# Patient Record
Sex: FEMALE | Race: BLACK OR AFRICAN AMERICAN | Employment: OTHER
[De-identification: names, ages, dates, MRNs, and addresses within clinical notes are randomized per-mention and may not be internally consistent; named-entity substitution may affect disease eponyms.]

---

## 2017-01-12 ENCOUNTER — OFFICE VISIT (OUTPATIENT)
Facility: CLINIC | Age: 67
End: 2017-01-12

## 2017-01-12 VITALS
WEIGHT: 232 LBS | BODY MASS INDEX: 38.65 KG/M2 | TEMPERATURE: 98.4 F | DIASTOLIC BLOOD PRESSURE: 80 MMHG | OXYGEN SATURATION: 98 % | SYSTOLIC BLOOD PRESSURE: 128 MMHG | HEART RATE: 68 BPM | HEIGHT: 65 IN

## 2017-01-12 DIAGNOSIS — M25.511 CHRONIC RIGHT SHOULDER PAIN: ICD-10-CM

## 2017-01-12 DIAGNOSIS — M15.9 PRIMARY OSTEOARTHRITIS INVOLVING MULTIPLE JOINTS: ICD-10-CM

## 2017-01-12 DIAGNOSIS — M79.7 FIBROMYALGIA: ICD-10-CM

## 2017-01-12 DIAGNOSIS — E66.9 OBESITY (BMI 30-39.9): ICD-10-CM

## 2017-01-12 DIAGNOSIS — G47.30 SLEEP APNEA, UNSPECIFIED TYPE: ICD-10-CM

## 2017-01-12 DIAGNOSIS — E78.5 HYPERLIPIDEMIA, UNSPECIFIED HYPERLIPIDEMIA TYPE: ICD-10-CM

## 2017-01-12 DIAGNOSIS — I10 ESSENTIAL HYPERTENSION: Primary | ICD-10-CM

## 2017-01-12 DIAGNOSIS — E03.8 OTHER SPECIFIED HYPOTHYROIDISM: ICD-10-CM

## 2017-01-12 DIAGNOSIS — G89.29 CHRONIC RIGHT SHOULDER PAIN: ICD-10-CM

## 2017-01-12 PROCEDURE — 99214 OFFICE O/P EST MOD 30 MIN: CPT | Performed by: FAMILY MEDICINE

## 2017-01-12 PROCEDURE — 20610 DRAIN/INJ JOINT/BURSA W/O US: CPT | Performed by: FAMILY MEDICINE

## 2017-01-12 RX ORDER — LIDOCAINE HYDROCHLORIDE 20 MG/ML
1 INJECTION, SOLUTION INFILTRATION; PERINEURAL ONCE
Status: COMPLETED | OUTPATIENT
Start: 2017-01-12 | End: 2017-01-12

## 2017-01-12 RX ORDER — TRIAMCINOLONE ACETONIDE 40 MG/ML
40 INJECTION, SUSPENSION INTRA-ARTICULAR; INTRAMUSCULAR ONCE
Status: COMPLETED | OUTPATIENT
Start: 2017-01-12 | End: 2017-01-12

## 2017-01-12 RX ADMIN — LIDOCAINE HYDROCHLORIDE 1 ML: 20 INJECTION, SOLUTION INFILTRATION; PERINEURAL at 15:41

## 2017-01-12 RX ADMIN — TRIAMCINOLONE ACETONIDE 40 MG: 40 INJECTION, SUSPENSION INTRA-ARTICULAR; INTRAMUSCULAR at 15:42

## 2017-01-16 DIAGNOSIS — I10 ESSENTIAL HYPERTENSION: ICD-10-CM

## 2017-01-16 RX ORDER — BUPROPION HYDROCHLORIDE 150 MG/1
150 TABLET ORAL EVERY MORNING
Qty: 90 TABLET | Refills: 1 | Status: SHIPPED | OUTPATIENT
Start: 2017-01-16 | End: 2017-05-24 | Stop reason: DRUGHIGH

## 2017-01-16 RX ORDER — LEVOTHYROXINE SODIUM 0.12 MG/1
TABLET ORAL
Qty: 30 TABLET | Refills: 5 | Status: SHIPPED | OUTPATIENT
Start: 2017-01-16 | End: 2017-08-29 | Stop reason: SDUPTHER

## 2017-01-16 RX ORDER — LIDOCAINE 50 MG/G
2 PATCH TOPICAL DAILY PRN
Qty: 30 PATCH | Refills: 5 | Status: SHIPPED | OUTPATIENT
Start: 2017-01-16 | End: 2017-01-30 | Stop reason: SDUPTHER

## 2017-01-16 RX ORDER — ERGOCALCIFEROL (VITAMIN D2) 1250 MCG
50000 CAPSULE ORAL
Qty: 12 CAPSULE | Refills: 1 | Status: SHIPPED | OUTPATIENT
Start: 2017-01-16 | End: 2017-08-29 | Stop reason: SDUPTHER

## 2017-01-24 DIAGNOSIS — L85.3 DRY SKIN: ICD-10-CM

## 2017-01-25 RX ORDER — AMMONIUM LACTATE 12 G/100G
LOTION TOPICAL
Qty: 57 G | Refills: 2 | Status: SHIPPED | OUTPATIENT
Start: 2017-01-25 | End: 2017-09-03 | Stop reason: SDUPTHER

## 2017-01-30 RX ORDER — LIDOCAINE 50 MG/G
2 PATCH TOPICAL DAILY PRN
Qty: 30 PATCH | Refills: 5 | Status: SHIPPED | OUTPATIENT
Start: 2017-01-30 | End: 2017-02-16 | Stop reason: SDUPTHER

## 2017-02-09 ENCOUNTER — HOSPITAL ENCOUNTER (OUTPATIENT)
Dept: PHARMACY | Age: 67
Setting detail: THERAPIES SERIES
Discharge: HOME | End: 2017-02-09
Payer: COMMERCIAL

## 2017-02-09 LAB
INR BLD: 2.2
PROTIME: 26.6 SECONDS

## 2017-02-09 PROCEDURE — 85610 PROTHROMBIN TIME: CPT

## 2017-02-09 PROCEDURE — G0463 HOSPITAL OUTPT CLINIC VISIT: HCPCS

## 2017-02-16 ENCOUNTER — OFFICE VISIT (OUTPATIENT)
Facility: CLINIC | Age: 67
End: 2017-02-16

## 2017-02-16 VITALS
WEIGHT: 235.4 LBS | DIASTOLIC BLOOD PRESSURE: 76 MMHG | OXYGEN SATURATION: 98 % | BODY MASS INDEX: 39.22 KG/M2 | TEMPERATURE: 98 F | SYSTOLIC BLOOD PRESSURE: 128 MMHG | HEART RATE: 80 BPM

## 2017-02-16 DIAGNOSIS — E66.9 OBESITY (BMI 30-39.9): ICD-10-CM

## 2017-02-16 DIAGNOSIS — M25.511 CHRONIC PAIN OF BOTH SHOULDERS: ICD-10-CM

## 2017-02-16 DIAGNOSIS — M15.9 PRIMARY OSTEOARTHRITIS INVOLVING MULTIPLE JOINTS: ICD-10-CM

## 2017-02-16 DIAGNOSIS — F32.A DEPRESSION, UNSPECIFIED DEPRESSION TYPE: ICD-10-CM

## 2017-02-16 DIAGNOSIS — E03.8 OTHER SPECIFIED HYPOTHYROIDISM: ICD-10-CM

## 2017-02-16 DIAGNOSIS — N30.00 ACUTE CYSTITIS WITHOUT HEMATURIA: ICD-10-CM

## 2017-02-16 DIAGNOSIS — E78.5 HYPERLIPIDEMIA, UNSPECIFIED HYPERLIPIDEMIA TYPE: ICD-10-CM

## 2017-02-16 DIAGNOSIS — M25.512 CHRONIC PAIN OF BOTH SHOULDERS: ICD-10-CM

## 2017-02-16 DIAGNOSIS — I10 ESSENTIAL HYPERTENSION: ICD-10-CM

## 2017-02-16 DIAGNOSIS — R30.0 DYSURIA: ICD-10-CM

## 2017-02-16 DIAGNOSIS — M79.7 FIBROMYALGIA: Primary | ICD-10-CM

## 2017-02-16 DIAGNOSIS — G89.29 CHRONIC PAIN OF BOTH SHOULDERS: ICD-10-CM

## 2017-02-16 DIAGNOSIS — Z12.39 BREAST CANCER SCREENING: ICD-10-CM

## 2017-02-16 DIAGNOSIS — F41.9 ANXIETY: ICD-10-CM

## 2017-02-16 DIAGNOSIS — G47.30 SLEEP APNEA, UNSPECIFIED TYPE: ICD-10-CM

## 2017-02-16 LAB
BILIRUBIN, POC: ABNORMAL
BLOOD URINE, POC: ABNORMAL
CLARITY, POC: ABNORMAL
COLOR, POC: YELLOW
GLUCOSE URINE, POC: ABNORMAL
KETONES, POC: ABNORMAL
LEUKOCYTE EST, POC: ABNORMAL
NITRITE, POC: ABNORMAL
PH, POC: 7.5
PROTEIN, POC: ABNORMAL
SPECIFIC GRAVITY, POC: 1.02
UROBILINOGEN, POC: ABNORMAL

## 2017-02-16 PROCEDURE — 99215 OFFICE O/P EST HI 40 MIN: CPT | Performed by: FAMILY MEDICINE

## 2017-02-16 PROCEDURE — 81002 URINALYSIS NONAUTO W/O SCOPE: CPT | Performed by: FAMILY MEDICINE

## 2017-02-16 RX ORDER — LIDOCAINE 50 MG/G
2 PATCH TOPICAL DAILY PRN
Qty: 30 PATCH | Refills: 1 | Status: SHIPPED | OUTPATIENT
Start: 2017-02-16 | End: 2017-04-13 | Stop reason: SDUPTHER

## 2017-02-16 RX ORDER — NITROFURANTOIN 25; 75 MG/1; MG/1
100 CAPSULE ORAL 2 TIMES DAILY
Qty: 20 CAPSULE | Refills: 0 | Status: SHIPPED | OUTPATIENT
Start: 2017-02-16 | End: 2017-02-26

## 2017-02-21 ENCOUNTER — TELEPHONE (OUTPATIENT)
Facility: CLINIC | Age: 67
End: 2017-02-21

## 2017-02-27 DIAGNOSIS — M79.7 FIBROMYALGIA: ICD-10-CM

## 2017-03-01 ENCOUNTER — OFFICE VISIT (OUTPATIENT)
Facility: CLINIC | Age: 67
End: 2017-03-01

## 2017-03-01 VITALS
SYSTOLIC BLOOD PRESSURE: 152 MMHG | WEIGHT: 236.6 LBS | DIASTOLIC BLOOD PRESSURE: 90 MMHG | BODY MASS INDEX: 39.42 KG/M2 | OXYGEN SATURATION: 97 % | TEMPERATURE: 98.6 F | HEART RATE: 69 BPM

## 2017-03-01 DIAGNOSIS — F32.A DEPRESSION, UNSPECIFIED DEPRESSION TYPE: ICD-10-CM

## 2017-03-01 DIAGNOSIS — F41.9 ANXIETY: ICD-10-CM

## 2017-03-01 DIAGNOSIS — E03.8 OTHER SPECIFIED HYPOTHYROIDISM: ICD-10-CM

## 2017-03-01 DIAGNOSIS — G47.33 OBSTRUCTIVE SLEEP APNEA SYNDROME: ICD-10-CM

## 2017-03-01 DIAGNOSIS — R42 VERTIGO: Primary | ICD-10-CM

## 2017-03-01 DIAGNOSIS — E66.9 OBESITY (BMI 30-39.9): ICD-10-CM

## 2017-03-01 DIAGNOSIS — M79.7 FIBROMYALGIA: ICD-10-CM

## 2017-03-01 DIAGNOSIS — I10 ESSENTIAL HYPERTENSION: ICD-10-CM

## 2017-03-01 DIAGNOSIS — E78.5 HYPERLIPIDEMIA, UNSPECIFIED HYPERLIPIDEMIA TYPE: ICD-10-CM

## 2017-03-01 PROCEDURE — 99214 OFFICE O/P EST MOD 30 MIN: CPT | Performed by: FAMILY MEDICINE

## 2017-03-01 RX ORDER — VALSARTAN 160 MG/1
160 TABLET ORAL DAILY
Qty: 30 TABLET | Refills: 3 | Status: SHIPPED | OUTPATIENT
Start: 2017-03-01 | End: 2017-07-26 | Stop reason: SDUPTHER

## 2017-03-01 RX ORDER — ACETAMINOPHEN AND CODEINE PHOSPHATE 300; 30 MG/1; MG/1
TABLET ORAL
Qty: 120 TABLET | Refills: 0 | Status: SHIPPED | OUTPATIENT
Start: 2017-03-01 | End: 2017-03-31 | Stop reason: SDUPTHER

## 2017-03-01 RX ORDER — TRAMADOL HYDROCHLORIDE 50 MG/1
TABLET ORAL
Qty: 180 TABLET | Refills: 0 | Status: SHIPPED | OUTPATIENT
Start: 2017-03-01 | End: 2017-03-31 | Stop reason: SDUPTHER

## 2017-03-02 ENCOUNTER — TELEPHONE (OUTPATIENT)
Facility: CLINIC | Age: 67
End: 2017-03-02

## 2017-03-02 RX ORDER — ACETAMINOPHEN AND CODEINE PHOSPHATE 300; 30 MG/1; MG/1
TABLET ORAL
Qty: 120 TABLET | Refills: 0 | OUTPATIENT
Start: 2017-03-02

## 2017-03-02 RX ORDER — TRAMADOL HYDROCHLORIDE 50 MG/1
TABLET ORAL
Qty: 180 TABLET | Refills: 0 | OUTPATIENT
Start: 2017-03-02

## 2017-03-09 ENCOUNTER — APPOINTMENT (OUTPATIENT)
Dept: PHARMACY | Age: 67
End: 2017-03-09
Payer: COMMERCIAL

## 2017-03-10 ENCOUNTER — HOSPITAL ENCOUNTER (OUTPATIENT)
Dept: PHARMACY | Age: 67
Setting detail: THERAPIES SERIES
Discharge: HOME | End: 2017-03-10
Payer: COMMERCIAL

## 2017-03-10 LAB
INR BLD: 2.3
PROTIME: 28 SECONDS

## 2017-03-10 PROCEDURE — 85610 PROTHROMBIN TIME: CPT

## 2017-03-10 PROCEDURE — G0463 HOSPITAL OUTPT CLINIC VISIT: HCPCS

## 2017-04-07 ENCOUNTER — HOSPITAL ENCOUNTER (OUTPATIENT)
Dept: PHARMACY | Age: 67
Setting detail: THERAPIES SERIES
End: 2017-04-07
Payer: COMMERCIAL

## 2017-04-12 ENCOUNTER — HOSPITAL ENCOUNTER (OUTPATIENT)
Dept: PHARMACY | Age: 67
Setting detail: THERAPIES SERIES
Discharge: HOME | End: 2017-04-12
Payer: COMMERCIAL

## 2017-04-12 ENCOUNTER — APPOINTMENT (OUTPATIENT)
Dept: PHARMACY | Age: 67
End: 2017-04-12
Payer: COMMERCIAL

## 2017-04-12 LAB
INR BLD: 2
PROTIME: 24.1 SECONDS

## 2017-04-12 PROCEDURE — G0463 HOSPITAL OUTPT CLINIC VISIT: HCPCS

## 2017-04-12 PROCEDURE — 85610 PROTHROMBIN TIME: CPT

## 2017-04-13 DIAGNOSIS — M25.512 CHRONIC PAIN OF BOTH SHOULDERS: ICD-10-CM

## 2017-04-13 DIAGNOSIS — M79.7 FIBROMYALGIA: ICD-10-CM

## 2017-04-13 DIAGNOSIS — M15.9 PRIMARY OSTEOARTHRITIS INVOLVING MULTIPLE JOINTS: ICD-10-CM

## 2017-04-13 DIAGNOSIS — M25.511 CHRONIC PAIN OF BOTH SHOULDERS: ICD-10-CM

## 2017-04-13 DIAGNOSIS — G89.29 CHRONIC PAIN OF BOTH SHOULDERS: ICD-10-CM

## 2017-04-13 RX ORDER — LIDOCAINE 50 MG/G
PATCH TOPICAL
Qty: 30 PATCH | Refills: 1 | Status: SHIPPED | OUTPATIENT
Start: 2017-04-13 | End: 2017-07-26 | Stop reason: SDUPTHER

## 2017-05-02 ENCOUNTER — HOSPITAL ENCOUNTER (OUTPATIENT)
Dept: PHARMACY | Age: 67
Setting detail: THERAPIES SERIES
Discharge: HOME | End: 2017-05-02
Payer: COMMERCIAL

## 2017-05-02 DIAGNOSIS — I82.4Y9 DEEP VEIN THROMBOSIS (DVT) OF PROXIMAL LOWER EXTREMITY, UNSPECIFIED CHRONICITY, UNSPECIFIED LATERALITY (HCC): ICD-10-CM

## 2017-05-02 LAB
INR BLD: 3.1
PROTIME: 37.1 SECONDS

## 2017-05-02 PROCEDURE — 99211 OFF/OP EST MAY X REQ PHY/QHP: CPT

## 2017-05-02 PROCEDURE — 85610 PROTHROMBIN TIME: CPT

## 2017-05-16 ENCOUNTER — HOSPITAL ENCOUNTER (OUTPATIENT)
Dept: PHARMACY | Age: 67
Setting detail: THERAPIES SERIES
End: 2017-05-16
Payer: COMMERCIAL

## 2017-05-19 ENCOUNTER — HOSPITAL ENCOUNTER (OUTPATIENT)
Dept: PHARMACY | Age: 67
Setting detail: THERAPIES SERIES
Discharge: HOME | End: 2017-05-19
Payer: COMMERCIAL

## 2017-05-19 LAB
INR BLD: 2.6
PROTIME: 30.9 SECONDS

## 2017-05-19 PROCEDURE — 85610 PROTHROMBIN TIME: CPT

## 2017-05-19 PROCEDURE — 99211 OFF/OP EST MAY X REQ PHY/QHP: CPT

## 2017-06-13 RX ORDER — SIMVASTATIN 40 MG
TABLET ORAL
Qty: 90 TABLET | Refills: 10 | Status: SHIPPED | OUTPATIENT
Start: 2017-06-13 | End: 2018-07-02 | Stop reason: SDUPTHER

## 2017-06-13 RX ORDER — WARFARIN SODIUM 5 MG/1
TABLET ORAL
Qty: 90 TABLET | Refills: 10 | Status: SHIPPED | OUTPATIENT
Start: 2017-06-13 | End: 2017-06-16 | Stop reason: ALTCHOICE

## 2017-06-16 ENCOUNTER — HOSPITAL ENCOUNTER (OUTPATIENT)
Dept: PHARMACY | Age: 67
Setting detail: THERAPIES SERIES
Discharge: HOME | End: 2017-06-16
Payer: COMMERCIAL

## 2017-06-16 LAB
INR BLD: 1.9
PROTIME: 22.8 SECONDS

## 2017-06-16 PROCEDURE — 85610 PROTHROMBIN TIME: CPT

## 2017-06-16 PROCEDURE — 99211 OFF/OP EST MAY X REQ PHY/QHP: CPT

## 2017-06-30 ENCOUNTER — HOSPITAL ENCOUNTER (OUTPATIENT)
Dept: PHARMACY | Age: 67
Setting detail: THERAPIES SERIES
Discharge: HOME | End: 2017-06-30
Payer: COMMERCIAL

## 2017-06-30 LAB
INR BLD: 4.3
PROTIME: 51.2 SECONDS

## 2017-06-30 PROCEDURE — 85610 PROTHROMBIN TIME: CPT

## 2017-06-30 PROCEDURE — 99211 OFF/OP EST MAY X REQ PHY/QHP: CPT

## 2017-07-05 ENCOUNTER — HOSPITAL ENCOUNTER (OUTPATIENT)
Dept: PHARMACY | Age: 67
Setting detail: THERAPIES SERIES
Discharge: HOME | End: 2017-07-05
Payer: COMMERCIAL

## 2017-07-05 LAB
INR BLD: 1.2
PROTIME: 14 SECONDS

## 2017-07-05 PROCEDURE — 85610 PROTHROMBIN TIME: CPT

## 2017-07-05 PROCEDURE — 99211 OFF/OP EST MAY X REQ PHY/QHP: CPT

## 2017-07-10 ENCOUNTER — HOSPITAL ENCOUNTER (OUTPATIENT)
Dept: PHARMACY | Age: 67
Setting detail: THERAPIES SERIES
Discharge: HOME | End: 2017-07-10
Payer: COMMERCIAL

## 2017-07-10 LAB
INR BLD: 2.4
PROTIME: 29.3 SECONDS

## 2017-07-10 PROCEDURE — 99211 OFF/OP EST MAY X REQ PHY/QHP: CPT

## 2017-07-10 PROCEDURE — 85610 PROTHROMBIN TIME: CPT

## 2017-07-17 ENCOUNTER — APPOINTMENT (OUTPATIENT)
Dept: PHARMACY | Age: 67
End: 2017-07-17
Payer: COMMERCIAL

## 2017-07-19 ENCOUNTER — HOSPITAL ENCOUNTER (OUTPATIENT)
Dept: PHARMACY | Age: 67
Setting detail: THERAPIES SERIES
Discharge: HOME | End: 2017-07-19
Payer: COMMERCIAL

## 2017-07-19 DIAGNOSIS — I82.403 ACUTE DEEP VEIN THROMBOSIS (DVT) OF BOTH LOWER EXTREMITIES, UNSPECIFIED VEIN (HCC): ICD-10-CM

## 2017-07-19 LAB
INR BLD: 3.7
PROTIME: 44.4 SECONDS

## 2017-07-19 PROCEDURE — 99211 OFF/OP EST MAY X REQ PHY/QHP: CPT

## 2017-07-19 PROCEDURE — 85610 PROTHROMBIN TIME: CPT

## 2017-07-28 ENCOUNTER — HOSPITAL ENCOUNTER (OUTPATIENT)
Dept: PHARMACY | Age: 67
Setting detail: THERAPIES SERIES
Discharge: HOME | End: 2017-07-28
Payer: COMMERCIAL

## 2017-07-28 LAB
INR BLD: 2.5
PROTIME: 30 SECONDS

## 2017-07-28 PROCEDURE — 85610 PROTHROMBIN TIME: CPT

## 2017-07-28 PROCEDURE — 99211 OFF/OP EST MAY X REQ PHY/QHP: CPT

## 2017-08-11 ENCOUNTER — HOSPITAL ENCOUNTER (OUTPATIENT)
Dept: PHARMACY | Age: 67
Setting detail: THERAPIES SERIES
Discharge: HOME | End: 2017-08-11
Payer: COMMERCIAL

## 2017-08-11 DIAGNOSIS — I82.90 DEEP VEIN THROMBOSIS (DVT) OF NON-EXTREMITY VEIN, UNSPECIFIED CHRONICITY: ICD-10-CM

## 2017-08-11 LAB
INR BLD: 2.3
PROTIME: 27.3 SECONDS

## 2017-08-11 PROCEDURE — 99211 OFF/OP EST MAY X REQ PHY/QHP: CPT

## 2017-08-11 PROCEDURE — 85610 PROTHROMBIN TIME: CPT

## 2017-08-25 ENCOUNTER — HOSPITAL ENCOUNTER (OUTPATIENT)
Dept: PHARMACY | Age: 67
Setting detail: THERAPIES SERIES
Discharge: HOME | End: 2017-08-25
Payer: COMMERCIAL

## 2017-08-25 LAB
INR BLD: 2.9
PROTIME: 35 SECONDS

## 2017-08-25 PROCEDURE — 99211 OFF/OP EST MAY X REQ PHY/QHP: CPT

## 2017-08-25 PROCEDURE — 85610 PROTHROMBIN TIME: CPT

## 2017-08-29 RX ORDER — BUPROPION HYDROCHLORIDE 150 MG/1
TABLET ORAL
Qty: 90 TABLET | Refills: 1 | Status: SHIPPED | OUTPATIENT
Start: 2017-08-29 | End: 2017-11-16

## 2017-08-29 RX ORDER — ERGOCALCIFEROL 1.25 MG/1
CAPSULE ORAL
Qty: 12 CAPSULE | Refills: 1 | Status: SHIPPED | OUTPATIENT
Start: 2017-08-29 | End: 2018-03-13 | Stop reason: SDUPTHER

## 2017-08-29 RX ORDER — LEVOTHYROXINE SODIUM 0.12 MG/1
TABLET ORAL
Qty: 30 TABLET | Refills: 5 | Status: SHIPPED | OUTPATIENT
Start: 2017-08-29 | End: 2018-04-13 | Stop reason: SDUPTHER

## 2017-08-29 RX ORDER — FLUTICASONE PROPIONATE 50 MCG
SPRAY, SUSPENSION (ML) NASAL
Qty: 16 G | Refills: 11 | Status: SHIPPED | OUTPATIENT
Start: 2017-08-29 | End: 2019-08-02 | Stop reason: DRUGHIGH

## 2017-09-21 ENCOUNTER — HOSPITAL ENCOUNTER (OUTPATIENT)
Dept: PHARMACY | Age: 67
Setting detail: THERAPIES SERIES
Discharge: HOME | End: 2017-09-21
Payer: COMMERCIAL

## 2017-09-21 DIAGNOSIS — I82.403 DEEP VEIN THROMBOSIS (DVT) OF BOTH LOWER EXTREMITIES, UNSPECIFIED CHRONICITY, UNSPECIFIED VEIN (HCC): ICD-10-CM

## 2017-09-21 LAB
INR BLD: 1.2
PROTIME: 14.5 SECONDS

## 2017-09-21 PROCEDURE — 85610 PROTHROMBIN TIME: CPT

## 2017-09-21 PROCEDURE — 99211 OFF/OP EST MAY X REQ PHY/QHP: CPT

## 2017-09-28 ENCOUNTER — OFFICE VISIT (OUTPATIENT)
Dept: PSYCHOLOGY | Age: 67
End: 2017-09-28
Payer: COMMERCIAL

## 2017-09-28 DIAGNOSIS — F33.1 DEPRESSION, MAJOR, RECURRENT, MODERATE (HCC): Primary | ICD-10-CM

## 2017-09-28 PROCEDURE — 90791 PSYCH DIAGNOSTIC EVALUATION: CPT | Performed by: SOCIAL WORKER

## 2017-09-28 ASSESSMENT — PATIENT HEALTH QUESTIONNAIRE - PHQ9
SUM OF ALL RESPONSES TO PHQ QUESTIONS 1-9: 19
3. TROUBLE FALLING OR STAYING ASLEEP: 3
6. FEELING BAD ABOUT YOURSELF - OR THAT YOU ARE A FAILURE OR HAVE LET YOURSELF OR YOUR FAMILY DOWN: 2
SUM OF ALL RESPONSES TO PHQ9 QUESTIONS 1 & 2: 6
10. IF YOU CHECKED OFF ANY PROBLEMS, HOW DIFFICULT HAVE THESE PROBLEMS MADE IT FOR YOU TO DO YOUR WORK, TAKE CARE OF THINGS AT HOME, OR GET ALONG WITH OTHER PEOPLE: 2
7. TROUBLE CONCENTRATING ON THINGS, SUCH AS READING THE NEWSPAPER OR WATCHING TELEVISION: 2
5. POOR APPETITE OR OVEREATING: 2
2. FEELING DOWN, DEPRESSED OR HOPELESS: 3
1. LITTLE INTEREST OR PLEASURE IN DOING THINGS: 3
4. FEELING TIRED OR HAVING LITTLE ENERGY: 3
9. THOUGHTS THAT YOU WOULD BE BETTER OFF DEAD, OR OF HURTING YOURSELF: 1
8. MOVING OR SPEAKING SO SLOWLY THAT OTHER PEOPLE COULD HAVE NOTICED. OR THE OPPOSITE, BEING SO FIGETY OR RESTLESS THAT YOU HAVE BEEN MOVING AROUND A LOT MORE THAN USUAL: 0

## 2017-10-05 ENCOUNTER — HOSPITAL ENCOUNTER (OUTPATIENT)
Dept: PHARMACY | Age: 67
Setting detail: THERAPIES SERIES
Discharge: HOME | End: 2017-10-05
Payer: COMMERCIAL

## 2017-10-05 DIAGNOSIS — I82.403 DEEP VEIN THROMBOSIS (DVT) OF BOTH LOWER EXTREMITIES, UNSPECIFIED CHRONICITY, UNSPECIFIED VEIN (HCC): ICD-10-CM

## 2017-10-05 LAB
INR BLD: 4.8
PROTIME: 58 SECONDS

## 2017-10-05 PROCEDURE — 99211 OFF/OP EST MAY X REQ PHY/QHP: CPT

## 2017-10-05 PROCEDURE — 85610 PROTHROMBIN TIME: CPT

## 2017-10-05 NOTE — PROGRESS NOTES
Patient states compliant all of the time with regimen. No bleeding or thromboembolic side effects noted. No significant med or dietary changes. No significant recent illness or disease state changes. PT/INR done in office per protocol. INR was supratherapeutic. INR = 4.8, goal range 2-3    Warfarin regimen will be held for one day and decreased to 2.5 mg daily. Will retest in 1 week. Patient understands dosing directions and information discussed. Dosing schedule and follow up appointment given to patient. Progress note routed to referring physicians office.

## 2017-10-09 ENCOUNTER — HOSPITAL ENCOUNTER (OUTPATIENT)
Dept: PAIN MANAGEMENT | Age: 67
Discharge: HOME | End: 2017-10-09
Payer: COMMERCIAL

## 2017-10-12 ENCOUNTER — OFFICE VISIT (OUTPATIENT)
Dept: PSYCHOLOGY | Age: 67
End: 2017-10-12
Payer: COMMERCIAL

## 2017-10-12 ENCOUNTER — HOSPITAL ENCOUNTER (OUTPATIENT)
Dept: PHARMACY | Age: 67
Setting detail: THERAPIES SERIES
Discharge: HOME | End: 2017-10-12
Payer: COMMERCIAL

## 2017-10-12 DIAGNOSIS — F33.1 DEPRESSION, MAJOR, RECURRENT, MODERATE (HCC): Primary | ICD-10-CM

## 2017-10-12 DIAGNOSIS — I82.403 DEEP VEIN THROMBOSIS (DVT) OF BOTH LOWER EXTREMITIES, UNSPECIFIED CHRONICITY, UNSPECIFIED VEIN (HCC): ICD-10-CM

## 2017-10-12 LAB
INR BLD: 1.5
PROTIME: 17.4 SECONDS

## 2017-10-12 PROCEDURE — 90832 PSYTX W PT 30 MINUTES: CPT | Performed by: SOCIAL WORKER

## 2017-10-12 PROCEDURE — 85610 PROTHROMBIN TIME: CPT

## 2017-10-12 PROCEDURE — 99211 OFF/OP EST MAY X REQ PHY/QHP: CPT

## 2017-10-12 NOTE — PROGRESS NOTES
Medication Sig Dispense Refill    lidocaine (LIDODERM) 5 % APPLY 2 PATCHES TO THE SKIN DAILY AS NEEDED FOR PAIN 30 patch 1    traMADol (ULTRAM) 50 MG tablet TAKE TWO TABLETS BY MOUTH EVERY 8 HOURS AS NEEDED. Do not fill until 9/30/17. 180 tablet 0    acetaminophen-codeine (TYLENOL #3) 300-30 MG per tablet TAKE ONE TABLET BY MOUTH EVERY 6 HOURS AS NEEDED. Do not fill until 9/30/17. (Patient taking differently: 1 tablet every 8 hours as needed TAKE ONE TABLET BY MOUTH EVERY 6 HOURS AS NEEDED. Do not fill until 9/30/17.) 120 tablet 0    hydrocortisone 1 % cream APPLY TWO TIMES A DAY 28.35 g 1    ammonium lactate (LAC-HYDRIN) 12 % lotion APPLY TO AFFECTED AREA(S) DAILY 225 mL 1    vitamin D (ERGOCALCIFEROL) 68140 units CAPS capsule TAKE 1 CAPSULE BY MOUTH ONCE WEEKLY ON SATURDAYS 12 capsule 1    levothyroxine (SYNTHROID) 125 MCG tablet TAKE 1 TABLET BY MOUTH DAILY. 30 tablet 5    buPROPion (WELLBUTRIN XL) 150 MG extended release tablet TAKE 1 TABLET BY MOUTH EVERY MORNING 90 tablet 1    fluticasone (FLONASE) 50 MCG/ACT nasal spray INSTILL 2 SPRAYS IN EACH NOSTRIL ONCE DAILY 16 g 11    gabapentin (NEURONTIN) 600 MG tablet TAKE ONE (1) TABLET BY MOUTH 4 TIMES DAILY 120 tablet 3    valsartan (DIOVAN) 160 MG tablet Take 1 tablet by mouth daily 90 tablet 1    metoprolol tartrate (LOPRESSOR) 25 MG tablet Take 1 tablet by mouth 2 times daily 60 tablet 5    Gabapentin POWD Formula 8E: diclo3%+gaba6%+DMSO5%+prilo2%+baclofen2%+lido2%. Apply 1-2 gm topically to affected area TID- g 5    simvastatin (ZOCOR) 40 MG tablet TAKE 1 TABLET BY MOUTH EVERY NIGHT 90 tablet 10    buPROPion (WELLBUTRIN XL) 300 MG extended release tablet Take 1 tablet by mouth every morning 90 tablet 1    loratadine (CLARITIN) 10 MG tablet TAKE 1 TABLET BY MOUTH DAILY. 90 tablet 1    warfarin (COUMADIN) 5 MG tablet Take 2.5 mg by mouth daily Dosed by [facility]: 2.5 mg daily and 5 mg Thursdays.   1    acyclovir (ZOVIRAX) 200 MG capsule TAKE ONE (1) CAPSULE BY MOUTH TWICE DAILY 60 capsule 11    albuterol sulfate HFA (PROVENTIL HFA) 108 (90 BASE) MCG/ACT inhaler Inhale 1-2 puffs into the lungs every 4 hours as needed for Wheezing 1 Inhaler 0    Lancets MISC Test daily and as needed 50 each 3    Glucose Blood (BLOOD GLUCOSE TEST STRIPS) STRP Test blood sugar once daily. 50 strip 1     No current facility-administered medications for this visit. Social History:   Social History     Social History    Marital status: Single     Spouse name: N/A    Number of children: 2    Years of education: 13     Occupational History    disabled       SSI      Social History Main Topics    Smoking status: Never Smoker    Smokeless tobacco: Never Used    Alcohol use No      Comment: never used     Drug use: No    Sexual activity: No     Other Topics Concern    Not on file     Social History Narrative    No narrative on file       TOBACCO:   reports that she has never smoked. She has never used smokeless tobacco.  ETOH:   reports that she does not drink alcohol. Family History:   Family History   Problem Relation Age of Onset    Cervical Cancer Mother     Heart Disease Mother     Heart Disease Father          Assessment:     The patient returns for her follow up visit related to depression. She reports trying to complete her homework but sates,\" I got angry because I started to see how my thinking was causing me to feel certain things and I got overwhelmed and just stopped trying to do the homework. Reviewed cognitive distortions and discussed a positive visualization strategy to help the patient begin to identify and release her feelings in a healthy manner. She continues to report experiencing depression, difficulty falling and staying asleep, isolation and decreased motivation to participate in social activities with friends and family. The patient denies SI/HI.  She agreed to return in 2

## 2017-10-12 NOTE — PATIENT INSTRUCTIONS
1. Identify the cognitive distortions you use and write down an example of when you use the ones you selected. 2. Use positive visualization to identify, feel and release emotions in a healthy manner. ie Name feeling, visualize color and shape and ask to leave, then imagine the feeling leaving in a creative way . 3. Return in 2 weeks.

## 2017-10-26 ENCOUNTER — HOSPITAL ENCOUNTER (OUTPATIENT)
Dept: PHARMACY | Age: 67
Setting detail: THERAPIES SERIES
Discharge: HOME | End: 2017-10-26
Payer: COMMERCIAL

## 2017-10-26 LAB
INR BLD: 1.8
PROTIME: 21.8 SECONDS

## 2017-10-26 PROCEDURE — 99211 OFF/OP EST MAY X REQ PHY/QHP: CPT

## 2017-10-26 PROCEDURE — 85610 PROTHROMBIN TIME: CPT

## 2017-10-26 NOTE — PROGRESS NOTES
Patient states compliant all of the time with regimen. No bleeding or thromboembolic side effects noted. No significant med or dietary changes. No significant recent illness or disease state changes. PT/INR done in office per protocol. INR was subtherapeutic. INR = 1.8, goal range 2-3    Warfarin regimen will be continued at current dose 5 mg Thursday and 2.5 mg all other days. Will retest in 2 weeks. Patient understands dosing directions and information discussed. Dosing schedule and follow up appointment given to patient. Progress note routed to referring physicians office.

## 2017-11-09 ENCOUNTER — HOSPITAL ENCOUNTER (OUTPATIENT)
Dept: PHARMACY | Age: 67
Setting detail: THERAPIES SERIES
Discharge: HOME | End: 2017-11-09
Payer: COMMERCIAL

## 2017-11-09 DIAGNOSIS — I82.403 DEEP VEIN THROMBOSIS (DVT) OF BOTH LOWER EXTREMITIES, UNSPECIFIED CHRONICITY, UNSPECIFIED VEIN (HCC): ICD-10-CM

## 2017-11-09 LAB
INR BLD: 1.2
PROTIME: 13.8 SECONDS

## 2017-11-09 PROCEDURE — 85610 PROTHROMBIN TIME: CPT

## 2017-11-09 PROCEDURE — 99211 OFF/OP EST MAY X REQ PHY/QHP: CPT

## 2017-11-16 ENCOUNTER — HOSPITAL ENCOUNTER (OUTPATIENT)
Dept: PHARMACY | Age: 67
Setting detail: THERAPIES SERIES
Discharge: HOME | End: 2017-11-16
Payer: COMMERCIAL

## 2017-11-16 ENCOUNTER — HOSPITAL ENCOUNTER (OUTPATIENT)
Age: 67
Setting detail: SPECIMEN
Discharge: HOME | End: 2017-11-16
Payer: COMMERCIAL

## 2017-11-16 DIAGNOSIS — I82.403 DEEP VEIN THROMBOSIS (DVT) OF BOTH LOWER EXTREMITIES, UNSPECIFIED CHRONICITY, UNSPECIFIED VEIN (HCC): ICD-10-CM

## 2017-11-16 LAB
AMPHETAMINE SCREEN URINE: NEGATIVE
BARBITURATE SCREEN URINE: NEGATIVE
BENZODIAZEPINE SCREEN, URINE: NEGATIVE
BUPRENORPHINE URINE: ABNORMAL
CANNABINOID SCREEN URINE: NEGATIVE
COCAINE METABOLITE, URINE: NEGATIVE
INR BLD: 2.6
MDMA URINE: ABNORMAL
METHADONE SCREEN, URINE: POSITIVE
METHAMPHETAMINE, URINE: ABNORMAL
OPIATES, URINE: POSITIVE
OXYCODONE SCREEN URINE: NEGATIVE
PHENCYCLIDINE, URINE: NEGATIVE
PROPOXYPHENE, URINE: ABNORMAL
PROTIME: 31 SECONDS
TEST INFORMATION: ABNORMAL
TRICYCLIC ANTIDEPRESSANTS, UR: ABNORMAL

## 2017-11-16 PROCEDURE — 85610 PROTHROMBIN TIME: CPT

## 2017-11-16 PROCEDURE — 99211 OFF/OP EST MAY X REQ PHY/QHP: CPT

## 2017-11-30 ENCOUNTER — HOSPITAL ENCOUNTER (OUTPATIENT)
Dept: PHARMACY | Age: 67
Setting detail: THERAPIES SERIES
Discharge: HOME | End: 2017-11-30
Payer: COMMERCIAL

## 2017-11-30 DIAGNOSIS — I82.403 DEEP VEIN THROMBOSIS (DVT) OF BOTH LOWER EXTREMITIES, UNSPECIFIED CHRONICITY, UNSPECIFIED VEIN (HCC): ICD-10-CM

## 2017-11-30 LAB
INR BLD: 2.2
PROTIME: 26.2 SECONDS

## 2017-11-30 PROCEDURE — 99211 OFF/OP EST MAY X REQ PHY/QHP: CPT

## 2017-11-30 PROCEDURE — 85610 PROTHROMBIN TIME: CPT

## 2017-11-30 NOTE — PROGRESS NOTES
Patient states compliant all of the time with regimen. No bleeding or thromboembolic side effects noted. No significant med or dietary changes. Still on tramadol. Unsure when she will change to [facility]. No significant recent illness or disease state changes. PT/INR done in office per protocol. INR was therapeutic t 2.2 (goal 2-3). Warfarin regimen will be continued at current dose 5mg Mon/Wed and 2.5mg 5x weekly. Will retest in 4 weeks. Patient understands dosing directions and information discussed. Dosing schedule and follow up appointment given to patient. Progress note routed to referring physicians office.

## 2017-12-28 ENCOUNTER — HOSPITAL ENCOUNTER (OUTPATIENT)
Dept: PHARMACY | Age: 67
Setting detail: THERAPIES SERIES
Discharge: HOME | End: 2017-12-28
Payer: COMMERCIAL

## 2017-12-28 LAB
INR BLD: 2.6
PROTIME: 30.6 SECONDS

## 2017-12-28 PROCEDURE — 99211 OFF/OP EST MAY X REQ PHY/QHP: CPT

## 2017-12-28 PROCEDURE — 85610 PROTHROMBIN TIME: CPT

## 2018-01-09 ENCOUNTER — TELEPHONE (OUTPATIENT)
Dept: PHARMACY | Age: 68
End: 2018-01-09

## 2018-01-11 ENCOUNTER — HOSPITAL ENCOUNTER (OUTPATIENT)
Dept: PHARMACY | Age: 68
Setting detail: THERAPIES SERIES
Discharge: HOME | End: 2018-01-11
Payer: COMMERCIAL

## 2018-01-11 DIAGNOSIS — I82.403 DEEP VEIN THROMBOSIS (DVT) OF BOTH LOWER EXTREMITIES, UNSPECIFIED CHRONICITY, UNSPECIFIED VEIN (HCC): ICD-10-CM

## 2018-01-11 LAB
INR BLD: 2
PROTIME: 23.4 SECONDS

## 2018-01-11 PROCEDURE — 85610 PROTHROMBIN TIME: CPT

## 2018-01-11 PROCEDURE — 99211 OFF/OP EST MAY X REQ PHY/QHP: CPT

## 2018-01-11 NOTE — PROGRESS NOTES
Patient states compliant all of the time with regimen. No bleeding or thromboembolic side effects noted but does have more bruising on right arm and leg that are resolving but she does not know where they came from. No significant med or dietary changes. No significant recent illness or disease state changes. PT/INR done in office per protocol. INR was therapeutic at  2 (goal 2-3). Warfarin regimen will be continued at current dose of 5mg Mon/Wed and 2.5mg 5x weekly. Will retest in 4 weeks. Patient understands dosing directions and information discussed. Dosing schedule and follow up appointment given to patient. Progress note routed to referring physicians office.

## 2018-01-25 ENCOUNTER — HOSPITAL ENCOUNTER (OUTPATIENT)
Dept: PHARMACY | Age: 68
Setting detail: THERAPIES SERIES
Discharge: HOME | End: 2018-01-25
Payer: COMMERCIAL

## 2018-01-25 DIAGNOSIS — I82.403 DEEP VEIN THROMBOSIS (DVT) OF BOTH LOWER EXTREMITIES, UNSPECIFIED CHRONICITY, UNSPECIFIED VEIN (HCC): ICD-10-CM

## 2018-01-25 LAB
INR BLD: 1.4
PROTIME: 16.5 SECONDS

## 2018-01-25 PROCEDURE — 85610 PROTHROMBIN TIME: CPT

## 2018-01-25 PROCEDURE — 99211 OFF/OP EST MAY X REQ PHY/QHP: CPT

## 2018-02-02 ENCOUNTER — HOSPITAL ENCOUNTER (OUTPATIENT)
Dept: PHARMACY | Age: 68
Setting detail: THERAPIES SERIES
Discharge: HOME | End: 2018-02-02
Payer: COMMERCIAL

## 2018-02-02 LAB
INR BLD: 2.3
PROTIME: 27.2 SECONDS

## 2018-02-02 PROCEDURE — 99211 OFF/OP EST MAY X REQ PHY/QHP: CPT

## 2018-02-02 PROCEDURE — 85610 PROTHROMBIN TIME: CPT

## 2018-02-13 ENCOUNTER — HOSPITAL ENCOUNTER (EMERGENCY)
Age: 68
Discharge: HOME | End: 2018-02-13
Attending: EMERGENCY MEDICINE
Payer: COMMERCIAL

## 2018-02-13 ENCOUNTER — APPOINTMENT (OUTPATIENT)
Dept: GENERAL RADIOLOGY | Age: 68
End: 2018-02-13
Payer: COMMERCIAL

## 2018-02-13 VITALS
WEIGHT: 225 LBS | HEIGHT: 66 IN | HEART RATE: 83 BPM | SYSTOLIC BLOOD PRESSURE: 159 MMHG | OXYGEN SATURATION: 99 % | RESPIRATION RATE: 16 BRPM | DIASTOLIC BLOOD PRESSURE: 65 MMHG | BODY MASS INDEX: 36.16 KG/M2 | TEMPERATURE: 97.7 F

## 2018-02-13 DIAGNOSIS — G89.29 CHRONIC LEFT SHOULDER PAIN: Primary | ICD-10-CM

## 2018-02-13 DIAGNOSIS — M25.512 CHRONIC LEFT SHOULDER PAIN: Primary | ICD-10-CM

## 2018-02-13 PROCEDURE — 99283 EMERGENCY DEPT VISIT LOW MDM: CPT

## 2018-02-13 PROCEDURE — 73030 X-RAY EXAM OF SHOULDER: CPT

## 2018-02-13 RX ORDER — CYCLOBENZAPRINE HCL 10 MG
10 TABLET ORAL 3 TIMES DAILY PRN
Qty: 15 TABLET | Refills: 0 | Status: SHIPPED | OUTPATIENT
Start: 2018-02-13 | End: 2018-08-03 | Stop reason: ALTCHOICE

## 2018-02-13 RX ORDER — METHYLPREDNISOLONE 4 MG/1
TABLET ORAL
Qty: 1 KIT | Refills: 0 | Status: SHIPPED | OUTPATIENT
Start: 2018-02-13 | End: 2018-02-19

## 2018-02-13 ASSESSMENT — ENCOUNTER SYMPTOMS
SHORTNESS OF BREATH: 0
COUGH: 0
TROUBLE SWALLOWING: 0
VOMITING: 0
NAUSEA: 0

## 2018-02-13 ASSESSMENT — PAIN DESCRIPTION - DESCRIPTORS: DESCRIPTORS: ACHING

## 2018-02-13 ASSESSMENT — PAIN DESCRIPTION - LOCATION: LOCATION: SHOULDER

## 2018-02-13 ASSESSMENT — PAIN DESCRIPTION - ORIENTATION: ORIENTATION: LEFT

## 2018-02-13 ASSESSMENT — PAIN DESCRIPTION - PAIN TYPE: TYPE: ACUTE PAIN

## 2018-02-13 ASSESSMENT — PAIN SCALES - GENERAL: PAINLEVEL_OUTOF10: 9

## 2018-02-13 NOTE — ED PROVIDER NOTES
[address]  eMERGENCY dEPARTMENT eNCOUnter      Pt Name: Nima Lopez  MRN: [id number]  [place] 1950  Date of evaluation: 2/13/2018  Provider: Vanessa Dawkins [id number]       Chief Complaint   Patient presents with    Shoulder Pain     left          HISTORY OF PRESENT ILLNESS  (Location/Symptom, Timing/Onset, Context/Setting, Quality, Duration, Modifying Factors, Severity.)   Nima Lopez is a 74 y.o. female who presents to the emergency department for evaluation of  orthopedic pain:    Location/Symptom:  Left shoulder pain  Timing/Onset:  chronic  Context/Setting:  Patient presents to ED with complaints of left shoulder pain. States that pain is chronic but has gotten worse over the past few days. States she had injury in December and she felt some \"crunching noise\" in her left anterior shoulder. States she might have some arthritis in it as well. States that pain is constant, aching, worsened by movement and activity. Pain is worse and especially by activities overhead. She has been taking Tylenol 3 and applying Lidoderm patches with no improvement of her symptoms. No new injury. No numbness, tingling or weakness. No chest pain, shortness of breath, abdominal pain, nausea, vomiting, fever or chills. Quality:   Achy, sharp  Duration:   constant  Modifying Factors: Worse with movement, better with rest  Severity:   Mild-moderate      Nursing Notes were reviewed and I agree. REVIEW OF SYSTEMS    (2-9 systems for level 4, 10 or more for level 5)     Review of Systems   Constitutional: Negative for chills and fever. HENT: Negative for trouble swallowing. Respiratory: Negative for cough and shortness of breath. Cardiovascular: Negative for chest pain and palpitations. Gastrointestinal: Negative for nausea and vomiting. Musculoskeletal:        Left shoulder pain     Except as noted above the remainder of the review of systems was reviewed and negative. PAST MEDICAL HISTORY         Diagnosis Date    Affective disorder ([facility] [address]     seasonal    DVT (deep venous thrombosis) (HCC)     has history in R leg    Fibromyalgia     Hypertension     Pneumonia      Reviewed. SURGICAL HISTORY           Procedure Laterality Date    COLONOSCOPY  8/31/2011    internal hemorrhoides    DILATION AND CURETTAGE OF UTERUS      ECTOPIC PREGNANCY SURGERY Left     HYSTERECTOMY       Reviewed. CURRENT MEDICATIONS       Previous Medications    ACETAMINOPHEN-CODEINE (TYLENOL #3) 300-30 MG PER TABLET    TAKE ONE TABLET BY MOUTH EVERY 6 HOURS AS NEEDED FOR PAIN. ACYCLOVIR (ZOVIRAX) 200 MG CAPSULE    TAKE ONE (1) CAPSULE BY MOUTH TWICE DAILY    ALBUTEROL SULFATE HFA (PROVENTIL HFA) 108 (90 BASE) MCG/ACT INHALER    Inhale 1-2 puffs into the lungs every 4 hours as needed for Wheezing    AMMONIUM LACTATE (LAC-HYDRIN) 12 % LOTION    APPLY TO AFFECTED AREA(S) DAILY    BUPROPION (WELLBUTRIN XL) 300 MG EXTENDED RELEASE TABLET    Take 1 tablet by mouth every morning    FLUTICASONE (FLONASE) 50 MCG/ACT NASAL SPRAY    INSTILL 2 SPRAYS IN EACH NOSTRIL ONCE DAILY    GABAPENTIN (NEURONTIN) 800 MG TABLET    TAKE ONE (1) TABLET BY MOUTH 4 TIMES DAILY    GABAPENTIN POWD    Formula 8E: diclo3%+gaba6%+DMSO5%+prilo2%+baclofen2%+lido2%. Apply 1-2 gm topically to affected area TID-QID    GLUCOSE BLOOD (BLOOD GLUCOSE TEST STRIPS) STRP    Test blood sugar once daily. HYDROCORTISONE 1 % CREAM    APPLY TWO TIMES A DAY    LANCETS MISC    Test daily and as needed    LEVOTHYROXINE (SYNTHROID) 125 MCG TABLET    TAKE 1 TABLET BY MOUTH DAILY. LIDOCAINE (LIDODERM) 5 %    APPLY 2 PATCHES TO THE SKIN DAILY AS NEEDED FOR PAIN    LIDOCAINE (LIDODERM) 5 %    APPLY 2 PATCHES TO THE SKIN DAILY AS NEEDED FOR PAIN    LORATADINE (CLARITIN) 10 MG TABLET    TAKE 1 TABLET BY MOUTH DAILY.     METOPROLOL TARTRATE (LOPRESSOR) 25 MG TABLET    Take 1 tablet by mouth 2 times daily    SIMVASTATIN (ZOCOR) 40 MG TABLET    TAKE

## 2018-02-16 ENCOUNTER — HOSPITAL ENCOUNTER (OUTPATIENT)
Dept: PHARMACY | Age: 68
Setting detail: THERAPIES SERIES
Discharge: HOME | End: 2018-02-16
Payer: COMMERCIAL

## 2018-02-16 LAB
INR BLD: 1.9
PROTIME: 23.3 SECONDS

## 2018-02-16 PROCEDURE — 85610 PROTHROMBIN TIME: CPT

## 2018-02-16 PROCEDURE — 99211 OFF/OP EST MAY X REQ PHY/QHP: CPT

## 2018-03-09 ENCOUNTER — HOSPITAL ENCOUNTER (OUTPATIENT)
Dept: PHARMACY | Age: 68
Setting detail: THERAPIES SERIES
Discharge: HOME | End: 2018-03-09
Payer: COMMERCIAL

## 2018-03-09 DIAGNOSIS — I82.403 ACUTE DEEP VEIN THROMBOSIS (DVT) OF BOTH LOWER EXTREMITIES, UNSPECIFIED VEIN (HCC): ICD-10-CM

## 2018-03-09 LAB
INR BLD: 1.4
PROTIME: 17.1 SECONDS

## 2018-03-09 PROCEDURE — 99211 OFF/OP EST MAY X REQ PHY/QHP: CPT

## 2018-03-09 PROCEDURE — 85610 PROTHROMBIN TIME: CPT

## 2018-03-09 NOTE — PROGRESS NOTES
Patient states compliant all of the time with regimen. No bleeding or thromboembolic side effects noted. No significant dietary changes. No significant recent illness or disease state changes. Patient started on cephalexin yesterday for UTI. PT/INR done in office per protocol. INR was subtherapeutic. INR = 1.4, goal range 2-3    Warfarin regimen will be increased to 2.5 mg MWF and 5 mg all other days. WIll take 5 mg today  Will retest in 2 weeks. Patient understands dosing directions and information discussed. Dosing schedule and follow up appointment given to patient. Progress note routed to referring physicians office.

## 2018-03-23 ENCOUNTER — HOSPITAL ENCOUNTER (OUTPATIENT)
Dept: PHARMACY | Age: 68
Discharge: HOME | End: 2018-03-23

## 2018-03-29 ENCOUNTER — HOSPITAL ENCOUNTER (OUTPATIENT)
Dept: PHARMACY | Age: 68
Setting detail: THERAPIES SERIES
Discharge: HOME | End: 2018-03-29
Payer: COMMERCIAL

## 2018-03-29 DIAGNOSIS — I82.401 DEEP VEIN THROMBOSIS (DVT) OF RIGHT LOWER EXTREMITY, UNSPECIFIED CHRONICITY, UNSPECIFIED VEIN (HCC): ICD-10-CM

## 2018-03-29 LAB
INR BLD: 5.6
PROTIME: 67.7 SECONDS

## 2018-03-29 PROCEDURE — 85610 PROTHROMBIN TIME: CPT

## 2018-03-29 PROCEDURE — 99211 OFF/OP EST MAY X REQ PHY/QHP: CPT

## 2018-04-05 ENCOUNTER — HOSPITAL ENCOUNTER (OUTPATIENT)
Dept: PHARMACY | Age: 68
Setting detail: THERAPIES SERIES
Discharge: HOME | End: 2018-04-05
Payer: COMMERCIAL

## 2018-04-05 LAB
INR BLD: 1.1
PROTIME: 13.6 SECONDS

## 2018-04-05 PROCEDURE — 85610 PROTHROMBIN TIME: CPT

## 2018-04-05 PROCEDURE — 99211 OFF/OP EST MAY X REQ PHY/QHP: CPT

## 2018-04-12 ENCOUNTER — HOSPITAL ENCOUNTER (OUTPATIENT)
Dept: PHARMACY | Age: 68
Setting detail: THERAPIES SERIES
Discharge: HOME | End: 2018-04-12
Payer: COMMERCIAL

## 2018-04-12 DIAGNOSIS — I82.409 DEEP VEIN THROMBOSIS (DVT) OF LOWER EXTREMITY, UNSPECIFIED CHRONICITY, UNSPECIFIED LATERALITY, UNSPECIFIED VEIN (HCC): ICD-10-CM

## 2018-04-12 LAB
INR BLD: 2.4
PROTIME: 28.7 SECONDS

## 2018-04-12 PROCEDURE — 99211 OFF/OP EST MAY X REQ PHY/QHP: CPT

## 2018-04-12 PROCEDURE — 85610 PROTHROMBIN TIME: CPT

## 2018-04-13 RX ORDER — LEVOTHYROXINE SODIUM 0.12 MG/1
TABLET ORAL
Qty: 30 TABLET | Refills: 5 | Status: SHIPPED | OUTPATIENT
Start: 2018-04-13 | End: 2018-10-09 | Stop reason: SDUPTHER

## 2018-04-26 ENCOUNTER — HOSPITAL ENCOUNTER (OUTPATIENT)
Dept: PHARMACY | Age: 68
Setting detail: THERAPIES SERIES
Discharge: HOME | End: 2018-04-26
Payer: COMMERCIAL

## 2018-04-26 DIAGNOSIS — I82.409 DEEP VEIN THROMBOSIS (DVT) OF LOWER EXTREMITY, UNSPECIFIED CHRONICITY, UNSPECIFIED LATERALITY, UNSPECIFIED VEIN (HCC): ICD-10-CM

## 2018-04-26 LAB
INR BLD: 2.9
PROTIME: 34.7 SECONDS

## 2018-04-26 PROCEDURE — 85610 PROTHROMBIN TIME: CPT

## 2018-04-26 PROCEDURE — 99211 OFF/OP EST MAY X REQ PHY/QHP: CPT

## 2018-05-24 ENCOUNTER — HOSPITAL ENCOUNTER (OUTPATIENT)
Dept: PHARMACY | Age: 68
Setting detail: THERAPIES SERIES
Discharge: HOME | End: 2018-05-24
Payer: COMMERCIAL

## 2018-05-24 DIAGNOSIS — I82.409 DEEP VEIN THROMBOSIS (DVT) OF LOWER EXTREMITY, UNSPECIFIED CHRONICITY, UNSPECIFIED LATERALITY, UNSPECIFIED VEIN (HCC): ICD-10-CM

## 2018-05-24 LAB
INR BLD: 2.3
PROTIME: 27.8 SECONDS

## 2018-05-24 PROCEDURE — 99211 OFF/OP EST MAY X REQ PHY/QHP: CPT

## 2018-05-24 PROCEDURE — 85610 PROTHROMBIN TIME: CPT

## 2018-05-24 RX ORDER — CELECOXIB 200 MG/1
200 CAPSULE ORAL 3 TIMES DAILY PRN
COMMUNITY
End: 2018-08-03 | Stop reason: ALTCHOICE

## 2018-06-21 ENCOUNTER — HOSPITAL ENCOUNTER (OUTPATIENT)
Dept: PHARMACY | Age: 68
Setting detail: THERAPIES SERIES
Discharge: HOME | End: 2018-06-21
Payer: COMMERCIAL

## 2018-06-21 DIAGNOSIS — I82.409 DEEP VEIN THROMBOSIS (DVT) OF LOWER EXTREMITY, UNSPECIFIED CHRONICITY, UNSPECIFIED LATERALITY, UNSPECIFIED VEIN (HCC): ICD-10-CM

## 2018-06-21 LAB
INR BLD: 5.1
PROTIME: 61.6 SECONDS

## 2018-06-21 PROCEDURE — 85610 PROTHROMBIN TIME: CPT

## 2018-06-21 PROCEDURE — 99211 OFF/OP EST MAY X REQ PHY/QHP: CPT

## 2018-06-26 ENCOUNTER — HOSPITAL ENCOUNTER (OUTPATIENT)
Dept: PHARMACY | Age: 68
Setting detail: THERAPIES SERIES
Discharge: HOME | End: 2018-06-26
Payer: COMMERCIAL

## 2018-06-26 DIAGNOSIS — I82.409 DEEP VEIN THROMBOSIS (DVT) OF LOWER EXTREMITY, UNSPECIFIED CHRONICITY, UNSPECIFIED LATERALITY, UNSPECIFIED VEIN (HCC): ICD-10-CM

## 2018-06-26 LAB
INR BLD: 1.4
PROTIME: 16.6 SECONDS

## 2018-06-26 PROCEDURE — 85610 PROTHROMBIN TIME: CPT

## 2018-06-26 PROCEDURE — 99211 OFF/OP EST MAY X REQ PHY/QHP: CPT

## 2018-07-02 RX ORDER — SIMVASTATIN 40 MG
TABLET ORAL
Qty: 90 TABLET | Refills: 10 | Status: SHIPPED | OUTPATIENT
Start: 2018-07-02 | End: 2019-08-19 | Stop reason: SDUPTHER

## 2018-07-05 ENCOUNTER — APPOINTMENT (OUTPATIENT)
Dept: PHARMACY | Age: 68
End: 2018-07-05
Payer: COMMERCIAL

## 2018-07-09 ENCOUNTER — HOSPITAL ENCOUNTER (OUTPATIENT)
Dept: PHARMACY | Age: 68
Setting detail: THERAPIES SERIES
Discharge: HOME | End: 2018-07-09
Payer: COMMERCIAL

## 2018-07-09 LAB
INR BLD: 4.6
PROTIME: 55.5 SECONDS

## 2018-07-09 PROCEDURE — 99211 OFF/OP EST MAY X REQ PHY/QHP: CPT

## 2018-07-09 PROCEDURE — 85610 PROTHROMBIN TIME: CPT

## 2018-07-09 NOTE — PROGRESS NOTES
Patient states compliant all of the time with regimen. No bleeding or thromboembolic side effects noted. No significant dietary changes. No significant disease state changes. Patient states she did have a little funny feeling in chest on Saturday. Took 1 Tylenol and felt better. Has not been feeling well for the past 4 days. Had diarrhea on Thurs and Friday of last week. Not eating much in general.     PT/INR done in office per protocol. INR was supratherapeutic at 4.6 (goal 2-3). Warfarin regimen will be held for 2 days and then 5 mg MWF and 2.5 mg all other days  Will retest in 1 week. Patient counseled on signs/symptoms of bleeding and when to seek medical attention. Patient instructed to use extra precautions with elevated INR. Patient understands dosing directions and information discussed. Dosing schedule and follow up appointment given to patient. Progress note routed to referring physicians office.

## 2018-07-16 ENCOUNTER — TELEPHONE (OUTPATIENT)
Dept: PHARMACY | Age: 68
End: 2018-07-16

## 2018-07-16 ENCOUNTER — APPOINTMENT (OUTPATIENT)
Dept: PHARMACY | Age: 68
End: 2018-07-16
Payer: COMMERCIAL

## 2018-07-16 NOTE — TELEPHONE ENCOUNTER
Patient was due to come in for INR check today but the caravan that was supposed to  patient stated they did not have an appt for her. Patient will get ride to clinic sometime tomorrow 7/17 and we will set up rides for her for next appt.

## 2018-07-17 ENCOUNTER — HOSPITAL ENCOUNTER (OUTPATIENT)
Dept: PHARMACY | Age: 68
Setting detail: THERAPIES SERIES
Discharge: HOME | End: 2018-07-17
Payer: COMMERCIAL

## 2018-07-17 LAB
INR BLD: 2.4
PROTIME: 29 SECONDS

## 2018-07-17 PROCEDURE — 99211 OFF/OP EST MAY X REQ PHY/QHP: CPT

## 2018-07-17 PROCEDURE — 85610 PROTHROMBIN TIME: CPT

## 2018-07-31 ENCOUNTER — APPOINTMENT (OUTPATIENT)
Dept: PHARMACY | Age: 68
End: 2018-07-31
Payer: COMMERCIAL

## 2018-08-03 ENCOUNTER — HOSPITAL ENCOUNTER (OUTPATIENT)
Dept: PHARMACY | Age: 68
Setting detail: THERAPIES SERIES
Discharge: HOME | End: 2018-08-03
Payer: COMMERCIAL

## 2018-08-03 DIAGNOSIS — I82.403 DEEP VEIN THROMBOSIS (DVT) OF BOTH LOWER EXTREMITIES, UNSPECIFIED CHRONICITY, UNSPECIFIED VEIN (HCC): ICD-10-CM

## 2018-08-03 LAB
INR BLD: 3
PROTIME: 36.5 SECONDS

## 2018-08-03 PROCEDURE — 99212 OFFICE O/P EST SF 10 MIN: CPT

## 2018-08-03 PROCEDURE — 85610 PROTHROMBIN TIME: CPT

## 2018-08-03 RX ORDER — UBIDECARENONE 75 MG
50 CAPSULE ORAL DAILY
COMMUNITY
End: 2018-10-06

## 2018-08-08 ENCOUNTER — HOSPITAL ENCOUNTER (OUTPATIENT)
Age: 68
Setting detail: SPECIMEN
Discharge: HOME | End: 2018-08-08
Payer: COMMERCIAL

## 2018-08-08 DIAGNOSIS — E03.9 ACQUIRED HYPOTHYROIDISM: ICD-10-CM

## 2018-08-08 DIAGNOSIS — I10 ESSENTIAL HYPERTENSION: ICD-10-CM

## 2018-08-08 LAB
ALBUMIN SERPL-MCNC: 3.9 G/DL (ref 3.5–5.2)
ALBUMIN/GLOBULIN RATIO: 1.1 (ref 1–2.5)
ALP BLD-CCNC: 58 U/L (ref 35–104)
ALT SERPL-CCNC: 14 U/L (ref 5–33)
ANION GAP SERPL CALCULATED.3IONS-SCNC: 10 MMOL/L (ref 9–17)
AST SERPL-CCNC: 15 U/L
BILIRUB SERPL-MCNC: <0.1 MG/DL (ref 0.3–1.2)
BUN BLDV-MCNC: 12 MG/DL (ref 8–23)
BUN/CREAT BLD: ABNORMAL (ref 9–20)
CALCIUM SERPL-MCNC: 9.2 MG/DL (ref 8.6–10.4)
CHLORIDE BLD-SCNC: 97 MMOL/L (ref 98–107)
CO2: 30 MMOL/L (ref 20–31)
CREAT SERPL-MCNC: 0.7 MG/DL (ref 0.5–0.9)
GFR AFRICAN AMERICAN: >60 ML/MIN
GFR NON-AFRICAN AMERICAN: >60 ML/MIN
GFR SERPL CREATININE-BSD FRML MDRD: ABNORMAL ML/MIN/{1.73_M2}
GFR SERPL CREATININE-BSD FRML MDRD: ABNORMAL ML/MIN/{1.73_M2}
GLUCOSE BLD-MCNC: 97 MG/DL (ref 70–99)
HCT VFR BLD CALC: 45 % (ref 36.3–47.1)
HEMOGLOBIN: 14 G/DL (ref 11.9–15.1)
MCH RBC QN AUTO: 28.8 PG (ref 25.2–33.5)
MCHC RBC AUTO-ENTMCNC: 31.1 G/DL (ref 28.4–34.8)
MCV RBC AUTO: 92.6 FL (ref 82.6–102.9)
NRBC AUTOMATED: 0 PER 100 WBC
PDW BLD-RTO: 13.6 % (ref 11.8–14.4)
PLATELET # BLD: 221 K/UL (ref 138–453)
PMV BLD AUTO: 10.1 FL (ref 8.1–13.5)
POTASSIUM SERPL-SCNC: 4.2 MMOL/L (ref 3.7–5.3)
RBC # BLD: 4.86 M/UL (ref 3.95–5.11)
SODIUM BLD-SCNC: 137 MMOL/L (ref 135–144)
THYROXINE, FREE: 0.77 NG/DL (ref 0.93–1.7)
TOTAL PROTEIN: 7.3 G/DL (ref 6.4–8.3)
TSH SERPL DL<=0.05 MIU/L-ACNC: 7.37 MIU/L (ref 0.3–5)
WBC # BLD: 5.8 K/UL (ref 3.5–11.3)

## 2018-08-20 ENCOUNTER — HOSPITAL ENCOUNTER (OUTPATIENT)
Dept: WOMENS IMAGING | Age: 68
Discharge: HOME | End: 2018-08-22
Payer: COMMERCIAL

## 2018-08-20 DIAGNOSIS — Z12.39 BREAST CANCER SCREENING: ICD-10-CM

## 2018-08-20 PROCEDURE — 77063 BREAST TOMOSYNTHESIS BI: CPT

## 2018-08-24 ENCOUNTER — HOSPITAL ENCOUNTER (OUTPATIENT)
Dept: PHARMACY | Age: 68
Setting detail: THERAPIES SERIES
Discharge: HOME | End: 2018-08-24
Payer: COMMERCIAL

## 2018-08-24 LAB
INR BLD: 3.1
PROTIME: 37.7 SECONDS

## 2018-08-24 PROCEDURE — 85610 PROTHROMBIN TIME: CPT

## 2018-08-24 PROCEDURE — 99211 OFF/OP EST MAY X REQ PHY/QHP: CPT

## 2018-09-07 ENCOUNTER — HOSPITAL ENCOUNTER (OUTPATIENT)
Dept: PHARMACY | Age: 68
Setting detail: THERAPIES SERIES
Discharge: HOME | End: 2018-09-07
Payer: COMMERCIAL

## 2018-09-07 LAB
INR BLD: 2.4
PROTIME: 29.2 SECONDS

## 2018-09-07 PROCEDURE — 99211 OFF/OP EST MAY X REQ PHY/QHP: CPT

## 2018-09-07 PROCEDURE — 85610 PROTHROMBIN TIME: CPT

## 2018-10-05 ENCOUNTER — TELEPHONE (OUTPATIENT)
Dept: PHARMACY | Age: 68
End: 2018-10-05

## 2018-10-05 ENCOUNTER — HOSPITAL ENCOUNTER (OUTPATIENT)
Dept: PHARMACY | Age: 68
Setting detail: THERAPIES SERIES
Discharge: HOME | End: 2018-10-05
Payer: COMMERCIAL

## 2018-10-05 DIAGNOSIS — I82.403 DEEP VEIN THROMBOSIS (DVT) OF BOTH LOWER EXTREMITIES, UNSPECIFIED CHRONICITY, UNSPECIFIED VEIN (HCC): ICD-10-CM

## 2018-10-05 LAB
INR BLD: 2
PROTIME: 23.9 SECONDS

## 2018-10-05 PROCEDURE — 93005 ELECTROCARDIOGRAM TRACING: CPT

## 2018-10-05 PROCEDURE — 99212 OFFICE O/P EST SF 10 MIN: CPT

## 2018-10-05 PROCEDURE — 85610 PROTHROMBIN TIME: CPT

## 2018-10-05 RX ORDER — WARFARIN SODIUM 2.5 MG/1
TABLET ORAL
Qty: 120 TABLET | Refills: 3 | Status: ON HOLD | OUTPATIENT
Start: 2018-10-05 | End: 2018-10-06

## 2018-10-06 ENCOUNTER — HOSPITAL ENCOUNTER (OUTPATIENT)
Age: 68
Setting detail: OBSERVATION
Discharge: HOME | End: 2018-10-08
Attending: EMERGENCY MEDICINE | Admitting: FAMILY MEDICINE
Payer: COMMERCIAL

## 2018-10-06 ENCOUNTER — APPOINTMENT (OUTPATIENT)
Dept: GENERAL RADIOLOGY | Age: 68
End: 2018-10-06
Payer: COMMERCIAL

## 2018-10-06 DIAGNOSIS — R07.9 CHEST PAIN, UNSPECIFIED TYPE: Primary | ICD-10-CM

## 2018-10-06 LAB
ABSOLUTE EOS #: 0.1 K/UL (ref 0–0.4)
ABSOLUTE IMMATURE GRANULOCYTE: ABNORMAL K/UL (ref 0–0.3)
ABSOLUTE LYMPH #: 1.6 K/UL (ref 1–4.8)
ABSOLUTE MONO #: 0.4 K/UL (ref 0.1–1.3)
ALBUMIN SERPL-MCNC: 3.9 G/DL (ref 3.5–5.2)
ALBUMIN/GLOBULIN RATIO: ABNORMAL (ref 1–2.5)
ALP BLD-CCNC: 56 U/L (ref 35–104)
ALT SERPL-CCNC: 12 U/L (ref 5–33)
ANION GAP SERPL CALCULATED.3IONS-SCNC: 11 MMOL/L (ref 9–17)
AST SERPL-CCNC: 13 U/L
BASOPHILS # BLD: 0 % (ref 0–2)
BASOPHILS ABSOLUTE: 0 K/UL (ref 0–0.2)
BILIRUB SERPL-MCNC: 0.23 MG/DL (ref 0.3–1.2)
BUN BLDV-MCNC: 13 MG/DL (ref 8–23)
BUN/CREAT BLD: ABNORMAL (ref 9–20)
CALCIUM SERPL-MCNC: 9.4 MG/DL (ref 8.6–10.4)
CHLORIDE BLD-SCNC: 100 MMOL/L (ref 98–107)
CO2: 28 MMOL/L (ref 20–31)
CREAT SERPL-MCNC: 0.69 MG/DL (ref 0.5–0.9)
DIFFERENTIAL TYPE: ABNORMAL
EKG ATRIAL RATE: 76 BPM
EKG P AXIS: 44 DEGREES
EKG P-R INTERVAL: 158 MS
EKG Q-T INTERVAL: 386 MS
EKG QRS DURATION: 98 MS
EKG QTC CALCULATION (BAZETT): 434 MS
EKG R AXIS: -2 DEGREES
EKG T AXIS: 31 DEGREES
EKG VENTRICULAR RATE: 76 BPM
EOSINOPHILS RELATIVE PERCENT: 3 % (ref 0–4)
GFR AFRICAN AMERICAN: >60 ML/MIN
GFR NON-AFRICAN AMERICAN: >60 ML/MIN
GFR SERPL CREATININE-BSD FRML MDRD: ABNORMAL ML/MIN/{1.73_M2}
GFR SERPL CREATININE-BSD FRML MDRD: ABNORMAL ML/MIN/{1.73_M2}
GLUCOSE BLD-MCNC: 99 MG/DL (ref 70–99)
HCT VFR BLD CALC: 41.1 % (ref 36–46)
HEMOGLOBIN: 13.3 G/DL (ref 12–16)
IMMATURE GRANULOCYTES: ABNORMAL %
INR BLD: 1.9
LYMPHOCYTES # BLD: 30 % (ref 24–44)
MCH RBC QN AUTO: 29 PG (ref 26–34)
MCHC RBC AUTO-ENTMCNC: 32.5 G/DL (ref 31–37)
MCV RBC AUTO: 89.4 FL (ref 80–100)
MONOCYTES # BLD: 8 % (ref 1–7)
NRBC AUTOMATED: ABNORMAL PER 100 WBC
PDW BLD-RTO: 13.8 % (ref 11.5–14.9)
PLATELET # BLD: 201 K/UL (ref 150–450)
PLATELET ESTIMATE: ABNORMAL
PMV BLD AUTO: 7.6 FL (ref 6–12)
POTASSIUM SERPL-SCNC: 4.2 MMOL/L (ref 3.7–5.3)
PROTHROMBIN TIME: 19.4 SEC (ref 9.7–12)
RBC # BLD: 4.59 M/UL (ref 4–5.2)
RBC # BLD: ABNORMAL 10*6/UL
SEG NEUTROPHILS: 59 % (ref 36–66)
SEGMENTED NEUTROPHILS ABSOLUTE COUNT: 3.2 K/UL (ref 1.3–9.1)
SODIUM BLD-SCNC: 139 MMOL/L (ref 135–144)
TOTAL PROTEIN: 7.2 G/DL (ref 6.4–8.3)
TROPONIN INTERP: NORMAL
TROPONIN T: <0.03 NG/ML
WBC # BLD: 5.4 K/UL (ref 3.5–11)
WBC # BLD: ABNORMAL 10*3/UL

## 2018-10-06 PROCEDURE — 71046 X-RAY EXAM CHEST 2 VIEWS: CPT

## 2018-10-06 PROCEDURE — 99285 EMERGENCY DEPT VISIT HI MDM: CPT

## 2018-10-06 PROCEDURE — 6370000000 HC RX 637 (ALT 250 FOR IP): Performed by: EMERGENCY MEDICINE

## 2018-10-06 PROCEDURE — G0378 HOSPITAL OBSERVATION PER HR: HCPCS

## 2018-10-06 PROCEDURE — 36415 COLL VENOUS BLD VENIPUNCTURE: CPT

## 2018-10-06 PROCEDURE — 85610 PROTHROMBIN TIME: CPT

## 2018-10-06 PROCEDURE — 96374 THER/PROPH/DIAG INJ IV PUSH: CPT

## 2018-10-06 PROCEDURE — 84484 ASSAY OF TROPONIN QUANT: CPT

## 2018-10-06 PROCEDURE — 6370000000 HC RX 637 (ALT 250 FOR IP): Performed by: FAMILY MEDICINE

## 2018-10-06 PROCEDURE — 2580000003 HC RX 258: Performed by: FAMILY MEDICINE

## 2018-10-06 PROCEDURE — 93005 ELECTROCARDIOGRAM TRACING: CPT

## 2018-10-06 PROCEDURE — 80053 COMPREHEN METABOLIC PANEL: CPT

## 2018-10-06 PROCEDURE — 85025 COMPLETE CBC W/AUTO DIFF WBC: CPT

## 2018-10-06 PROCEDURE — 6360000002 HC RX W HCPCS: Performed by: EMERGENCY MEDICINE

## 2018-10-06 RX ORDER — CETIRIZINE HYDROCHLORIDE 10 MG/1
10 TABLET ORAL DAILY
Status: DISCONTINUED | OUTPATIENT
Start: 2018-10-06 | End: 2018-10-08 | Stop reason: HOSPADM

## 2018-10-06 RX ORDER — LEVOTHYROXINE SODIUM 0.12 MG/1
125 TABLET ORAL DAILY
Status: DISCONTINUED | OUTPATIENT
Start: 2018-10-06 | End: 2018-10-08 | Stop reason: HOSPADM

## 2018-10-06 RX ORDER — LIDOCAINE 50 MG/G
1 PATCH TOPICAL DAILY PRN
Status: DISCONTINUED | OUTPATIENT
Start: 2018-10-06 | End: 2018-10-08 | Stop reason: HOSPADM

## 2018-10-06 RX ORDER — FLUTICASONE PROPIONATE 50 MCG
2 SPRAY, SUSPENSION (ML) NASAL DAILY
Status: DISCONTINUED | OUTPATIENT
Start: 2018-10-07 | End: 2018-10-08 | Stop reason: HOSPADM

## 2018-10-06 RX ORDER — MORPHINE SULFATE 4 MG/ML
4 INJECTION, SOLUTION INTRAMUSCULAR; INTRAVENOUS ONCE
Status: COMPLETED | OUTPATIENT
Start: 2018-10-06 | End: 2018-10-06

## 2018-10-06 RX ORDER — SODIUM CHLORIDE 0.9 % (FLUSH) 0.9 %
10 SYRINGE (ML) INJECTION EVERY 12 HOURS SCHEDULED
Status: DISCONTINUED | OUTPATIENT
Start: 2018-10-06 | End: 2018-10-08 | Stop reason: HOSPADM

## 2018-10-06 RX ORDER — GABAPENTIN 400 MG/1
800 CAPSULE ORAL 4 TIMES DAILY
Status: DISCONTINUED | OUTPATIENT
Start: 2018-10-06 | End: 2018-10-08 | Stop reason: HOSPADM

## 2018-10-06 RX ORDER — WARFARIN SODIUM 5 MG/1
5 TABLET ORAL SEE ADMIN INSTRUCTIONS
COMMUNITY
End: 2020-09-10

## 2018-10-06 RX ORDER — AMMONIUM LACTATE 12 G/100G
LOTION TOPICAL PRN
Status: DISCONTINUED | OUTPATIENT
Start: 2018-10-06 | End: 2018-10-08 | Stop reason: HOSPADM

## 2018-10-06 RX ORDER — SIMVASTATIN 40 MG
40 TABLET ORAL NIGHTLY
Status: DISCONTINUED | OUTPATIENT
Start: 2018-10-06 | End: 2018-10-08 | Stop reason: HOSPADM

## 2018-10-06 RX ORDER — LOSARTAN POTASSIUM 100 MG/1
100 TABLET ORAL DAILY
Status: DISCONTINUED | OUTPATIENT
Start: 2018-10-07 | End: 2018-10-08 | Stop reason: HOSPADM

## 2018-10-06 RX ORDER — NICOTINE POLACRILEX 4 MG
15 LOZENGE BUCCAL PRN
Status: DISCONTINUED | OUTPATIENT
Start: 2018-10-06 | End: 2018-10-08 | Stop reason: HOSPADM

## 2018-10-06 RX ORDER — WARFARIN SODIUM 2.5 MG/1
2.5 TABLET ORAL SEE ADMIN INSTRUCTIONS
COMMUNITY
End: 2021-01-07

## 2018-10-06 RX ORDER — TRAMADOL HYDROCHLORIDE 50 MG/1
50 TABLET ORAL EVERY 8 HOURS PRN
Status: DISCONTINUED | OUTPATIENT
Start: 2018-10-06 | End: 2018-10-08 | Stop reason: HOSPADM

## 2018-10-06 RX ORDER — WARFARIN SODIUM 5 MG/1
5 TABLET ORAL
Status: DISCONTINUED | OUTPATIENT
Start: 2018-10-08 | End: 2018-10-08 | Stop reason: HOSPADM

## 2018-10-06 RX ORDER — ACETAMINOPHEN 325 MG/1
650 TABLET ORAL ONCE
Status: COMPLETED | OUTPATIENT
Start: 2018-10-06 | End: 2018-10-06

## 2018-10-06 RX ORDER — SODIUM CHLORIDE 0.9 % (FLUSH) 0.9 %
10 SYRINGE (ML) INJECTION PRN
Status: DISCONTINUED | OUTPATIENT
Start: 2018-10-06 | End: 2018-10-08 | Stop reason: HOSPADM

## 2018-10-06 RX ORDER — ACETAMINOPHEN AND CODEINE PHOSPHATE 300; 30 MG/1; MG/1
1 TABLET ORAL EVERY 6 HOURS PRN
Status: DISCONTINUED | OUTPATIENT
Start: 2018-10-06 | End: 2018-10-06 | Stop reason: CLARIF

## 2018-10-06 RX ORDER — VALSARTAN 320 MG/1
1 TABLET ORAL DAILY
Status: DISCONTINUED | OUTPATIENT
Start: 2018-10-06 | End: 2018-10-06

## 2018-10-06 RX ORDER — WARFARIN SODIUM 2.5 MG/1
2.5 TABLET ORAL
Status: DISCONTINUED | OUTPATIENT
Start: 2018-10-06 | End: 2018-10-08 | Stop reason: HOSPADM

## 2018-10-06 RX ORDER — ACETAMINOPHEN 325 MG/1
650 TABLET ORAL EVERY 4 HOURS PRN
Status: DISCONTINUED | OUTPATIENT
Start: 2018-10-06 | End: 2018-10-08 | Stop reason: HOSPADM

## 2018-10-06 RX ORDER — DEXTROSE MONOHYDRATE 50 MG/ML
100 INJECTION, SOLUTION INTRAVENOUS PRN
Status: DISCONTINUED | OUTPATIENT
Start: 2018-10-06 | End: 2018-10-08 | Stop reason: HOSPADM

## 2018-10-06 RX ORDER — ASPIRIN 81 MG/1
324 TABLET, CHEWABLE ORAL ONCE
Status: COMPLETED | OUTPATIENT
Start: 2018-10-06 | End: 2018-10-06

## 2018-10-06 RX ORDER — DEXTROSE MONOHYDRATE 25 G/50ML
12.5 INJECTION, SOLUTION INTRAVENOUS PRN
Status: DISCONTINUED | OUTPATIENT
Start: 2018-10-06 | End: 2018-10-08 | Stop reason: HOSPADM

## 2018-10-06 RX ORDER — ACYCLOVIR 200 MG/1
200 CAPSULE ORAL 2 TIMES DAILY
Status: DISCONTINUED | OUTPATIENT
Start: 2018-10-06 | End: 2018-10-08 | Stop reason: HOSPADM

## 2018-10-06 RX ORDER — DIAPER,BRIEF,INFANT-TODD,DISP
EACH MISCELLANEOUS 2 TIMES DAILY
Status: DISCONTINUED | OUTPATIENT
Start: 2018-10-06 | End: 2018-10-08 | Stop reason: HOSPADM

## 2018-10-06 RX ORDER — ERGOCALCIFEROL 1.25 MG/1
50000 CAPSULE ORAL WEEKLY
Status: DISCONTINUED | OUTPATIENT
Start: 2018-10-06 | End: 2018-10-08 | Stop reason: HOSPADM

## 2018-10-06 RX ORDER — BUPROPION HYDROCHLORIDE 300 MG/1
300 TABLET ORAL EVERY MORNING
Status: DISCONTINUED | OUTPATIENT
Start: 2018-10-07 | End: 2018-10-08 | Stop reason: HOSPADM

## 2018-10-06 RX ORDER — HYDROCODONE BITARTRATE AND ACETAMINOPHEN 5; 325 MG/1; MG/1
1 TABLET ORAL EVERY 6 HOURS PRN
Status: DISCONTINUED | OUTPATIENT
Start: 2018-10-06 | End: 2018-10-08 | Stop reason: HOSPADM

## 2018-10-06 RX ORDER — NITROGLYCERIN 0.4 MG/1
0.4 TABLET SUBLINGUAL ONCE
Status: COMPLETED | OUTPATIENT
Start: 2018-10-06 | End: 2018-10-06

## 2018-10-06 RX ADMIN — ASPIRIN 81 MG 324 MG: 81 TABLET ORAL at 12:54

## 2018-10-06 RX ADMIN — ERGOCALCIFEROL 50000 UNITS: 1.25 CAPSULE ORAL at 20:34

## 2018-10-06 RX ADMIN — NITROGLYCERIN 0.4 MG: 0.4 TABLET SUBLINGUAL at 12:32

## 2018-10-06 RX ADMIN — SIMVASTATIN 40 MG: 40 TABLET, FILM COATED ORAL at 20:33

## 2018-10-06 RX ADMIN — CETIRIZINE HYDROCHLORIDE 10 MG: 10 TABLET, FILM COATED ORAL at 20:33

## 2018-10-06 RX ADMIN — ACYCLOVIR 200 MG: 200 CAPSULE ORAL at 20:34

## 2018-10-06 RX ADMIN — Medication 10 ML: at 20:40

## 2018-10-06 RX ADMIN — MORPHINE SULFATE 4 MG: 4 INJECTION INTRAVENOUS at 14:05

## 2018-10-06 RX ADMIN — ACETAMINOPHEN 650 MG: 325 TABLET, FILM COATED ORAL at 12:21

## 2018-10-06 RX ADMIN — WARFARIN SODIUM 2.5 MG: 2.5 TABLET ORAL at 20:34

## 2018-10-06 RX ADMIN — LEVOTHYROXINE SODIUM 125 MCG: 125 TABLET ORAL at 20:33

## 2018-10-06 RX ADMIN — HYDROCODONE BITARTRATE AND ACETAMINOPHEN 1 TABLET: 5; 325 TABLET ORAL at 23:55

## 2018-10-06 RX ADMIN — METOPROLOL TARTRATE 25 MG: 25 TABLET ORAL at 20:33

## 2018-10-06 RX ADMIN — GABAPENTIN 800 MG: 400 CAPSULE ORAL at 20:33

## 2018-10-06 ASSESSMENT — PAIN SCALES - GENERAL
PAINLEVEL_OUTOF10: 8
PAINLEVEL_OUTOF10: 4
PAINLEVEL_OUTOF10: 4
PAINLEVEL_OUTOF10: 9
PAINLEVEL_OUTOF10: 7
PAINLEVEL_OUTOF10: 9

## 2018-10-06 ASSESSMENT — PAIN DESCRIPTION - PAIN TYPE
TYPE: ACUTE PAIN

## 2018-10-06 ASSESSMENT — PAIN DESCRIPTION - DESCRIPTORS
DESCRIPTORS: ACHING

## 2018-10-06 ASSESSMENT — PAIN DESCRIPTION - LOCATION
LOCATION: CHEST

## 2018-10-06 ASSESSMENT — ENCOUNTER SYMPTOMS
DIARRHEA: 0
EYE PAIN: 0
SORE THROAT: 0
CONSTIPATION: 0
BACK PAIN: 0
NAUSEA: 0
VOMITING: 0
EYE REDNESS: 0
CHEST TIGHTNESS: 0
SHORTNESS OF BREATH: 0
COUGH: 0
ABDOMINAL PAIN: 0

## 2018-10-06 ASSESSMENT — PAIN DESCRIPTION - FREQUENCY
FREQUENCY: CONTINUOUS

## 2018-10-06 ASSESSMENT — PAIN DESCRIPTION - DIRECTION: RADIATING_TOWARDS: MID BACK

## 2018-10-06 ASSESSMENT — HEART SCORE: ECG: 0

## 2018-10-06 NOTE — ED PROVIDER NOTES
10/06/18 1142 10/06/18 1231 10/06/18 1247   BP: (!) 122/104 (!) 148/66 122/64   Pulse: 80 77 77   Resp: 16 18    Temp: 98.8 °F (37.1 °C)     TempSrc: Oral     SpO2: 97% 96% 95%   Weight: 227 lb (103 kg)     Height: 5' 5\" (1.651 m)         The patient was given the following medications while in the emergency department:  Orders Placed This Encounter   Medications    nitroGLYCERIN (NITROSTAT) SL tablet 0.4 mg    aspirin chewable tablet 324 mg    acetaminophen (TYLENOL) tablet 650 mg     CONSULTS:  IP CONSULT TO FAMILY MEDICINE  IP CONSULT TO CARDIOLOGY    FINAL IMPRESSION      1. Chest pain, unspecified type          DISPOSITION/PLAN   DISPOSITION Decision To Admit 10/06/2018 01:28:03 PM      PATIENT REFERRED TO:  No follow-up provider specified.   DISCHARGE MEDICATIONS:  New Prescriptions    No medications on file     Andrea Astudillo MD  Attending Emergency Physician                        Kerwin Merida MD  10/06/18 999

## 2018-10-07 LAB
ABSOLUTE EOS #: 0.2 K/UL (ref 0–0.4)
ABSOLUTE IMMATURE GRANULOCYTE: NORMAL K/UL (ref 0–0.3)
ABSOLUTE LYMPH #: 2.6 K/UL (ref 1–4.8)
ABSOLUTE MONO #: 0.5 K/UL (ref 0.1–1.3)
ANION GAP SERPL CALCULATED.3IONS-SCNC: 11 MMOL/L (ref 9–17)
BASOPHILS # BLD: 1 % (ref 0–2)
BASOPHILS ABSOLUTE: 0 K/UL (ref 0–0.2)
BUN BLDV-MCNC: 11 MG/DL (ref 8–23)
BUN/CREAT BLD: ABNORMAL (ref 9–20)
CALCIUM SERPL-MCNC: 9.1 MG/DL (ref 8.6–10.4)
CHLORIDE BLD-SCNC: 100 MMOL/L (ref 98–107)
CO2: 28 MMOL/L (ref 20–31)
CREAT SERPL-MCNC: 0.68 MG/DL (ref 0.5–0.9)
DIFFERENTIAL TYPE: NORMAL
EOSINOPHILS RELATIVE PERCENT: 3 % (ref 0–4)
GFR AFRICAN AMERICAN: >60 ML/MIN
GFR NON-AFRICAN AMERICAN: >60 ML/MIN
GFR SERPL CREATININE-BSD FRML MDRD: ABNORMAL ML/MIN/{1.73_M2}
GFR SERPL CREATININE-BSD FRML MDRD: ABNORMAL ML/MIN/{1.73_M2}
GLUCOSE BLD-MCNC: 115 MG/DL (ref 70–99)
HCT VFR BLD CALC: 40 % (ref 36–46)
HEMOGLOBIN: 13 G/DL (ref 12–16)
IMMATURE GRANULOCYTES: NORMAL %
LYMPHOCYTES # BLD: 41 % (ref 24–44)
MCH RBC QN AUTO: 29.1 PG (ref 26–34)
MCHC RBC AUTO-ENTMCNC: 32.6 G/DL (ref 31–37)
MCV RBC AUTO: 89.2 FL (ref 80–100)
MONOCYTES # BLD: 7 % (ref 1–7)
NRBC AUTOMATED: NORMAL PER 100 WBC
PDW BLD-RTO: 13.8 % (ref 11.5–14.9)
PLATELET # BLD: 195 K/UL (ref 150–450)
PLATELET ESTIMATE: NORMAL
PMV BLD AUTO: 7 FL (ref 6–12)
POTASSIUM SERPL-SCNC: 3.8 MMOL/L (ref 3.7–5.3)
RBC # BLD: 4.48 M/UL (ref 4–5.2)
RBC # BLD: NORMAL 10*6/UL
SEG NEUTROPHILS: 48 % (ref 36–66)
SEGMENTED NEUTROPHILS ABSOLUTE COUNT: 3 K/UL (ref 1.3–9.1)
SODIUM BLD-SCNC: 139 MMOL/L (ref 135–144)
TROPONIN INTERP: NORMAL
TROPONIN INTERP: NORMAL
TROPONIN T: <0.03 NG/ML
TROPONIN T: <0.03 NG/ML
WBC # BLD: 6.2 K/UL (ref 3.5–11)
WBC # BLD: NORMAL 10*3/UL

## 2018-10-07 PROCEDURE — G0378 HOSPITAL OBSERVATION PER HR: HCPCS

## 2018-10-07 PROCEDURE — 6370000000 HC RX 637 (ALT 250 FOR IP): Performed by: FAMILY MEDICINE

## 2018-10-07 PROCEDURE — 36415 COLL VENOUS BLD VENIPUNCTURE: CPT

## 2018-10-07 PROCEDURE — 85025 COMPLETE CBC W/AUTO DIFF WBC: CPT

## 2018-10-07 PROCEDURE — 2580000003 HC RX 258: Performed by: FAMILY MEDICINE

## 2018-10-07 PROCEDURE — 80048 BASIC METABOLIC PNL TOTAL CA: CPT

## 2018-10-07 PROCEDURE — 99220 PR INITIAL OBSERVATION CARE/DAY 70 MINUTES: CPT | Performed by: FAMILY MEDICINE

## 2018-10-07 PROCEDURE — 84484 ASSAY OF TROPONIN QUANT: CPT

## 2018-10-07 RX ADMIN — HYDROCODONE BITARTRATE AND ACETAMINOPHEN 1 TABLET: 5; 325 TABLET ORAL at 20:04

## 2018-10-07 RX ADMIN — LEVOTHYROXINE SODIUM 125 MCG: 125 TABLET ORAL at 06:51

## 2018-10-07 RX ADMIN — CETIRIZINE HYDROCHLORIDE 10 MG: 10 TABLET, FILM COATED ORAL at 08:59

## 2018-10-07 RX ADMIN — HYDROCODONE BITARTRATE AND ACETAMINOPHEN 1 TABLET: 5; 325 TABLET ORAL at 09:00

## 2018-10-07 RX ADMIN — METOPROLOL TARTRATE 25 MG: 25 TABLET ORAL at 22:01

## 2018-10-07 RX ADMIN — HYDROCORTISONE: 1 CREAM TOPICAL at 22:01

## 2018-10-07 RX ADMIN — LOSARTAN POTASSIUM 100 MG: 100 TABLET ORAL at 08:59

## 2018-10-07 RX ADMIN — SIMVASTATIN 40 MG: 40 TABLET, FILM COATED ORAL at 22:01

## 2018-10-07 RX ADMIN — GABAPENTIN 800 MG: 400 CAPSULE ORAL at 17:33

## 2018-10-07 RX ADMIN — BUPROPION HYDROCHLORIDE 300 MG: 300 TABLET, FILM COATED, EXTENDED RELEASE ORAL at 08:59

## 2018-10-07 RX ADMIN — ACYCLOVIR 200 MG: 200 CAPSULE ORAL at 22:00

## 2018-10-07 RX ADMIN — GABAPENTIN 800 MG: 400 CAPSULE ORAL at 08:59

## 2018-10-07 RX ADMIN — Medication 10 ML: at 09:02

## 2018-10-07 RX ADMIN — FLUTICASONE PROPIONATE 2 SPRAY: 50 SPRAY, METERED NASAL at 09:01

## 2018-10-07 RX ADMIN — ACYCLOVIR 200 MG: 200 CAPSULE ORAL at 09:02

## 2018-10-07 RX ADMIN — GABAPENTIN 800 MG: 400 CAPSULE ORAL at 22:01

## 2018-10-07 RX ADMIN — GABAPENTIN 800 MG: 400 CAPSULE ORAL at 13:04

## 2018-10-07 RX ADMIN — Medication 10 ML: at 22:01

## 2018-10-07 RX ADMIN — WARFARIN SODIUM 2.5 MG: 2.5 TABLET ORAL at 17:33

## 2018-10-07 ASSESSMENT — PAIN SCALES - GENERAL
PAINLEVEL_OUTOF10: 8
PAINLEVEL_OUTOF10: 0
PAINLEVEL_OUTOF10: 5
PAINLEVEL_OUTOF10: 0
PAINLEVEL_OUTOF10: 7

## 2018-10-07 ASSESSMENT — PAIN DESCRIPTION - FREQUENCY
FREQUENCY: CONTINUOUS
FREQUENCY: CONTINUOUS

## 2018-10-07 ASSESSMENT — PAIN DESCRIPTION - ONSET: ONSET: ON-GOING

## 2018-10-07 ASSESSMENT — PAIN DESCRIPTION - LOCATION
LOCATION: ABDOMEN
LOCATION: CHEST

## 2018-10-07 ASSESSMENT — PAIN DESCRIPTION - DESCRIPTORS
DESCRIPTORS: ACHING
DESCRIPTORS: ACHING

## 2018-10-07 ASSESSMENT — PAIN DESCRIPTION - DIRECTION: RADIATING_TOWARDS: BACK

## 2018-10-07 ASSESSMENT — PAIN DESCRIPTION - PAIN TYPE
TYPE: ACUTE PAIN
TYPE: ACUTE PAIN

## 2018-10-07 ASSESSMENT — PAIN DESCRIPTION - ORIENTATION: ORIENTATION: MID

## 2018-10-07 NOTE — H&P
Family Medicine Admit Note    PCP: Booker Garcia MD    Date of Admission: 10/6/2018    Date of Service: Pt seen/examined on 10/7/18 and Placed in Observation. Chief Complaint:  Chest pain      History Of Present Illness: The patient is a 79 y.o. female who presents to [address] [place] [address] with complaints of intermittent chest pain for the past 6 weeks. She reports that the pain has been persistent for the past few days and radiates straight thru to her back. She describes the pain as pressure mid-sternal. The pain is worse with exertion and when laying flat. She reports that the pain improves with Nitro but she ran out of them. She is scheduled for an outpt stress test later this month and has never had one. She denies any N/V, cough, SOB, fever or chills. She is on Coumadin for hx of DVT. She denies any leg pain or swelling. Past Medical History:        Diagnosis Date    Affective disorder ([address]     seasonal    DVT (deep venous thrombosis) (HCC)     has history in R leg    Fibromyalgia     Hypertension     Pneumonia        Past Surgical History:        Procedure Laterality Date    COLONOSCOPY  8/31/2011    internal hemorrhoides    DILATION AND CURETTAGE OF UTERUS      ECTOPIC PREGNANCY SURGERY Left     HYSTERECTOMY         Medications Prior to Admission:    Prior to Admission medications    Medication Sig Start Date End Date Taking? Authorizing Provider   warfarin (COUMADIN) 2.5 MG tablet Take 2.5 mg by mouth See Admin Instructions   Yes Historical Provider, MD   warfarin (COUMADIN) 5 MG tablet Take 5 mg by mouth See Admin Instructions   Yes Historical Provider, MD   acetaminophen-codeine (TYLENOL #3) 300-30 MG per tablet Take 1 tablet by mouth every 6 hours as needed for Pain for up to 30 days.  TAKE ONE TABLET BY MOUTH EVERY 6 HOURS AS NEEDED FOR PAIN. 10/2/18 11/1/18 Yes VIKA Pederson - CNP   traMADol (ULTRAM) 50 MG tablet Take 1 tablet by mouth every 8 hours as needed for Pain for up to 28 days. TAKE TWO TABLETS BY MOUTH EVERY 8 HOURS AS NEEDED. 10/2/18 10/30/18 Yes VIKA Stevens CNP   vitamin D (ERGOCALCIFEROL) 26000 units CAPS capsule TAKE 1 CAPSULE BY MOUTH EVERY WEEK ON SATURDAYS 9/13/18  Yes VIKA Stevens CNP   metoprolol tartrate (LOPRESSOR) 25 MG tablet TAKE ONE (1) TABLET BY MOUTH TWICE DAILY 9/13/18  Yes VIKA Stevens CNP   loratadine (CLARITIN) 10 MG tablet TAKE 1 TABLET BY MOUTH ONCE DAILY  Patient taking differently: 1 tab daily prn 9/13/18  Yes VIKA Stevens CNP   lidocaine (LIDODERM) 5 % APPLY 2 PATCHES TO THE SKIN ONCE DAILY AS NEEDED FOR PAIN 9/13/18  Yes VIKA Stevens CNP   Gabapentin POWD Formula 8E: diclo3%+gaba6%+DMSO5%+prilo2%+baclofen2%+lido2%. Apply 1-2 gm topically to affected area TID-QID 7/24/18  Yes Phillip Girard MD   valsartan (DIOVAN) 320 MG tablet TAKE 1 TABLET BY MOUTH ONCE DAILY 7/11/18  Yes Phillip Girard MD   buPROPion (WELLBUTRIN XL) 300 MG extended release tablet Take 1 tablet by mouth every morning 7/3/18  Yes Phillip Girard MD   simvastatin (ZOCOR) 40 MG tablet TAKE ONE (1) TABLET BY MOUTH EVERY NIGHT 7/2/18  Yes Phillip Girard MD   gabapentin (NEURONTIN) 800 MG tablet TAKE ONE (1) TABLET BY MOUTH 4 TIMES DAILY. 4/16/18 10/6/18 Yes Phillip Girard MD   hydrocortisone 1 % cream APPLY TO AFFECTED AREA TWICE DAILY 4/13/18  Yes Phillip Girard MD   levothyroxine (SYNTHROID) 125 MCG tablet TAKE 1 TABLET BY MOUTH DAILY.  4/13/18  Yes VIKA Wells CNP   ammonium lactate (LAC-HYDRIN) 12 % lotion APPLY TO AFFECTED AREA(S) DAILY 4/4/18  Yes Phillip Girard MD   acyclovir (ZOVIRAX) 200 MG capsule TAKE ONE (1) CAPSULE BY MOUTH TWICE DAILY 12/14/17  Yes VIKA Wells CNP   fluticasone [facility]) 50 MCG/ACT nasal spray INSTILL 2 SPRAYS IN EACH NOSTRIL ONCE DAILY 8/29/17  Yes Jimbo Angela PA-C   Lancets MISC Test daily and as needed 7/15/15   VIKA Wells CNP   Glucose Blood (BLOOD GLUCOSE

## 2018-10-07 NOTE — PLAN OF CARE
Problem: Pain:  Goal: Pain level will decrease  Pain level will decrease   Outcome: Ongoing  Patient denies chest pain this shift, chronic pain continues. Medication PRN. Problem: Falls - Risk of:  Goal: Will remain free from falls  Will remain free from falls   Outcome: Ongoing  No injury this shift.

## 2018-10-07 NOTE — H&P
HISTORY and [address]         NAME:  Mariola Hancock  MRN: [id number]   YOB: 1950   Date: 10/7/2018   Age: 79 y.o. Gender: female       COMPLAINT AND PRESENT HISTORY:    Rafael Schumacher, a magan 79 yr old female was admitted from ED yesterday after going there due to chest pain, She say she had had chest pressure specifically in substernal area for 2 full days, She has had some twinges off and on for 6 weeks. She saw Dr Varney Lombard mid Sept for this pain. That was her first appointment. Pain mid sternal area radiates to the back. She denies diaphoresis or nausea but she has noted some very slight dyspnea on exertion. No leg swelling,     She has HTN, She takes her meds, She is on Coumadin for a DVT and doing well,     In ED the EKG was done, Triponin was less than 0.03.  Lytes and CBC were normal, She last had an ECHO July 2016    DIAGNOSTIC RESULTS   Radiology:     EKG:   Labs:  CBC:   Recent Labs      10/06/18   1155  10/07/18   0300   WBC  5.4  6.2   HGB  13.3  13.0   PLT  201  195     BMP:    Recent Labs      10/06/18   1155  10/07/18   0300   NA  139  139   K  4.2  3.8   CL  100  100   CO2  28  28   BUN  13  11   CREATININE  0.69  0.68   GLUCOSE  99  115*     Hepatic:   Recent Labs      10/06/18   1155   AST  13   ALT  12   BILITOT  0.23*   ALKPHOS  56     CARDIAC ENZY:   Recent Labs      10/06/18   2039  10/07/18   0300  10/07/18   0928   TROPONINT  <0.03  <0.03  <0.03     INR:   Recent Labs      10/05/18   1335  10/06/18   1155   INR  2  1.9     U/A:  Lab Results   Component Value Date    NITRITE neg 03/07/2018    COLORU yellow 03/07/2018    COLORU YELLOW 07/19/2016    WBCUA 10 TO 20 07/19/2016    RBCUA 2 TO 5 07/19/2016    MUCUS NOT REPORTED 07/19/2016    BACTERIA FEW 07/19/2016    CLARITYU cloudy 03/07/2018    SPECGRAV 1.020 03/07/2018    SPECGRAV 1.036 07/19/2016    LEUKOCYTESUR trace 03/07/2018    LEUKOCYTESUR MOD 07/19/2016    BLOODU neg 03/07/2018    GLUCOSEU

## 2018-10-08 ENCOUNTER — APPOINTMENT (OUTPATIENT)
Dept: NUCLEAR MEDICINE | Age: 68
End: 2018-10-08
Payer: COMMERCIAL

## 2018-10-08 VITALS
DIASTOLIC BLOOD PRESSURE: 58 MMHG | BODY MASS INDEX: 38.53 KG/M2 | RESPIRATION RATE: 16 BRPM | HEART RATE: 72 BPM | WEIGHT: 231.26 LBS | SYSTOLIC BLOOD PRESSURE: 142 MMHG | TEMPERATURE: 97.7 F | HEIGHT: 65 IN | OXYGEN SATURATION: 98 %

## 2018-10-08 LAB
INR BLD: 1.7
LV EF: 57 %
LVEF MODALITY: NORMAL
PROTHROMBIN TIME: 17.6 SEC (ref 9.7–12)
TROPONIN INTERP: NORMAL
TROPONIN T: <0.03 NG/ML

## 2018-10-08 PROCEDURE — 6370000000 HC RX 637 (ALT 250 FOR IP): Performed by: FAMILY MEDICINE

## 2018-10-08 PROCEDURE — 93017 CV STRESS TEST TRACING ONLY: CPT

## 2018-10-08 PROCEDURE — 3430000000 HC RX DIAGNOSTIC RADIOPHARMACEUTICAL: Performed by: INTERNAL MEDICINE

## 2018-10-08 PROCEDURE — 6360000002 HC RX W HCPCS: Performed by: FAMILY MEDICINE

## 2018-10-08 PROCEDURE — 85610 PROTHROMBIN TIME: CPT

## 2018-10-08 PROCEDURE — G0378 HOSPITAL OBSERVATION PER HR: HCPCS

## 2018-10-08 PROCEDURE — 2580000003 HC RX 258: Performed by: FAMILY MEDICINE

## 2018-10-08 PROCEDURE — 99225 PR SBSQ OBSERVATION CARE/DAY 25 MINUTES: CPT | Performed by: FAMILY MEDICINE

## 2018-10-08 PROCEDURE — A9500 TC99M SESTAMIBI: HCPCS | Performed by: INTERNAL MEDICINE

## 2018-10-08 PROCEDURE — 36415 COLL VENOUS BLD VENIPUNCTURE: CPT

## 2018-10-08 PROCEDURE — 2580000003 HC RX 258: Performed by: INTERNAL MEDICINE

## 2018-10-08 PROCEDURE — 78452 HT MUSCLE IMAGE SPECT MULT: CPT

## 2018-10-08 PROCEDURE — 84484 ASSAY OF TROPONIN QUANT: CPT

## 2018-10-08 RX ORDER — METOPROLOL TARTRATE 5 MG/5ML
2.5 INJECTION INTRAVENOUS PRN
Status: DISCONTINUED | OUTPATIENT
Start: 2018-10-08 | End: 2018-10-08 | Stop reason: HOSPADM

## 2018-10-08 RX ORDER — NITROGLYCERIN 0.4 MG/1
TABLET SUBLINGUAL
Qty: 25 TABLET | Refills: 3 | Status: SHIPPED | OUTPATIENT
Start: 2018-10-08 | End: 2020-10-26 | Stop reason: SDUPTHER

## 2018-10-08 RX ORDER — SODIUM CHLORIDE 0.9 % (FLUSH) 0.9 %
10 SYRINGE (ML) INJECTION PRN
Status: DISCONTINUED | OUTPATIENT
Start: 2018-10-08 | End: 2018-10-08 | Stop reason: HOSPADM

## 2018-10-08 RX ORDER — 0.9 % SODIUM CHLORIDE 0.9 %
250 INTRAVENOUS SOLUTION INTRAVENOUS ONCE
Status: DISCONTINUED | OUTPATIENT
Start: 2018-10-08 | End: 2018-10-08 | Stop reason: HOSPADM

## 2018-10-08 RX ORDER — AMINOPHYLLINE DIHYDRATE 25 MG/ML
100 INJECTION, SOLUTION INTRAVENOUS
Status: DISCONTINUED | OUTPATIENT
Start: 2018-10-08 | End: 2018-10-08 | Stop reason: HOSPADM

## 2018-10-08 RX ORDER — NITROGLYCERIN 0.4 MG/1
0.4 TABLET SUBLINGUAL EVERY 5 MIN PRN
Status: DISCONTINUED | OUTPATIENT
Start: 2018-10-08 | End: 2018-10-08 | Stop reason: HOSPADM

## 2018-10-08 RX ADMIN — ACYCLOVIR 200 MG: 200 CAPSULE ORAL at 11:22

## 2018-10-08 RX ADMIN — CETIRIZINE HYDROCHLORIDE 10 MG: 10 TABLET, FILM COATED ORAL at 11:22

## 2018-10-08 RX ADMIN — TETRAKIS(2-METHOXYISOBUTYLISOCYANIDE)COPPER(I) TETRAFLUOROBORATE 35.4 MILLICURIE: 1 INJECTION, POWDER, LYOPHILIZED, FOR SOLUTION INTRAVENOUS at 09:20

## 2018-10-08 RX ADMIN — BUPROPION HYDROCHLORIDE 300 MG: 300 TABLET, FILM COATED, EXTENDED RELEASE ORAL at 11:22

## 2018-10-08 RX ADMIN — GABAPENTIN 800 MG: 400 CAPSULE ORAL at 11:23

## 2018-10-08 RX ADMIN — Medication 10 ML: at 07:51

## 2018-10-08 RX ADMIN — HYDROCODONE BITARTRATE AND ACETAMINOPHEN 1 TABLET: 5; 325 TABLET ORAL at 05:30

## 2018-10-08 RX ADMIN — Medication 10 ML: at 09:18

## 2018-10-08 RX ADMIN — HYDROCODONE BITARTRATE AND ACETAMINOPHEN 1 TABLET: 5; 325 TABLET ORAL at 13:02

## 2018-10-08 RX ADMIN — METOPROLOL TARTRATE 25 MG: 25 TABLET ORAL at 11:22

## 2018-10-08 RX ADMIN — REGADENOSON 0.4 MG: 0.08 INJECTION, SOLUTION INTRAVENOUS at 09:18

## 2018-10-08 RX ADMIN — TETRAKIS(2-METHOXYISOBUTYLISOCYANIDE)COPPER(I) TETRAFLUOROBORATE 11.3 MILLICURIE: 1 INJECTION, POWDER, LYOPHILIZED, FOR SOLUTION INTRAVENOUS at 07:50

## 2018-10-08 RX ADMIN — LEVOTHYROXINE SODIUM 125 MCG: 125 TABLET ORAL at 05:31

## 2018-10-08 RX ADMIN — LOSARTAN POTASSIUM 100 MG: 100 TABLET ORAL at 11:22

## 2018-10-08 ASSESSMENT — PAIN DESCRIPTION - LOCATION: LOCATION: CHEST

## 2018-10-08 ASSESSMENT — PAIN DESCRIPTION - ORIENTATION: ORIENTATION: MID

## 2018-10-08 ASSESSMENT — PAIN SCALES - GENERAL
PAINLEVEL_OUTOF10: 3
PAINLEVEL_OUTOF10: 0
PAINLEVEL_OUTOF10: 7
PAINLEVEL_OUTOF10: 8

## 2018-10-08 ASSESSMENT — PAIN DESCRIPTION - DIRECTION: RADIATING_TOWARDS: BACK

## 2018-10-08 ASSESSMENT — PAIN DESCRIPTION - DESCRIPTORS: DESCRIPTORS: ACHING

## 2018-10-08 ASSESSMENT — PAIN DESCRIPTION - PAIN TYPE: TYPE: ACUTE PAIN

## 2018-10-08 ASSESSMENT — PAIN DESCRIPTION - FREQUENCY: FREQUENCY: CONTINUOUS

## 2018-10-08 NOTE — PROGRESS NOTES
infection without hematuria           Plan:  Patient admitted with chest pain r/o ACS - stress test today. If stress is negative, she may be D/C'd home. Continue current treatments. Follow up with Cardiology as directed. Follow up in office next week.     See orders   Disposition:    Electronically signed by Mouna Terry MD on 10/8/2018 at 5:47 AM

## 2018-10-08 NOTE — FLOWSHEET NOTE
10/08/18 1440   Encounter Summary   Services provided to: Patient   Referral/Consult From: Rounding   Complexity of Encounter Low   Length of Encounter 15 minutes   Spiritual/Hinduism   Type Spiritual support   Assessment Sleeping   Intervention Prayer   Outcome Did not respond

## 2018-10-09 ENCOUNTER — TELEPHONE (OUTPATIENT)
Dept: PHARMACY | Age: 68
End: 2018-10-09

## 2018-10-10 RX ORDER — LEVOTHYROXINE SODIUM 0.12 MG/1
TABLET ORAL
Qty: 30 TABLET | Refills: 5 | Status: SHIPPED | OUTPATIENT
Start: 2018-10-10 | End: 2018-10-15 | Stop reason: DRUGHIGH

## 2018-10-10 NOTE — TELEPHONE ENCOUNTER
(post-traumatic stress disorder)     DVT (deep venous thrombosis) (HCC)     Chronic pain     IGT (impaired glucose tolerance)     Fatigue     Chronic bilateral low back pain without sciatica     Chest pain     Obesity (BMI 30-39. 9)     Acute cystitis without hematuria     Urinary tract infection without hematuria

## 2018-10-11 NOTE — DISCHARGE SUMMARY
200 MG capsule  TAKE ONE (1) CAPSULE BY MOUTH TWICE DAILY             ammonium lactate (LAC-HYDRIN) 12 % lotion  APPLY TO AFFECTED AREA(S) DAILY             buPROPion (WELLBUTRIN XL) 300 MG extended release tablet  Take 1 tablet by mouth every morning             fluticasone (FLONASE) 50 MCG/ACT nasal spray  INSTILL 2 SPRAYS IN EACH NOSTRIL ONCE DAILY             gabapentin (NEURONTIN) 800 MG tablet  TAKE ONE (1) TABLET BY MOUTH 4 TIMES DAILY. Gabapentin POWD  Formula 8E: diclo3%+gaba6%+DMSO5%+prilo2%+baclofen2%+lido2%. Apply 1-2 gm topically to affected area TID-QID             Glucose Blood (BLOOD GLUCOSE TEST STRIPS) STRP  Test blood sugar once daily. hydrocortisone 1 % cream  APPLY TO AFFECTED AREA TWICE DAILY             Lancets MISC  Test daily and as needed             lidocaine (LIDODERM) 5 %  APPLY 2 PATCHES TO THE SKIN ONCE DAILY AS NEEDED FOR PAIN             loratadine (CLARITIN) 10 MG tablet  TAKE 1 TABLET BY MOUTH ONCE DAILY             metoprolol tartrate (LOPRESSOR) 25 MG tablet  TAKE ONE (1) TABLET BY MOUTH TWICE DAILY             nitroGLYCERIN (NITROSTAT) 0.4 MG SL tablet  up to max of 3 total doses. If no relief after 1 dose, call 911. simvastatin (ZOCOR) 40 MG tablet  TAKE ONE (1) TABLET BY MOUTH EVERY NIGHT             traMADol (ULTRAM) 50 MG tablet  Take 1 tablet by mouth every 8 hours as needed for Pain for up to 28 days.  TAKE TWO TABLETS BY MOUTH EVERY 8 HOURS AS NEEDED.             valsartan (DIOVAN) 320 MG tablet  TAKE 1 TABLET BY MOUTH ONCE DAILY             vitamin D (ERGOCALCIFEROL) 65859 units CAPS capsule  TAKE 1 CAPSULE BY MOUTH EVERY WEEK ON SATURDAYS             warfarin (COUMADIN) 2.5 MG tablet  Take 2.5 mg by mouth See Admin Instructions             warfarin (COUMADIN) 5 MG tablet  Take 5 mg by mouth See Admin Instructions                  Activity: activity as tolerated    Diet: cardiac diet    Time Spent on discharge is more than 30 minutes in

## 2018-10-15 ENCOUNTER — HOSPITAL ENCOUNTER (OUTPATIENT)
Dept: PHARMACY | Age: 68
Setting detail: THERAPIES SERIES
Discharge: HOME | End: 2018-10-15
Payer: COMMERCIAL

## 2018-10-15 LAB
INR BLD: 2.1
PROTIME: 25.7 SECONDS

## 2018-10-15 PROCEDURE — 99212 OFFICE O/P EST SF 10 MIN: CPT

## 2018-10-15 PROCEDURE — 85610 PROTHROMBIN TIME: CPT

## 2018-10-15 NOTE — PROGRESS NOTES
Patient states compliant all of the time with regimen. No bleeding or thromboembolic side effects noted. No significant dietary changes. Patient saw Dr. Joann Archer and levothyroxine was increased to 137 mcg, valsartan changed to losartan, and will be starting tizanidine. No significant recent illness or disease state changes. PT/INR done in office per protocol. INR was therapeutic at 2.1 (goal 2-3). Warfarin regimen will be continued at current dose 5 mg MWF and 2.5 mg all other days. Will retest in 2 weeks with change in levothyroxine dose. Patient understands dosing directions and information discussed. Dosing schedule and follow up appointment given to patient. Progress note routed to referring physicians office.

## 2018-10-29 ENCOUNTER — HOSPITAL ENCOUNTER (OUTPATIENT)
Dept: PHARMACY | Age: 68
Setting detail: THERAPIES SERIES
Discharge: HOME | End: 2018-10-29
Payer: COMMERCIAL

## 2018-10-29 DIAGNOSIS — I82.403 DEEP VEIN THROMBOSIS (DVT) OF BOTH LOWER EXTREMITIES, UNSPECIFIED CHRONICITY, UNSPECIFIED VEIN (HCC): ICD-10-CM

## 2018-10-29 LAB
INR BLD: 1.7
PROTIME: 19.9 SECONDS

## 2018-10-29 PROCEDURE — 99212 OFFICE O/P EST SF 10 MIN: CPT

## 2018-10-29 PROCEDURE — 85610 PROTHROMBIN TIME: CPT

## 2018-11-10 ENCOUNTER — HOSPITAL ENCOUNTER (EMERGENCY)
Age: 68
Discharge: HOME | End: 2018-11-11
Attending: EMERGENCY MEDICINE
Payer: COMMERCIAL

## 2018-11-10 DIAGNOSIS — R07.9 CHEST PAIN, UNSPECIFIED TYPE: Primary | ICD-10-CM

## 2018-11-10 LAB
ABSOLUTE EOS #: 0.2 K/UL (ref 0–0.4)
ABSOLUTE IMMATURE GRANULOCYTE: ABNORMAL K/UL (ref 0–0.3)
ABSOLUTE LYMPH #: 1.9 K/UL (ref 1–4.8)
ABSOLUTE MONO #: 0.5 K/UL (ref 0.1–1.3)
BASOPHILS # BLD: 1 % (ref 0–2)
BASOPHILS ABSOLUTE: 0 K/UL (ref 0–0.2)
DIFFERENTIAL TYPE: ABNORMAL
EKG ATRIAL RATE: 68 BPM
EKG P AXIS: 34 DEGREES
EKG P-R INTERVAL: 150 MS
EKG Q-T INTERVAL: 400 MS
EKG QRS DURATION: 92 MS
EKG QTC CALCULATION (BAZETT): 425 MS
EKG R AXIS: -6 DEGREES
EKG T AXIS: 23 DEGREES
EKG VENTRICULAR RATE: 68 BPM
EOSINOPHILS RELATIVE PERCENT: 3 % (ref 0–4)
HCT VFR BLD CALC: 40.8 % (ref 36–46)
HEMOGLOBIN: 13.3 G/DL (ref 12–16)
IMMATURE GRANULOCYTES: ABNORMAL %
LYMPHOCYTES # BLD: 32 % (ref 24–44)
MCH RBC QN AUTO: 29 PG (ref 26–34)
MCHC RBC AUTO-ENTMCNC: 32.7 G/DL (ref 31–37)
MCV RBC AUTO: 88.7 FL (ref 80–100)
MONOCYTES # BLD: 9 % (ref 1–7)
NRBC AUTOMATED: ABNORMAL PER 100 WBC
PDW BLD-RTO: 13.6 % (ref 11.5–14.9)
PLATELET # BLD: 193 K/UL (ref 150–450)
PLATELET ESTIMATE: ABNORMAL
PMV BLD AUTO: 7.4 FL (ref 6–12)
RBC # BLD: 4.6 M/UL (ref 4–5.2)
RBC # BLD: ABNORMAL 10*6/UL
SEG NEUTROPHILS: 55 % (ref 36–66)
SEGMENTED NEUTROPHILS ABSOLUTE COUNT: 3.3 K/UL (ref 1.3–9.1)
WBC # BLD: 5.9 K/UL (ref 3.5–11)
WBC # BLD: ABNORMAL 10*3/UL

## 2018-11-10 PROCEDURE — 36415 COLL VENOUS BLD VENIPUNCTURE: CPT

## 2018-11-10 PROCEDURE — 85379 FIBRIN DEGRADATION QUANT: CPT

## 2018-11-10 PROCEDURE — 99285 EMERGENCY DEPT VISIT HI MDM: CPT

## 2018-11-10 PROCEDURE — 93005 ELECTROCARDIOGRAM TRACING: CPT

## 2018-11-10 PROCEDURE — 83690 ASSAY OF LIPASE: CPT

## 2018-11-10 PROCEDURE — 84484 ASSAY OF TROPONIN QUANT: CPT

## 2018-11-10 PROCEDURE — 85730 THROMBOPLASTIN TIME PARTIAL: CPT

## 2018-11-10 PROCEDURE — 80053 COMPREHEN METABOLIC PANEL: CPT

## 2018-11-10 PROCEDURE — 83880 ASSAY OF NATRIURETIC PEPTIDE: CPT

## 2018-11-10 PROCEDURE — 85610 PROTHROMBIN TIME: CPT

## 2018-11-10 PROCEDURE — 85025 COMPLETE CBC W/AUTO DIFF WBC: CPT

## 2018-11-10 RX ORDER — ASPIRIN 81 MG/1
324 TABLET, CHEWABLE ORAL ONCE
Status: DISCONTINUED | OUTPATIENT
Start: 2018-11-10 | End: 2018-11-11 | Stop reason: HOSPADM

## 2018-11-10 RX ORDER — NITROGLYCERIN 0.4 MG/1
0.4 TABLET SUBLINGUAL EVERY 5 MIN PRN
Status: DISCONTINUED | OUTPATIENT
Start: 2018-11-10 | End: 2018-11-11 | Stop reason: HOSPADM

## 2018-11-10 ASSESSMENT — PAIN SCALES - GENERAL: PAINLEVEL_OUTOF10: 7

## 2018-11-11 ENCOUNTER — APPOINTMENT (OUTPATIENT)
Dept: GENERAL RADIOLOGY | Age: 68
End: 2018-11-11
Payer: COMMERCIAL

## 2018-11-11 VITALS
OXYGEN SATURATION: 94 % | BODY MASS INDEX: 36.48 KG/M2 | SYSTOLIC BLOOD PRESSURE: 110 MMHG | RESPIRATION RATE: 14 BRPM | HEIGHT: 66 IN | WEIGHT: 227 LBS | DIASTOLIC BLOOD PRESSURE: 47 MMHG | TEMPERATURE: 98.5 F | HEART RATE: 68 BPM

## 2018-11-11 LAB
ALBUMIN SERPL-MCNC: 3.8 G/DL (ref 3.5–5.2)
ALBUMIN/GLOBULIN RATIO: ABNORMAL (ref 1–2.5)
ALP BLD-CCNC: 55 U/L (ref 35–104)
ALT SERPL-CCNC: 14 U/L (ref 5–33)
ANION GAP SERPL CALCULATED.3IONS-SCNC: 9 MMOL/L (ref 9–17)
AST SERPL-CCNC: 14 U/L
BILIRUB SERPL-MCNC: 0.25 MG/DL (ref 0.3–1.2)
BNP INTERPRETATION: NORMAL
BUN BLDV-MCNC: 13 MG/DL (ref 8–23)
BUN/CREAT BLD: ABNORMAL (ref 9–20)
CALCIUM SERPL-MCNC: 9.5 MG/DL (ref 8.6–10.4)
CHLORIDE BLD-SCNC: 100 MMOL/L (ref 98–107)
CO2: 29 MMOL/L (ref 20–31)
CREAT SERPL-MCNC: 0.9 MG/DL (ref 0.5–0.9)
D-DIMER QUANTITATIVE: <0.19 MG/L FEU
GFR AFRICAN AMERICAN: >60 ML/MIN
GFR NON-AFRICAN AMERICAN: >60 ML/MIN
GFR SERPL CREATININE-BSD FRML MDRD: ABNORMAL ML/MIN/{1.73_M2}
GFR SERPL CREATININE-BSD FRML MDRD: ABNORMAL ML/MIN/{1.73_M2}
GLUCOSE BLD-MCNC: 107 MG/DL (ref 70–99)
INR BLD: 2.6
LIPASE: 24 U/L (ref 13–60)
PARTIAL THROMBOPLASTIN TIME: 30.8 SEC (ref 23–31)
POTASSIUM SERPL-SCNC: 4.1 MMOL/L (ref 3.7–5.3)
PRO-BNP: 22 PG/ML
PROTHROMBIN TIME: 26.1 SEC (ref 9.7–12)
SODIUM BLD-SCNC: 138 MMOL/L (ref 135–144)
TOTAL PROTEIN: 7 G/DL (ref 6.4–8.3)
TROPONIN INTERP: NORMAL
TROPONIN INTERP: NORMAL
TROPONIN T: <0.03 NG/ML
TROPONIN T: <0.03 NG/ML

## 2018-11-11 PROCEDURE — 2580000003 HC RX 258: Performed by: EMERGENCY MEDICINE

## 2018-11-11 PROCEDURE — 71046 X-RAY EXAM CHEST 2 VIEWS: CPT

## 2018-11-11 PROCEDURE — 84484 ASSAY OF TROPONIN QUANT: CPT

## 2018-11-11 PROCEDURE — 36415 COLL VENOUS BLD VENIPUNCTURE: CPT

## 2018-11-11 RX ORDER — 0.9 % SODIUM CHLORIDE 0.9 %
500 INTRAVENOUS SOLUTION INTRAVENOUS ONCE
Status: COMPLETED | OUTPATIENT
Start: 2018-11-11 | End: 2018-11-11

## 2018-11-11 RX ADMIN — SODIUM CHLORIDE 500 ML: 9 INJECTION, SOLUTION INTRAVENOUS at 01:25

## 2018-11-11 ASSESSMENT — HEART SCORE: ECG: 1

## 2018-11-11 ASSESSMENT — PAIN SCALES - GENERAL: PAINLEVEL_OUTOF10: 2

## 2018-11-11 NOTE — ED NOTES
Pt up to the bathroom per self. Pt states chest pain is better. No complaints at this time.      Malou Anderson, RN  11/11/18 0662

## 2018-11-12 ASSESSMENT — ENCOUNTER SYMPTOMS
ABDOMINAL PAIN: 0
DIARRHEA: 0
SHORTNESS OF BREATH: 1
EYE REDNESS: 0
RHINORRHEA: 0
NAUSEA: 0
EYE PAIN: 0
VOMITING: 0
COUGH: 0
BACK PAIN: 0
SORE THROAT: 0

## 2018-12-06 ENCOUNTER — HOSPITAL ENCOUNTER (OUTPATIENT)
Dept: PHARMACY | Age: 68
Setting detail: THERAPIES SERIES
Discharge: HOME | End: 2018-12-06
Payer: COMMERCIAL

## 2018-12-06 ENCOUNTER — HOSPITAL ENCOUNTER (OUTPATIENT)
Dept: PHARMACY | Age: 68
Setting detail: THERAPIES SERIES
End: 2018-12-06
Payer: COMMERCIAL

## 2018-12-06 LAB
INR BLD: 2.5
PROTIME: 29.4 SECONDS

## 2018-12-06 PROCEDURE — 99211 OFF/OP EST MAY X REQ PHY/QHP: CPT

## 2018-12-06 PROCEDURE — 85610 PROTHROMBIN TIME: CPT

## 2019-01-03 ENCOUNTER — HOSPITAL ENCOUNTER (OUTPATIENT)
Dept: PHARMACY | Age: 69
Setting detail: THERAPIES SERIES
Discharge: HOME | End: 2019-01-03
Payer: COMMERCIAL

## 2019-01-03 DIAGNOSIS — I82.403 DEEP VEIN THROMBOSIS (DVT) OF BOTH LOWER EXTREMITIES, UNSPECIFIED CHRONICITY, UNSPECIFIED VEIN (HCC): ICD-10-CM

## 2019-01-03 LAB
INR BLD: 2.8
PROTIME: 33.7 SECONDS

## 2019-01-03 PROCEDURE — 85610 PROTHROMBIN TIME: CPT

## 2019-01-03 PROCEDURE — 99211 OFF/OP EST MAY X REQ PHY/QHP: CPT

## 2019-01-27 ENCOUNTER — APPOINTMENT (OUTPATIENT)
Dept: GENERAL RADIOLOGY | Age: 69
End: 2019-01-27
Payer: COMMERCIAL

## 2019-01-27 ENCOUNTER — APPOINTMENT (OUTPATIENT)
Dept: CT IMAGING | Age: 69
End: 2019-01-27
Payer: COMMERCIAL

## 2019-01-27 ENCOUNTER — HOSPITAL ENCOUNTER (OUTPATIENT)
Age: 69
Setting detail: OBSERVATION
Discharge: HOME HEALTH | End: 2019-01-29
Attending: EMERGENCY MEDICINE | Admitting: FAMILY MEDICINE
Payer: COMMERCIAL

## 2019-01-27 DIAGNOSIS — R79.1 SUPRATHERAPEUTIC INR: ICD-10-CM

## 2019-01-27 DIAGNOSIS — N39.0 URINARY TRACT INFECTION WITHOUT HEMATURIA, SITE UNSPECIFIED: ICD-10-CM
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
## 2019-01-27 DIAGNOSIS — M54.50 CHRONIC BILATERAL LOW BACK PAIN WITHOUT SCIATICA: ICD-10-CM

## 2019-01-27 DIAGNOSIS — M79.7 FIBROMYALGIA: ICD-10-CM

## 2019-01-27 DIAGNOSIS — N30.00 ACUTE CYSTITIS WITHOUT HEMATURIA: ICD-10-CM

## 2019-01-27 DIAGNOSIS — G89.29 CHRONIC BILATERAL LOW BACK PAIN WITHOUT SCIATICA: ICD-10-CM
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
## 2019-01-27 PROBLEM — S00.83XA FACIAL HEMATOMA: Status: ACTIVE | Noted: 2019-01-27

## 2019-01-27 LAB
-: ABNORMAL
ABSOLUTE EOS #: 0.2 K/UL (ref 0–0.4)
ABSOLUTE IMMATURE GRANULOCYTE: ABNORMAL K/UL (ref 0–0.3)
ABSOLUTE LYMPH #: 1.9 K/UL (ref 1–4.8)
ABSOLUTE MONO #: 0.6 K/UL (ref 0.1–1.3)
AMORPHOUS: ABNORMAL
ANION GAP SERPL CALCULATED.3IONS-SCNC: 9 MMOL/L (ref 9–17)
BACTERIA: ABNORMAL
BASOPHILS # BLD: 1 % (ref 0–2)
BASOPHILS ABSOLUTE: 0 K/UL (ref 0–0.2)
BILIRUBIN URINE: NEGATIVE
BNP INTERPRETATION: NORMAL
BUN BLDV-MCNC: 15 MG/DL (ref 8–23)
BUN/CREAT BLD: ABNORMAL (ref 9–20)
CALCIUM SERPL-MCNC: 9.8 MG/DL (ref 8.6–10.4)
CASTS UA: ABNORMAL /LPF
CHLORIDE BLD-SCNC: 100 MMOL/L (ref 98–107)
CO2: 31 MMOL/L (ref 20–31)
COLOR: YELLOW
COMMENT UA: ABNORMAL
CREAT SERPL-MCNC: 0.75 MG/DL (ref 0.5–0.9)
CRYSTALS, UA: ABNORMAL /HPF
DIFFERENTIAL TYPE: ABNORMAL
EKG ATRIAL RATE: 69 BPM
EKG P AXIS: 58 DEGREES
EKG P-R INTERVAL: 150 MS
EKG Q-T INTERVAL: 428 MS
EKG QRS DURATION: 96 MS
EKG QTC CALCULATION (BAZETT): 458 MS
EKG R AXIS: 10 DEGREES
EKG T AXIS: 38 DEGREES
EKG VENTRICULAR RATE: 69 BPM
EOSINOPHILS RELATIVE PERCENT: 4 % (ref 0–4)
EPITHELIAL CELLS UA: ABNORMAL /HPF
GFR AFRICAN AMERICAN: >60 ML/MIN
GFR NON-AFRICAN AMERICAN: >60 ML/MIN
GFR SERPL CREATININE-BSD FRML MDRD: ABNORMAL ML/MIN/{1.73_M2}
GFR SERPL CREATININE-BSD FRML MDRD: ABNORMAL ML/MIN/{1.73_M2}
GLUCOSE BLD-MCNC: 102 MG/DL (ref 70–99)
GLUCOSE URINE: NEGATIVE
HCT VFR BLD CALC: 40.3 % (ref 36–46)
HEMOGLOBIN: 13.1 G/DL (ref 12–16)
IMMATURE GRANULOCYTES: ABNORMAL %
INR BLD: 4
KETONES, URINE: NEGATIVE
LEUKOCYTE ESTERASE, URINE: ABNORMAL
LYMPHOCYTES # BLD: 33 % (ref 24–44)
MCH RBC QN AUTO: 28.8 PG (ref 26–34)
MCHC RBC AUTO-ENTMCNC: 32.4 G/DL (ref 31–37)
MCV RBC AUTO: 89 FL (ref 80–100)
MONOCYTES # BLD: 11 % (ref 1–7)
MUCUS: ABNORMAL
NITRITE, URINE: NEGATIVE
NRBC AUTOMATED: ABNORMAL PER 100 WBC
OTHER OBSERVATIONS UA: ABNORMAL
PDW BLD-RTO: 13.7 % (ref 11.5–14.9)
PH UA: 5.5 (ref 5–8)
PLATELET # BLD: 204 K/UL (ref 150–450)
PLATELET ESTIMATE: ABNORMAL
PMV BLD AUTO: 7.1 FL (ref 6–12)
POTASSIUM SERPL-SCNC: 4.2 MMOL/L (ref 3.7–5.3)
PRO-BNP: <20 PG/ML
PROTEIN UA: NEGATIVE
PROTHROMBIN TIME: 39.2 SEC (ref 11.8–14.6)
RBC # BLD: 4.53 M/UL (ref 4–5.2)
RBC # BLD: ABNORMAL 10*6/UL
RBC UA: ABNORMAL /HPF
RENAL EPITHELIAL, UA: ABNORMAL /HPF
SEG NEUTROPHILS: 51 % (ref 36–66)
SEGMENTED NEUTROPHILS ABSOLUTE COUNT: 3 K/UL (ref 1.3–9.1)
SODIUM BLD-SCNC: 140 MMOL/L (ref 135–144)
SPECIFIC GRAVITY UA: 1.02 (ref 1–1.03)
TRICHOMONAS: ABNORMAL
TROPONIN INTERP: NORMAL
TROPONIN T: NORMAL NG/ML
TROPONIN, HIGH SENSITIVITY: <6 NG/L (ref 0–14)
TURBIDITY: ABNORMAL
URINE HGB: NEGATIVE
UROBILINOGEN, URINE: NORMAL
WBC # BLD: 5.8 K/UL (ref 3.5–11)
WBC # BLD: ABNORMAL 10*3/UL
WBC UA: ABNORMAL /HPF
YEAST: ABNORMAL

## 2019-01-27 PROCEDURE — 70486 CT MAXILLOFACIAL W/O DYE: CPT

## 2019-01-27 PROCEDURE — G0378 HOSPITAL OBSERVATION PER HR: HCPCS

## 2019-01-27 PROCEDURE — 71046 X-RAY EXAM CHEST 2 VIEWS: CPT

## 2019-01-27 PROCEDURE — 72125 CT NECK SPINE W/O DYE: CPT

## 2019-01-27 PROCEDURE — 6370000000 HC RX 637 (ALT 250 FOR IP): Performed by: FAMILY MEDICINE

## 2019-01-27 PROCEDURE — 73564 X-RAY EXAM KNEE 4 OR MORE: CPT

## 2019-01-27 PROCEDURE — 85610 PROTHROMBIN TIME: CPT

## 2019-01-27 PROCEDURE — 70450 CT HEAD/BRAIN W/O DYE: CPT

## 2019-01-27 PROCEDURE — 81001 URINALYSIS AUTO W/SCOPE: CPT

## 2019-01-27 PROCEDURE — 36415 COLL VENOUS BLD VENIPUNCTURE: CPT

## 2019-01-27 PROCEDURE — 6370000000 HC RX 637 (ALT 250 FOR IP): Performed by: EMERGENCY MEDICINE

## 2019-01-27 PROCEDURE — 84484 ASSAY OF TROPONIN QUANT: CPT

## 2019-01-27 PROCEDURE — 83880 ASSAY OF NATRIURETIC PEPTIDE: CPT

## 2019-01-27 PROCEDURE — 80048 BASIC METABOLIC PNL TOTAL CA: CPT

## 2019-01-27 PROCEDURE — 2580000003 HC RX 258: Performed by: FAMILY MEDICINE

## 2019-01-27 PROCEDURE — 85025 COMPLETE CBC W/AUTO DIFF WBC: CPT

## 2019-01-27 PROCEDURE — 93005 ELECTROCARDIOGRAM TRACING: CPT

## 2019-01-27 PROCEDURE — 99285 EMERGENCY DEPT VISIT HI MDM: CPT

## 2019-01-27 RX ORDER — OXYCODONE HYDROCHLORIDE 5 MG/1
5 TABLET ORAL ONCE
Status: COMPLETED | OUTPATIENT
Start: 2019-01-27 | End: 2019-01-27

## 2019-01-27 RX ORDER — HYDROCODONE BITARTRATE AND ACETAMINOPHEN 5; 325 MG/1; MG/1
1 TABLET ORAL EVERY 6 HOURS PRN
Status: DISCONTINUED | OUTPATIENT
Start: 2019-01-27 | End: 2019-01-29 | Stop reason: HOSPADM

## 2019-01-27 RX ORDER — ACETAMINOPHEN 325 MG/1
650 TABLET ORAL EVERY 4 HOURS PRN
Status: DISCONTINUED | OUTPATIENT
Start: 2019-01-27 | End: 2019-01-29 | Stop reason: HOSPADM

## 2019-01-27 RX ORDER — LEVOTHYROXINE SODIUM 137 UG/1
137 TABLET ORAL DAILY
Status: DISCONTINUED | OUTPATIENT
Start: 2019-01-27 | End: 2019-01-29 | Stop reason: HOSPADM

## 2019-01-27 RX ORDER — SODIUM CHLORIDE 0.9 % (FLUSH) 0.9 %
10 SYRINGE (ML) INJECTION PRN
Status: DISCONTINUED | OUTPATIENT
Start: 2019-01-27 | End: 2019-01-29 | Stop reason: HOSPADM

## 2019-01-27 RX ORDER — TIZANIDINE 2 MG/1
2 TABLET ORAL EVERY 8 HOURS PRN
Status: DISCONTINUED | OUTPATIENT
Start: 2019-01-27 | End: 2019-01-29 | Stop reason: HOSPADM

## 2019-01-27 RX ORDER — DIAPER,BRIEF,INFANT-TODD,DISP
EACH MISCELLANEOUS 2 TIMES DAILY
Status: DISCONTINUED | OUTPATIENT
Start: 2019-01-27 | End: 2019-01-29 | Stop reason: HOSPADM

## 2019-01-27 RX ORDER — OXYCODONE HYDROCHLORIDE AND ACETAMINOPHEN 5; 325 MG/1; MG/1
2 TABLET ORAL EVERY 4 HOURS PRN
Status: DISCONTINUED | OUTPATIENT
Start: 2019-01-27 | End: 2019-01-29 | Stop reason: HOSPADM

## 2019-01-27 RX ORDER — SIMVASTATIN 40 MG
40 TABLET ORAL NIGHTLY
Status: DISCONTINUED | OUTPATIENT
Start: 2019-01-27 | End: 2019-01-29 | Stop reason: HOSPADM

## 2019-01-27 RX ORDER — OXYCODONE HYDROCHLORIDE AND ACETAMINOPHEN 5; 325 MG/1; MG/1
1 TABLET ORAL ONCE
Status: COMPLETED | OUTPATIENT
Start: 2019-01-27 | End: 2019-01-27

## 2019-01-27 RX ORDER — FLUTICASONE PROPIONATE 50 MCG
2 SPRAY, SUSPENSION (ML) NASAL DAILY
Status: DISCONTINUED | OUTPATIENT
Start: 2019-01-27 | End: 2019-01-29 | Stop reason: HOSPADM

## 2019-01-27 RX ORDER — OXYCODONE HYDROCHLORIDE AND ACETAMINOPHEN 5; 325 MG/1; MG/1
1 TABLET ORAL EVERY 4 HOURS PRN
Status: DISCONTINUED | OUTPATIENT
Start: 2019-01-27 | End: 2019-01-29 | Stop reason: HOSPADM

## 2019-01-27 RX ORDER — AMLODIPINE BESYLATE 10 MG/1
10 TABLET ORAL DAILY
Status: DISCONTINUED | OUTPATIENT
Start: 2019-01-27 | End: 2019-01-29 | Stop reason: HOSPADM

## 2019-01-27 RX ORDER — AMMONIUM LACTATE 12 G/100G
LOTION TOPICAL PRN
Status: DISCONTINUED | OUTPATIENT
Start: 2019-01-27 | End: 2019-01-29 | Stop reason: HOSPADM

## 2019-01-27 RX ORDER — ACYCLOVIR 200 MG/1
200 CAPSULE ORAL 2 TIMES DAILY
Status: DISCONTINUED | OUTPATIENT
Start: 2019-01-27 | End: 2019-01-29 | Stop reason: HOSPADM

## 2019-01-27 RX ORDER — GABAPENTIN 400 MG/1
800 CAPSULE ORAL 3 TIMES DAILY
Status: DISCONTINUED | OUTPATIENT
Start: 2019-01-27 | End: 2019-01-29 | Stop reason: HOSPADM

## 2019-01-27 RX ORDER — SODIUM CHLORIDE 0.9 % (FLUSH) 0.9 %
10 SYRINGE (ML) INJECTION EVERY 12 HOURS SCHEDULED
Status: DISCONTINUED | OUTPATIENT
Start: 2019-01-27 | End: 2019-01-29 | Stop reason: HOSPADM

## 2019-01-27 RX ORDER — NITROGLYCERIN 0.4 MG/1
0.4 TABLET SUBLINGUAL EVERY 5 MIN PRN
Status: DISCONTINUED | OUTPATIENT
Start: 2019-01-27 | End: 2019-01-29 | Stop reason: HOSPADM

## 2019-01-27 RX ORDER — BUPROPION HYDROCHLORIDE 300 MG/1
300 TABLET ORAL EVERY MORNING
Status: DISCONTINUED | OUTPATIENT
Start: 2019-01-28 | End: 2019-01-29 | Stop reason: HOSPADM

## 2019-01-27 RX ADMIN — AMLODIPINE BESYLATE 10 MG: 10 TABLET ORAL at 16:11

## 2019-01-27 RX ADMIN — ACYCLOVIR 200 MG: 200 CAPSULE ORAL at 21:36

## 2019-01-27 RX ADMIN — METOPROLOL TARTRATE 25 MG: 25 TABLET ORAL at 21:33

## 2019-01-27 RX ADMIN — OXYCODONE HYDROCHLORIDE AND ACETAMINOPHEN 1 TABLET: 5; 325 TABLET ORAL at 09:28

## 2019-01-27 RX ADMIN — HYDROCORTISONE: 1 CREAM TOPICAL at 21:39

## 2019-01-27 RX ADMIN — OXYCODONE HYDROCHLORIDE 5 MG: 5 TABLET ORAL at 12:22

## 2019-01-27 RX ADMIN — GABAPENTIN 800 MG: 400 CAPSULE ORAL at 21:33

## 2019-01-27 RX ADMIN — GABAPENTIN 800 MG: 400 CAPSULE ORAL at 16:11

## 2019-01-27 RX ADMIN — SIMVASTATIN 40 MG: 40 TABLET, FILM COATED ORAL at 21:33

## 2019-01-27 RX ADMIN — OXYCODONE AND ACETAMINOPHEN 2 TABLET: 5; 325 TABLET ORAL at 21:33

## 2019-01-27 RX ADMIN — Medication 10 ML: at 21:42

## 2019-01-27 RX ADMIN — OXYCODONE AND ACETAMINOPHEN 2 TABLET: 5; 325 TABLET ORAL at 16:03

## 2019-01-27 ASSESSMENT — ENCOUNTER SYMPTOMS
DIARRHEA: 0
TROUBLE SWALLOWING: 0
FACIAL SWELLING: 1
CHEST TIGHTNESS: 0
EYE REDNESS: 0
VOMITING: 0
SORE THROAT: 0
COUGH: 0
NAUSEA: 0
ABDOMINAL PAIN: 0
CONSTIPATION: 0
SHORTNESS OF BREATH: 0
BACK PAIN: 0
EYE PAIN: 0

## 2019-01-27 ASSESSMENT — PAIN SCALES - GENERAL
PAINLEVEL_OUTOF10: 5
PAINLEVEL_OUTOF10: 10
PAINLEVEL_OUTOF10: 8
PAINLEVEL_OUTOF10: 2

## 2019-01-27 ASSESSMENT — PAIN DESCRIPTION - ORIENTATION
ORIENTATION: LEFT

## 2019-01-27 ASSESSMENT — PAIN DESCRIPTION - ONSET
ONSET: ON-GOING
ONSET: ON-GOING

## 2019-01-27 ASSESSMENT — PAIN DESCRIPTION - DESCRIPTORS
DESCRIPTORS: SHARP
DESCRIPTORS: SHARP
DESCRIPTORS: SHARP;CONSTANT

## 2019-01-27 ASSESSMENT — PAIN DESCRIPTION - LOCATION
LOCATION: LEG;FACE
LOCATION: LEG

## 2019-01-27 ASSESSMENT — PAIN DESCRIPTION - FREQUENCY
FREQUENCY: CONTINUOUS
FREQUENCY: CONTINUOUS

## 2019-01-27 ASSESSMENT — PAIN DESCRIPTION - PAIN TYPE
TYPE: ACUTE PAIN

## 2019-01-28 LAB
ALBUMIN SERPL-MCNC: 3.3 G/DL (ref 3.5–5.2)
ALBUMIN/GLOBULIN RATIO: ABNORMAL (ref 1–2.5)
ALP BLD-CCNC: 53 U/L (ref 35–104)
ALT SERPL-CCNC: 13 U/L (ref 5–33)
ANION GAP SERPL CALCULATED.3IONS-SCNC: 7 MMOL/L (ref 9–17)
AST SERPL-CCNC: 14 U/L
BILIRUB SERPL-MCNC: 0.18 MG/DL (ref 0.3–1.2)
BUN BLDV-MCNC: 15 MG/DL (ref 8–23)
BUN/CREAT BLD: ABNORMAL (ref 9–20)
CALCIUM SERPL-MCNC: 9.7 MG/DL (ref 8.6–10.4)
CHLORIDE BLD-SCNC: 102 MMOL/L (ref 98–107)
CO2: 31 MMOL/L (ref 20–31)
CREAT SERPL-MCNC: 0.64 MG/DL (ref 0.5–0.9)
GFR AFRICAN AMERICAN: >60 ML/MIN
GFR NON-AFRICAN AMERICAN: >60 ML/MIN
GFR SERPL CREATININE-BSD FRML MDRD: ABNORMAL ML/MIN/{1.73_M2}
GFR SERPL CREATININE-BSD FRML MDRD: ABNORMAL ML/MIN/{1.73_M2}
GLUCOSE BLD-MCNC: 108 MG/DL (ref 70–99)
HCT VFR BLD CALC: 38.9 % (ref 36–46)
HEMOGLOBIN: 12.5 G/DL (ref 12–16)
MCH RBC QN AUTO: 28.9 PG (ref 26–34)
MCHC RBC AUTO-ENTMCNC: 32.1 G/DL (ref 31–37)
MCV RBC AUTO: 89.9 FL (ref 80–100)
NRBC AUTOMATED: NORMAL PER 100 WBC
PDW BLD-RTO: 13.8 % (ref 11.5–14.9)
PLATELET # BLD: 193 K/UL (ref 150–450)
PMV BLD AUTO: 7.4 FL (ref 6–12)
POTASSIUM SERPL-SCNC: 4.3 MMOL/L (ref 3.7–5.3)
RBC # BLD: 4.33 M/UL (ref 4–5.2)
SODIUM BLD-SCNC: 140 MMOL/L (ref 135–144)
TOTAL PROTEIN: 6.7 G/DL (ref 6.4–8.3)
WBC # BLD: 5.4 K/UL (ref 3.5–11)

## 2019-01-28 PROCEDURE — G0378 HOSPITAL OBSERVATION PER HR: HCPCS

## 2019-01-28 PROCEDURE — 6360000002 HC RX W HCPCS: Performed by: FAMILY MEDICINE

## 2019-01-28 PROCEDURE — 36415 COLL VENOUS BLD VENIPUNCTURE: CPT

## 2019-01-28 PROCEDURE — 96376 TX/PRO/DX INJ SAME DRUG ADON: CPT

## 2019-01-28 PROCEDURE — 6370000000 HC RX 637 (ALT 250 FOR IP): Performed by: FAMILY MEDICINE

## 2019-01-28 PROCEDURE — 2580000003 HC RX 258: Performed by: FAMILY MEDICINE

## 2019-01-28 PROCEDURE — 97162 PT EVAL MOD COMPLEX 30 MIN: CPT

## 2019-01-28 PROCEDURE — 97165 OT EVAL LOW COMPLEX 30 MIN: CPT

## 2019-01-28 PROCEDURE — 80053 COMPREHEN METABOLIC PANEL: CPT

## 2019-01-28 PROCEDURE — 85027 COMPLETE CBC AUTOMATED: CPT

## 2019-01-28 PROCEDURE — 96374 THER/PROPH/DIAG INJ IV PUSH: CPT

## 2019-01-28 PROCEDURE — 99220 PR INITIAL OBSERVATION CARE/DAY 70 MINUTES: CPT | Performed by: FAMILY MEDICINE

## 2019-01-28 RX ORDER — NITROFURANTOIN 25; 75 MG/1; MG/1
100 CAPSULE ORAL EVERY 12 HOURS SCHEDULED
Status: DISCONTINUED | OUTPATIENT
Start: 2019-01-28 | End: 2019-01-29 | Stop reason: HOSPADM

## 2019-01-28 RX ORDER — ONDANSETRON 2 MG/ML
4 INJECTION INTRAMUSCULAR; INTRAVENOUS EVERY 6 HOURS PRN
Status: DISCONTINUED | OUTPATIENT
Start: 2019-01-28 | End: 2019-01-29 | Stop reason: HOSPADM

## 2019-01-28 RX ADMIN — ONDANSETRON 4 MG: 2 INJECTION INTRAMUSCULAR; INTRAVENOUS at 23:38

## 2019-01-28 RX ADMIN — METOPROLOL TARTRATE 25 MG: 25 TABLET ORAL at 10:06

## 2019-01-28 RX ADMIN — HYDROCORTISONE: 1 CREAM TOPICAL at 10:06

## 2019-01-28 RX ADMIN — Medication 10 ML: at 10:12

## 2019-01-28 RX ADMIN — SIMVASTATIN 40 MG: 40 TABLET, FILM COATED ORAL at 20:48

## 2019-01-28 RX ADMIN — METOPROLOL TARTRATE 25 MG: 25 TABLET ORAL at 21:02

## 2019-01-28 RX ADMIN — OXYCODONE AND ACETAMINOPHEN 2 TABLET: 5; 325 TABLET ORAL at 10:09

## 2019-01-28 RX ADMIN — HYDROCORTISONE: 1 CREAM TOPICAL at 20:48

## 2019-01-28 RX ADMIN — ACYCLOVIR 200 MG: 200 CAPSULE ORAL at 10:05

## 2019-01-28 RX ADMIN — NITROFURANTOIN (MONOHYDRATE/MACROCRYSTALS) 100 MG: 75; 25 CAPSULE ORAL at 20:48

## 2019-01-28 RX ADMIN — ONDANSETRON 4 MG: 2 INJECTION INTRAMUSCULAR; INTRAVENOUS at 15:41

## 2019-01-28 RX ADMIN — LEVOTHYROXINE SODIUM 137 MCG: 137 TABLET ORAL at 05:48

## 2019-01-28 RX ADMIN — Medication 10 ML: at 23:39

## 2019-01-28 RX ADMIN — NITROFURANTOIN (MONOHYDRATE/MACROCRYSTALS) 100 MG: 75; 25 CAPSULE ORAL at 10:06

## 2019-01-28 RX ADMIN — OXYCODONE AND ACETAMINOPHEN 2 TABLET: 5; 325 TABLET ORAL at 05:48

## 2019-01-28 RX ADMIN — ACYCLOVIR 200 MG: 200 CAPSULE ORAL at 20:47

## 2019-01-28 RX ADMIN — BUPROPION HYDROCHLORIDE 300 MG: 300 TABLET, FILM COATED, EXTENDED RELEASE ORAL at 10:05

## 2019-01-28 RX ADMIN — GABAPENTIN 800 MG: 400 CAPSULE ORAL at 20:47

## 2019-01-28 RX ADMIN — Medication 10 ML: at 20:50

## 2019-01-28 RX ADMIN — OXYCODONE AND ACETAMINOPHEN 2 TABLET: 5; 325 TABLET ORAL at 20:46

## 2019-01-28 RX ADMIN — AMLODIPINE BESYLATE 10 MG: 10 TABLET ORAL at 10:05

## 2019-01-28 RX ADMIN — GABAPENTIN 800 MG: 400 CAPSULE ORAL at 15:46

## 2019-01-28 RX ADMIN — GABAPENTIN 800 MG: 400 CAPSULE ORAL at 10:06

## 2019-01-28 RX ADMIN — OXYCODONE AND ACETAMINOPHEN 2 TABLET: 5; 325 TABLET ORAL at 15:41

## 2019-01-28 ASSESSMENT — PAIN DESCRIPTION - FREQUENCY
FREQUENCY: CONTINUOUS

## 2019-01-28 ASSESSMENT — PAIN DESCRIPTION - ONSET
ONSET: ON-GOING
ONSET: ON-GOING

## 2019-01-28 ASSESSMENT — PAIN SCALES - GENERAL
PAINLEVEL_OUTOF10: 7
PAINLEVEL_OUTOF10: 0
PAINLEVEL_OUTOF10: 0
PAINLEVEL_OUTOF10: 10
PAINLEVEL_OUTOF10: 7
PAINLEVEL_OUTOF10: 0
PAINLEVEL_OUTOF10: 8
PAINLEVEL_OUTOF10: 0
PAINLEVEL_OUTOF10: 7
PAINLEVEL_OUTOF10: 0
PAINLEVEL_OUTOF10: 10

## 2019-01-28 ASSESSMENT — PAIN DESCRIPTION - LOCATION
LOCATION: LEG

## 2019-01-28 ASSESSMENT — PAIN DESCRIPTION - PAIN TYPE
TYPE: ACUTE PAIN

## 2019-01-28 ASSESSMENT — PAIN DESCRIPTION - ORIENTATION
ORIENTATION: LEFT

## 2019-01-28 ASSESSMENT — PAIN DESCRIPTION - DESCRIPTORS
DESCRIPTORS: THROBBING
DESCRIPTORS: THROBBING
DESCRIPTORS: SHARP

## 2019-01-28 ASSESSMENT — PAIN - FUNCTIONAL ASSESSMENT: PAIN_FUNCTIONAL_ASSESSMENT: ACTIVITIES ARE NOT PREVENTED

## 2019-01-28 ASSESSMENT — PAIN DESCRIPTION - PROGRESSION
CLINICAL_PROGRESSION: GRADUALLY WORSENING
CLINICAL_PROGRESSION: GRADUALLY WORSENING

## 2019-01-29 ENCOUNTER — HOSPITAL ENCOUNTER (OUTPATIENT)
Dept: PHARMACY | Age: 69
Setting detail: THERAPIES SERIES
Discharge: HOME | End: 2019-01-29
Payer: COMMERCIAL

## 2019-01-29 VITALS
HEART RATE: 64 BPM | RESPIRATION RATE: 16 BRPM | BODY MASS INDEX: 38.97 KG/M2 | TEMPERATURE: 97.5 F | OXYGEN SATURATION: 97 % | HEIGHT: 65 IN | WEIGHT: 233.91 LBS | DIASTOLIC BLOOD PRESSURE: 50 MMHG | SYSTOLIC BLOOD PRESSURE: 129 MMHG

## 2019-01-29 LAB
ALBUMIN SERPL-MCNC: 3.5 G/DL (ref 3.5–5.2)
ALBUMIN/GLOBULIN RATIO: ABNORMAL (ref 1–2.5)
ALP BLD-CCNC: 51 U/L (ref 35–104)
ALT SERPL-CCNC: 13 U/L (ref 5–33)
ANION GAP SERPL CALCULATED.3IONS-SCNC: 8 MMOL/L (ref 9–17)
AST SERPL-CCNC: 12 U/L
BILIRUB SERPL-MCNC: 0.17 MG/DL (ref 0.3–1.2)
BUN BLDV-MCNC: 15 MG/DL (ref 8–23)
BUN/CREAT BLD: ABNORMAL (ref 9–20)
CALCIUM SERPL-MCNC: 9.2 MG/DL (ref 8.6–10.4)
CHLORIDE BLD-SCNC: 98 MMOL/L (ref 98–107)
CO2: 32 MMOL/L (ref 20–31)
CREAT SERPL-MCNC: 0.74 MG/DL (ref 0.5–0.9)
GFR AFRICAN AMERICAN: >60 ML/MIN
GFR NON-AFRICAN AMERICAN: >60 ML/MIN
GFR SERPL CREATININE-BSD FRML MDRD: ABNORMAL ML/MIN/{1.73_M2}
GFR SERPL CREATININE-BSD FRML MDRD: ABNORMAL ML/MIN/{1.73_M2}
GLUCOSE BLD-MCNC: 111 MG/DL (ref 70–99)
HCT VFR BLD CALC: 40.1 % (ref 36–46)
HEMOGLOBIN: 12.8 G/DL (ref 12–16)
INR BLD: 1.7
MCH RBC QN AUTO: 28.5 PG (ref 26–34)
MCHC RBC AUTO-ENTMCNC: 32 G/DL (ref 31–37)
MCV RBC AUTO: 89.2 FL (ref 80–100)
NRBC AUTOMATED: NORMAL PER 100 WBC
PDW BLD-RTO: 13.8 % (ref 11.5–14.9)
PLATELET # BLD: 187 K/UL (ref 150–450)
PMV BLD AUTO: 6.8 FL (ref 6–12)
POTASSIUM SERPL-SCNC: 4 MMOL/L (ref 3.7–5.3)
PROTIME: 20.7 SECONDS
RBC # BLD: 4.5 M/UL (ref 4–5.2)
SODIUM BLD-SCNC: 138 MMOL/L (ref 135–144)
TOTAL PROTEIN: 6.7 G/DL (ref 6.4–8.3)
WBC # BLD: 5.7 K/UL (ref 3.5–11)

## 2019-01-29 PROCEDURE — 6360000002 HC RX W HCPCS: Performed by: FAMILY MEDICINE

## 2019-01-29 PROCEDURE — 99211 OFF/OP EST MAY X REQ PHY/QHP: CPT

## 2019-01-29 PROCEDURE — 85610 PROTHROMBIN TIME: CPT

## 2019-01-29 PROCEDURE — 6370000000 HC RX 637 (ALT 250 FOR IP): Performed by: FAMILY MEDICINE

## 2019-01-29 PROCEDURE — 2580000003 HC RX 258: Performed by: FAMILY MEDICINE

## 2019-01-29 PROCEDURE — 99217 PR OBSERVATION CARE DISCHARGE MANAGEMENT: CPT | Performed by: FAMILY MEDICINE

## 2019-01-29 PROCEDURE — 80053 COMPREHEN METABOLIC PANEL: CPT

## 2019-01-29 PROCEDURE — 96376 TX/PRO/DX INJ SAME DRUG ADON: CPT

## 2019-01-29 PROCEDURE — 36415 COLL VENOUS BLD VENIPUNCTURE: CPT

## 2019-01-29 PROCEDURE — G0378 HOSPITAL OBSERVATION PER HR: HCPCS

## 2019-01-29 PROCEDURE — 85027 COMPLETE CBC AUTOMATED: CPT

## 2019-01-29 RX ORDER — NITROFURANTOIN 25; 75 MG/1; MG/1
100 CAPSULE ORAL EVERY 12 HOURS SCHEDULED
Qty: 14 CAPSULE | Refills: 0 | Status: SHIPPED | OUTPATIENT
Start: 2019-01-29 | End: 2019-02-05

## 2019-01-29 RX ADMIN — OXYCODONE AND ACETAMINOPHEN 2 TABLET: 5; 325 TABLET ORAL at 17:13

## 2019-01-29 RX ADMIN — AMLODIPINE BESYLATE 10 MG: 10 TABLET ORAL at 10:14

## 2019-01-29 RX ADMIN — GABAPENTIN 800 MG: 400 CAPSULE ORAL at 10:13

## 2019-01-29 RX ADMIN — OXYCODONE AND ACETAMINOPHEN 2 TABLET: 5; 325 TABLET ORAL at 02:25

## 2019-01-29 RX ADMIN — Medication 10 ML: at 10:16

## 2019-01-29 RX ADMIN — ACYCLOVIR 200 MG: 200 CAPSULE ORAL at 10:14

## 2019-01-29 RX ADMIN — LEVOTHYROXINE SODIUM 137 MCG: 137 TABLET ORAL at 06:44

## 2019-01-29 RX ADMIN — HYDROCORTISONE: 1 CREAM TOPICAL at 10:14

## 2019-01-29 RX ADMIN — NITROFURANTOIN (MONOHYDRATE/MACROCRYSTALS) 100 MG: 75; 25 CAPSULE ORAL at 10:13

## 2019-01-29 RX ADMIN — BUPROPION HYDROCHLORIDE 300 MG: 300 TABLET, FILM COATED, EXTENDED RELEASE ORAL at 10:13

## 2019-01-29 RX ADMIN — ONDANSETRON 4 MG: 2 INJECTION INTRAMUSCULAR; INTRAVENOUS at 10:13

## 2019-01-29 RX ADMIN — GABAPENTIN 800 MG: 400 CAPSULE ORAL at 15:05

## 2019-01-29 RX ADMIN — METOPROLOL TARTRATE 25 MG: 25 TABLET ORAL at 10:14

## 2019-01-29 RX ADMIN — OXYCODONE AND ACETAMINOPHEN 2 TABLET: 5; 325 TABLET ORAL at 06:48

## 2019-01-29 RX ADMIN — OXYCODONE AND ACETAMINOPHEN 2 TABLET: 5; 325 TABLET ORAL at 12:03

## 2019-01-29 RX ADMIN — FLUTICASONE PROPIONATE 2 SPRAY: 50 SPRAY, METERED NASAL at 10:15

## 2019-01-29 ASSESSMENT — PAIN DESCRIPTION - LOCATION
LOCATION: LEG
LOCATION: LEG

## 2019-01-29 ASSESSMENT — PAIN DESCRIPTION - PAIN TYPE
TYPE: ACUTE PAIN
TYPE: ACUTE PAIN

## 2019-01-29 ASSESSMENT — PAIN DESCRIPTION - ORIENTATION
ORIENTATION: LEFT
ORIENTATION: LEFT

## 2019-01-29 ASSESSMENT — PAIN SCALES - GENERAL
PAINLEVEL_OUTOF10: 5
PAINLEVEL_OUTOF10: 0
PAINLEVEL_OUTOF10: 8
PAINLEVEL_OUTOF10: 6
PAINLEVEL_OUTOF10: 8
PAINLEVEL_OUTOF10: 6
PAINLEVEL_OUTOF10: 8

## 2019-01-31 ENCOUNTER — APPOINTMENT (OUTPATIENT)
Dept: PHARMACY | Age: 69
End: 2019-01-31
Payer: COMMERCIAL

## 2019-02-06 ENCOUNTER — HOSPITAL ENCOUNTER (OUTPATIENT)
Age: 69
Setting detail: SPECIMEN
Discharge: HOME | End: 2019-02-06
Payer: COMMERCIAL

## 2019-02-06 ENCOUNTER — APPOINTMENT (OUTPATIENT)
Dept: PHARMACY | Age: 69
End: 2019-02-06
Payer: COMMERCIAL

## 2019-02-07 LAB
CULTURE: NORMAL
Lab: NORMAL
SPECIMEN DESCRIPTION: NORMAL
STATUS: NORMAL

## 2019-02-11 ENCOUNTER — HOSPITAL ENCOUNTER (OUTPATIENT)
Dept: PHARMACY | Age: 69
Setting detail: THERAPIES SERIES
Discharge: HOME | End: 2019-02-11
Payer: COMMERCIAL

## 2019-02-11 LAB
INR BLD: 2.1
PROTIME: 24.8 SECONDS

## 2019-02-11 PROCEDURE — 99211 OFF/OP EST MAY X REQ PHY/QHP: CPT

## 2019-02-11 PROCEDURE — 85610 PROTHROMBIN TIME: CPT

## 2019-02-26 PROBLEM — N39.0 URINARY TRACT INFECTION: Status: RESOLVED | Noted: 2019-01-27 | Resolved: 2019-02-26

## 2019-03-04 ENCOUNTER — APPOINTMENT (OUTPATIENT)
Dept: GENERAL RADIOLOGY | Age: 69
End: 2019-03-04
Payer: COMMERCIAL

## 2019-03-04 ENCOUNTER — HOSPITAL ENCOUNTER (EMERGENCY)
Age: 69
Discharge: HOME | End: 2019-03-04
Attending: EMERGENCY MEDICINE
Payer: COMMERCIAL

## 2019-03-04 ENCOUNTER — APPOINTMENT (OUTPATIENT)
Dept: CT IMAGING | Age: 69
End: 2019-03-04
Payer: COMMERCIAL

## 2019-03-04 VITALS
HEIGHT: 65 IN | TEMPERATURE: 97.8 F | OXYGEN SATURATION: 99 % | DIASTOLIC BLOOD PRESSURE: 66 MMHG | BODY MASS INDEX: 37.49 KG/M2 | WEIGHT: 225 LBS | HEART RATE: 72 BPM | SYSTOLIC BLOOD PRESSURE: 122 MMHG | RESPIRATION RATE: 20 BRPM

## 2019-03-04 DIAGNOSIS — R07.9 CHEST PAIN, UNSPECIFIED TYPE: Primary | ICD-10-CM

## 2019-03-04 LAB
ABSOLUTE EOS #: 0.2 K/UL (ref 0–0.4)
ABSOLUTE IMMATURE GRANULOCYTE: ABNORMAL K/UL (ref 0–0.3)
ABSOLUTE LYMPH #: 1.7 K/UL (ref 1–4.8)
ABSOLUTE MONO #: 0.4 K/UL (ref 0.1–1.3)
ANION GAP SERPL CALCULATED.3IONS-SCNC: 10 MMOL/L (ref 9–17)
BASOPHILS # BLD: 1 % (ref 0–2)
BASOPHILS ABSOLUTE: 0 K/UL (ref 0–0.2)
BNP INTERPRETATION: NORMAL
BUN BLDV-MCNC: 13 MG/DL (ref 8–23)
BUN/CREAT BLD: ABNORMAL (ref 9–20)
CALCIUM SERPL-MCNC: 9.4 MG/DL (ref 8.6–10.4)
CHLORIDE BLD-SCNC: 99 MMOL/L (ref 98–107)
CO2: 28 MMOL/L (ref 20–31)
CREAT SERPL-MCNC: 0.73 MG/DL (ref 0.5–0.9)
DIFFERENTIAL TYPE: ABNORMAL
EOSINOPHILS RELATIVE PERCENT: 3 % (ref 0–4)
GFR AFRICAN AMERICAN: >60 ML/MIN
GFR NON-AFRICAN AMERICAN: >60 ML/MIN
GFR SERPL CREATININE-BSD FRML MDRD: ABNORMAL ML/MIN/{1.73_M2}
GFR SERPL CREATININE-BSD FRML MDRD: ABNORMAL ML/MIN/{1.73_M2}
GLUCOSE BLD-MCNC: 117 MG/DL (ref 70–99)
HCT VFR BLD CALC: 39.2 % (ref 36–46)
HEMOGLOBIN: 12.7 G/DL (ref 12–16)
IMMATURE GRANULOCYTES: ABNORMAL %
LYMPHOCYTES # BLD: 33 % (ref 24–44)
MCH RBC QN AUTO: 28.6 PG (ref 26–34)
MCHC RBC AUTO-ENTMCNC: 32.4 G/DL (ref 31–37)
MCV RBC AUTO: 88.2 FL (ref 80–100)
MONOCYTES # BLD: 8 % (ref 1–7)
MYOGLOBIN: 44 NG/ML (ref 25–58)
MYOGLOBIN: 45 NG/ML (ref 25–58)
NRBC AUTOMATED: ABNORMAL PER 100 WBC
PDW BLD-RTO: 13.7 % (ref 11.5–14.9)
PLATELET # BLD: 208 K/UL (ref 150–450)
PLATELET ESTIMATE: ABNORMAL
PMV BLD AUTO: 7.2 FL (ref 6–12)
POTASSIUM SERPL-SCNC: 4 MMOL/L (ref 3.7–5.3)
PRO-BNP: <20 PG/ML
RBC # BLD: 4.44 M/UL (ref 4–5.2)
RBC # BLD: ABNORMAL 10*6/UL
SEG NEUTROPHILS: 55 % (ref 36–66)
SEGMENTED NEUTROPHILS ABSOLUTE COUNT: 2.8 K/UL (ref 1.3–9.1)
SODIUM BLD-SCNC: 137 MMOL/L (ref 135–144)
TROPONIN INTERP: NORMAL
TROPONIN INTERP: NORMAL
TROPONIN T: NORMAL NG/ML
TROPONIN T: NORMAL NG/ML
TROPONIN, HIGH SENSITIVITY: <6 NG/L (ref 0–14)
TROPONIN, HIGH SENSITIVITY: <6 NG/L (ref 0–14)
WBC # BLD: 5 K/UL (ref 3.5–11)
WBC # BLD: ABNORMAL 10*3/UL

## 2019-03-04 PROCEDURE — 83874 ASSAY OF MYOGLOBIN: CPT

## 2019-03-04 PROCEDURE — 99285 EMERGENCY DEPT VISIT HI MDM: CPT

## 2019-03-04 PROCEDURE — 85025 COMPLETE CBC W/AUTO DIFF WBC: CPT

## 2019-03-04 PROCEDURE — 2580000003 HC RX 258: Performed by: EMERGENCY MEDICINE

## 2019-03-04 PROCEDURE — 83880 ASSAY OF NATRIURETIC PEPTIDE: CPT

## 2019-03-04 PROCEDURE — 6360000004 HC RX CONTRAST MEDICATION: Performed by: EMERGENCY MEDICINE

## 2019-03-04 PROCEDURE — 80048 BASIC METABOLIC PNL TOTAL CA: CPT

## 2019-03-04 PROCEDURE — 71046 X-RAY EXAM CHEST 2 VIEWS: CPT

## 2019-03-04 PROCEDURE — 36415 COLL VENOUS BLD VENIPUNCTURE: CPT

## 2019-03-04 PROCEDURE — 71275 CT ANGIOGRAPHY CHEST: CPT

## 2019-03-04 PROCEDURE — 93005 ELECTROCARDIOGRAM TRACING: CPT

## 2019-03-04 PROCEDURE — 6370000000 HC RX 637 (ALT 250 FOR IP): Performed by: EMERGENCY MEDICINE

## 2019-03-04 PROCEDURE — 84484 ASSAY OF TROPONIN QUANT: CPT

## 2019-03-04 RX ORDER — NITROGLYCERIN 0.4 MG/1
0.4 TABLET SUBLINGUAL EVERY 5 MIN PRN
Status: DISCONTINUED | OUTPATIENT
Start: 2019-03-04 | End: 2019-03-04 | Stop reason: HOSPADM

## 2019-03-04 RX ORDER — 0.9 % SODIUM CHLORIDE 0.9 %
80 INTRAVENOUS SOLUTION INTRAVENOUS ONCE
Status: COMPLETED | OUTPATIENT
Start: 2019-03-04 | End: 2019-03-04

## 2019-03-04 RX ORDER — ASPIRIN 81 MG/1
324 TABLET, CHEWABLE ORAL ONCE
Status: COMPLETED | OUTPATIENT
Start: 2019-03-04 | End: 2019-03-04

## 2019-03-04 RX ORDER — SODIUM CHLORIDE 0.9 % (FLUSH) 0.9 %
10 SYRINGE (ML) INJECTION PRN
Status: DISCONTINUED | OUTPATIENT
Start: 2019-03-04 | End: 2019-03-04 | Stop reason: HOSPADM

## 2019-03-04 RX ADMIN — ASPIRIN 81 MG 324 MG: 81 TABLET ORAL at 13:41

## 2019-03-04 RX ADMIN — IOVERSOL 100 ML: 741 INJECTION INTRA-ARTERIAL; INTRAVENOUS at 13:16

## 2019-03-04 RX ADMIN — SODIUM CHLORIDE 80 ML: 9 INJECTION, SOLUTION INTRAVENOUS at 13:16

## 2019-03-04 RX ADMIN — Medication 10 ML: at 13:17

## 2019-03-04 ASSESSMENT — PAIN DESCRIPTION - PAIN TYPE: TYPE: ACUTE PAIN

## 2019-03-04 ASSESSMENT — PAIN DESCRIPTION - FREQUENCY: FREQUENCY: CONTINUOUS

## 2019-03-04 ASSESSMENT — PAIN DESCRIPTION - LOCATION: LOCATION: CHEST;BACK

## 2019-03-04 ASSESSMENT — ENCOUNTER SYMPTOMS
EYE PAIN: 0
COUGH: 0
RHINORRHEA: 0
EYE REDNESS: 0
FACIAL SWELLING: 0
SHORTNESS OF BREATH: 1
ABDOMINAL PAIN: 0
VOMITING: 0
TROUBLE SWALLOWING: 0
BACK PAIN: 0
SINUS PRESSURE: 0
BLOOD IN STOOL: 0
COLOR CHANGE: 0
DIARRHEA: 0
SORE THROAT: 0
CONSTIPATION: 0
EYE DISCHARGE: 0
NAUSEA: 0
CHEST TIGHTNESS: 0
WHEEZING: 0

## 2019-03-04 ASSESSMENT — PAIN DESCRIPTION - ORIENTATION: ORIENTATION: MID;UPPER

## 2019-03-04 ASSESSMENT — PAIN SCALES - GENERAL
PAINLEVEL_OUTOF10: 8
PAINLEVEL_OUTOF10: 5

## 2019-03-04 ASSESSMENT — PAIN DESCRIPTION - DESCRIPTORS: DESCRIPTORS: PRESSURE

## 2019-03-05 LAB
EKG ATRIAL RATE: 69 BPM
EKG ATRIAL RATE: 69 BPM
EKG P AXIS: 49 DEGREES
EKG P AXIS: 49 DEGREES
EKG P-R INTERVAL: 150 MS
EKG P-R INTERVAL: 150 MS
EKG Q-T INTERVAL: 430 MS
EKG Q-T INTERVAL: 430 MS
EKG QRS DURATION: 92 MS
EKG QRS DURATION: 92 MS
EKG QTC CALCULATION (BAZETT): 460 MS
EKG QTC CALCULATION (BAZETT): 460 MS
EKG R AXIS: 2 DEGREES
EKG R AXIS: 2 DEGREES
EKG T AXIS: 44 DEGREES
EKG T AXIS: 44 DEGREES
EKG VENTRICULAR RATE: 69 BPM
EKG VENTRICULAR RATE: 69 BPM

## 2019-03-06 ENCOUNTER — HOSPITAL ENCOUNTER (EMERGENCY)
Age: 69
Discharge: HOME | End: 2019-03-06
Attending: EMERGENCY MEDICINE
Payer: COMMERCIAL

## 2019-03-06 VITALS
DIASTOLIC BLOOD PRESSURE: 64 MMHG | HEART RATE: 98 BPM | SYSTOLIC BLOOD PRESSURE: 145 MMHG | RESPIRATION RATE: 20 BRPM | WEIGHT: 225 LBS | TEMPERATURE: 97.6 F | BODY MASS INDEX: 37.49 KG/M2 | HEIGHT: 65 IN | OXYGEN SATURATION: 99 %

## 2019-03-06 DIAGNOSIS — R11.2 NON-INTRACTABLE VOMITING WITH NAUSEA, UNSPECIFIED VOMITING TYPE: ICD-10-CM

## 2019-03-06 DIAGNOSIS — R07.9 CHEST PAIN, UNSPECIFIED TYPE: Primary | ICD-10-CM

## 2019-03-06 LAB
ABSOLUTE EOS #: 0.1 K/UL (ref 0–0.4)
ABSOLUTE IMMATURE GRANULOCYTE: NORMAL K/UL (ref 0–0.3)
ABSOLUTE LYMPH #: 1.6 K/UL (ref 1–4.8)
ABSOLUTE MONO #: 0.4 K/UL (ref 0.1–1.3)
ALBUMIN SERPL-MCNC: 3.8 G/DL (ref 3.5–5.2)
ALBUMIN/GLOBULIN RATIO: ABNORMAL (ref 1–2.5)
ALP BLD-CCNC: 59 U/L (ref 35–104)
ALT SERPL-CCNC: 13 U/L (ref 5–33)
ANION GAP SERPL CALCULATED.3IONS-SCNC: 12 MMOL/L (ref 9–17)
AST SERPL-CCNC: 18 U/L
BASOPHILS # BLD: 0 % (ref 0–2)
BASOPHILS ABSOLUTE: 0 K/UL (ref 0–0.2)
BILIRUB SERPL-MCNC: 0.45 MG/DL (ref 0.3–1.2)
BUN BLDV-MCNC: 8 MG/DL (ref 8–23)
BUN/CREAT BLD: ABNORMAL (ref 9–20)
CALCIUM SERPL-MCNC: 9.4 MG/DL (ref 8.6–10.4)
CHLORIDE BLD-SCNC: 98 MMOL/L (ref 98–107)
CO2: 26 MMOL/L (ref 20–31)
CREAT SERPL-MCNC: 0.65 MG/DL (ref 0.5–0.9)
DIFFERENTIAL TYPE: NORMAL
EOSINOPHILS RELATIVE PERCENT: 2 % (ref 0–4)
GFR AFRICAN AMERICAN: >60 ML/MIN
GFR NON-AFRICAN AMERICAN: >60 ML/MIN
GFR SERPL CREATININE-BSD FRML MDRD: ABNORMAL ML/MIN/{1.73_M2}
GFR SERPL CREATININE-BSD FRML MDRD: ABNORMAL ML/MIN/{1.73_M2}
GLUCOSE BLD-MCNC: 98 MG/DL (ref 70–99)
HCT VFR BLD CALC: 40.1 % (ref 36–46)
HEMOGLOBIN: 13.4 G/DL (ref 12–16)
IMMATURE GRANULOCYTES: NORMAL %
INR BLD: 1.8
LIPASE: 15 U/L (ref 13–60)
LYMPHOCYTES # BLD: 25 % (ref 24–44)
MCH RBC QN AUTO: 29.2 PG (ref 26–34)
MCHC RBC AUTO-ENTMCNC: 33.4 G/DL (ref 31–37)
MCV RBC AUTO: 87.4 FL (ref 80–100)
MONOCYTES # BLD: 7 % (ref 1–7)
MYOGLOBIN: 40 NG/ML (ref 25–58)
MYOGLOBIN: 43 NG/ML (ref 25–58)
NRBC AUTOMATED: NORMAL PER 100 WBC
PARTIAL THROMBOPLASTIN TIME: 31 SEC (ref 24–36)
PDW BLD-RTO: 13.8 % (ref 11.5–14.9)
PLATELET # BLD: 217 K/UL (ref 150–450)
PLATELET ESTIMATE: NORMAL
PMV BLD AUTO: 7.4 FL (ref 6–12)
POTASSIUM SERPL-SCNC: 3.5 MMOL/L (ref 3.7–5.3)
PROTHROMBIN TIME: 20.5 SEC (ref 11.8–14.6)
RBC # BLD: 4.59 M/UL (ref 4–5.2)
RBC # BLD: NORMAL 10*6/UL
SEG NEUTROPHILS: 66 % (ref 36–66)
SEGMENTED NEUTROPHILS ABSOLUTE COUNT: 4.1 K/UL (ref 1.3–9.1)
SODIUM BLD-SCNC: 136 MMOL/L (ref 135–144)
THYROXINE, FREE: 0.92 NG/DL (ref 0.93–1.7)
TOTAL PROTEIN: 7.9 G/DL (ref 6.4–8.3)
TROPONIN INTERP: NORMAL
TROPONIN INTERP: NORMAL
TROPONIN T: NORMAL NG/ML
TROPONIN T: NORMAL NG/ML
TROPONIN, HIGH SENSITIVITY: <6 NG/L (ref 0–14)
TROPONIN, HIGH SENSITIVITY: <6 NG/L (ref 0–14)
TSH SERPL DL<=0.05 MIU/L-ACNC: 13.25 MIU/L (ref 0.3–5)
WBC # BLD: 6.3 K/UL (ref 3.5–11)
WBC # BLD: NORMAL 10*3/UL

## 2019-03-06 PROCEDURE — 83690 ASSAY OF LIPASE: CPT

## 2019-03-06 PROCEDURE — 84484 ASSAY OF TROPONIN QUANT: CPT

## 2019-03-06 PROCEDURE — 84439 ASSAY OF FREE THYROXINE: CPT

## 2019-03-06 PROCEDURE — 6360000002 HC RX W HCPCS: Performed by: EMERGENCY MEDICINE

## 2019-03-06 PROCEDURE — 2580000003 HC RX 258: Performed by: EMERGENCY MEDICINE

## 2019-03-06 PROCEDURE — 99284 EMERGENCY DEPT VISIT MOD MDM: CPT

## 2019-03-06 PROCEDURE — 85730 THROMBOPLASTIN TIME PARTIAL: CPT

## 2019-03-06 PROCEDURE — 93005 ELECTROCARDIOGRAM TRACING: CPT

## 2019-03-06 PROCEDURE — 80053 COMPREHEN METABOLIC PANEL: CPT

## 2019-03-06 PROCEDURE — 84443 ASSAY THYROID STIM HORMONE: CPT

## 2019-03-06 PROCEDURE — 36415 COLL VENOUS BLD VENIPUNCTURE: CPT

## 2019-03-06 PROCEDURE — 83874 ASSAY OF MYOGLOBIN: CPT

## 2019-03-06 PROCEDURE — 96374 THER/PROPH/DIAG INJ IV PUSH: CPT

## 2019-03-06 PROCEDURE — 85610 PROTHROMBIN TIME: CPT

## 2019-03-06 PROCEDURE — 85025 COMPLETE CBC W/AUTO DIFF WBC: CPT

## 2019-03-06 RX ORDER — ONDANSETRON 4 MG/1
4 TABLET, ORALLY DISINTEGRATING ORAL EVERY 8 HOURS PRN
Qty: 10 TABLET | Refills: 0 | Status: SHIPPED | OUTPATIENT
Start: 2019-03-06 | End: 2019-08-02

## 2019-03-06 RX ORDER — 0.9 % SODIUM CHLORIDE 0.9 %
500 INTRAVENOUS SOLUTION INTRAVENOUS ONCE
Status: COMPLETED | OUTPATIENT
Start: 2019-03-06 | End: 2019-03-06

## 2019-03-06 RX ORDER — ONDANSETRON 2 MG/ML
4 INJECTION INTRAMUSCULAR; INTRAVENOUS ONCE
Status: COMPLETED | OUTPATIENT
Start: 2019-03-06 | End: 2019-03-06

## 2019-03-06 RX ADMIN — SODIUM CHLORIDE 500 ML: 9 INJECTION, SOLUTION INTRAVENOUS at 15:44

## 2019-03-06 RX ADMIN — ONDANSETRON 4 MG: 2 INJECTION INTRAMUSCULAR; INTRAVENOUS at 15:44

## 2019-03-06 ASSESSMENT — ENCOUNTER SYMPTOMS
WHEEZING: 0
CONSTIPATION: 0
CHEST TIGHTNESS: 0
EYE DISCHARGE: 0
DIARRHEA: 0
SORE THROAT: 0
FACIAL SWELLING: 0
TROUBLE SWALLOWING: 0
EYE PAIN: 0
COUGH: 1
SHORTNESS OF BREATH: 0
ABDOMINAL PAIN: 0
SINUS PRESSURE: 0
EYE REDNESS: 0
VOMITING: 1
BLOOD IN STOOL: 0
NAUSEA: 1
RHINORRHEA: 0
BACK PAIN: 0
COLOR CHANGE: 0

## 2019-03-06 ASSESSMENT — PAIN SCALES - GENERAL: PAINLEVEL_OUTOF10: 8

## 2019-03-06 ASSESSMENT — PAIN DESCRIPTION - LOCATION: LOCATION: CHEST

## 2019-03-07 ENCOUNTER — TELEPHONE (OUTPATIENT)
Dept: PHARMACY | Age: 69
End: 2019-03-07

## 2019-03-07 LAB
EKG ATRIAL RATE: 88 BPM
EKG P AXIS: 54 DEGREES
EKG P-R INTERVAL: 150 MS
EKG Q-T INTERVAL: 378 MS
EKG QRS DURATION: 92 MS
EKG QTC CALCULATION (BAZETT): 457 MS
EKG R AXIS: 1 DEGREES
EKG T AXIS: 29 DEGREES
EKG VENTRICULAR RATE: 88 BPM

## 2019-03-11 ENCOUNTER — HOSPITAL ENCOUNTER (OUTPATIENT)
Dept: PHARMACY | Age: 69
Setting detail: THERAPIES SERIES
Discharge: HOME | End: 2019-03-11
Payer: COMMERCIAL

## 2019-03-11 LAB
INR BLD: 2.2
PROTIME: 26.3 SECONDS

## 2019-03-11 PROCEDURE — 85610 PROTHROMBIN TIME: CPT

## 2019-03-11 PROCEDURE — 99211 OFF/OP EST MAY X REQ PHY/QHP: CPT

## 2019-03-25 ENCOUNTER — HOSPITAL ENCOUNTER (OUTPATIENT)
Dept: PHARMACY | Age: 69
Setting detail: THERAPIES SERIES
Discharge: HOME | End: 2019-03-25
Payer: COMMERCIAL

## 2019-03-25 LAB
INR BLD: 2.4
PROTIME: 28.7 SECONDS

## 2019-03-25 PROCEDURE — 99211 OFF/OP EST MAY X REQ PHY/QHP: CPT

## 2019-03-25 PROCEDURE — 85610 PROTHROMBIN TIME: CPT

## 2019-04-22 ENCOUNTER — HOSPITAL ENCOUNTER (OUTPATIENT)
Dept: PHARMACY | Age: 69
Setting detail: THERAPIES SERIES
Discharge: HOME | End: 2019-04-22
Payer: COMMERCIAL

## 2019-04-22 LAB
INR BLD: 4.7
PROTIME: 55.9 SECONDS

## 2019-04-22 PROCEDURE — 85610 PROTHROMBIN TIME: CPT

## 2019-04-22 PROCEDURE — 99211 OFF/OP EST MAY X REQ PHY/QHP: CPT

## 2019-04-24 ENCOUNTER — HOSPITAL ENCOUNTER (OUTPATIENT)
Dept: PHARMACY | Age: 69
Setting detail: THERAPIES SERIES
Discharge: HOME | End: 2019-04-24
Payer: COMMERCIAL

## 2019-04-24 DIAGNOSIS — I82.403 DEEP VEIN THROMBOSIS (DVT) OF BOTH LOWER EXTREMITIES, UNSPECIFIED CHRONICITY, UNSPECIFIED VEIN (HCC): ICD-10-CM

## 2019-04-24 LAB
INR BLD: 1.8
PROTIME: 21.3 SECONDS

## 2019-04-24 PROCEDURE — 99211 OFF/OP EST MAY X REQ PHY/QHP: CPT

## 2019-04-24 PROCEDURE — 85610 PROTHROMBIN TIME: CPT

## 2019-04-24 NOTE — PROGRESS NOTES
Patient states compliant all of the time with regimen. Warfarin held last two day for elevated INR  Medication list reviewed. No changes. No bleeding or thromboembolic side effects noted. Diet, use of Vitamin K reviewed. Patient does relate she had not been eating her usual vitamin K foods. No significant recent illness or disease state changes. Patient in increased pain from fibromyalgia and shoulder pain. No upcomming surgeries or procedures. New prescription for warfarin needed? No    PT/INR done in office per protocol. INR was subtherapeutic. INR = 1.8, goal range 2-3 after holding for 2 days    Warfarin regimen will be decreased to 5 mg MWF and 2.5 mg all other days. Will retest in 2 weeks. Patient understands dosing directions and information discussed. Dosing schedule and follow up appointment given to patient. Progress note routed to referring physicians office.

## 2019-04-26 PROBLEM — M25.511 CHRONIC PAIN OF BOTH SHOULDERS: Status: ACTIVE | Noted: 2019-04-26

## 2019-04-26 PROBLEM — M15.0 PRIMARY OSTEOARTHRITIS INVOLVING MULTIPLE JOINTS: Status: ACTIVE | Noted: 2019-04-26

## 2019-04-26 PROBLEM — M25.512 CHRONIC PAIN OF BOTH SHOULDERS: Status: ACTIVE | Noted: 2019-04-26

## 2019-04-26 PROBLEM — M15.9 PRIMARY OSTEOARTHRITIS INVOLVING MULTIPLE JOINTS: Status: ACTIVE | Noted: 2019-04-26

## 2019-04-26 PROBLEM — G89.29 CHRONIC PAIN OF BOTH SHOULDERS: Status: ACTIVE | Noted: 2019-04-26

## 2019-05-08 ENCOUNTER — HOSPITAL ENCOUNTER (OUTPATIENT)
Dept: PHARMACY | Age: 69
Setting detail: THERAPIES SERIES
Discharge: HOME | End: 2019-05-08
Payer: COMMERCIAL

## 2019-05-08 LAB
INR BLD: 3.5
PROTIME: 41.5 SECONDS

## 2019-05-08 PROCEDURE — 85610 PROTHROMBIN TIME: CPT

## 2019-05-08 PROCEDURE — 99212 OFFICE O/P EST SF 10 MIN: CPT

## 2019-05-08 NOTE — PROGRESS NOTES
Patient states compliant all of the time with regimen. No bleeding or thromboembolic side effects noted. No significant med or dietary changes. No significant disease state changes. Patient states with the changing weather she has been in a lot more pain. Patient has been taking 4 tablets of Tylenol with codeine. Patient has had to take a few zofran tablets due to nausea. PT/INR done in office per protocol. INR was supratherapeutic at 3.5 (goal 2-3). Warfarin regimen will be held for 1 day and then reduced to 5 mg Mon/Fri and 2.5 mg all other days. Will retest in 1 week. Patient counseled on signs/symptoms of bleeding and when to seek medical attention. Patient instructed to use extra precautions with elevated INR. Patient understands dosing directions and information discussed. Dosing schedule and follow up appointment given to patient. Progress note routed to referring physicians office. Patient acknowledges working in [address] [place] [address] with Pharmacist as referred by his/her physician.

## 2019-05-15 ENCOUNTER — HOSPITAL ENCOUNTER (OUTPATIENT)
Dept: PHARMACY | Age: 69
Setting detail: THERAPIES SERIES
Discharge: HOME | End: 2019-05-15
Payer: COMMERCIAL

## 2019-05-15 LAB
INR BLD: 2.2
PROTIME: 26.3 SECONDS

## 2019-05-15 PROCEDURE — 85610 PROTHROMBIN TIME: CPT

## 2019-05-15 PROCEDURE — 99211 OFF/OP EST MAY X REQ PHY/QHP: CPT

## 2019-05-28 ENCOUNTER — TELEPHONE (OUTPATIENT)
Dept: PHARMACY | Age: 69
End: 2019-05-28

## 2019-05-28 NOTE — TELEPHONE ENCOUNTER
Patient forget to call Moises Payan for  tomorrow.  THey are only able to pick her up at 8:30, time rescheduleds

## 2019-05-29 ENCOUNTER — HOSPITAL ENCOUNTER (OUTPATIENT)
Dept: PHARMACY | Age: 69
Setting detail: THERAPIES SERIES
Discharge: HOME | End: 2019-05-29
Payer: COMMERCIAL

## 2019-05-29 DIAGNOSIS — I82.403 DEEP VEIN THROMBOSIS (DVT) OF BOTH LOWER EXTREMITIES, UNSPECIFIED CHRONICITY, UNSPECIFIED VEIN (HCC): ICD-10-CM

## 2019-05-29 LAB
INR BLD: 2.1
PROTIME: 25.3 SECONDS

## 2019-05-29 PROCEDURE — 85610 PROTHROMBIN TIME: CPT

## 2019-05-29 PROCEDURE — 99211 OFF/OP EST MAY X REQ PHY/QHP: CPT

## 2019-05-29 NOTE — PROGRESS NOTES
Patient states compliant all of the time with regimen. No bleeding or thromboembolic side effects noted. No significant med or dietary changes. No significant recent illness or disease state changes. PT/INR done in office per protocol. INR was therapeutic at 2.1 (goal 2-3). Warfarin regimen will be continued at current dose of 5mg on MWF and 2.5mg 4x weekly. Will retest in 4 weeks. Patient understands dosing directions and information discussed. Dosing schedule and follow up appointment given to patient. Progress note routed to referring physicians office. Patient acknowledges working in [address] [place] [address] with Pharmacist as referred by his/her physician.

## 2019-06-06 ENCOUNTER — HOSPITAL ENCOUNTER (OUTPATIENT)
Dept: WOMENS IMAGING | Age: 69
Discharge: HOME | End: 2019-06-08
Payer: COMMERCIAL

## 2019-06-06 DIAGNOSIS — Z13.820 SCREENING FOR OSTEOPOROSIS: ICD-10-CM

## 2019-06-06 DIAGNOSIS — R10.13 EPIGASTRIC ABDOMINAL PAIN: ICD-10-CM

## 2019-06-06 PROCEDURE — 76705 ECHO EXAM OF ABDOMEN: CPT

## 2019-06-06 PROCEDURE — 77080 DXA BONE DENSITY AXIAL: CPT

## 2019-06-26 ENCOUNTER — HOSPITAL ENCOUNTER (OUTPATIENT)
Dept: PHARMACY | Age: 69
Setting detail: THERAPIES SERIES
Discharge: HOME | End: 2019-06-26
Payer: COMMERCIAL

## 2019-06-26 DIAGNOSIS — I82.409 DEEP VEIN THROMBOSIS (DVT) OF LOWER EXTREMITY, UNSPECIFIED CHRONICITY, UNSPECIFIED LATERALITY, UNSPECIFIED VEIN (HCC): ICD-10-CM

## 2019-06-26 LAB
INR BLD: 3.3
PROTIME: 39.8 SECONDS

## 2019-06-26 PROCEDURE — 99212 OFFICE O/P EST SF 10 MIN: CPT

## 2019-06-26 PROCEDURE — 85610 PROTHROMBIN TIME: CPT

## 2019-06-26 NOTE — PROGRESS NOTES
Patient states compliant all of the time with regimen. No bleeding or thromboembolic side effects noted. Patient reports possibly eating less vitamin K. Pain control has not been good. Patient has been having issues getting all of her pain medication due to change in insurance and opioid restrictions and has been taking some ibuprofen and naproxen - not at the same time. Patient reports taking it with food. Was using tylenol #3 and tramadol but does not have as much as she used to. PT/INR done in office per protocol. INR was supratherapeutic at 3.3 (goal 2-3) likely due to intake of NSAIDs. Warfarin regimen will be held today then reduced to 5mg on Mon/Fri and 2.5mg all other days. Will retest in 2 weeks. Patient understands dosing directions and information discussed. Dosing schedule and follow up appointment given to patient. Progress note routed to referring physicians office. Patient acknowledges working in [address] [place] [address] with Pharmacist as referred by his/her physician.

## 2019-07-08 ENCOUNTER — HOSPITAL ENCOUNTER (OUTPATIENT)
Dept: PHARMACY | Age: 69
Setting detail: THERAPIES SERIES
Discharge: HOME | End: 2019-07-08
Payer: COMMERCIAL

## 2019-07-08 DIAGNOSIS — I82.409 DEEP VEIN THROMBOSIS (DVT) OF LOWER EXTREMITY, UNSPECIFIED CHRONICITY, UNSPECIFIED LATERALITY, UNSPECIFIED VEIN (HCC): ICD-10-CM

## 2019-07-08 LAB
INR BLD: 3.3
PROTIME: 39.5 SECONDS

## 2019-07-08 PROCEDURE — 85610 PROTHROMBIN TIME: CPT

## 2019-07-08 PROCEDURE — 99212 OFFICE O/P EST SF 10 MIN: CPT

## 2019-07-24 ENCOUNTER — HOSPITAL ENCOUNTER (OUTPATIENT)
Dept: PHARMACY | Age: 69
Setting detail: THERAPIES SERIES
Discharge: HOME | End: 2019-07-24
Payer: COMMERCIAL

## 2019-07-24 DIAGNOSIS — I82.409 DEEP VEIN THROMBOSIS (DVT) OF LOWER EXTREMITY, UNSPECIFIED CHRONICITY, UNSPECIFIED LATERALITY, UNSPECIFIED VEIN (HCC): ICD-10-CM

## 2019-07-24 LAB
INR BLD: 1.7
PROTIME: 20.3 SECONDS

## 2019-07-24 PROCEDURE — 85610 PROTHROMBIN TIME: CPT

## 2019-07-24 PROCEDURE — 99212 OFFICE O/P EST SF 10 MIN: CPT

## 2019-07-28 ENCOUNTER — HOSPITAL ENCOUNTER (EMERGENCY)
Age: 69
Discharge: HOME | End: 2019-07-28
Attending: EMERGENCY MEDICINE
Payer: COMMERCIAL

## 2019-07-28 ENCOUNTER — APPOINTMENT (OUTPATIENT)
Dept: GENERAL RADIOLOGY | Age: 69
End: 2019-07-28
Payer: COMMERCIAL

## 2019-07-28 VITALS
TEMPERATURE: 98 F | RESPIRATION RATE: 13 BRPM | OXYGEN SATURATION: 96 % | WEIGHT: 212 LBS | SYSTOLIC BLOOD PRESSURE: 138 MMHG | HEIGHT: 65 IN | BODY MASS INDEX: 35.32 KG/M2 | DIASTOLIC BLOOD PRESSURE: 88 MMHG | HEART RATE: 73 BPM

## 2019-07-28 DIAGNOSIS — R07.9 CHEST PAIN, UNSPECIFIED TYPE: Primary | ICD-10-CM

## 2019-07-28 DIAGNOSIS — N30.00 ACUTE CYSTITIS WITHOUT HEMATURIA: ICD-10-CM

## 2019-07-28 LAB
-: ABNORMAL
ABSOLUTE EOS #: 0.1 K/UL (ref 0–0.4)
ABSOLUTE IMMATURE GRANULOCYTE: ABNORMAL K/UL (ref 0–0.3)
ABSOLUTE LYMPH #: 1.7 K/UL (ref 1–4.8)
ABSOLUTE MONO #: 0.6 K/UL (ref 0.1–1.3)
AMORPHOUS: ABNORMAL
ANION GAP SERPL CALCULATED.3IONS-SCNC: 11 MMOL/L (ref 9–17)
BACTERIA: ABNORMAL
BASOPHILS # BLD: 1 % (ref 0–2)
BASOPHILS ABSOLUTE: 0 K/UL (ref 0–0.2)
BILIRUBIN URINE: NEGATIVE
BUN BLDV-MCNC: 12 MG/DL (ref 8–23)
BUN/CREAT BLD: NORMAL (ref 9–20)
CALCIUM SERPL-MCNC: 9.6 MG/DL (ref 8.6–10.4)
CASTS UA: ABNORMAL /LPF
CHLORIDE BLD-SCNC: 101 MMOL/L (ref 98–107)
CO2: 26 MMOL/L (ref 20–31)
COLOR: YELLOW
COMMENT UA: ABNORMAL
CREAT SERPL-MCNC: 0.87 MG/DL (ref 0.5–0.9)
CRYSTALS, UA: ABNORMAL /HPF
D-DIMER QUANTITATIVE: 0.41 MG/L FEU (ref 0–0.59)
DIFFERENTIAL TYPE: ABNORMAL
EOSINOPHILS RELATIVE PERCENT: 2 % (ref 0–4)
EPITHELIAL CELLS UA: ABNORMAL /HPF
GFR AFRICAN AMERICAN: >60 ML/MIN
GFR NON-AFRICAN AMERICAN: >60 ML/MIN
GFR SERPL CREATININE-BSD FRML MDRD: NORMAL ML/MIN/{1.73_M2}
GFR SERPL CREATININE-BSD FRML MDRD: NORMAL ML/MIN/{1.73_M2}
GLUCOSE BLD-MCNC: 99 MG/DL (ref 70–99)
GLUCOSE URINE: NEGATIVE
HCT VFR BLD CALC: 37.9 % (ref 36–46)
HEMOGLOBIN: 12.3 G/DL (ref 12–16)
IMMATURE GRANULOCYTES: ABNORMAL %
INR BLD: 1.7
KETONES, URINE: NEGATIVE
LEUKOCYTE ESTERASE, URINE: ABNORMAL
LYMPHOCYTES # BLD: 25 % (ref 24–44)
MCH RBC QN AUTO: 28.6 PG (ref 26–34)
MCHC RBC AUTO-ENTMCNC: 32.5 G/DL (ref 31–37)
MCV RBC AUTO: 87.9 FL (ref 80–100)
MONOCYTES # BLD: 9 % (ref 1–7)
MUCUS: ABNORMAL
NITRITE, URINE: NEGATIVE
NRBC AUTOMATED: ABNORMAL PER 100 WBC
OTHER OBSERVATIONS UA: ABNORMAL
PDW BLD-RTO: 13.5 % (ref 11.5–14.9)
PH UA: 6.5 (ref 5–8)
PLATELET # BLD: 208 K/UL (ref 150–450)
PLATELET ESTIMATE: ABNORMAL
PMV BLD AUTO: 7 FL (ref 6–12)
POTASSIUM SERPL-SCNC: 3.7 MMOL/L (ref 3.7–5.3)
PROTEIN UA: NEGATIVE
PROTHROMBIN TIME: 20.1 SEC (ref 11.8–14.6)
RBC # BLD: 4.31 M/UL (ref 4–5.2)
RBC # BLD: ABNORMAL 10*6/UL
RBC UA: ABNORMAL /HPF
RENAL EPITHELIAL, UA: ABNORMAL /HPF
SEG NEUTROPHILS: 63 % (ref 36–66)
SEGMENTED NEUTROPHILS ABSOLUTE COUNT: 4.4 K/UL (ref 1.3–9.1)
SODIUM BLD-SCNC: 138 MMOL/L (ref 135–144)
SPECIFIC GRAVITY UA: 1.01 (ref 1–1.03)
TRICHOMONAS: ABNORMAL
TROPONIN INTERP: NORMAL
TROPONIN INTERP: NORMAL
TROPONIN T: NORMAL NG/ML
TROPONIN T: NORMAL NG/ML
TROPONIN, HIGH SENSITIVITY: <6 NG/L (ref 0–14)
TROPONIN, HIGH SENSITIVITY: <6 NG/L (ref 0–14)
TURBIDITY: CLEAR
URINE HGB: NEGATIVE
UROBILINOGEN, URINE: NORMAL
WBC # BLD: 7 K/UL (ref 3.5–11)
WBC # BLD: ABNORMAL 10*3/UL
WBC UA: ABNORMAL /HPF
YEAST: ABNORMAL

## 2019-07-28 PROCEDURE — 93005 ELECTROCARDIOGRAM TRACING: CPT

## 2019-07-28 PROCEDURE — 71046 X-RAY EXAM CHEST 2 VIEWS: CPT

## 2019-07-28 PROCEDURE — 85025 COMPLETE CBC W/AUTO DIFF WBC: CPT

## 2019-07-28 PROCEDURE — 85610 PROTHROMBIN TIME: CPT

## 2019-07-28 PROCEDURE — 80048 BASIC METABOLIC PNL TOTAL CA: CPT

## 2019-07-28 PROCEDURE — 6370000000 HC RX 637 (ALT 250 FOR IP): Performed by: EMERGENCY MEDICINE

## 2019-07-28 PROCEDURE — 85379 FIBRIN DEGRADATION QUANT: CPT

## 2019-07-28 PROCEDURE — 99285 EMERGENCY DEPT VISIT HI MDM: CPT

## 2019-07-28 PROCEDURE — 36415 COLL VENOUS BLD VENIPUNCTURE: CPT

## 2019-07-28 PROCEDURE — 81001 URINALYSIS AUTO W/SCOPE: CPT

## 2019-07-28 PROCEDURE — 84484 ASSAY OF TROPONIN QUANT: CPT

## 2019-07-28 PROCEDURE — 87086 URINE CULTURE/COLONY COUNT: CPT

## 2019-07-28 RX ORDER — NITROFURANTOIN 25; 75 MG/1; MG/1
100 CAPSULE ORAL 2 TIMES DAILY
Qty: 14 CAPSULE | Refills: 0 | Status: SHIPPED | OUTPATIENT
Start: 2019-07-28 | End: 2019-08-01

## 2019-07-28 RX ORDER — ASPIRIN 81 MG/1
324 TABLET, CHEWABLE ORAL ONCE
Status: COMPLETED | OUTPATIENT
Start: 2019-07-28 | End: 2019-07-28

## 2019-07-28 RX ORDER — NITROFURANTOIN 25; 75 MG/1; MG/1
100 CAPSULE ORAL ONCE
Status: COMPLETED | OUTPATIENT
Start: 2019-07-28 | End: 2019-07-28

## 2019-07-28 RX ADMIN — ASPIRIN 81 MG CHEWABLE TABLET 324 MG: 81 TABLET CHEWABLE at 16:22

## 2019-07-28 RX ADMIN — NITROFURANTOIN MONOHYDRATE/MACROCRYSTALLINE 100 MG: 25; 75 CAPSULE ORAL at 19:30

## 2019-07-28 ASSESSMENT — ENCOUNTER SYMPTOMS
SHORTNESS OF BREATH: 1
COLOR CHANGE: 0
VOMITING: 0
COUGH: 0
NAUSEA: 0
ABDOMINAL PAIN: 0
CONSTIPATION: 0
BACK PAIN: 0
TROUBLE SWALLOWING: 0
BLOOD IN STOOL: 0
SORE THROAT: 0
DIARRHEA: 0

## 2019-07-28 ASSESSMENT — PAIN SCALES - GENERAL: PAINLEVEL_OUTOF10: 7

## 2019-07-28 NOTE — ED PROVIDER NOTES
911.    ONDANSETRON (ZOFRAN ODT) 4 MG DISINTEGRATING TABLET    Take 1 tablet by mouth every 8 hours as needed for Nausea or Vomiting    SIMVASTATIN (ZOCOR) 40 MG TABLET    TAKE ONE (1) TABLET BY MOUTH EVERY NIGHT    TIZANIDINE (ZANAFLEX) 2 MG TABLET    Take 1 tablet by mouth every 8 hours as needed (muscle spasms)    TRAMADOL (ULTRAM) 50 MG TABLET    Take 1 tablet by mouth every 6 hours as needed for Pain for up to 30 days. VITAMIN D (ERGOCALCIFEROL) 19465 UNITS CAPS CAPSULE    TAKE 1 CAPSULE BY MOUTH EVERY WEEK ON SATURDAYS    WARFARIN (COUMADIN) 2.5 MG TABLET    Take 2.5 mg by mouth See Admin Instructions Dosed by [facility] Anticoagulation Service:5 mg MF and 2.5 mg 5x weekly    WARFARIN (COUMADIN) 5 MG TABLET    Take 5 mg by mouth See Admin Instructions Dosed by [facility] Anticoagulation Service: 5 mg Monday and 2.5 mg  6x weekly       ALLERGIES     is allergic to cymbalta [duloxetine hcl]; lisinopril; and robaxin [methocarbamol]. FAMILY HISTORY     She indicated that the status of her mother is unknown. She indicated that the status of her father is unknown.     family history includes Cervical Cancer in her mother; Heart Disease in her father and mother. SOCIAL HISTORY      reports that she has never smoked. She has never used smokeless tobacco. She reports that she does not drink alcohol or use drugs. PHYSICAL EXAM     INITIAL VITALS:  height is 5' 5\" (1.651 m) and weight is 212 lb (96.2 kg). Her oral temperature is 98 °F (36.7 °C). Her blood pressure is 138/88 and her pulse is 73. Her respiration is 13 and oxygen saturation is 96%. Physical Exam   Constitutional: She is oriented to person, place, and time. No distress. HENT:   Head: Normocephalic and atraumatic. Eyes: Pupils are equal, round, and reactive to light. Conjunctivae are normal.   Neck: Neck supple. Cardiovascular: Normal rate, regular rhythm, normal heart sounds and intact distal pulses.    No murmur Abnormal; Notable for the following components:    Bacteria, UA FEW (*)     All other components within normal limits   URINE CULTURE CLEAN CATCH   TROPONIN   TROPONIN   BASIC METABOLIC PANEL   D-DIMER, QUANTITATIVE         EMERGENCY DEPARTMENT COURSE:   Vitals:    Vitals:    07/28/19 1520 07/28/19 1630   BP: (!) 141/66 138/88   Pulse: 83 73   Resp: 16 13   Temp: 98 °F (36.7 °C)    TempSrc: Oral    SpO2:  96%   Weight: 212 lb (96.2 kg)    Height: 5' 5\" (1.651 m)      7:24 PM  On reexamination patient feels completely improved. She is denying any chest pain, difficulty breathing at this time. Work-up is unremarkable. She has 2- troponins less than 6. D-dimer is negative. She did have an infection in her urine with 20-50 white blood cells. She does have a history of frequent UTIs. Upon further questioning she has had some increase in frequency of urination. She is afebrile and well-appearing. I believe should be appropriate for outpatient treatment. Will start on Macrobid. She was advised to follow-up with her PCP. She states she actually has a follow-up appointment later this week. Advised to return if any symptoms worsen. Patient is agreeable to plan and will be discharged home today. CRITICALCARE:      CONSULTS:  None      PROCEDURES:      FINAL IMPRESSION      1. Chest pain, unspecified type    2.  Acute cystitis without hematuria            DISPOSITION/PLAN   DISPOSITION Decision To Discharge 07/28/2019 06:10:47 PM          PATIENT REFERRED TO:  Bernice Holley MD  [address] [facility]  [address] [facility],[address]  [address] [facility]  [phone]    Schedule an appointment as soon as possible for a visit       [facility] ED  [facility] [address] [phone]    As needed, If symptoms worsen      DISCHARGE MEDICATIONS:  New Prescriptions    NITROFURANTOIN, MACROCRYSTAL-MONOHYDRATE, (MACROBID) 100 MG CAPSULE    Take 1 capsule by mouth 2 times daily for 7 doses       (Please note that portions ofthis note were completed with a voice recognition program.  Efforts were made to edit the dictations but occasionally words are mis-transcribed.)    Mandie Hahn DO  Attending Emergency Physician          Mandie Hahn DO  07/28/19 1924

## 2019-07-28 NOTE — ED NOTES
Pt to be discharged.  Reports to Dr Shannan Quiñones that she has increased urinary frequency-CCU collected     Bi Liu RN  07/28/19 4985

## 2019-07-29 LAB
CULTURE: NORMAL
Lab: NORMAL
SPECIMEN DESCRIPTION: NORMAL

## 2019-08-02 ENCOUNTER — HOSPITAL ENCOUNTER (OUTPATIENT)
Age: 69
Setting detail: OBSERVATION
Discharge: HOME | End: 2019-08-05
Attending: EMERGENCY MEDICINE | Admitting: FAMILY MEDICINE
Payer: COMMERCIAL

## 2019-08-02 ENCOUNTER — APPOINTMENT (OUTPATIENT)
Dept: CT IMAGING | Age: 69
End: 2019-08-02
Payer: COMMERCIAL

## 2019-08-02 DIAGNOSIS — I82.403 RECURRENT ACUTE DEEP VEIN THROMBOSIS (DVT) OF BOTH LOWER EXTREMITIES (HCC): Chronic | ICD-10-CM

## 2019-08-02 DIAGNOSIS — R07.9 CHEST PAIN, UNSPECIFIED TYPE: Primary | ICD-10-CM

## 2019-08-02 LAB
-: ABNORMAL
ABSOLUTE EOS #: 0.1 K/UL (ref 0–0.4)
ABSOLUTE IMMATURE GRANULOCYTE: ABNORMAL K/UL (ref 0–0.3)
ABSOLUTE LYMPH #: 1.5 K/UL (ref 1–4.8)
ABSOLUTE MONO #: 0.5 K/UL (ref 0.1–1.3)
AMORPHOUS: ABNORMAL
ANION GAP SERPL CALCULATED.3IONS-SCNC: 11 MMOL/L (ref 9–17)
BACTERIA: ABNORMAL
BASOPHILS # BLD: 1 % (ref 0–2)
BASOPHILS ABSOLUTE: 0 K/UL (ref 0–0.2)
BILIRUBIN URINE: NEGATIVE
BNP INTERPRETATION: NORMAL
BUN BLDV-MCNC: 13 MG/DL (ref 8–23)
BUN/CREAT BLD: ABNORMAL (ref 9–20)
CALCIUM SERPL-MCNC: 8.8 MG/DL (ref 8.6–10.4)
CASTS UA: ABNORMAL /LPF
CHLORIDE BLD-SCNC: 102 MMOL/L (ref 98–107)
CO2: 26 MMOL/L (ref 20–31)
COLOR: YELLOW
COMMENT UA: ABNORMAL
CREAT SERPL-MCNC: 0.76 MG/DL (ref 0.5–0.9)
CRYSTALS, UA: ABNORMAL /HPF
DIFFERENTIAL TYPE: ABNORMAL
EOSINOPHILS RELATIVE PERCENT: 2 % (ref 0–4)
EPITHELIAL CELLS UA: ABNORMAL /HPF
GFR AFRICAN AMERICAN: >60 ML/MIN
GFR NON-AFRICAN AMERICAN: >60 ML/MIN
GFR SERPL CREATININE-BSD FRML MDRD: ABNORMAL ML/MIN/{1.73_M2}
GFR SERPL CREATININE-BSD FRML MDRD: ABNORMAL ML/MIN/{1.73_M2}
GLUCOSE BLD-MCNC: 104 MG/DL (ref 70–99)
GLUCOSE URINE: NEGATIVE
HCT VFR BLD CALC: 36.9 % (ref 36–46)
HEMOGLOBIN: 11.9 G/DL (ref 12–16)
IMMATURE GRANULOCYTES: ABNORMAL %
INR BLD: 1.9
KETONES, URINE: NEGATIVE
LEUKOCYTE ESTERASE, URINE: ABNORMAL
LYMPHOCYTES # BLD: 25 % (ref 24–44)
MCH RBC QN AUTO: 28.5 PG (ref 26–34)
MCHC RBC AUTO-ENTMCNC: 32.2 G/DL (ref 31–37)
MCV RBC AUTO: 88.4 FL (ref 80–100)
MONOCYTES # BLD: 9 % (ref 1–7)
MUCUS: ABNORMAL
NITRITE, URINE: NEGATIVE
NRBC AUTOMATED: ABNORMAL PER 100 WBC
OTHER OBSERVATIONS UA: ABNORMAL
PDW BLD-RTO: 13.9 % (ref 11.5–14.9)
PH UA: 7 (ref 5–8)
PLATELET # BLD: 209 K/UL (ref 150–450)
PLATELET ESTIMATE: ABNORMAL
PMV BLD AUTO: 6.3 FL (ref 6–12)
POTASSIUM SERPL-SCNC: 3.9 MMOL/L (ref 3.7–5.3)
PRO-BNP: 24 PG/ML
PROTEIN UA: NEGATIVE
PROTHROMBIN TIME: 21.6 SEC (ref 11.8–14.6)
RBC # BLD: 4.18 M/UL (ref 4–5.2)
RBC # BLD: ABNORMAL 10*6/UL
RBC UA: ABNORMAL /HPF
RENAL EPITHELIAL, UA: ABNORMAL /HPF
SEG NEUTROPHILS: 63 % (ref 36–66)
SEGMENTED NEUTROPHILS ABSOLUTE COUNT: 3.8 K/UL (ref 1.3–9.1)
SODIUM BLD-SCNC: 139 MMOL/L (ref 135–144)
SPECIFIC GRAVITY UA: 1.01 (ref 1–1.03)
TRICHOMONAS: ABNORMAL
TROPONIN INTERP: NORMAL
TROPONIN INTERP: NORMAL
TROPONIN T: NORMAL NG/ML
TROPONIN T: NORMAL NG/ML
TROPONIN, HIGH SENSITIVITY: <6 NG/L (ref 0–14)
TROPONIN, HIGH SENSITIVITY: <6 NG/L (ref 0–14)
TURBIDITY: CLEAR
URINE HGB: NEGATIVE
UROBILINOGEN, URINE: NORMAL
WBC # BLD: 6 K/UL (ref 3.5–11)
WBC # BLD: ABNORMAL 10*3/UL
WBC UA: ABNORMAL /HPF
YEAST: ABNORMAL

## 2019-08-02 PROCEDURE — G0378 HOSPITAL OBSERVATION PER HR: HCPCS

## 2019-08-02 PROCEDURE — 81001 URINALYSIS AUTO W/SCOPE: CPT

## 2019-08-02 PROCEDURE — 80048 BASIC METABOLIC PNL TOTAL CA: CPT

## 2019-08-02 PROCEDURE — 83880 ASSAY OF NATRIURETIC PEPTIDE: CPT

## 2019-08-02 PROCEDURE — 6360000004 HC RX CONTRAST MEDICATION: Performed by: EMERGENCY MEDICINE

## 2019-08-02 PROCEDURE — 99285 EMERGENCY DEPT VISIT HI MDM: CPT

## 2019-08-02 PROCEDURE — 85610 PROTHROMBIN TIME: CPT

## 2019-08-02 PROCEDURE — 93005 ELECTROCARDIOGRAM TRACING: CPT | Performed by: EMERGENCY MEDICINE

## 2019-08-02 PROCEDURE — 6370000000 HC RX 637 (ALT 250 FOR IP): Performed by: EMERGENCY MEDICINE

## 2019-08-02 PROCEDURE — 84484 ASSAY OF TROPONIN QUANT: CPT

## 2019-08-02 PROCEDURE — 96374 THER/PROPH/DIAG INJ IV PUSH: CPT

## 2019-08-02 PROCEDURE — 87086 URINE CULTURE/COLONY COUNT: CPT

## 2019-08-02 PROCEDURE — 6360000002 HC RX W HCPCS: Performed by: EMERGENCY MEDICINE

## 2019-08-02 PROCEDURE — 71260 CT THORAX DX C+: CPT

## 2019-08-02 PROCEDURE — 2580000003 HC RX 258: Performed by: EMERGENCY MEDICINE

## 2019-08-02 PROCEDURE — 36415 COLL VENOUS BLD VENIPUNCTURE: CPT

## 2019-08-02 PROCEDURE — 96375 TX/PRO/DX INJ NEW DRUG ADDON: CPT

## 2019-08-02 PROCEDURE — 85025 COMPLETE CBC W/AUTO DIFF WBC: CPT

## 2019-08-02 RX ORDER — SODIUM CHLORIDE 0.9 % (FLUSH) 0.9 %
10 SYRINGE (ML) INJECTION PRN
Status: DISCONTINUED | OUTPATIENT
Start: 2019-08-02 | End: 2019-08-03 | Stop reason: SDUPTHER

## 2019-08-02 RX ORDER — FLUTICASONE PROPIONATE 50 MCG
2 SPRAY, SUSPENSION (ML) NASAL DAILY PRN
COMMUNITY
End: 2019-09-26 | Stop reason: SDUPTHER

## 2019-08-02 RX ORDER — LORATADINE 10 MG/1
10 TABLET ORAL DAILY PRN
COMMUNITY
End: 2019-09-26 | Stop reason: SDUPTHER

## 2019-08-02 RX ORDER — MORPHINE SULFATE 4 MG/ML
4 INJECTION, SOLUTION INTRAMUSCULAR; INTRAVENOUS ONCE
Status: COMPLETED | OUTPATIENT
Start: 2019-08-02 | End: 2019-08-02

## 2019-08-02 RX ORDER — ASPIRIN 81 MG/1
324 TABLET, CHEWABLE ORAL ONCE
Status: COMPLETED | OUTPATIENT
Start: 2019-08-02 | End: 2019-08-02

## 2019-08-02 RX ORDER — 0.9 % SODIUM CHLORIDE 0.9 %
80 INTRAVENOUS SOLUTION INTRAVENOUS ONCE
Status: DISCONTINUED | OUTPATIENT
Start: 2019-08-02 | End: 2019-08-05 | Stop reason: HOSPADM

## 2019-08-02 RX ORDER — ONDANSETRON 2 MG/ML
4 INJECTION INTRAMUSCULAR; INTRAVENOUS ONCE
Status: COMPLETED | OUTPATIENT
Start: 2019-08-02 | End: 2019-08-02

## 2019-08-02 RX ORDER — 0.9 % SODIUM CHLORIDE 0.9 %
80 INTRAVENOUS SOLUTION INTRAVENOUS ONCE
Status: COMPLETED | OUTPATIENT
Start: 2019-08-02 | End: 2019-08-02

## 2019-08-02 RX ADMIN — IOVERSOL 75 ML: 741 INJECTION INTRA-ARTERIAL; INTRAVENOUS at 15:35

## 2019-08-02 RX ADMIN — MORPHINE SULFATE 4 MG: 4 INJECTION, SOLUTION INTRAMUSCULAR; INTRAVENOUS at 14:53

## 2019-08-02 RX ADMIN — SODIUM CHLORIDE 80 ML: 9 INJECTION, SOLUTION INTRAVENOUS at 15:35

## 2019-08-02 RX ADMIN — ONDANSETRON 4 MG: 2 INJECTION INTRAMUSCULAR; INTRAVENOUS at 14:53

## 2019-08-02 RX ADMIN — Medication 10 ML: at 15:35

## 2019-08-02 RX ADMIN — ASPIRIN 81 MG 324 MG: 81 TABLET ORAL at 13:18

## 2019-08-02 ASSESSMENT — ENCOUNTER SYMPTOMS
CONSTIPATION: 0
COUGH: 0
TROUBLE SWALLOWING: 0
DIARRHEA: 0
VOMITING: 0
NAUSEA: 0
SORE THROAT: 0
COLOR CHANGE: 0
BACK PAIN: 1
ABDOMINAL PAIN: 0
BLOOD IN STOOL: 0
SHORTNESS OF BREATH: 1

## 2019-08-02 ASSESSMENT — PAIN SCALES - GENERAL
PAINLEVEL_OUTOF10: 7
PAINLEVEL_OUTOF10: 9

## 2019-08-02 ASSESSMENT — PAIN DESCRIPTION - LOCATION: LOCATION: CHEST

## 2019-08-02 NOTE — ED PROVIDER NOTES
METOPROLOL TARTRATE (LOPRESSOR) 25 MG TABLET    TAKE 1 TABLET BY MOUTH TWICE DAILY    NITROGLYCERIN (NITROSTAT) 0.4 MG SL TABLET    up to max of 3 total doses. If no relief after 1 dose, call 911. SIMVASTATIN (ZOCOR) 40 MG TABLET    TAKE ONE (1) TABLET BY MOUTH EVERY NIGHT    TIZANIDINE (ZANAFLEX) 2 MG TABLET    Take 1 tablet by mouth every 8 hours as needed (muscle spasms)    TRAMADOL (ULTRAM) 50 MG TABLET    Take 1 tablet by mouth every 6 hours as needed for Pain for up to 30 days. VITAMIN D (ERGOCALCIFEROL) 59286 UNITS CAPS CAPSULE    TAKE 1 CAPSULE BY MOUTH EVERY WEEK ON SATURDAYS    WARFARIN (COUMADIN) 2.5 MG TABLET    Take 2.5 mg by mouth Five times weekly Indications: Sun, Tues, Wed, Thurs, Sat Dosed by [facility] Anticoagulation Service:5 mg MF and 2.5 mg 5x weekly    WARFARIN (COUMADIN) 5 MG TABLET    Take 5 mg by mouth Twice a Week Indications: Monday, Friday Dosed by [facility] Anticoagulation Service: 5 mg Monday, Friday and 2.5 mg  5x weekly       ALLERGIES     is allergic to cymbalta [duloxetine hcl]; lisinopril; and robaxin [methocarbamol]. FAMILY HISTORY     She indicated that the status of her mother is unknown. She indicated that the status of her father is unknown.     family history includes Cervical Cancer in her mother; Heart Disease in her father and mother. SOCIAL HISTORY      reports that she has never smoked. She has never used smokeless tobacco. She reports that she does not drink alcohol or use drugs. PHYSICAL EXAM     INITIAL VITALS:  height is 5' 5\" (1.651 m) and weight is 212 lb (96.2 kg). Her oral temperature is 98.2 °F (36.8 °C). Her blood pressure is 116/71 and her pulse is 58. Her respiration is 17 and oxygen saturation is 97%. Physical Exam   Constitutional: She is oriented to person, place, and time. No distress. HENT:   Head: Normocephalic and atraumatic. Eyes: Pupils are equal, round, and reactive to light.  Conjunctivae are normal. Neck: Neck supple. Cardiovascular: Normal rate, regular rhythm, normal heart sounds and intact distal pulses. No murmur heard. Pulmonary/Chest: Effort normal and breath sounds normal. No respiratory distress. Abdominal: Soft. Bowel sounds are normal. She exhibits no distension. There is no tenderness. Musculoskeletal: She exhibits no edema or tenderness. Lymphadenopathy:     She has no cervical adenopathy. Neurological: She is alert and oriented to person, place, and time. Skin: Skin is warm and dry. No rash noted. Psychiatric: Judgment normal.   Nursing note and vitals reviewed. DIFFERENTIAL DIAGNOSIS/MDM:   73-year-old female presents for second visit in the past week for chest pain shortness of breath. She is afebrile, nontoxic, normal vital signs. No acute distress. Work-up reviewed from prior visit. I actually saw the patient at that time. She had 2- troponins and a negative d-dimer. I am going to repeat cardiac work-up. I still have a low suspicion for PE however I am going to get a CT scan of her chest based on her continued symptoms and worsening symptoms. Dissection is a possibility however less are likely due to no radiation of the pain and no ripping or tearing sensation and also her overall clinical picture. With this being a repeat visit for chest pain and her symptoms worsening I anticipate admission.     DIAGNOSTIC RESULTS     EKG: All EKG's are interpreted by the Emergency Department Physician who either signs or Co-signs this chart in the absence of a cardiologist.    EKG Interpretation    Interpreted by emergency department physician at 1:20 PM.    Compared to EKG from March 2019    Rhythm: normal sinus   Rate: normal  Axis: normal  Ectopy: none  Conduction: normal  ST Segments: no acute change  T Waves: non specific changes and flattening in  multiple  Q Waves: nonspecific    EKG  Impression: non-specific EKG, no significant changes        RADIOLOGY:   I

## 2019-08-03 PROBLEM — S00.83XA FACIAL HEMATOMA: Status: RESOLVED | Noted: 2019-01-27 | Resolved: 2019-08-03

## 2019-08-03 LAB
ANION GAP SERPL CALCULATED.3IONS-SCNC: 8 MMOL/L (ref 9–17)
BUN BLDV-MCNC: 15 MG/DL (ref 8–23)
BUN/CREAT BLD: ABNORMAL (ref 9–20)
CALCIUM SERPL-MCNC: 8.7 MG/DL (ref 8.6–10.4)
CHLORIDE BLD-SCNC: 103 MMOL/L (ref 98–107)
CO2: 28 MMOL/L (ref 20–31)
CREAT SERPL-MCNC: 0.7 MG/DL (ref 0.5–0.9)
CULTURE: NORMAL
GFR AFRICAN AMERICAN: >60 ML/MIN
GFR NON-AFRICAN AMERICAN: >60 ML/MIN
GFR SERPL CREATININE-BSD FRML MDRD: ABNORMAL ML/MIN/{1.73_M2}
GFR SERPL CREATININE-BSD FRML MDRD: ABNORMAL ML/MIN/{1.73_M2}
GLUCOSE BLD-MCNC: 85 MG/DL (ref 70–99)
HCT VFR BLD CALC: 36.5 % (ref 36–46)
HEMOGLOBIN: 12 G/DL (ref 12–16)
INR BLD: 1.8
Lab: NORMAL
MCH RBC QN AUTO: 29.2 PG (ref 26–34)
MCHC RBC AUTO-ENTMCNC: 32.9 G/DL (ref 31–37)
MCV RBC AUTO: 88.7 FL (ref 80–100)
NRBC AUTOMATED: NORMAL PER 100 WBC
PDW BLD-RTO: 14.1 % (ref 11.5–14.9)
PLATELET # BLD: 202 K/UL (ref 150–450)
PMV BLD AUTO: 6.5 FL (ref 6–12)
POTASSIUM SERPL-SCNC: 4.2 MMOL/L (ref 3.7–5.3)
PROTHROMBIN TIME: 20.6 SEC (ref 11.8–14.6)
RBC # BLD: 4.12 M/UL (ref 4–5.2)
SODIUM BLD-SCNC: 139 MMOL/L (ref 135–144)
SPECIMEN DESCRIPTION: NORMAL
TROPONIN INTERP: NORMAL
TROPONIN INTERP: NORMAL
TROPONIN T: NORMAL NG/ML
TROPONIN T: NORMAL NG/ML
TROPONIN, HIGH SENSITIVITY: <6 NG/L (ref 0–14)
TROPONIN, HIGH SENSITIVITY: <6 NG/L (ref 0–14)
WBC # BLD: 6.3 K/UL (ref 3.5–11)

## 2019-08-03 PROCEDURE — 36415 COLL VENOUS BLD VENIPUNCTURE: CPT

## 2019-08-03 PROCEDURE — 85027 COMPLETE CBC AUTOMATED: CPT

## 2019-08-03 PROCEDURE — 80048 BASIC METABOLIC PNL TOTAL CA: CPT

## 2019-08-03 PROCEDURE — 6360000002 HC RX W HCPCS: Performed by: FAMILY MEDICINE

## 2019-08-03 PROCEDURE — 6370000000 HC RX 637 (ALT 250 FOR IP): Performed by: FAMILY MEDICINE

## 2019-08-03 PROCEDURE — 84484 ASSAY OF TROPONIN QUANT: CPT

## 2019-08-03 PROCEDURE — 99225 PR SBSQ OBSERVATION CARE/DAY 25 MINUTES: CPT | Performed by: FAMILY MEDICINE

## 2019-08-03 PROCEDURE — 85610 PROTHROMBIN TIME: CPT

## 2019-08-03 PROCEDURE — 2580000003 HC RX 258: Performed by: FAMILY MEDICINE

## 2019-08-03 PROCEDURE — G0378 HOSPITAL OBSERVATION PER HR: HCPCS

## 2019-08-03 RX ORDER — WARFARIN SODIUM 2.5 MG/1
2.5 TABLET ORAL
Status: DISCONTINUED | OUTPATIENT
Start: 2019-08-03 | End: 2019-08-04

## 2019-08-03 RX ORDER — ERGOCALCIFEROL 1.25 MG/1
50000 CAPSULE ORAL WEEKLY
Status: DISCONTINUED | OUTPATIENT
Start: 2019-08-03 | End: 2019-08-05 | Stop reason: HOSPADM

## 2019-08-03 RX ORDER — SODIUM CHLORIDE 0.9 % (FLUSH) 0.9 %
10 SYRINGE (ML) INJECTION PRN
Status: DISCONTINUED | OUTPATIENT
Start: 2019-08-03 | End: 2019-08-05 | Stop reason: HOSPADM

## 2019-08-03 RX ORDER — ACYCLOVIR 200 MG/1
200 CAPSULE ORAL 2 TIMES DAILY
Status: DISCONTINUED | OUTPATIENT
Start: 2019-08-03 | End: 2019-08-05 | Stop reason: HOSPADM

## 2019-08-03 RX ORDER — CETIRIZINE HYDROCHLORIDE 10 MG/1
10 TABLET ORAL DAILY
Status: DISCONTINUED | OUTPATIENT
Start: 2019-08-03 | End: 2019-08-05 | Stop reason: HOSPADM

## 2019-08-03 RX ORDER — AMLODIPINE BESYLATE 10 MG/1
10 TABLET ORAL DAILY
Status: DISCONTINUED | OUTPATIENT
Start: 2019-08-03 | End: 2019-08-05 | Stop reason: HOSPADM

## 2019-08-03 RX ORDER — FLUTICASONE PROPIONATE 50 MCG
2 SPRAY, SUSPENSION (ML) NASAL DAILY PRN
Status: DISCONTINUED | OUTPATIENT
Start: 2019-08-03 | End: 2019-08-05 | Stop reason: HOSPADM

## 2019-08-03 RX ORDER — GABAPENTIN 400 MG/1
800 CAPSULE ORAL 4 TIMES DAILY
Status: DISCONTINUED | OUTPATIENT
Start: 2019-08-03 | End: 2019-08-04 | Stop reason: SDUPTHER

## 2019-08-03 RX ORDER — HYDROCODONE BITARTRATE AND ACETAMINOPHEN 5; 325 MG/1; MG/1
1 TABLET ORAL EVERY 6 HOURS PRN
Status: DISCONTINUED | OUTPATIENT
Start: 2019-08-03 | End: 2019-08-05 | Stop reason: HOSPADM

## 2019-08-03 RX ORDER — AMMONIUM LACTATE 12 G/100G
LOTION TOPICAL PRN
Status: DISCONTINUED | OUTPATIENT
Start: 2019-08-03 | End: 2019-08-05 | Stop reason: HOSPADM

## 2019-08-03 RX ORDER — PANTOPRAZOLE SODIUM 40 MG/1
40 TABLET, DELAYED RELEASE ORAL 2 TIMES DAILY
Status: DISCONTINUED | OUTPATIENT
Start: 2019-08-03 | End: 2019-08-05 | Stop reason: HOSPADM

## 2019-08-03 RX ORDER — LEVOTHYROXINE SODIUM 137 UG/1
137 TABLET ORAL DAILY
Status: DISCONTINUED | OUTPATIENT
Start: 2019-08-03 | End: 2019-08-04

## 2019-08-03 RX ORDER — SIMVASTATIN 40 MG
40 TABLET ORAL NIGHTLY
Status: DISCONTINUED | OUTPATIENT
Start: 2019-08-03 | End: 2019-08-05 | Stop reason: HOSPADM

## 2019-08-03 RX ORDER — SODIUM CHLORIDE 0.9 % (FLUSH) 0.9 %
10 SYRINGE (ML) INJECTION EVERY 12 HOURS SCHEDULED
Status: DISCONTINUED | OUTPATIENT
Start: 2019-08-03 | End: 2019-08-05 | Stop reason: HOSPADM

## 2019-08-03 RX ORDER — WARFARIN SODIUM 5 MG/1
5 TABLET ORAL
Status: DISCONTINUED | OUTPATIENT
Start: 2019-08-05 | End: 2019-08-04

## 2019-08-03 RX ORDER — ONDANSETRON 2 MG/ML
4 INJECTION INTRAMUSCULAR; INTRAVENOUS EVERY 6 HOURS PRN
Status: DISCONTINUED | OUTPATIENT
Start: 2019-08-03 | End: 2019-08-05 | Stop reason: HOSPADM

## 2019-08-03 RX ORDER — BUPROPION HYDROCHLORIDE 300 MG/1
300 TABLET ORAL EVERY MORNING
Status: DISCONTINUED | OUTPATIENT
Start: 2019-08-03 | End: 2019-08-05 | Stop reason: HOSPADM

## 2019-08-03 RX ORDER — ASPIRIN 81 MG/1
81 TABLET, CHEWABLE ORAL DAILY
Status: DISCONTINUED | OUTPATIENT
Start: 2019-08-03 | End: 2019-08-05 | Stop reason: HOSPADM

## 2019-08-03 RX ORDER — NITROGLYCERIN 0.4 MG/1
0.4 TABLET SUBLINGUAL EVERY 5 MIN PRN
Status: DISCONTINUED | OUTPATIENT
Start: 2019-08-03 | End: 2019-08-05 | Stop reason: HOSPADM

## 2019-08-03 RX ORDER — TIZANIDINE 2 MG/1
2 TABLET ORAL EVERY 8 HOURS PRN
Status: DISCONTINUED | OUTPATIENT
Start: 2019-08-03 | End: 2019-08-05 | Stop reason: HOSPADM

## 2019-08-03 RX ADMIN — GABAPENTIN 800 MG: 400 CAPSULE ORAL at 17:22

## 2019-08-03 RX ADMIN — ONDANSETRON 4 MG: 2 INJECTION INTRAMUSCULAR; INTRAVENOUS at 11:58

## 2019-08-03 RX ADMIN — WARFARIN SODIUM 2.5 MG: 2.5 TABLET ORAL at 17:22

## 2019-08-03 RX ADMIN — CETIRIZINE HYDROCHLORIDE 10 MG: 10 TABLET, FILM COATED ORAL at 07:59

## 2019-08-03 RX ADMIN — GABAPENTIN 800 MG: 400 CAPSULE ORAL at 07:58

## 2019-08-03 RX ADMIN — BUPROPION HYDROCHLORIDE 300 MG: 300 TABLET, FILM COATED, EXTENDED RELEASE ORAL at 07:59

## 2019-08-03 RX ADMIN — GABAPENTIN 800 MG: 400 CAPSULE ORAL at 11:58

## 2019-08-03 RX ADMIN — GABAPENTIN 800 MG: 400 CAPSULE ORAL at 21:53

## 2019-08-03 RX ADMIN — SIMVASTATIN 40 MG: 40 TABLET, FILM COATED ORAL at 21:53

## 2019-08-03 RX ADMIN — LEVOTHYROXINE SODIUM 137 MCG: 137 TABLET ORAL at 07:58

## 2019-08-03 RX ADMIN — METOPROLOL TARTRATE 25 MG: 25 TABLET ORAL at 07:58

## 2019-08-03 RX ADMIN — METOPROLOL TARTRATE 25 MG: 25 TABLET ORAL at 21:53

## 2019-08-03 RX ADMIN — HYDROCODONE BITARTRATE AND ACETAMINOPHEN 1 TABLET: 5; 325 TABLET ORAL at 01:54

## 2019-08-03 RX ADMIN — ACYCLOVIR 200 MG: 200 CAPSULE ORAL at 07:59

## 2019-08-03 RX ADMIN — Medication 10 ML: at 07:59

## 2019-08-03 RX ADMIN — AMLODIPINE BESYLATE 10 MG: 10 TABLET ORAL at 07:59

## 2019-08-03 RX ADMIN — ASPIRIN 81 MG 81 MG: 81 TABLET ORAL at 07:59

## 2019-08-03 RX ADMIN — Medication 10 ML: at 21:54

## 2019-08-03 RX ADMIN — ACYCLOVIR 200 MG: 200 CAPSULE ORAL at 01:54

## 2019-08-03 RX ADMIN — HYDROCODONE BITARTRATE AND ACETAMINOPHEN 1 TABLET: 5; 325 TABLET ORAL at 08:40

## 2019-08-03 RX ADMIN — HYDROCODONE BITARTRATE AND ACETAMINOPHEN 1 TABLET: 5; 325 TABLET ORAL at 21:53

## 2019-08-03 RX ADMIN — ACYCLOVIR 200 MG: 200 CAPSULE ORAL at 21:53

## 2019-08-03 ASSESSMENT — PAIN SCALES - GENERAL
PAINLEVEL_OUTOF10: 9
PAINLEVEL_OUTOF10: 6
PAINLEVEL_OUTOF10: 8

## 2019-08-03 NOTE — H&P
Family Medicine Admit Note    PCP: Geovanna Vee MD    Date of Admission: 8/2/2019    Date of Service: Pt seen/examined on 8/3/2019 and  Placed in Observation. Chief Complaint:  Chest pain      History Of Present Illness: The patient is a 81 y.o. female who presents to [facility] with chest pain off and on for the past week. Past Medical History:        Diagnosis Date    Affective disorder ([address]     seasonal    Arthritis     shoulders and lower back    DVT (deep venous thrombosis) ([facility])     has history in R leg X 4    Fibromyalgia     Hyperlipidemia     Hypertension     Pneumonia     Sinusitis     Thyroid disease     hypothyroidsm       Past Surgical History:        Procedure Laterality Date    COLONOSCOPY  8/31/2011    internal hemorrhoides    DILATION AND CURETTAGE OF UTERUS      ECTOPIC PREGNANCY SURGERY Left     HYSTERECTOMY         Medications Prior to Admission:    Prior to Admission medications    Medication Sig Start Date End Date Taking? Authorizing Provider   fluticasone (FLONASE) 50 MCG/ACT nasal spray 2 sprays by Each Nostril route daily as needed for Allergies (seasonal)   Yes Historical Provider, MD   loratadine (CLARITIN) 10 MG tablet Take 10 mg by mouth daily as needed (seasonal allergies)   Yes Historical Provider, MD   acetaminophen-codeine (TYLENOL #3) 300-30 MG per tablet Take 1 tablet by mouth every 6 hours as needed for Pain for up to 30 days. TAKE ONE TABLET BY MOUTH EVERY 6 HOURS AS NEEDED FOR PAIN 7/29/19 8/28/19 Yes Geovanna Vee MD   traMADol (ULTRAM) 50 MG tablet Take 1 tablet by mouth every 6 hours as needed for Pain for up to 30 days.  7/29/19 8/28/19 Yes Geovanna Vee MD   levothyroxine (SYNTHROID) 137 MCG tablet Take 1 tablet by mouth Daily 7/29/19  Yes Geovanna Vee MD   metoprolol tartrate (LOPRESSOR) 25 MG tablet TAKE 1 TABLET BY MOUTH TWICE DAILY 5/24/19  Yes Anthony Simmons, APRN - CNP   buPROPion (WELLBUTRIN XL) 300 MG extended

## 2019-08-04 LAB
EKG ATRIAL RATE: 67 BPM
EKG P AXIS: 47 DEGREES
EKG P-R INTERVAL: 154 MS
EKG Q-T INTERVAL: 406 MS
EKG QRS DURATION: 94 MS
EKG QTC CALCULATION (BAZETT): 429 MS
EKG R AXIS: -2 DEGREES
EKG T AXIS: 16 DEGREES
EKG VENTRICULAR RATE: 67 BPM
INR BLD: 1.8
PROTHROMBIN TIME: 21.3 SEC (ref 11.8–14.6)
THYROXINE, FREE: 0.91 NG/DL (ref 0.93–1.7)
TSH SERPL DL<=0.05 MIU/L-ACNC: 5.98 MIU/L (ref 0.3–5)

## 2019-08-04 PROCEDURE — 36415 COLL VENOUS BLD VENIPUNCTURE: CPT

## 2019-08-04 PROCEDURE — 93010 ELECTROCARDIOGRAM REPORT: CPT | Performed by: INTERNAL MEDICINE

## 2019-08-04 PROCEDURE — 84439 ASSAY OF FREE THYROXINE: CPT

## 2019-08-04 PROCEDURE — G0378 HOSPITAL OBSERVATION PER HR: HCPCS

## 2019-08-04 PROCEDURE — 6360000002 HC RX W HCPCS: Performed by: INTERNAL MEDICINE

## 2019-08-04 PROCEDURE — 2580000003 HC RX 258: Performed by: FAMILY MEDICINE

## 2019-08-04 PROCEDURE — 6370000000 HC RX 637 (ALT 250 FOR IP): Performed by: FAMILY MEDICINE

## 2019-08-04 PROCEDURE — 6370000000 HC RX 637 (ALT 250 FOR IP): Performed by: INTERNAL MEDICINE

## 2019-08-04 PROCEDURE — 85610 PROTHROMBIN TIME: CPT

## 2019-08-04 PROCEDURE — 99232 SBSQ HOSP IP/OBS MODERATE 35: CPT | Performed by: FAMILY MEDICINE

## 2019-08-04 PROCEDURE — 84443 ASSAY THYROID STIM HORMONE: CPT

## 2019-08-04 RX ORDER — LEVOTHYROXINE SODIUM 0.07 MG/1
150 TABLET ORAL DAILY
Status: DISCONTINUED | OUTPATIENT
Start: 2019-08-05 | End: 2019-08-05 | Stop reason: HOSPADM

## 2019-08-04 RX ORDER — WARFARIN SODIUM 5 MG/1
5 TABLET ORAL
Status: COMPLETED | OUTPATIENT
Start: 2019-08-04 | End: 2019-08-04

## 2019-08-04 RX ORDER — GABAPENTIN 800 MG/1
800 TABLET ORAL 4 TIMES DAILY
Status: DISCONTINUED | OUTPATIENT
Start: 2019-08-04 | End: 2019-08-05 | Stop reason: HOSPADM

## 2019-08-04 RX ORDER — PANTOPRAZOLE SODIUM 40 MG/1
40 TABLET, DELAYED RELEASE ORAL 2 TIMES DAILY
Qty: 30 TABLET | Refills: 3 | Status: SHIPPED | OUTPATIENT
Start: 2019-08-04 | End: 2019-09-04 | Stop reason: SDUPTHER

## 2019-08-04 RX ORDER — ASPIRIN 81 MG/1
81 TABLET, CHEWABLE ORAL DAILY
Qty: 30 TABLET | Refills: 3 | Status: SHIPPED | OUTPATIENT
Start: 2019-08-05 | End: 2020-03-02

## 2019-08-04 RX ADMIN — GABAPENTIN 800 MG: 400 CAPSULE ORAL at 13:08

## 2019-08-04 RX ADMIN — HYDROCODONE BITARTRATE AND ACETAMINOPHEN 1 TABLET: 5; 325 TABLET ORAL at 14:16

## 2019-08-04 RX ADMIN — METOPROLOL TARTRATE 25 MG: 25 TABLET ORAL at 21:09

## 2019-08-04 RX ADMIN — ENOXAPARIN SODIUM 100 MG: 100 INJECTION SUBCUTANEOUS at 21:12

## 2019-08-04 RX ADMIN — BUPROPION HYDROCHLORIDE 300 MG: 300 TABLET, FILM COATED, EXTENDED RELEASE ORAL at 08:11

## 2019-08-04 RX ADMIN — GABAPENTIN 800 MG: 800 TABLET ORAL at 21:47

## 2019-08-04 RX ADMIN — HYDROCODONE BITARTRATE AND ACETAMINOPHEN 1 TABLET: 5; 325 TABLET ORAL at 21:56

## 2019-08-04 RX ADMIN — CETIRIZINE HYDROCHLORIDE 10 MG: 10 TABLET, FILM COATED ORAL at 07:38

## 2019-08-04 RX ADMIN — HYDROCODONE BITARTRATE AND ACETAMINOPHEN 1 TABLET: 5; 325 TABLET ORAL at 08:11

## 2019-08-04 RX ADMIN — ACYCLOVIR 200 MG: 200 CAPSULE ORAL at 07:39

## 2019-08-04 RX ADMIN — Medication 10 ML: at 07:39

## 2019-08-04 RX ADMIN — LEVOTHYROXINE SODIUM 137 MCG: 137 TABLET ORAL at 05:51

## 2019-08-04 RX ADMIN — ASPIRIN 81 MG 81 MG: 81 TABLET ORAL at 07:38

## 2019-08-04 RX ADMIN — Medication 10 ML: at 21:09

## 2019-08-04 RX ADMIN — PANTOPRAZOLE SODIUM 40 MG: 40 TABLET, DELAYED RELEASE ORAL at 21:12

## 2019-08-04 RX ADMIN — ACYCLOVIR 200 MG: 200 CAPSULE ORAL at 21:09

## 2019-08-04 RX ADMIN — METOPROLOL TARTRATE 25 MG: 25 TABLET ORAL at 07:37

## 2019-08-04 RX ADMIN — GABAPENTIN 800 MG: 400 CAPSULE ORAL at 17:23

## 2019-08-04 RX ADMIN — PANTOPRAZOLE SODIUM 40 MG: 40 TABLET, DELAYED RELEASE ORAL at 07:37

## 2019-08-04 RX ADMIN — AMLODIPINE BESYLATE 10 MG: 10 TABLET ORAL at 07:37

## 2019-08-04 RX ADMIN — WARFARIN SODIUM 5 MG: 2.5 TABLET ORAL at 17:24

## 2019-08-04 RX ADMIN — SIMVASTATIN 40 MG: 40 TABLET, FILM COATED ORAL at 21:09

## 2019-08-04 RX ADMIN — GABAPENTIN 800 MG: 400 CAPSULE ORAL at 07:37

## 2019-08-04 ASSESSMENT — PAIN SCALES - GENERAL
PAINLEVEL_OUTOF10: 10
PAINLEVEL_OUTOF10: 8
PAINLEVEL_OUTOF10: 9

## 2019-08-04 NOTE — PROGRESS NOTES
[facility] Bumps is a 81 y.o. female patient.     Current Facility-Administered Medications   Medication Dose Route Frequency Provider Last Rate Last Dose    [START ON 8/5/2019] levothyroxine (SYNTHROID) tablet 150 mcg  150 mcg Oral Daily Jacquie Gomez MD        HYDROcodone-acetaminophen [facility]) 5-325 MG per tablet 1 tablet  1 tablet Oral Q6H PRN Jacquie Gomez MD   1 tablet at 08/03/19 2153    acyclovir (ZOVIRAX) capsule 200 mg  200 mg Oral BID Jacquie Gomez MD   200 mg at 08/04/19 0739    amLODIPine (NORVASC) tablet 10 mg  10 mg Oral Daily Jacquie Gomez MD   10 mg at 08/04/19 0737    ammonium lactate (LAC-HYDRIN) 12 % lotion   Topical PRN Jacquie Gomez MD        buPROPion [facility] XL) extended release tablet 300 mg  300 mg Oral QAM Jacquie Gomez MD   300 mg at 08/03/19 0759    fluticasone (FLONASE) 50 MCG/ACT nasal spray 2 spray  2 spray Each Nostril Daily PRN Jacquie Gomez MD        gabapentin (NEURONTIN) capsule 800 mg  800 mg Oral 4x Daily Jacquie Gomez MD   800 mg at 08/04/19 0737    cetirizine (ZYRTEC) tablet 10 mg  10 mg Oral Daily Jacquie Gomez MD   10 mg at 08/04/19 6615    metoprolol tartrate (LOPRESSOR) tablet 25 mg  25 mg Oral BID Jacquie Gomez MD   25 mg at 08/04/19 0737    nitroGLYCERIN (NITROSTAT) SL tablet 0.4 mg  0.4 mg Sublingual Q5 Min PRN Jacquie Gomez MD        simvastatin (ZOCOR) tablet 40 mg  40 mg Oral Nightly Jacquie Gomez MD   40 mg at 08/03/19 2153    tiZANidine (ZANAFLEX) tablet 2 mg  2 mg Oral Q8H PRN Jacquie Gomez MD        vitamin D (ERGOCALCIFEROL) capsule 50,000 Units  50,000 Units Oral Weekly Jacquie Gomez MD        warfarin (COUMADIN) tablet 2.5 mg  2.5 mg Oral Once per day on Sun Tue Wed Thu Sat Jacquie Gomez MD   2.5 mg at 08/03/19 1722    [START ON 8/5/2019] warfarin (COUMADIN) tablet 5 mg  5 mg Oral Once per day on Mon Fri MD Nathalie Downs sodium chloride flush 0.9 % injection 10 mL  10 mL Intravenous 2 times per day Edel Cleary MD   10 mL at 08/04/19 0739    sodium chloride flush 0.9 % injection 10 mL  10 mL Intravenous PRN Edel Cleary MD        magnesium hydroxide (MILK OF MAGNESIA) 400 MG/5ML suspension 30 mL  30 mL Oral Daily PRN Edel Cleary MD        ondansetron [facility]) injection 4 mg  4 mg Intravenous Q6H PRN Edel Cleary MD   4 mg at 08/03/19 1158    aspirin chewable tablet 81 mg  81 mg Oral Daily dEel Cleary MD   81 mg at 08/04/19 8800    warfarin (COUMADIN) daily dosing (placeholder)   Other RX Placeholder Edel Cleary MD        pantoprazole (PROTONIX) tablet 40 mg  40 mg Oral BID Luis Alfredo Richards MD   40 mg at 08/04/19 0737    0.9 % sodium chloride bolus  80 mL Intravenous Once Edel Cleary MD        iopamidol (ISOVUE-370) 76 % injection 75 mL  75 mL Intravenous ONCE PRN Edel Cleary MD         Allergies   Allergen Reactions    Cymbalta [Duloxetine Hcl]     Lisinopril Other (See Comments)     SEVER COUGH    Robaxin [Methocarbamol] Swelling     Principal Problem:    Chest pain  Active Problems:    Essential hypertension    Hyperlipidemia    Fibromyalgia    Hypothyroidism  Resolved Problems:    * No resolved hospital problems. *    Blood pressure 131/64, pulse 68, temperature 97.8 °F (36.6 °C), temperature source Oral, resp. rate 16, height 5' 5\" (1.651 m), weight 212 lb (96.2 kg), SpO2 97 %, not currently breastfeeding. Subjective:  Symptoms:  Resolved. Diet:  Adequate intake. Activity level: Returning to normal.    Pain:  She reports no pain. Objective:  General Appearance:  Comfortable. Vital signs: (most recent): Blood pressure 131/64, pulse 68, temperature 97.8 °F (36.6 °C), temperature source Oral, resp. rate 16, height 5' 5\" (1.651 m), weight 212 lb (96.2 kg), SpO2 97 %, not currently breastfeeding.   Vital signs are normal.    Lungs:  Normal effort and normal respiratory rate. There are decreased breath sounds. Heart: Normal rate. Regular rhythm. S2 normal.      Assessment & Plan  Chest pain - no evidence of cardiac origin. Consult pulmonology as patient follows with them; if no further IP w/u planned, d/c today  Hypothyroid - increase levothyroxine dose.    Hypertension - controlled  Hx dvt - sees anticoag clinic; consult pharmacy to manage    Collette Collin, MD  8/4/2019

## 2019-08-04 NOTE — PLAN OF CARE
Problem: Pain:  Goal: Pain level will decrease  Description  Pain level will decrease  8/4/2019 0555 by Nas Orosco RN  Outcome: Ongoing  Note:   Patient's pain has been well controlled throughout the entire shift, please see MAR.

## 2019-08-05 VITALS
SYSTOLIC BLOOD PRESSURE: 132 MMHG | TEMPERATURE: 98.3 F | RESPIRATION RATE: 17 BRPM | OXYGEN SATURATION: 98 % | DIASTOLIC BLOOD PRESSURE: 62 MMHG | HEIGHT: 65 IN | WEIGHT: 212 LBS | BODY MASS INDEX: 35.32 KG/M2 | HEART RATE: 77 BPM

## 2019-08-05 PROBLEM — E66.9 OBESITY, CLASS II, BMI 35-39.9: Status: ACTIVE | Noted: 2019-08-05

## 2019-08-05 PROBLEM — E66.812 OBESITY, CLASS II, BMI 35-39.9: Status: ACTIVE | Noted: 2019-08-05

## 2019-08-05 PROBLEM — I27.82 CHRONIC PULMONARY EMBOLISM (HCC): Status: ACTIVE | Noted: 2019-08-05

## 2019-08-05 LAB
INR BLD: 2.3
LV EF: 55 %
LVEF MODALITY: NORMAL
PROTHROMBIN TIME: 25.5 SEC (ref 11.8–14.6)

## 2019-08-05 PROCEDURE — 6370000000 HC RX 637 (ALT 250 FOR IP): Performed by: INTERNAL MEDICINE

## 2019-08-05 PROCEDURE — G0378 HOSPITAL OBSERVATION PER HR: HCPCS

## 2019-08-05 PROCEDURE — 85610 PROTHROMBIN TIME: CPT

## 2019-08-05 PROCEDURE — 93306 TTE W/DOPPLER COMPLETE: CPT

## 2019-08-05 PROCEDURE — 36415 COLL VENOUS BLD VENIPUNCTURE: CPT

## 2019-08-05 PROCEDURE — 99217 PR OBSERVATION CARE DISCHARGE MANAGEMENT: CPT | Performed by: FAMILY MEDICINE

## 2019-08-05 PROCEDURE — 6370000000 HC RX 637 (ALT 250 FOR IP): Performed by: FAMILY MEDICINE

## 2019-08-05 PROCEDURE — 6360000002 HC RX W HCPCS: Performed by: INTERNAL MEDICINE

## 2019-08-05 PROCEDURE — 2580000003 HC RX 258: Performed by: FAMILY MEDICINE

## 2019-08-05 RX ORDER — WARFARIN SODIUM 5 MG/1
5 TABLET ORAL ONCE
Status: COMPLETED | OUTPATIENT
Start: 2019-08-05 | End: 2019-08-05

## 2019-08-05 RX ADMIN — PANTOPRAZOLE SODIUM 40 MG: 40 TABLET, DELAYED RELEASE ORAL at 09:57

## 2019-08-05 RX ADMIN — WARFARIN SODIUM 5 MG: 5 TABLET ORAL at 17:48

## 2019-08-05 RX ADMIN — ASPIRIN 81 MG 81 MG: 81 TABLET ORAL at 09:57

## 2019-08-05 RX ADMIN — LEVOTHYROXINE SODIUM 150 MCG: 75 TABLET ORAL at 06:34

## 2019-08-05 RX ADMIN — GABAPENTIN 800 MG: 800 TABLET ORAL at 17:49

## 2019-08-05 RX ADMIN — ENOXAPARIN SODIUM 100 MG: 100 INJECTION SUBCUTANEOUS at 09:55

## 2019-08-05 RX ADMIN — AMLODIPINE BESYLATE 10 MG: 10 TABLET ORAL at 09:58

## 2019-08-05 RX ADMIN — Medication 10 ML: at 12:37

## 2019-08-05 RX ADMIN — HYDROCODONE BITARTRATE AND ACETAMINOPHEN 1 TABLET: 5; 325 TABLET ORAL at 13:41

## 2019-08-05 RX ADMIN — HYDROCODONE BITARTRATE AND ACETAMINOPHEN 1 TABLET: 5; 325 TABLET ORAL at 06:53

## 2019-08-05 RX ADMIN — CETIRIZINE HYDROCHLORIDE 10 MG: 10 TABLET, FILM COATED ORAL at 09:57

## 2019-08-05 RX ADMIN — ACYCLOVIR 200 MG: 200 CAPSULE ORAL at 09:49

## 2019-08-05 RX ADMIN — BUPROPION HYDROCHLORIDE 300 MG: 300 TABLET, FILM COATED, EXTENDED RELEASE ORAL at 09:57

## 2019-08-05 RX ADMIN — METOPROLOL TARTRATE 25 MG: 25 TABLET ORAL at 09:58

## 2019-08-05 RX ADMIN — GABAPENTIN 800 MG: 800 TABLET ORAL at 13:39

## 2019-08-05 RX ADMIN — GABAPENTIN 800 MG: 800 TABLET ORAL at 10:03

## 2019-08-05 ASSESSMENT — PAIN SCALES - GENERAL
PAINLEVEL_OUTOF10: 8
PAINLEVEL_OUTOF10: 9

## 2019-08-05 NOTE — CONSULTS
[address] [facility]                 [address] [facility], [address]                                  CONSULTATION    PATIENT NAME: Tiara Jackson                   :        1950  MED REC NO:   [id number]                              ROOM:       2096  ACCOUNT NO:   [de-identified]                           ADMIT DATE: 2019  PROVIDER:     Mathew Caldwell    CONSULT DATE:  2019    REASON FOR CONSULTATION:  Chest pain. HISTORY OF PRESENT ILLNESS:  The patient is a pleasant 73-year-old  female. The patient sees Dr. Gregg Chaves for a diagnosis of sleep  apnea. Reportedly, the patient is intolerant of CPAP or BiPAP. She  wears oxygen 2 liters nasal cannula at night. She stated she follows  with Dr. Manny Robledo for a few months. 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  She mentions that her history of DVTs  included , , and . She denies any history of having pulmonary  embolism. The patient presented to the hospital 2019. She had intermittent  chest discomfort off and on for the past week. May be some increased  shortness of breath. No wheezing. No fever. No chills. No sweats. No sputum production. Of note, there was a CT scan per PE protocol performed  in the  afternoon. There was evidence of a linear density in the right lower  lobe. It has been read as either a web or a chronic pulmonary embolism. She has no recollection of history any history of pulmonary embolism. She has been taking warfarin and has been following with the  anticoagulation clinic for quite sometime. Of note, her most recent  INRs were anywhere from 1.7 to 1.9 between 2019 and 2019. Of note, she stated that she has been offered Xarelto or Eliquis in the  past.  She declined because she feels that if the INRs are monitored she  feels more comfortable with the care.   She has apprehension of taking  medication
TAKE ONE TABLET BY MOUTH EVERY 6 HOURS AS NEEDED FOR PAIN 7/29/19 8/28/19 Yes Bernice Holley MD   traMADol (ULTRAM) 50 MG tablet Take 1 tablet by mouth every 6 hours as needed for Pain for up to 30 days. 7/29/19 8/28/19 Yes Bernice Holley MD   levothyroxine (SYNTHROID) 137 MCG tablet Take 1 tablet by mouth Daily 7/29/19  Yes Bernice Holley MD   metoprolol tartrate (LOPRESSOR) 25 MG tablet TAKE 1 TABLET BY MOUTH TWICE DAILY 5/24/19  Yes VIKA Blair - CNP   buPROPion (WELLBUTRIN XL) 300 MG extended release tablet Take 1 tablet by mouth every morning 5/1/19  Yes Bernice Holley MD   lidocaine (LIDODERM) 5 % APPLY 2 PATCHES TO THE SKIN ONCE DAILY AS NEEDED FOR PAIN 4/22/19  Yes VIKA Garrett CNP   acyclovir (ZOVIRAX) 200 MG capsule TAKE ONE (1) CAPSULE BY MOUTH TWICE DAILY 4/20/19  Yes Bernice Holley MD   Gabapentin POWD Formula 8E: diclo3%+gaba6%+DMSO5%+prilo2%+baclofen2%+lido2% with ketamine. Apply 1-2 gm topically to affected area TID-QID 4/3/19  Yes Bernice Holley MD   amLODIPine (NORVASC) 10 MG tablet Take 1 tablet by mouth daily 1/17/19  Yes Bernice Holley MD   gabapentin (NEURONTIN) 800 MG tablet TAKE ONE (1) TABLET BY MOUTH 4 TIMES DAILY. 1/3/19 8/2/19 Yes Bernice Holley MD   vitamin D (ERGOCALCIFEROL) 81467 units CAPS capsule TAKE 1 CAPSULE BY MOUTH EVERY WEEK ON SATURDAYS 12/3/18  Yes Bernice Holley MD   tiZANidine (ZANAFLEX) 2 MG tablet Take 1 tablet by mouth every 8 hours as needed (muscle spasms) 10/15/18  Yes Bernice Holley MD   nitroGLYCERIN (NITROSTAT) 0.4 MG SL tablet up to max of 3 total doses.  If no relief after 1 dose, call 911. 10/8/18  Yes Bernice Holley MD   warfarin (COUMADIN) 2.5 MG tablet Take 2.5 mg by mouth Five times weekly Indications: Sun, Tues, Wed, Thurs, Sat Dosed by [facility] Anticoagulation Service:5 mg MF and 2.5 mg 5x weekly   Yes Historical Provider, MD   warfarin (COUMADIN) 5 MG tablet Take 5 mg by mouth Twice a Week Indications:

## 2019-08-05 NOTE — PLAN OF CARE
Problem: Pain:  Goal: Pain level will decrease  Description  Pain level will decrease  8/5/2019 1558 by Shannan Gonzalez RN  Outcome: Ongoing  Note:   Pt medicated with pain medication prn. Assessed all pain characteristics including level, type, location, frequency, and onset. Non-pharmacologic interventions offered to pt as well. Pt states pain is tolerable at this time. Will continue to monitor       Problem: Pain:  Goal: Control of acute pain  Description  Control of acute pain  Outcome: Ongoing  Note:   Pt medicated with pain medication prn. Assessed all pain characteristics including level, type, location, frequency, and onset. Non-pharmacologic interventions offered to pt as well. Pt does not complain of acute pain at this time. Will continue to monitor       Problem: Pain:  Goal: Control of chronic pain  Description  Control of chronic pain  Outcome: Ongoing  Note:   Pt medicated with pain medication prn. Assessed all pain characteristics including level, type, location, frequency, and onset. Non-pharmacologic interventions offered to pt as well. Pt does not complain of chronic pain at this time.  Will continue to monitor

## 2019-08-05 NOTE — PROGRESS NOTES
Cardiology. Follow up in the office within 1 week. Complete orders per chart.     See orders   Disposition:    Electronically signed by Ga Donahue MD on 8/5/2019 at 7:29 AM

## 2019-08-05 NOTE — PROGRESS NOTES
[facility]   Progress Note                   Date:   8/5/2019  Patient name: Matthieu Carlos  Date of admission:  8/2/2019 12:53 PM  MRN:   [id number]  YOB: 1950  PCP: Keesha Gongora MD    Reason for Admission:     Subjective:       Clinical Changes / Abnormalities: Pt has chest pain this am not any more , negative Trop       Medications:   Scheduled Meds:   levothyroxine  150 mcg Oral Daily    warfarin (COUMADIN) daily dosing (placeholder)   Other RX Placeholder    enoxaparin  1 mg/kg Subcutaneous BID    gabapentin  800 mg Oral 4x Daily    acyclovir  200 mg Oral BID    amLODIPine  10 mg Oral Daily    buPROPion  300 mg Oral QAM    cetirizine  10 mg Oral Daily    metoprolol tartrate  25 mg Oral BID    simvastatin  40 mg Oral Nightly    vitamin D  50,000 Units Oral Weekly    sodium chloride flush  10 mL Intravenous 2 times per day    aspirin  81 mg Oral Daily    warfarin (COUMADIN) daily dosing (placeholder)   Other RX Placeholder    pantoprazole  40 mg Oral BID    sodium chloride  80 mL Intravenous Once     Continuous Infusions:  CBC:   Recent Labs     08/02/19  1318 08/03/19  0410   WBC 6.0 6.3   HGB 11.9* 12.0    202     BMP:    Recent Labs     08/02/19  1318 08/03/19  0410    139   K 3.9 4.2    103   CO2 26 28   BUN 13 15   CREATININE 0.76 0.70   GLUCOSE 104* 85     Hepatic: No results for input(s): AST, ALT, ALB, BILITOT, ALKPHOS in the last 72 hours. Troponin: No results for input(s): TROPONINI in the last 72 hours. BNP: No results for input(s): BNP in the last 72 hours. Lipids: No results for input(s): CHOL, HDL in the last 72 hours.     Invalid input(s): LDLCALCU  INR:   Recent Labs     08/03/19  1102 08/04/19  0533 08/05/19  0523   INR 1.8 1.8 2.3       Objective:   Vitals: /62   Pulse 69   Temp 98.1 °F (36.7 °C) (Oral)   Resp 18   Ht 5' 5\" (1.651 m)   Wt 212 lb (96.2 kg)   SpO2 97%   BMI 35.28 kg/m²   I/O last 3 completed

## 2019-08-05 NOTE — PLAN OF CARE
Problem: Pain:  Goal: Pain level will decrease  Description  Pain level will decrease  8/5/2019 0356 by Sammie Hassan RN  Outcome: Ongoing  Note:   Patient's pain has been well controlled throughout the entire shift, please see MAR.

## 2019-08-05 NOTE — PROGRESS NOTES
masses or organomegaly  Neurologic - There are no focal motor or sensory deficits  Skin - No bruising or bleeding  Extremities - No clubbing, cyanosis, edema    MEDS      levothyroxine  150 mcg Oral Daily    warfarin (COUMADIN) daily dosing (placeholder)   Other RX Placeholder    gabapentin  800 mg Oral 4x Daily    acyclovir  200 mg Oral BID    amLODIPine  10 mg Oral Daily    buPROPion  300 mg Oral QAM    cetirizine  10 mg Oral Daily    metoprolol tartrate  25 mg Oral BID    simvastatin  40 mg Oral Nightly    vitamin D  50,000 Units Oral Weekly    sodium chloride flush  10 mL Intravenous 2 times per day    aspirin  81 mg Oral Daily    warfarin (COUMADIN) daily dosing (placeholder)   Other RX Placeholder    pantoprazole  40 mg Oral BID    sodium chloride  80 mL Intravenous Once       perflutren lipid microspheres, HYDROcodone 5 mg - acetaminophen, ammonium lactate, fluticasone, nitroGLYCERIN, tiZANidine, sodium chloride flush, magnesium hydroxide, ondansetron, iopamidol    LABS   CBC   Recent Labs     08/03/19  0410   WBC 6.3   HGB 12.0   HCT 36.5   MCV 88.7        BMP:   Lab Results   Component Value Date     08/03/2019    K 4.2 08/03/2019     08/03/2019    CO2 28 08/03/2019    BUN 15 08/03/2019    LABALBU 3.8 03/06/2019    CREATININE 0.70 08/03/2019    CALCIUM 8.7 08/03/2019    GFRAA >60 08/03/2019    LABGLOM >60 08/03/2019     ABGs:No results found for: PHART, PO2ART, NPV5KKT No results found for: IFIO2, MODE, SETTIDVOL, SETPEEP  Ionized Calcium:  No results found for: IONCA  Magnesium:    Lab Results   Component Value Date    MG 1.9 09/30/2016     Phosphorus:  No results found for: PHOS     LIVER PROFILE No results for input(s): AST, ALT, LIPASE, BILIDIR, BILITOT, ALKPHOS in the last 72 hours. Invalid input(s):   AMYLASE,  ALB  INR   Recent Labs     08/05/19  0523   INR 2.3     PTT   Lab Results   Component Value Date    APTT 31.0 03/06/2019         RADIOLOGY     (See actual reports for details)    ASSESSMENT/PLAN   Principal Problem:    Chronic pulmonary embolism (HCC)  Active Problems:    Essential hypertension    Hyperlipidemia    Fibromyalgia    Hypothyroidism    RENEA (obstructive sleep apnea)    Depression    Anxiety    Recurrent acute deep vein thrombosis (DVT) of both lower extremities (HCC)    IGT (impaired glucose tolerance)    Chest pain    Obesity, Class II, BMI 35-39.9  Resolved Problems:    * No resolved hospital problems.  *    INR is now therapeutic  Discontinue Lovenox  Waiting for echocardiogram  Check CBC  Thyroid values are abnormal, defer to primary service for treatment    Electronically signed by Brody Domingo MD on 8/5/2019 at 1:15 PM

## 2019-08-05 NOTE — FLOWSHEET NOTE
08/05/19 1445   Provider Notification   Reason for Communication Evaluate   Provider Name Dr. Ernestine Rodriguez   Provider Notification Physician   Method of Communication Secure Message   Response No new orders   Notification Time 1440     Results for Stiven Chairez (MRN [id number]) as of 8/5/2019 14:45   Ref. Range 8/4/2019 05:33   TSH Latest Ref Range: 0.30 - 5.00 mIU/L 5.98 (H)   Thyroxine, Free Latest Ref Range: 0.93 - 1.70 ng/dL 0.91 (L)     Dr. Ernestine Rodriguez is aware of these values and noted that she will follow up with her outpatient.

## 2019-08-06 ENCOUNTER — HOSPITAL ENCOUNTER (OUTPATIENT)
Dept: PHARMACY | Age: 69
Setting detail: THERAPIES SERIES
Discharge: HOME | End: 2019-08-06
Payer: COMMERCIAL

## 2019-08-06 DIAGNOSIS — I82.403 RECURRENT ACUTE DEEP VEIN THROMBOSIS (DVT) OF BOTH LOWER EXTREMITIES (HCC): ICD-10-CM

## 2019-08-06 LAB
INR BLD: 3.2
PROTHROMBIN TIME: 38.9

## 2019-08-06 PROCEDURE — 99212 OFFICE O/P EST SF 10 MIN: CPT

## 2019-08-06 PROCEDURE — 85610 PROTHROMBIN TIME: CPT

## 2019-08-06 NOTE — PROGRESS NOTES
RN reviewed all discharge instructions with patient including new prescriptions and instructions for meds. IV removed. All meds returned and in patients possession. [employer] has been called.  Electronically signed by Sergio Hernandez RN on 8/5/2019 at 8:05 PM

## 2019-08-06 NOTE — PROGRESS NOTES
Patient states compliant all of the time with regimen taking warfarin 5mg yesterday. Patient discharged from [address] [place] [address] on 8/5 (yesterday). Admit on 8/2 with INR 1.9. Received 2.5mg on 8/3 and 5mg on 8/4 and 8/5. No bleeding or thromboembolic side effects noted. Patient feels she ate less greens while in hospital.  Will try to get back to normal diet but will likely take a few days. Patient was taking (one dose) of Macrobid prior to coming in hospital. In hospital was told she did not have a UTI and therefore not taking any more. Needs to  ASA 81mg. Discussed importance of taking every day for consistent affect on INR. Has RX for pantoprazole but has not started. Will start later today. Patient still having chest pain similar to when she was ED. Will follow up with Dr Liana Lewis. Encouraged her to go home and make appt. PT/INR done in office per protocol. INR was supratherapeutic at 3.2 (Goal 2-3). Warfarin regimen will be continued at 2.5mg today and tomorrow (8/6 and 8/7). Will not hold warfarin due to concern for low INR. Will retest in 2 days. Patient understands dosing directions and information discussed. Dosing schedule and follow up appointment given to patient. Progress note routed to referring physicians office. Patient acknowledges working in [address] [place] [address] with Pharmacist as referred by his/her physician.

## 2019-08-07 ENCOUNTER — APPOINTMENT (OUTPATIENT)
Dept: PHARMACY | Age: 69
End: 2019-08-07
Payer: COMMERCIAL

## 2019-08-08 ENCOUNTER — HOSPITAL ENCOUNTER (OUTPATIENT)
Dept: PHARMACY | Age: 69
Setting detail: THERAPIES SERIES
Discharge: HOME | End: 2019-08-08
Payer: COMMERCIAL

## 2019-08-08 DIAGNOSIS — I82.403 RECURRENT ACUTE DEEP VEIN THROMBOSIS (DVT) OF BOTH LOWER EXTREMITIES (HCC): ICD-10-CM

## 2019-08-08 LAB
INR BLD: 3.1
PROTHROMBIN TIME: 37.7

## 2019-08-08 PROCEDURE — 85610 PROTHROMBIN TIME: CPT

## 2019-08-08 PROCEDURE — 99212 OFFICE O/P EST SF 10 MIN: CPT

## 2019-08-08 NOTE — PROGRESS NOTES
Patient states noncompliant some of the time with regimen as forgot and took 5mg dose last night instead of  2.5mg. No bleeding or thromboembolic side effects noted. No significant med or dietary changes. Continues with CP similar to when came to ED. Was not able to get appt with Dr. Partha Burgess until mid Sept. Concerned about the time she has to wait. Encouraged her to discuss with Dr. Andre Calderon. PT/INR done in office per protocol. INR was supratherapeutic at 3.1 (Goal 2-3) but per hospital notes Dr Partha Burgess would like to keep patient on higher end above 2.5. Will look to get formal referral changed but will leave at current dose now. Warfarin regimen will be 2.5mg today (8/8) and Sat (8/10) and 5mg on 8/9 and 8/11 with follow up INR on Monday 8/12. Will retest in 4 days. Patient understands dosing directions and information discussed. Dosing schedule and follow up appointment given to patient. Progress note routed to referring physicians office. Patient acknowledges working in [address] [place] [address] with Pharmacist as referred by his/her physician.

## 2019-08-12 ENCOUNTER — HOSPITAL ENCOUNTER (OUTPATIENT)
Dept: PHARMACY | Age: 69
Setting detail: THERAPIES SERIES
Discharge: HOME | End: 2019-08-12
Payer: COMMERCIAL

## 2019-08-12 LAB
INR BLD: 4.5
PROTHROMBIN TIME: 54

## 2019-08-12 PROCEDURE — 85610 PROTHROMBIN TIME: CPT

## 2019-08-12 PROCEDURE — 99212 OFFICE O/P EST SF 10 MIN: CPT

## 2019-08-12 NOTE — PROGRESS NOTES
Patient states compliant most of the time with regimen. Patient did take warfarin 2.5 mg yesterday instead of 5 mg. No bleeding or thromboembolic side effects noted. No significant med or dietary changes. No significant recent illness or disease state changes. Patient has not been sleeping. Still having the same chest pain. Had work up twice. Saw cardiologist on Friday and had ekg and reports it was good. Has a lot of stress with the concern over the chest pain. Per hospital notes, keep INR on the higher end of range above 2.5. PT/INR done in office per protocol. INR was supratherapeutic at 4.5 (goal 2-3). Warfarin regimen will be held for 1 day and then 2.5 mg daily. Will retest in 3 days. Patient counseled on signs/symptoms of bleeding and when to seek medical attention. Patient instructed to use extra precautions with elevated INR. Patient understands dosing directions and information discussed. Dosing schedule and follow up appointment given to patient. Progress note routed to referring physicians office. Patient acknowledges working in [address] [place] [address] with Pharmacist as referred by his/her physician.

## 2019-08-15 ENCOUNTER — HOSPITAL ENCOUNTER (OUTPATIENT)
Dept: PHARMACY | Age: 69
Setting detail: THERAPIES SERIES
Discharge: HOME | End: 2019-08-15
Payer: COMMERCIAL

## 2019-08-15 DIAGNOSIS — I82.403 RECURRENT ACUTE DEEP VEIN THROMBOSIS (DVT) OF BOTH LOWER EXTREMITIES (HCC): ICD-10-CM

## 2019-08-15 LAB
INR BLD: 2.2
PROTHROMBIN TIME: 25.9

## 2019-08-15 PROCEDURE — 85610 PROTHROMBIN TIME: CPT

## 2019-08-15 PROCEDURE — 99212 OFFICE O/P EST SF 10 MIN: CPT

## 2019-08-17 ENCOUNTER — APPOINTMENT (OUTPATIENT)
Dept: GENERAL RADIOLOGY | Age: 69
End: 2019-08-17
Payer: COMMERCIAL

## 2019-08-17 ENCOUNTER — HOSPITAL ENCOUNTER (EMERGENCY)
Age: 69
Discharge: HOME | End: 2019-08-17
Attending: EMERGENCY MEDICINE
Payer: COMMERCIAL

## 2019-08-17 VITALS
TEMPERATURE: 98.3 F | DIASTOLIC BLOOD PRESSURE: 68 MMHG | HEART RATE: 74 BPM | RESPIRATION RATE: 14 BRPM | OXYGEN SATURATION: 97 % | SYSTOLIC BLOOD PRESSURE: 112 MMHG

## 2019-08-17 DIAGNOSIS — R07.9 CHEST PAIN, UNSPECIFIED TYPE: Primary | ICD-10-CM

## 2019-08-17 LAB
ABSOLUTE EOS #: 0.1 K/UL (ref 0–0.4)
ABSOLUTE IMMATURE GRANULOCYTE: ABNORMAL K/UL (ref 0–0.3)
ABSOLUTE LYMPH #: 1.3 K/UL (ref 1–4.8)
ABSOLUTE MONO #: 0.6 K/UL (ref 0.1–1.3)
ALBUMIN SERPL-MCNC: 3.8 G/DL (ref 3.5–5.2)
ALBUMIN/GLOBULIN RATIO: ABNORMAL (ref 1–2.5)
ALP BLD-CCNC: 58 U/L (ref 35–104)
ALT SERPL-CCNC: 13 U/L (ref 5–33)
ANION GAP SERPL CALCULATED.3IONS-SCNC: 9 MMOL/L (ref 9–17)
AST SERPL-CCNC: 11 U/L
BASOPHILS # BLD: 1 % (ref 0–2)
BASOPHILS ABSOLUTE: 0 K/UL (ref 0–0.2)
BILIRUB SERPL-MCNC: 0.26 MG/DL (ref 0.3–1.2)
BUN BLDV-MCNC: 15 MG/DL (ref 8–23)
BUN/CREAT BLD: ABNORMAL (ref 9–20)
CALCIUM SERPL-MCNC: 9.6 MG/DL (ref 8.6–10.4)
CHLORIDE BLD-SCNC: 100 MMOL/L (ref 98–107)
CO2: 28 MMOL/L (ref 20–31)
CREAT SERPL-MCNC: 0.77 MG/DL (ref 0.5–0.9)
DIFFERENTIAL TYPE: ABNORMAL
EOSINOPHILS RELATIVE PERCENT: 2 % (ref 0–4)
GFR AFRICAN AMERICAN: >60 ML/MIN
GFR NON-AFRICAN AMERICAN: >60 ML/MIN
GFR SERPL CREATININE-BSD FRML MDRD: ABNORMAL ML/MIN/{1.73_M2}
GFR SERPL CREATININE-BSD FRML MDRD: ABNORMAL ML/MIN/{1.73_M2}
GLUCOSE BLD-MCNC: 99 MG/DL (ref 70–99)
HCT VFR BLD CALC: 38.7 % (ref 36–46)
HEMOGLOBIN: 12.5 G/DL (ref 12–16)
IMMATURE GRANULOCYTES: ABNORMAL %
INR BLD: 2.7
LYMPHOCYTES # BLD: 21 % (ref 24–44)
MCH RBC QN AUTO: 28.9 PG (ref 26–34)
MCHC RBC AUTO-ENTMCNC: 32.3 G/DL (ref 31–37)
MCV RBC AUTO: 89.5 FL (ref 80–100)
MONOCYTES # BLD: 10 % (ref 1–7)
NRBC AUTOMATED: ABNORMAL PER 100 WBC
PDW BLD-RTO: 13.9 % (ref 11.5–14.9)
PLATELET # BLD: 221 K/UL (ref 150–450)
PLATELET ESTIMATE: ABNORMAL
PMV BLD AUTO: 6.4 FL (ref 6–12)
POTASSIUM SERPL-SCNC: 4 MMOL/L (ref 3.7–5.3)
PROTHROMBIN TIME: 28.4 SEC (ref 11.8–14.6)
RBC # BLD: 4.32 M/UL (ref 4–5.2)
RBC # BLD: ABNORMAL 10*6/UL
SEG NEUTROPHILS: 66 % (ref 36–66)
SEGMENTED NEUTROPHILS ABSOLUTE COUNT: 4.2 K/UL (ref 1.3–9.1)
SODIUM BLD-SCNC: 137 MMOL/L (ref 135–144)
TOTAL PROTEIN: 7.1 G/DL (ref 6.4–8.3)
TROPONIN INTERP: NORMAL
TROPONIN INTERP: NORMAL
TROPONIN T: NORMAL NG/ML
TROPONIN T: NORMAL NG/ML
TROPONIN, HIGH SENSITIVITY: <6 NG/L (ref 0–14)
TROPONIN, HIGH SENSITIVITY: <6 NG/L (ref 0–14)
WBC # BLD: 6.3 K/UL (ref 3.5–11)
WBC # BLD: ABNORMAL 10*3/UL

## 2019-08-17 PROCEDURE — 85025 COMPLETE CBC W/AUTO DIFF WBC: CPT

## 2019-08-17 PROCEDURE — 80053 COMPREHEN METABOLIC PANEL: CPT

## 2019-08-17 PROCEDURE — 6370000000 HC RX 637 (ALT 250 FOR IP): Performed by: EMERGENCY MEDICINE

## 2019-08-17 PROCEDURE — 71046 X-RAY EXAM CHEST 2 VIEWS: CPT

## 2019-08-17 PROCEDURE — 99285 EMERGENCY DEPT VISIT HI MDM: CPT

## 2019-08-17 PROCEDURE — 36415 COLL VENOUS BLD VENIPUNCTURE: CPT

## 2019-08-17 PROCEDURE — 93005 ELECTROCARDIOGRAM TRACING: CPT | Performed by: EMERGENCY MEDICINE

## 2019-08-17 PROCEDURE — 84484 ASSAY OF TROPONIN QUANT: CPT

## 2019-08-17 PROCEDURE — 85610 PROTHROMBIN TIME: CPT

## 2019-08-17 RX ORDER — NITROGLYCERIN 0.4 MG/1
0.4 TABLET SUBLINGUAL EVERY 5 MIN PRN
Status: DISCONTINUED | OUTPATIENT
Start: 2019-08-17 | End: 2019-08-17 | Stop reason: HOSPADM

## 2019-08-17 RX ORDER — TRAMADOL HYDROCHLORIDE 50 MG/1
50 TABLET ORAL ONCE
Status: COMPLETED | OUTPATIENT
Start: 2019-08-17 | End: 2019-08-17

## 2019-08-17 RX ADMIN — TRAMADOL HYDROCHLORIDE 50 MG: 50 TABLET, FILM COATED ORAL at 13:54

## 2019-08-17 RX ADMIN — NITROGLYCERIN 0.4 MG: 0.4 TABLET SUBLINGUAL at 12:11

## 2019-08-17 ASSESSMENT — PAIN SCALES - GENERAL
PAINLEVEL_OUTOF10: 9
PAINLEVEL_OUTOF10: 10

## 2019-08-17 ASSESSMENT — PAIN DESCRIPTION - ORIENTATION: ORIENTATION: MID

## 2019-08-17 ASSESSMENT — PAIN DESCRIPTION - LOCATION: LOCATION: CHEST

## 2019-08-17 ASSESSMENT — PAIN DESCRIPTION - PAIN TYPE: TYPE: ACUTE PAIN

## 2019-08-17 ASSESSMENT — PAIN DESCRIPTION - DESCRIPTORS: DESCRIPTORS: HEAVINESS

## 2019-08-18 ASSESSMENT — ENCOUNTER SYMPTOMS
COUGH: 0
ABDOMINAL PAIN: 0
NAUSEA: 0
DIARRHEA: 0
SHORTNESS OF BREATH: 1
VOMITING: 0

## 2019-08-19 LAB
EKG ATRIAL RATE: 74 BPM
EKG P AXIS: 43 DEGREES
EKG P-R INTERVAL: 154 MS
EKG Q-T INTERVAL: 368 MS
EKG QRS DURATION: 94 MS
EKG QTC CALCULATION (BAZETT): 408 MS
EKG R AXIS: 0 DEGREES
EKG T AXIS: 18 DEGREES
EKG VENTRICULAR RATE: 74 BPM

## 2019-08-19 PROCEDURE — 93010 ELECTROCARDIOGRAM REPORT: CPT | Performed by: INTERNAL MEDICINE

## 2019-08-21 ENCOUNTER — HOSPITAL ENCOUNTER (OUTPATIENT)
Dept: PHARMACY | Age: 69
Setting detail: THERAPIES SERIES
Discharge: HOME | End: 2019-08-21
Payer: COMMERCIAL

## 2019-08-21 LAB
INR BLD: 2.9
PROTHROMBIN TIME: 35

## 2019-08-21 PROCEDURE — 85610 PROTHROMBIN TIME: CPT

## 2019-08-21 PROCEDURE — 99211 OFF/OP EST MAY X REQ PHY/QHP: CPT

## 2019-08-28 ENCOUNTER — HOSPITAL ENCOUNTER (OUTPATIENT)
Dept: PHARMACY | Age: 69
Setting detail: THERAPIES SERIES
Discharge: HOME | End: 2019-08-28
Payer: COMMERCIAL

## 2019-08-28 DIAGNOSIS — I82.403 RECURRENT ACUTE DEEP VEIN THROMBOSIS (DVT) OF BOTH LOWER EXTREMITIES (HCC): ICD-10-CM

## 2019-08-28 LAB
INR BLD: 2
PROTHROMBIN TIME: 24.4

## 2019-08-28 PROCEDURE — 99212 OFFICE O/P EST SF 10 MIN: CPT

## 2019-08-28 PROCEDURE — 85610 PROTHROMBIN TIME: CPT

## 2019-08-28 NOTE — PROGRESS NOTES
Patient states compliant all of the time with regimen. Patient did take a couple doses late, but did not miss. No bleeding or thromboembolic side effects noted. No significant med or dietary changes. May start vitamin b supplement. Will let us know. No significant recent illness or disease state changes. No change in green vegetable amount. PT/INR done in office per protocol. INR was therapeutic at 2 (goal 2-3). Will increase warfarin dosage to keep INR on the higher end. Warfarin 5 mg MWF and 2.5 mg all other days  Will retest in 1 week    Patient understands dosing directions and information discussed. Dosing schedule and follow up appointment given to patient. Progress note routed to referring physicians office. Patient acknowledges working in [address] [place] [address] with Pharmacist as referred by his/her physician.

## 2019-08-31 NOTE — DISCHARGE SUMMARY
[facility] Discharge Summary      Patient ID: Eulogio Hall    MRN: [id number]     Acct:  [de-identified]       Patient's PCP: Ga Donahue MD    Admit Date: 8/2/2019     Discharge Date: 8/5/2019      Admitting Physician: Ga Donahue MD    Discharge Physician: Ga Donahue MD     Discharge Diagnoses:    Primary Problem  Chronic pulmonary embolism [facility])    Active Hospital Problems    Diagnosis Date Noted    Obesity, Class II, BMI 35-39.9 [E66.9] 08/05/2019    Chronic pulmonary embolism ([facility] [I27.82] 08/05/2019    Chest pain [R07.9] 07/19/2016    IGT (impaired glucose tolerance) [R73.02] 06/08/2015    Recurrent acute deep vein thrombosis (DVT) of both lower extremities ([facility] [I82.403] 03/28/2014    Essential hypertension [I10] 11/01/2011    Fibromyalgia [M79.7] 11/01/2011    Hyperlipidemia [E78.5] 11/01/2011    Hypothyroidism [E03.9] 11/01/2011    Anxiety [F41.9] 11/01/2011    Depression [F32.9] 11/01/2011    RENEA (obstructive sleep apnea) [G47.33] 11/01/2011     Past Medical History:   Diagnosis Date    Affective disorder ([facility]     seasonal    Arthritis     shoulders and lower back    DVT (deep venous thrombosis) (HCC)     has history in R leg X 4    Fibromyalgia     Hyperlipidemia     Hypertension     Pneumonia     Sinusitis     Thyroid disease     hypothyroidsm     The patient was seen and examined on day of discharge and this discharge summary is in conjunction with any daily progress note from day of discharge. Code Status:  Prior    Hospital Course: uncomplicated    Consults:  cardiology and pulmonary/intensive care    Significant Diagnostic Studies: as above, and as follows: see chart    Treatments: as above    Disposition: home    Discharged Condition: Stable    Follow Up:   Ga Donahue MD in one week    Discharge Medications:    Eros Bah   Home Medication Instructions XSB:[id number]    Printed on:08/30/19 8500   Medication Information

## 2019-09-06 ENCOUNTER — HOSPITAL ENCOUNTER (OUTPATIENT)
Dept: PHARMACY | Age: 69
Setting detail: THERAPIES SERIES
Discharge: HOME | End: 2019-09-06
Payer: COMMERCIAL

## 2019-09-06 DIAGNOSIS — I82.403 RECURRENT ACUTE DEEP VEIN THROMBOSIS (DVT) OF BOTH LOWER EXTREMITIES (HCC): ICD-10-CM

## 2019-09-06 LAB
INR BLD: 4.2
PROTHROMBIN TIME: 50.4

## 2019-09-06 PROCEDURE — 99211 OFF/OP EST MAY X REQ PHY/QHP: CPT

## 2019-09-06 PROCEDURE — 85610 PROTHROMBIN TIME: CPT

## 2019-09-09 ENCOUNTER — CARE COORDINATION (OUTPATIENT)
Dept: CARE COORDINATION | Age: 69
End: 2019-09-09

## 2019-09-16 ENCOUNTER — HOSPITAL ENCOUNTER (OUTPATIENT)
Dept: PHARMACY | Age: 69
Setting detail: THERAPIES SERIES
Discharge: HOME | End: 2019-09-16
Payer: COMMERCIAL

## 2019-09-16 LAB
INR BLD: 3.8
PROTHROMBIN TIME: 45.8

## 2019-09-16 PROCEDURE — 99212 OFFICE O/P EST SF 10 MIN: CPT

## 2019-09-16 PROCEDURE — 85610 PROTHROMBIN TIME: CPT

## 2019-09-16 NOTE — PROGRESS NOTES
Patient states compliant all of the time with regimen. No bleeding or thromboembolic side effects noted. No significant med or dietary changes. Not eating as much in general. No cranberry, grapefruit, black licorice, or alcohol. Patient still has pain, but reports it is a \"7\" this week instead of \"10\"  No significant recent illness or disease state changes. PT/INR done in office per protocol. INR was supratherapeutic at 3.8 (goal 2-3). Warfarin regimen will be held for 1 day and reduced to 5 mg Mon/Fri and 2.5 mg all other days. Will retest in 1 week. Patient counseled on signs/symptoms of bleeding and when to seek medical attention. Patient instructed to use extra precautions with elevated INR. Patient understands dosing directions and information discussed. Dosing schedule and follow up appointment given to patient. Progress note routed to referring physicians office. Patient acknowledges working in [address] [place] [address] with Pharmacist as referred by his/her physician.

## 2019-09-23 ENCOUNTER — HOSPITAL ENCOUNTER (OUTPATIENT)
Dept: PHARMACY | Age: 69
Setting detail: THERAPIES SERIES
Discharge: HOME | End: 2019-09-23
Payer: COMMERCIAL

## 2019-09-23 ENCOUNTER — TELEPHONE (OUTPATIENT)
Dept: PHARMACY | Age: 69
End: 2019-09-23

## 2019-09-23 LAB
INR BLD: 3.3
PROTHROMBIN TIME: 39.7

## 2019-09-23 PROCEDURE — 85610 PROTHROMBIN TIME: CPT

## 2019-09-23 PROCEDURE — 99212 OFFICE O/P EST SF 10 MIN: CPT

## 2019-09-23 RX ORDER — WARFARIN SODIUM 2.5 MG/1
TABLET ORAL
Qty: 110 TABLET | Refills: 1 | Status: SHIPPED | OUTPATIENT
Start: 2019-09-23 | End: 2019-12-13

## 2019-09-23 NOTE — PROGRESS NOTES
Patient states compliant all of the time with regimen. No bleeding or thromboembolic side effects noted. No significant med or dietary changes. Pain is still about the same. No significant recent illness or disease state changes. Prescription for warfarin 2.5 mg tablets [facility] to [facility]. PT/INR done in office per protocol. INR was supratherapeutic at 3.3 (goal 2-3). Keep INR on higher end of range per note from most recent hospitalization. Warfarin regimen will be reduced to 5 mg Fri and 2.5 mg all other days  Will retest in 1 week. Patient counseled on signs/symptoms of bleeding and when to seek medical attention. Patient instructed to use extra precautions with elevated INR. Patient understands dosing directions and information discussed. Dosing schedule and follow up appointment given to patient. Progress note routed to referring physicians office. Patient acknowledges working in [address] [place] [address] with Pharmacist as referred by his/her physician.

## 2019-09-30 ENCOUNTER — HOSPITAL ENCOUNTER (OUTPATIENT)
Dept: PHARMACY | Age: 69
Setting detail: THERAPIES SERIES
Discharge: HOME | End: 2019-09-30
Payer: COMMERCIAL

## 2019-09-30 DIAGNOSIS — I82.403 RECURRENT ACUTE DEEP VEIN THROMBOSIS (DVT) OF BOTH LOWER EXTREMITIES (HCC): ICD-10-CM

## 2019-09-30 LAB
INR BLD: 3.7
PROTHROMBIN TIME: 44.4

## 2019-09-30 PROCEDURE — 85610 PROTHROMBIN TIME: CPT

## 2019-09-30 PROCEDURE — 99213 OFFICE O/P EST LOW 20 MIN: CPT

## 2019-10-02 ENCOUNTER — HOSPITAL ENCOUNTER (OUTPATIENT)
Dept: NON INVASIVE DIAGNOSTICS | Age: 69
Discharge: HOME | End: 2019-10-02
Payer: COMMERCIAL

## 2019-10-04 ENCOUNTER — CARE COORDINATION (OUTPATIENT)
Dept: CARE COORDINATION | Age: 69
End: 2019-10-04

## 2019-10-07 ENCOUNTER — HOSPITAL ENCOUNTER (OUTPATIENT)
Dept: PULMONOLOGY | Age: 69
Discharge: HOME | End: 2019-10-07
Payer: COMMERCIAL

## 2019-10-07 ENCOUNTER — HOSPITAL ENCOUNTER (OUTPATIENT)
Dept: PHARMACY | Age: 69
Setting detail: THERAPIES SERIES
Discharge: HOME | End: 2019-10-07
Payer: COMMERCIAL

## 2019-10-07 DIAGNOSIS — I82.403 RECURRENT ACUTE DEEP VEIN THROMBOSIS (DVT) OF BOTH LOWER EXTREMITIES (HCC): ICD-10-CM

## 2019-10-07 LAB
INR BLD: 1.6
PROTHROMBIN TIME: 18.8

## 2019-10-07 PROCEDURE — 94060 EVALUATION OF WHEEZING: CPT

## 2019-10-07 PROCEDURE — 85610 PROTHROMBIN TIME: CPT

## 2019-10-07 PROCEDURE — 94070 EVALUATION OF WHEEZING: CPT

## 2019-10-07 PROCEDURE — 99211 OFF/OP EST MAY X REQ PHY/QHP: CPT

## 2019-10-07 PROCEDURE — 94664 DEMO&/EVAL PT USE INHALER: CPT

## 2019-10-07 PROCEDURE — 6360000002 HC RX W HCPCS: Performed by: INTERNAL MEDICINE

## 2019-10-07 PROCEDURE — 94761 N-INVAS EAR/PLS OXIMETRY MLT: CPT

## 2019-10-07 RX ORDER — ALBUTEROL SULFATE 2.5 MG/3ML
2.5 SOLUTION RESPIRATORY (INHALATION) EVERY 10 MIN PRN
Status: DISCONTINUED | OUTPATIENT
Start: 2019-10-07 | End: 2019-10-08 | Stop reason: HOSPADM

## 2019-10-07 RX ADMIN — ALBUTEROL SULFATE 2.5 MG: 2.5 SOLUTION RESPIRATORY (INHALATION) at 14:43

## 2019-10-07 RX ADMIN — METHACHOLINE CHLORIDE 100 MG: 100 POWDER, FOR SOLUTION RESPIRATORY (INHALATION) at 13:57

## 2019-10-07 RX ADMIN — ALBUTEROL SULFATE 2.5 MG: 2.5 SOLUTION RESPIRATORY (INHALATION) at 14:22

## 2019-10-14 ENCOUNTER — TELEPHONE (OUTPATIENT)
Dept: PHARMACY | Age: 69
End: 2019-10-14

## 2019-10-14 ENCOUNTER — HOSPITAL ENCOUNTER (OUTPATIENT)
Dept: PHARMACY | Age: 69
Discharge: HOME | End: 2019-10-14

## 2019-10-14 DIAGNOSIS — I82.403 RECURRENT ACUTE DEEP VEIN THROMBOSIS (DVT) OF BOTH LOWER EXTREMITIES (HCC): ICD-10-CM

## 2019-10-14 LAB
INR BLD: 2.9
PROTHROMBIN TIME: 34.2

## 2019-10-14 PROCEDURE — 85610 PROTHROMBIN TIME: CPT

## 2019-10-14 PROCEDURE — 99211 OFF/OP EST MAY X REQ PHY/QHP: CPT

## 2019-10-14 RX ORDER — WARFARIN SODIUM 5 MG/1
TABLET ORAL
Qty: 90 TABLET | Refills: 1 | OUTPATIENT
Start: 2019-10-14 | End: 2019-11-18 | Stop reason: CLARIF

## 2019-10-23 ENCOUNTER — CARE COORDINATION (OUTPATIENT)
Dept: CARE COORDINATION | Age: 69
End: 2019-10-23

## 2019-10-28 ENCOUNTER — HOSPITAL ENCOUNTER (OUTPATIENT)
Dept: PHARMACY | Age: 69
Setting detail: THERAPIES SERIES
Discharge: HOME | End: 2019-10-28
Payer: COMMERCIAL

## 2019-10-28 LAB
INR BLD: 1.3
PROTHROMBIN TIME: 16.1

## 2019-10-28 PROCEDURE — 99212 OFFICE O/P EST SF 10 MIN: CPT

## 2019-10-28 PROCEDURE — 85610 PROTHROMBIN TIME: CPT

## 2019-11-04 ENCOUNTER — HOSPITAL ENCOUNTER (OUTPATIENT)
Dept: PHARMACY | Age: 69
Setting detail: THERAPIES SERIES
Discharge: HOME | End: 2019-11-04
Payer: COMMERCIAL

## 2019-11-04 DIAGNOSIS — I82.403 RECURRENT ACUTE DEEP VEIN THROMBOSIS (DVT) OF BOTH LOWER EXTREMITIES (HCC): ICD-10-CM

## 2019-11-04 LAB
INR BLD: 2.4
PROTHROMBIN TIME: 29.3

## 2019-11-04 PROCEDURE — 85610 PROTHROMBIN TIME: CPT

## 2019-11-04 PROCEDURE — 99211 OFF/OP EST MAY X REQ PHY/QHP: CPT

## 2019-11-13 ENCOUNTER — CARE COORDINATION (OUTPATIENT)
Dept: CARE COORDINATION | Age: 69
End: 2019-11-13

## 2019-11-18 ENCOUNTER — HOSPITAL ENCOUNTER (OUTPATIENT)
Dept: PHARMACY | Age: 69
Setting detail: THERAPIES SERIES
Discharge: HOME | End: 2019-11-18
Payer: COMMERCIAL

## 2019-11-18 DIAGNOSIS — I82.403 RECURRENT ACUTE DEEP VEIN THROMBOSIS (DVT) OF BOTH LOWER EXTREMITIES (HCC): ICD-10-CM

## 2019-11-18 LAB
INR BLD: 2.1
PROTIME: 25.1 SECONDS

## 2019-11-18 PROCEDURE — 85610 PROTHROMBIN TIME: CPT

## 2019-11-18 PROCEDURE — 99211 OFF/OP EST MAY X REQ PHY/QHP: CPT

## 2019-11-20 ENCOUNTER — CARE COORDINATION (OUTPATIENT)
Dept: CARE COORDINATION | Age: 69
End: 2019-11-20

## 2019-12-02 ENCOUNTER — HOSPITAL ENCOUNTER (OUTPATIENT)
Dept: PHARMACY | Age: 69
Setting detail: THERAPIES SERIES
Discharge: HOME | End: 2019-12-02
Payer: COMMERCIAL

## 2019-12-02 DIAGNOSIS — I82.403 RECURRENT ACUTE DEEP VEIN THROMBOSIS (DVT) OF BOTH LOWER EXTREMITIES (HCC): ICD-10-CM

## 2019-12-02 LAB
INR BLD: 2.5
PROTIME: 30.4 SECONDS

## 2019-12-02 PROCEDURE — 85610 PROTHROMBIN TIME: CPT

## 2019-12-02 PROCEDURE — 99211 OFF/OP EST MAY X REQ PHY/QHP: CPT

## 2019-12-04 ENCOUNTER — HOSPITAL ENCOUNTER (OUTPATIENT)
Age: 69
Setting detail: SPECIMEN
Discharge: HOME | End: 2019-12-04
Payer: COMMERCIAL

## 2019-12-04 DIAGNOSIS — Z79.899 HIGH RISK MEDICATION USE: ICD-10-CM

## 2019-12-04 LAB
AMPHETAMINE SCREEN URINE: NEGATIVE
BARBITURATE SCREEN URINE: NEGATIVE
BENZODIAZEPINE SCREEN, URINE: NEGATIVE
BUPRENORPHINE URINE: ABNORMAL
CANNABINOID SCREEN URINE: NEGATIVE
COCAINE METABOLITE, URINE: NEGATIVE
MDMA URINE: ABNORMAL
METHADONE SCREEN, URINE: POSITIVE
METHAMPHETAMINE, URINE: ABNORMAL
OPIATES, URINE: POSITIVE
OXYCODONE SCREEN URINE: NEGATIVE
PHENCYCLIDINE, URINE: NEGATIVE
PROPOXYPHENE, URINE: ABNORMAL
TEST INFORMATION: ABNORMAL
TRICYCLIC ANTIDEPRESSANTS, UR: ABNORMAL

## 2019-12-05 ENCOUNTER — CARE COORDINATION (OUTPATIENT)
Dept: CARE COORDINATION | Age: 69
End: 2019-12-05

## 2019-12-06 ENCOUNTER — CARE COORDINATION (OUTPATIENT)
Dept: CARE COORDINATION | Age: 69
End: 2019-12-06

## 2019-12-08 LAB
ODESMETHYLTRAMADOL, URINE: 6885 NG/ML
TRAMADOL, URINE: NORMAL NG/ML

## 2019-12-09 ENCOUNTER — CARE COORDINATION (OUTPATIENT)
Dept: CARE COORDINATION | Age: 69
End: 2019-12-09

## 2019-12-09 ENCOUNTER — HOSPITAL ENCOUNTER (OUTPATIENT)
Age: 69
Setting detail: SPECIMEN
Discharge: HOME | End: 2019-12-09
Payer: COMMERCIAL

## 2019-12-10 DIAGNOSIS — Z79.899 HIGH RISK MEDICATION USE: ICD-10-CM

## 2019-12-11 ENCOUNTER — CARE COORDINATION (OUTPATIENT)
Dept: CARE COORDINATION | Age: 69
End: 2019-12-11

## 2019-12-12 ENCOUNTER — CARE COORDINATION (OUTPATIENT)
Dept: CARE COORDINATION | Age: 69
End: 2019-12-12

## 2019-12-12 LAB
ALCOHOL URINE: NEGATIVE MG/DL
AMPHETAMINE SCREEN URINE: POSITIVE NG/ML
BARBITURATE SCREEN URINE: NEGATIVE NG/ML
BENZODIAZEPINE SCREEN, URINE: NEGATIVE NG/ML
CANNABINOID SCREEN URINE: NEGATIVE NG/ML
COCAINE(METAB.)SCREEN, URINE: NEGATIVE NG/ML
CREATININE URINE: 158.8 MG/DL (ref 20–400)
Lab: NORMAL
METHADONE SCREEN, URINE: NEGATIVE NG/ML
OPIATES, URINE: NEGATIVE NG/ML
PHENCYCLIDINE, URINE: NEGATIVE NG/ML
PROPOXYPHENE, URINE: NEGATIVE NG/ML

## 2019-12-13 ENCOUNTER — HOSPITAL ENCOUNTER (OUTPATIENT)
Dept: PHARMACY | Age: 69
Setting detail: THERAPIES SERIES
Discharge: HOME | End: 2019-12-13
Payer: COMMERCIAL

## 2019-12-13 DIAGNOSIS — I82.403 RECURRENT ACUTE DEEP VEIN THROMBOSIS (DVT) OF BOTH LOWER EXTREMITIES (HCC): ICD-10-CM

## 2019-12-13 LAB
AMPHETAMINE URINE CONFIRM: <50 NG/ML
INR BLD: 3.5
METHAMPHETAMINE, URINE: <200 NG/ML
METHYLENEDIOXYAMPHETAMINE, UR: <200 NG/ML
METHYLENEDIOXYETHYLAMPHETAMINE, UR: <200 NG/ML
METHYLENEDIOXYMETHAMPHETAMINE, UR: <200 NG/ML
PHENTERMINE, URINE, QUANTITATIVE: <200 NG/ML
PROTIME: 41.9 SECONDS

## 2019-12-13 PROCEDURE — 99211 OFF/OP EST MAY X REQ PHY/QHP: CPT

## 2019-12-13 PROCEDURE — 85610 PROTHROMBIN TIME: CPT

## 2019-12-16 ENCOUNTER — CARE COORDINATION (OUTPATIENT)
Dept: CARE COORDINATION | Age: 69
End: 2019-12-16

## 2019-12-26 ENCOUNTER — HOSPITAL ENCOUNTER (OUTPATIENT)
Dept: PHARMACY | Age: 69
Setting detail: THERAPIES SERIES
Discharge: HOME | End: 2019-12-26
Payer: COMMERCIAL

## 2019-12-26 DIAGNOSIS — I82.403 RECURRENT ACUTE DEEP VEIN THROMBOSIS (DVT) OF BOTH LOWER EXTREMITIES (HCC): ICD-10-CM

## 2019-12-26 LAB
INR BLD: 4.2
PROTIME: 50.4 SECONDS

## 2019-12-26 PROCEDURE — 85610 PROTHROMBIN TIME: CPT

## 2019-12-26 PROCEDURE — 99212 OFFICE O/P EST SF 10 MIN: CPT

## 2019-12-27 ENCOUNTER — CARE COORDINATION (OUTPATIENT)
Dept: CARE COORDINATION | Age: 69
End: 2019-12-27

## 2019-12-27 NOTE — CARE COORDINATION
Received 2 VM from Luis Angel Beltran this AM requesting that \"someone call in something for this pain. \"  She stated \"I am begging you. \" She said that her pain management appointment isn't until 1/15/20 and she just needs something until her pain appointment. Attempted to call Luis Angel Beltran back x2 and her mailbox is full, so unable to leave a message. Will attempt to reach Luis Angel Beltran again today to reiterate that Dr. Kiran De Jesus will no longer prescribe any pain meds and that the other providers in the office will follow Dr. Gloria rosenbaum.

## 2020-01-03 ENCOUNTER — HOSPITAL ENCOUNTER (OUTPATIENT)
Dept: PHARMACY | Age: 70
Setting detail: THERAPIES SERIES
Discharge: HOME | End: 2020-01-03
Payer: COMMERCIAL

## 2020-01-03 LAB
INR BLD: 2.7
PROTIME: 31.9 SECONDS

## 2020-01-03 PROCEDURE — 85610 PROTHROMBIN TIME: CPT

## 2020-01-03 PROCEDURE — 99211 OFF/OP EST MAY X REQ PHY/QHP: CPT

## 2020-01-03 NOTE — PROGRESS NOTES
Patient states compliant all of the time with regimen. No bleeding or thromboembolic side effects noted. Bruising has cleared. No significant dietary changes. Patient had switched to Tylenol #3 for pain instead of ibuprofen on Saturday. No significant recent illness or disease state changes. PT/INR done in office per protocol. INR was therapeutic at 2.7 (goal 2-3). Patient has stopped Motrin. Warfarin regimen will be continued at what patient has taken the past week- 5 mg Mon/Thurs and 2.5 mg all other days. Will retest in 10 days. Patient understands dosing directions and information discussed. Dosing schedule and follow up appointment given to patient. Progress note routed to referring physicians office. Patient acknowledges working in [address] [place] [address] with Pharmacist as referred by his/her physician.

## 2020-01-14 ENCOUNTER — CARE COORDINATION (OUTPATIENT)
Dept: CARE COORDINATION | Age: 70
End: 2020-01-14

## 2020-01-14 ENCOUNTER — HOSPITAL ENCOUNTER (OUTPATIENT)
Dept: PHARMACY | Age: 70
Setting detail: THERAPIES SERIES
Discharge: HOME | End: 2020-01-14
Payer: COMMERCIAL

## 2020-01-14 LAB
INR BLD: 2
PROTIME: 24 SECONDS

## 2020-01-14 PROCEDURE — 99212 OFFICE O/P EST SF 10 MIN: CPT

## 2020-01-14 PROCEDURE — 85610 PROTHROMBIN TIME: CPT

## 2020-01-14 NOTE — PROGRESS NOTES
Patient states compliant all of the time with regimen. No bleeding or thromboembolic side effects noted. No significant med or dietary changes. No significant recent illness or disease state changes. PT/INR done in office per protocol. INR was therapeutic 2.0 (goal 2-3). Warfarin regimen will be 5 mg on MWF and 2.5 mg on remaining days. Will retest in 2 weeks. Patient understands dosing directions and information discussed. Dosing schedule and follow up appointment given to patient. Progress note routed to referring physicians office. Patient acknowledges working in [address] [place] [address] with Pharmacist as referred by his/her physician.       Mary Hobbs, PharmD, [facility]  1/14/2020 1:33 PM

## 2020-01-15 ENCOUNTER — HOSPITAL ENCOUNTER (OUTPATIENT)
Dept: PAIN MANAGEMENT | Age: 70
Discharge: HOME | End: 2020-01-15
Payer: COMMERCIAL

## 2020-01-15 VITALS
DIASTOLIC BLOOD PRESSURE: 62 MMHG | SYSTOLIC BLOOD PRESSURE: 118 MMHG | TEMPERATURE: 98.8 F | RESPIRATION RATE: 16 BRPM | WEIGHT: 227 LBS | HEART RATE: 84 BPM | HEIGHT: 65 IN | BODY MASS INDEX: 37.82 KG/M2 | OXYGEN SATURATION: 96 %

## 2020-01-15 PROCEDURE — 80307 DRUG TEST PRSMV CHEM ANLYZR: CPT

## 2020-01-15 PROCEDURE — 99203 OFFICE O/P NEW LOW 30 MIN: CPT

## 2020-01-15 PROCEDURE — 99244 OFF/OP CNSLTJ NEW/EST MOD 40: CPT | Performed by: PAIN MEDICINE

## 2020-01-15 ASSESSMENT — PAIN DESCRIPTION - FREQUENCY: FREQUENCY: CONTINUOUS

## 2020-01-15 ASSESSMENT — PAIN DESCRIPTION - ONSET: ONSET: ON-GOING

## 2020-01-15 ASSESSMENT — ENCOUNTER SYMPTOMS
GASTROINTESTINAL NEGATIVE: 1
PHOTOPHOBIA: 0
EYE REDNESS: 0
CHOKING: 0
ALLERGIC/IMMUNOLOGIC NEGATIVE: 1
CONSTIPATION: 0
COUGH: 0
NAUSEA: 0
ABDOMINAL PAIN: 0
VOMITING: 0
EYES NEGATIVE: 1
SHORTNESS OF BREATH: 1

## 2020-01-15 ASSESSMENT — PAIN DESCRIPTION - DESCRIPTORS: DESCRIPTORS: CONSTANT

## 2020-01-15 ASSESSMENT — PAIN DESCRIPTION - LOCATION: LOCATION: GENERALIZED

## 2020-01-15 ASSESSMENT — PAIN SCALES - GENERAL: PAINLEVEL_OUTOF10: 8

## 2020-01-15 ASSESSMENT — PAIN DESCRIPTION - PAIN TYPE: TYPE: CHRONIC PAIN

## 2020-01-15 NOTE — PROGRESS NOTES
Diet: common adult  Appetite:  Loss of appetite  Sedation:  no  Urinary Retention: no    FOCUSED PAINSCALE:  Highest : 10  Lowest :5  Average: Range-7  When and What  was your last procedure:   Shoulder injections years ago   Was your procedure effective:  yes    ACTIVITY/SOCIAL/EMOTIONAL:  Sleep Pattern: 2 hours per night. nightime awakenings  Energy Level:  Tired/Fatigued  Currently attending Physical Therapy:  No  Home Exercises: daily walking  Mobility: No issues with walking or standing  Do you use assistive devices? No  Have you fallen in the last 30 days?:  No  Currently seeing a Psychiatrist or Psychologist:  No  Emotional Issues: normal   Mood: appropriate     ABERRANT BEHAVIORS SINCE LAST VISIT:  Have you ever been treated in another Pain Clinic no  Refills for prescriptions appropriate: not applicable  Lost rx/pills:not applicable  Taking more medication than prescribed:  not applicable  Are you receiving PAIN medications from  other doctors: yes . Last Urine/Serum Drug Screen :   Was Serum/UDS as anticipated?   Will collect  Brought pill bottles in :not applicable   Was Pill count appropriate? :not applicable   Are currently pregnant?not applicable  Recent ER visits: No             Past Medical History      Diagnosis Date    Affective disorder ([address]     seasonal    Arthritis     shoulders and lower back    DVT (deep venous thrombosis) (HCC)     has history in R leg X 4    Fibromyalgia     Hyperlipidemia     Hypertension     Pneumonia     Sinusitis     Thyroid disease     hypothyroidsm       Surgical History  Past Surgical History:   Procedure Laterality Date    COLONOSCOPY  8/31/2011    internal hemorrhoides    DILATION AND CURETTAGE OF UTERUS      ECTOPIC PREGNANCY SURGERY Left     HYSTERECTOMY         Medications  Current Outpatient Medications   Medication Sig Dispense Refill    fluticasone (FLONASE) 50 MCG/ACT nasal spray 2 sprays by Each Nostril route daily as needed for Allergies (seasonal) 1 Bottle 1    lidocaine (LIDODERM) 5 % APPLY 2 PATCHES ON TO A CLEAN DRY AREA OF THE SKIN DAILY AS NEEDED FOR PAIN 30 patch 1    traMADol (ULTRAM) 50 MG tablet Take 1 tablet by mouth every 6 hours as needed for Pain for up to 30 days. 120 tablet 0    acetaminophen-codeine (TYLENOL #3) 300-30 MG per tablet Take 1 tablet by mouth every 6 hours as needed for Pain for up to 30 days. TAKE ONE TABLET BY MOUTH EVERY 6 HOURS AS NEEDED FOR PAIN 120 tablet 0    acyclovir (ZOVIRAX) 200 MG capsule TAKE 1 CAPSULE BY MOUTH TWICE DAILY 60 capsule 3    Gabapentin POWD Formula 8E: diclo3%+gaba6%+DMSO5%+prilo2%+baclofen2%+lido2% with ketamine.  Apply 1-2 gm topically to affected area TID- g 5    pantoprazole (PROTONIX) 40 MG tablet Take 1 tablet by mouth daily 30 tablet 3    hydrocortisone 1 % cream APPLY TO AFFECTED AREA TWICE DAILY 30 g 3    simvastatin (ZOCOR) 40 MG tablet TAKE ONE (1) TABLET BY MOUTH EVERY NIGHT 90 tablet 10    amLODIPine (NORVASC) 10 MG tablet TAKE 1 TABLET BY MOUTH DAILY 90 tablet 1    buPROPion (WELLBUTRIN XL) 300 MG extended release tablet TAKE 1 TABLET BY MOUTH EVERY MORNING 90 tablet 0    vitamin D (ERGOCALCIFEROL) 86405 units CAPS capsule Take 1 capsule by mouth on Saturday 12 capsule 1    aspirin 81 MG chewable tablet Take 1 tablet by mouth daily 30 tablet 3    levothyroxine (SYNTHROID) 137 MCG tablet Take 1 tablet by mouth Daily 30 tablet 3    metoprolol tartrate (LOPRESSOR) 25 MG tablet TAKE 1 TABLET BY MOUTH TWICE DAILY 60 tablet 5    warfarin (COUMADIN) 2.5 MG tablet Take 2.5 mg by mouth See Admin Instructions Indications: Sun, Tues, Wed, Thurs, Sat Dosed by [address] Anticoagulation Service:5 mg Mon/Thurs and 2.5 mg all other days      warfarin (COUMADIN) 5 MG tablet Take 5 mg by mouth once a week Indications: Monday, Friday Dosed by [address] Anticoagulation Service:5 mg Mon/Thurs and 2.5 mg all other days      ammonium lactate (LAC-HYDRIN) 12 % lotion APPLY TO AFFECTED AREA(S) DAILY 225 mL 1    loratadine (CLARITIN) 10 MG tablet TAKE 1 TABLET BY MOUTH DAILY AS NEEDED FOR ALLERGIES 30 tablet 1    gabapentin (NEURONTIN) 800 MG tablet TAKE 1 TABLET BY MOUTH FOUR TIMES DAILY 120 tablet 3    nitroGLYCERIN (NITROSTAT) 0.4 MG SL tablet up to max of 3 total doses. If no relief after 1 dose, call 911. 25 tablet 3     No current facility-administered medications for this encounter. Allergies  Cymbalta [duloxetine hcl]; Lisinopril; and Robaxin [methocarbamol]    Family History  family history includes Cervical Cancer in her mother; Heart Disease in her father and mother.     Social History  Social History     Socioeconomic History    Marital status: Single     Spouse name: None    Number of children: 2    Years of education: 13    Highest education level: None   Occupational History    Occupation: disabled      Comment: SSI    Social Needs    Financial resource strain: None    Food insecurity:     Worry: None     Inability: None    Transportation needs:     Medical: None     Non-medical: None   Tobacco Use    Smoking status: Never Smoker    Smokeless tobacco: Never Used   Substance and Sexual Activity    Alcohol use: No     Alcohol/week: 0.0 standard drinks     Comment: never used     Drug use: No    Sexual activity: Never   Lifestyle    Physical activity:     Days per week: None     Minutes per session: None    Stress: None   Relationships    Social connections:     Talks on phone: None     Gets together: None     Attends [employer] service: None     Active member of club or organization: None     Attends meetings of clubs or organizations: None     Relationship status: None    Intimate partner violence:     Fear of current or ex partner: None     Emotionally abused: None     Physically abused: None     Forced sexual activity: None   Other Topics Concern    None   Social History Narrative    None      reports no history breath sounds. Abdominal:      General: Abdomen is flat. Bowel sounds are normal. There is no distension. Musculoskeletal: Normal range of motion. Right lower leg: No edema. Left lower leg: No edema. Comments: Patient is diffusely tender along the spine. There is tenderness over the trapezius bilaterally as well as levator scapulae. Patient also experiencing some tenderness over the medial epicondyles bilaterally. Skin:     General: Skin is warm and dry. Neurological:      Mental Status: She is alert and oriented to person, place, and time. Cranial Nerves: Cranial nerves are intact. No cranial nerve deficit or dysarthria. Sensory: Sensation is intact. No sensory deficit. Motor: Motor function is intact. No weakness, tremor or atrophy. Coordination: Coordination normal.      Deep Tendon Reflexes: Reflexes normal.      Reflex Scores:       Tricep reflexes are 2+ on the right side and 2+ on the left side. Bicep reflexes are 2+ on the right side and 2+ on the left side. Patellar reflexes are 2+ on the right side and 2+ on the left side. Achilles reflexes are 2+ on the right side and 2+ on the left side. Psychiatric:         Mood and Affect: Mood is anxious and depressed. Speech: Speech normal.         Behavior: Behavior normal.         Thought Content: Thought content normal.      Right Ankle Exam     Muscle Strength   The patient has normal right ankle strength. Left Ankle Exam     Muscle Strength   The patient has normal left ankle strength. Right Knee Exam     Muscle Strength   The patient has normal right knee strength. Left Knee Exam     Muscle Strength   The patient has normal left knee strength. Right Hip Exam     Muscle Strength   The patient has normal right hip strength. Left Hip Exam     Muscle Strength   The patient has normal left hip strength.        Back Exam     Tenderness   The patient is NEGATIVE  NEGATIVE Final 12/04/2019  9:38  [address]          (Positive cutoff 300 ng/mL)                Methadone Screen, Urine POSITIVEAbnormal   NEGATIVE Final 12/04/2019  9:38  [address]          (Positive cutoff 300 ng/mL)                Opiates, Urine POSITIVEAbnormal   NEGATIVE Final 12/04/2019  9:38  [address]          (Positive cutoff 300 ng/mL)                Phencyclidine, Urine NEGATIVE  NEGATIVE Final 12/04/2019  9:38  [address]          (Positive cutoff 25 ng/mL)                Propoxyphene, Urine NOT REPORTED  NEGATIVE Final 12/04/2019  9:38 PM [address], Ur NEGATIVE  NEGATIVE Final 12/04/2019  9:38  [address]          (Positive cutoff 50 ng/mL)                Oxycodone Screen, Ur NEGATIVE  NEGATIVE Final 12/04/2019  9:38  [address]          (Positive cutoff 100 ng/mL)                Methamphetamine, Urine NOT REPORTED  NEGATIVE Final 12/04/2019  9:38  [address]   Tricyclic Antidepressants, Urine NOT REPORTED  NEGATIVE Final 12/04/2019  9:38  [address]   MDMA, Urine NOT REPORTED  NEGATIVE Final 12/04/2019  9:38  [address]   Buprenorphine Urine NOT REPORTED  NEGATIVE Final 12/04/2019  9:38 PM Brayden Schwab - [employer]   Final 12/04/2019  9:38  [address]         Imaging:  Radiology Images and Reports reviewed where indicated and necessary  Linear low-density is noted in the right lower lobe pulmonary artery branch   between images 99 and 101.  Similar findings were noted on the prior CT from   March 4th.  This likely represents sequela of chronic pulmonary embolus or   web.       There are no findings to suggest a new/acute pulmonary artery embolus.       Right middle lobe nodule, unchanged       RECOMMENDATIONS:   Fleischner Society guidelines for patient or not. I explained to the patient. If the patient would like to come off of her medications then we will taper the dose and this was explained to the patient. Patient agrees with the treatment plan  This note was created using voice recognition software. There may be inaccuracies of transcription  that are inadvertently overlooked prior to the signature. There is any questions about the transcription please contact me.     Electronically signed by Keri Poole MD on 1/15/2020 at 3:16 PM

## 2020-01-18 LAB
6-ACETYLMORPHINE, UR: NOT DETECTED
7-AMINOCLONAZEPAM, URINE: NOT DETECTED
ALPHA-OH-ALPRAZ, URINE: NOT DETECTED
ALPRAZOLAM, URINE: NOT DETECTED
AMPHETAMINES, URINE: NOT DETECTED
BARBITURATES, URINE: NOT DETECTED
BENZOYLECGONINE, UR: NOT DETECTED
BUPRENORPHINE URINE: NOT DETECTED
CARISOPRODOL, UR: NOT DETECTED
CLONAZEPAM, URINE: NOT DETECTED
CODEINE, URINE: PRESENT
CREATININE URINE: 114 MG/DL (ref 20–400)
DIAZEPAM, URINE: NOT DETECTED
DRUGS EXPECTED, UR: NORMAL
EER HI RES INTERP UR: NORMAL
ETHYL GLUCURONIDE UR: NOT DETECTED
FENTANYL URINE: NOT DETECTED
HYDROCODONE, URINE: PRESENT
HYDROMORPHONE, URINE: NOT DETECTED
LORAZEPAM, URINE: NOT DETECTED
MARIJUANA METAB, UR: NOT DETECTED
MDA, UR: NOT DETECTED
MDEA, EVE, UR: NOT DETECTED
MDMA URINE: NOT DETECTED
MEPERIDINE METAB, UR: NOT DETECTED
METHADONE, URINE: NOT DETECTED
METHAMPHETAMINE, URINE: NOT DETECTED
METHYLPHENIDATE: NOT DETECTED
MIDAZOLAM, URINE: NOT DETECTED
MORPHINE URINE: PRESENT
NORBUPRENORPHINE, URINE: NOT DETECTED
NORDIAZEPAM, URINE: NOT DETECTED
NORFENTANYL, URINE: NOT DETECTED
NORHYDROCODONE, URINE: PRESENT
NOROXYCODONE, URINE: NOT DETECTED
NOROXYMORPHONE, URINE: NOT DETECTED
OXAZEPAM, URINE: NOT DETECTED
OXYCODONE URINE: NOT DETECTED
OXYMORPHONE, URINE: NOT DETECTED
PAIN MANAGEMENT DRUG PANEL INTERP, URINE: NORMAL
PAIN MGT DRUG PANEL, HI RES, UR: NORMAL
PCP,URINE: NOT DETECTED
PHENTERMINE, UR: NOT DETECTED
PROPOXYPHENE, URINE: NOT DETECTED
TAPENTADOL, URINE: NOT DETECTED
TAPENTADOL-O-SULFATE, URINE: NOT DETECTED
TEMAZEPAM, URINE: NOT DETECTED
TRAMADOL, URINE: PRESENT
ZOLPIDEM, URINE: NOT DETECTED

## 2020-01-20 ENCOUNTER — CARE COORDINATION (OUTPATIENT)
Dept: CARE COORDINATION | Age: 70
End: 2020-01-20

## 2020-01-20 NOTE — CARE COORDINATION
Ambulatory Care Coordination Note  1/20/2020  CM Risk Score: 8  Charlson 10 Year Mortality Risk Score: 23%     ACC: Isidor Siemens, RN    Summary Note: Spoke with Silvana Mathur for CM F/U of pain, depression and anxiety. Silvana Mathur is still frustrated, as she went to see pain management last week and has to wait 2 weeks for her UDS results, then all the doctor talked to her about was strengthening exercises and weaning her off her pain meds. Silvana Mathur said she is just taking one day at a time. Silvana Mathur has no other issues or needs, and has stayed out of the ED since [facility] started calling her. Discussed CM graduation and Silvana Mathur is in agreement. RNCM instructed her to call the office for any needs and to come in for a same day visit rather than go to the ED. CM PLAN: Message to PCP for CM graduation. Care Coordination Interventions    Program Enrollment:  Complex Care  Referral from Primary Care Provider:  No  Suggested Interventions and Community Resources  No Identified Needs  Other Services:  Completed (Comment: Pain Mgmt at [facility] referral 12/12/19)  Zone Management Tools:  Not Started (Comment: NA)         Goals Addressed                 This Visit's Progress     Wellness Goal   On track     Patient Self-Management Goal for Health Maintenance  Goal: I will call for a same-day appointment with my PCP or go to a [facility] after hours, instead of the ED. Barriers: lack of education  Plan for overcoming my barriers: Care Manager   Confidence: 8/10  Anticipated Goal Completion Date: 10/9/2019            Prior to Admission medications    Medication Sig Start Date End Date Taking?  Authorizing Provider   gabapentin (NEURONTIN) 800 MG tablet TAKE 1 TABLET BY MOUTH FOUR TIMES DAILY 1/16/20 2/16/20  VIKA Gonzalez - ROBERTO   metoprolol tartrate (LOPRESSOR) 25 MG tablet TAKE 1 TABLET BY MOUTH TWICE DAILY 1/16/20   VIKA Gonzalez - CNP   fluticasone (FLONASE) 50 MCG/ACT nasal spray 2 sprays by Each Nostril route daily as needed 10/8/18   Clinton Lemons MD   warfarin (COUMADIN) 2.5 MG tablet Take 2.5 mg by mouth See Admin Instructions Indications: Sun, Tues, Wed, Thurs, Sat Dosed by [facility] Anticoagulation Service:5 mg Mon/Thurs and 2.5 mg all other days    Historical Provider, MD   warfarin (COUMADIN) 5 MG tablet Take 5 mg by mouth once a week Indications: Monday, Friday Dosed by [facility] Anticoagulation Service:5 mg Mon/Thurs and 2.5 mg all other days    Historical Provider, MD   ammonium lactate (LAC-HYDRIN) 12 % lotion APPLY TO AFFECTED AREA(S) DAILY 4/4/18   Clinton Lemons MD       Future Appointments   Date Time Provider Elizabeth Barros   1/28/2020  1:50 PM EMANUEL [facility]   5/11/2020  9:20 AM Clinton Lemons MD [id number]

## 2020-01-25 ENCOUNTER — HOSPITAL ENCOUNTER (OUTPATIENT)
Age: 70
Setting detail: OBSERVATION
Discharge: HOME | End: 2020-01-27
Attending: EMERGENCY MEDICINE | Admitting: FAMILY MEDICINE
Payer: COMMERCIAL

## 2020-01-25 ENCOUNTER — APPOINTMENT (OUTPATIENT)
Dept: GENERAL RADIOLOGY | Age: 70
End: 2020-01-25
Payer: COMMERCIAL

## 2020-01-25 LAB
ABSOLUTE EOS #: 0.2 K/UL (ref 0–0.4)
ABSOLUTE IMMATURE GRANULOCYTE: ABNORMAL K/UL (ref 0–0.3)
ABSOLUTE LYMPH #: 1.6 K/UL (ref 1–4.8)
ABSOLUTE MONO #: 0.5 K/UL (ref 0.1–1.3)
ALBUMIN SERPL-MCNC: 3.8 G/DL (ref 3.5–5.2)
ALBUMIN/GLOBULIN RATIO: ABNORMAL (ref 1–2.5)
ALP BLD-CCNC: 64 U/L (ref 35–104)
ALT SERPL-CCNC: 14 U/L (ref 5–33)
ANION GAP SERPL CALCULATED.3IONS-SCNC: 13 MMOL/L (ref 9–17)
AST SERPL-CCNC: 13 U/L
BASOPHILS # BLD: 1 % (ref 0–2)
BASOPHILS ABSOLUTE: 0 K/UL (ref 0–0.2)
BILIRUB SERPL-MCNC: 0.28 MG/DL (ref 0.3–1.2)
BILIRUBIN DIRECT: <0.08 MG/DL
BILIRUBIN, INDIRECT: ABNORMAL MG/DL (ref 0–1)
BNP INTERPRETATION: NORMAL
BUN BLDV-MCNC: 12 MG/DL (ref 8–23)
BUN/CREAT BLD: ABNORMAL (ref 9–20)
CALCIUM SERPL-MCNC: 9.4 MG/DL (ref 8.6–10.4)
CHLORIDE BLD-SCNC: 99 MMOL/L (ref 98–107)
CO2: 27 MMOL/L (ref 20–31)
CREAT SERPL-MCNC: 0.69 MG/DL (ref 0.5–0.9)
D-DIMER QUANTITATIVE: <0.27 MG/L FEU (ref 0–0.59)
DIFFERENTIAL TYPE: ABNORMAL
EKG ATRIAL RATE: 85 BPM
EKG P AXIS: 43 DEGREES
EKG P-R INTERVAL: 148 MS
EKG Q-T INTERVAL: 406 MS
EKG QRS DURATION: 96 MS
EKG QTC CALCULATION (BAZETT): 483 MS
EKG R AXIS: 0 DEGREES
EKG T AXIS: 9 DEGREES
EKG VENTRICULAR RATE: 85 BPM
EOSINOPHILS RELATIVE PERCENT: 3 % (ref 0–4)
GFR AFRICAN AMERICAN: >60 ML/MIN
GFR NON-AFRICAN AMERICAN: >60 ML/MIN
GFR SERPL CREATININE-BSD FRML MDRD: ABNORMAL ML/MIN/{1.73_M2}
GFR SERPL CREATININE-BSD FRML MDRD: ABNORMAL ML/MIN/{1.73_M2}
GLOBULIN: ABNORMAL G/DL (ref 1.5–3.8)
GLUCOSE BLD-MCNC: 114 MG/DL (ref 70–99)
HCT VFR BLD CALC: 40 % (ref 36–46)
HEMOGLOBIN: 12.9 G/DL (ref 12–16)
IMMATURE GRANULOCYTES: ABNORMAL %
INR BLD: 2.3
LYMPHOCYTES # BLD: 28 % (ref 24–44)
MCH RBC QN AUTO: 28.3 PG (ref 26–34)
MCHC RBC AUTO-ENTMCNC: 32.3 G/DL (ref 31–37)
MCV RBC AUTO: 87.6 FL (ref 80–100)
MONOCYTES # BLD: 8 % (ref 1–7)
NRBC AUTOMATED: ABNORMAL PER 100 WBC
PDW BLD-RTO: 13.9 % (ref 11.5–14.9)
PLATELET # BLD: 232 K/UL (ref 150–450)
PLATELET ESTIMATE: ABNORMAL
PMV BLD AUTO: 7.6 FL (ref 6–12)
POTASSIUM SERPL-SCNC: 3.9 MMOL/L (ref 3.7–5.3)
PRO-BNP: <20 PG/ML
PROTHROMBIN TIME: 25.3 SEC (ref 11.8–14.6)
RBC # BLD: 4.57 M/UL (ref 4–5.2)
RBC # BLD: ABNORMAL 10*6/UL
SEG NEUTROPHILS: 60 % (ref 36–66)
SEGMENTED NEUTROPHILS ABSOLUTE COUNT: 3.5 K/UL (ref 1.3–9.1)
SODIUM BLD-SCNC: 139 MMOL/L (ref 135–144)
TOTAL PROTEIN: 7 G/DL (ref 6.4–8.3)
TROPONIN INTERP: NORMAL
TROPONIN INTERP: NORMAL
TROPONIN T: NORMAL NG/ML
TROPONIN T: NORMAL NG/ML
TROPONIN, HIGH SENSITIVITY: <6 NG/L (ref 0–14)
TROPONIN, HIGH SENSITIVITY: <6 NG/L (ref 0–14)
WBC # BLD: 5.8 K/UL (ref 3.5–11)
WBC # BLD: ABNORMAL 10*3/UL

## 2020-01-25 PROCEDURE — G0378 HOSPITAL OBSERVATION PER HR: HCPCS

## 2020-01-25 PROCEDURE — 6370000000 HC RX 637 (ALT 250 FOR IP): Performed by: INTERNAL MEDICINE

## 2020-01-25 PROCEDURE — 6370000000 HC RX 637 (ALT 250 FOR IP): Performed by: FAMILY MEDICINE

## 2020-01-25 PROCEDURE — 80076 HEPATIC FUNCTION PANEL: CPT

## 2020-01-25 PROCEDURE — 93010 ELECTROCARDIOGRAM REPORT: CPT | Performed by: INTERNAL MEDICINE

## 2020-01-25 PROCEDURE — 93005 ELECTROCARDIOGRAM TRACING: CPT | Performed by: EMERGENCY MEDICINE

## 2020-01-25 PROCEDURE — 71046 X-RAY EXAM CHEST 2 VIEWS: CPT

## 2020-01-25 PROCEDURE — 83880 ASSAY OF NATRIURETIC PEPTIDE: CPT

## 2020-01-25 PROCEDURE — 99285 EMERGENCY DEPT VISIT HI MDM: CPT

## 2020-01-25 PROCEDURE — 80048 BASIC METABOLIC PNL TOTAL CA: CPT

## 2020-01-25 PROCEDURE — 84484 ASSAY OF TROPONIN QUANT: CPT

## 2020-01-25 PROCEDURE — 85025 COMPLETE CBC W/AUTO DIFF WBC: CPT

## 2020-01-25 PROCEDURE — 6370000000 HC RX 637 (ALT 250 FOR IP): Performed by: EMERGENCY MEDICINE

## 2020-01-25 PROCEDURE — 2580000003 HC RX 258: Performed by: FAMILY MEDICINE

## 2020-01-25 PROCEDURE — 85610 PROTHROMBIN TIME: CPT

## 2020-01-25 PROCEDURE — 36415 COLL VENOUS BLD VENIPUNCTURE: CPT

## 2020-01-25 PROCEDURE — 85379 FIBRIN DEGRADATION QUANT: CPT

## 2020-01-25 RX ORDER — WARFARIN SODIUM 5 MG/1
5 TABLET ORAL
Status: DISCONTINUED | OUTPATIENT
Start: 2020-01-27 | End: 2020-01-27 | Stop reason: HOSPADM

## 2020-01-25 RX ORDER — HYDROCODONE BITARTRATE AND ACETAMINOPHEN 5; 325 MG/1; MG/1
1 TABLET ORAL EVERY 6 HOURS PRN
Status: DISCONTINUED | OUTPATIENT
Start: 2020-01-25 | End: 2020-01-27 | Stop reason: HOSPADM

## 2020-01-25 RX ORDER — CETIRIZINE HYDROCHLORIDE 5 MG/1
5 TABLET ORAL DAILY
Status: DISCONTINUED | OUTPATIENT
Start: 2020-01-25 | End: 2020-01-27 | Stop reason: HOSPADM

## 2020-01-25 RX ORDER — LIDOCAINE 4 G/G
1 PATCH TOPICAL DAILY
Status: DISCONTINUED | OUTPATIENT
Start: 2020-01-25 | End: 2020-01-27 | Stop reason: HOSPADM

## 2020-01-25 RX ORDER — LEVOTHYROXINE SODIUM 137 UG/1
137 TABLET ORAL DAILY
Status: DISCONTINUED | OUTPATIENT
Start: 2020-01-25 | End: 2020-01-27 | Stop reason: HOSPADM

## 2020-01-25 RX ORDER — ACYCLOVIR 200 MG/1
800 CAPSULE ORAL 2 TIMES DAILY
Status: DISCONTINUED | OUTPATIENT
Start: 2020-01-25 | End: 2020-01-26

## 2020-01-25 RX ORDER — NITROGLYCERIN 0.4 MG/1
0.4 TABLET SUBLINGUAL EVERY 5 MIN PRN
Status: DISCONTINUED | OUTPATIENT
Start: 2020-01-25 | End: 2020-01-25

## 2020-01-25 RX ORDER — SODIUM CHLORIDE 0.9 % (FLUSH) 0.9 %
10 SYRINGE (ML) INJECTION EVERY 12 HOURS SCHEDULED
Status: DISCONTINUED | OUTPATIENT
Start: 2020-01-25 | End: 2020-01-27 | Stop reason: HOSPADM

## 2020-01-25 RX ORDER — LIDOCAINE HYDROCHLORIDE 20 MG/ML
15 SOLUTION OROPHARYNGEAL EVERY 6 HOURS PRN
Status: DISCONTINUED | OUTPATIENT
Start: 2020-01-25 | End: 2020-01-27 | Stop reason: HOSPADM

## 2020-01-25 RX ORDER — ONDANSETRON 2 MG/ML
4 INJECTION INTRAMUSCULAR; INTRAVENOUS EVERY 6 HOURS PRN
Status: DISCONTINUED | OUTPATIENT
Start: 2020-01-25 | End: 2020-01-27 | Stop reason: HOSPADM

## 2020-01-25 RX ORDER — BUPROPION HYDROCHLORIDE 300 MG/1
300 TABLET ORAL EVERY MORNING
Status: DISCONTINUED | OUTPATIENT
Start: 2020-01-25 | End: 2020-01-27 | Stop reason: HOSPADM

## 2020-01-25 RX ORDER — AMLODIPINE BESYLATE 10 MG/1
10 TABLET ORAL DAILY
Status: DISCONTINUED | OUTPATIENT
Start: 2020-01-25 | End: 2020-01-27 | Stop reason: HOSPADM

## 2020-01-25 RX ORDER — SODIUM CHLORIDE 0.9 % (FLUSH) 0.9 %
10 SYRINGE (ML) INJECTION PRN
Status: DISCONTINUED | OUTPATIENT
Start: 2020-01-25 | End: 2020-01-27 | Stop reason: HOSPADM

## 2020-01-25 RX ORDER — FLUTICASONE PROPIONATE 50 MCG
2 SPRAY, SUSPENSION (ML) NASAL DAILY PRN
Status: DISCONTINUED | OUTPATIENT
Start: 2020-01-25 | End: 2020-01-27 | Stop reason: HOSPADM

## 2020-01-25 RX ORDER — PANTOPRAZOLE SODIUM 40 MG/1
40 TABLET, DELAYED RELEASE ORAL DAILY
Status: DISCONTINUED | OUTPATIENT
Start: 2020-01-25 | End: 2020-01-26

## 2020-01-25 RX ORDER — WARFARIN SODIUM 2.5 MG/1
2.5 TABLET ORAL
Status: DISCONTINUED | OUTPATIENT
Start: 2020-01-25 | End: 2020-01-27 | Stop reason: HOSPADM

## 2020-01-25 RX ORDER — AMMONIUM LACTATE 12 G/100G
LOTION TOPICAL PRN
Status: DISCONTINUED | OUTPATIENT
Start: 2020-01-25 | End: 2020-01-27 | Stop reason: HOSPADM

## 2020-01-25 RX ORDER — SIMVASTATIN 40 MG
40 TABLET ORAL NIGHTLY
Status: DISCONTINUED | OUTPATIENT
Start: 2020-01-25 | End: 2020-01-27 | Stop reason: HOSPADM

## 2020-01-25 RX ORDER — DIAPER,BRIEF,INFANT-TODD,DISP
EACH MISCELLANEOUS 2 TIMES DAILY
Status: DISCONTINUED | OUTPATIENT
Start: 2020-01-25 | End: 2020-01-27 | Stop reason: HOSPADM

## 2020-01-25 RX ORDER — NITROGLYCERIN 0.4 MG/1
0.4 TABLET SUBLINGUAL EVERY 5 MIN PRN
Status: DISCONTINUED | OUTPATIENT
Start: 2020-01-25 | End: 2020-01-27 | Stop reason: HOSPADM

## 2020-01-25 RX ORDER — MAGNESIUM HYDROXIDE/ALUMINUM HYDROXICE/SIMETHICONE 120; 1200; 1200 MG/30ML; MG/30ML; MG/30ML
15 SUSPENSION ORAL EVERY 6 HOURS PRN
Status: DISCONTINUED | OUTPATIENT
Start: 2020-01-25 | End: 2020-01-27 | Stop reason: HOSPADM

## 2020-01-25 RX ORDER — ERGOCALCIFEROL 1.25 MG/1
50000 CAPSULE ORAL WEEKLY
Status: DISCONTINUED | OUTPATIENT
Start: 2020-01-25 | End: 2020-01-27 | Stop reason: HOSPADM

## 2020-01-25 RX ORDER — GABAPENTIN 400 MG/1
800 CAPSULE ORAL 4 TIMES DAILY
Status: DISCONTINUED | OUTPATIENT
Start: 2020-01-25 | End: 2020-01-27 | Stop reason: HOSPADM

## 2020-01-25 RX ORDER — ASPIRIN 81 MG/1
81 TABLET, CHEWABLE ORAL DAILY
Status: DISCONTINUED | OUTPATIENT
Start: 2020-01-25 | End: 2020-01-27 | Stop reason: HOSPADM

## 2020-01-25 RX ORDER — ASPIRIN 81 MG/1
162 TABLET, CHEWABLE ORAL ONCE
Status: COMPLETED | OUTPATIENT
Start: 2020-01-25 | End: 2020-01-25

## 2020-01-25 RX ADMIN — AMLODIPINE BESYLATE 10 MG: 10 TABLET ORAL at 20:43

## 2020-01-25 RX ADMIN — ASPIRIN 81 MG 162 MG: 81 TABLET ORAL at 10:29

## 2020-01-25 RX ADMIN — GABAPENTIN 800 MG: 400 CAPSULE ORAL at 16:07

## 2020-01-25 RX ADMIN — WARFARIN SODIUM 2.5 MG: 2.5 TABLET ORAL at 20:43

## 2020-01-25 RX ADMIN — Medication 10 ML: at 20:06

## 2020-01-25 RX ADMIN — METOPROLOL TARTRATE 25 MG: 25 TABLET ORAL at 20:03

## 2020-01-25 RX ADMIN — SIMVASTATIN 40 MG: 40 TABLET, FILM COATED ORAL at 20:02

## 2020-01-25 RX ADMIN — ACYCLOVIR 800 MG: 200 CAPSULE ORAL at 20:43

## 2020-01-25 RX ADMIN — NITROGLYCERIN 0.4 MG: 0.4 TABLET SUBLINGUAL at 11:13

## 2020-01-25 RX ADMIN — MAGNESIUM HYDROXIDE 30 ML: 400 SUSPENSION ORAL at 16:07

## 2020-01-25 RX ADMIN — LIDOCAINE HYDROCHLORIDE 15 ML: 20 SOLUTION ORAL; TOPICAL at 16:07

## 2020-01-25 RX ADMIN — HYDROCODONE BITARTRATE AND ACETAMINOPHEN 1 TABLET: 5; 325 TABLET ORAL at 20:02

## 2020-01-25 ASSESSMENT — ENCOUNTER SYMPTOMS
TROUBLE SWALLOWING: 0
BLOOD IN STOOL: 0
CONSTIPATION: 0
DIARRHEA: 0
VOMITING: 0
COUGH: 0
ABDOMINAL PAIN: 0
SORE THROAT: 0
BACK PAIN: 0
NAUSEA: 0
COLOR CHANGE: 0
SHORTNESS OF BREATH: 1

## 2020-01-25 ASSESSMENT — PAIN DESCRIPTION - ORIENTATION: ORIENTATION: MID

## 2020-01-25 ASSESSMENT — PAIN SCALES - GENERAL
PAINLEVEL_OUTOF10: 8
PAINLEVEL_OUTOF10: 7
PAINLEVEL_OUTOF10: 9
PAINLEVEL_OUTOF10: 5
PAINLEVEL_OUTOF10: 9

## 2020-01-25 ASSESSMENT — PAIN - FUNCTIONAL ASSESSMENT: PAIN_FUNCTIONAL_ASSESSMENT: PREVENTS OR INTERFERES SOME ACTIVE ACTIVITIES AND ADLS

## 2020-01-25 ASSESSMENT — PAIN DESCRIPTION - DESCRIPTORS: DESCRIPTORS: PRESSURE

## 2020-01-25 ASSESSMENT — PAIN DESCRIPTION - LOCATION: LOCATION: BACK;CHEST

## 2020-01-25 ASSESSMENT — PAIN DESCRIPTION - PAIN TYPE: TYPE: ACUTE PAIN

## 2020-01-25 NOTE — ED NOTES
Report given to Piyush Hart RN from U. Report method by phone   The following was reviewed with receiving RN:   Current vital signs:  BP (!) 105/44   Pulse 76   Temp 98.1 °F (36.7 °C) (Oral)   Resp 18   Ht 5' 5\" (1.651 m)   Wt 222 lb (100.7 kg)   SpO2 94%   BMI 36.94 kg/m²                MEWS Score: 2     Any medication or safety alerts were reviewed. Any pending diagnostics and notifications were also reviewed, as well as any safety concerns or issues, abnormal labs, abnormal imaging, and abnormal assessment findings. Questions were answered.       Jose Armando Baldwin RN  01/25/20 1649

## 2020-01-25 NOTE — PLAN OF CARE
No pain at this time. 0/10 pain scale. Vitals assessed. Lab results reported. Patient questioned about cardiac history.

## 2020-01-25 NOTE — CARE COORDINATION
CASE MANAGEMENT NOTE:    Admission Date:  1/25/2020 Enrique Otero is a 81 y.o.  female    Admitted for : Chest pain [R07.9]    Met with:  Patient    PCP:  Damir Prince                                [facility]      Current Residence/ Living Arrangements:  independently at home, Lives alone in [place] [address]. Current Services PTA:  Yes, [facility]    Is patient agreeable to VNS: No    Freedom of choice and list provided: No    List of [address] provided: No, Declines    VNS chosen:  No    DME:  walker and shower chair    Home Oxygen: Yes, Concentrator, at HS, has no Portable    Nebulizer: No    CPAP/BIPAP: No    Supplier: [facility]    Potential Assistance Needed: No    SNF needed: No    Freedom of choice and list provided: No     Pharmacy:  [address] [place] [address]       Is the Patient an [facility] with Readmission Risk Score greater than 14%? Yes  If yes, pt needs a follow up appointment made within 7 days. Does Patient want to use MEDS to BEDS? No    Family Members/Caregivers that pt would like involved in their care:    Yes    If yes, list name here:  Daughter, Deborah aMki    Transportation Provider:  Patient             Is patient in Isolation/One on One/Altered Mental Status? No  If yes, skip next question. If no, would they like an I-Pad to  use? No  If yes, call [phone]. Discharge Plan:  1/25/20 [facility] Pt. Lives in [place] [address]. DME walker,SC, Oxygen, Concentrator at HS, has no Port, from [facility]. Current w/ [facility]. Denies VNS. Cardio. Orange header 16%.  Will follow//KB                 Electronically signed by: Marino Ramirez RN on 1/25/2020 at 3:32 PM

## 2020-01-25 NOTE — ED NOTES
Pt BP down to 71/42 after 1 sublingual nitro tab. Dr Bartolo Terry notified. Pt asymptomatic at this time. BP rechecked at 94/48.       Scot Montgomery RN  01/25/20 [id number]

## 2020-01-25 NOTE — ED PROVIDER NOTES
[address] ED  eMERGENCY dEPARTMENT eNCOUnter    Pt Name: Olivier Jacinto  MRN: [id number]  YOB: 1950  Date of evaluation:1/25/20  PCP: Loki Ramires MD    [address] [place] [address]       Chief Complaint   Patient presents with    Chest Pain    Shortness of Breath       HISTORY OF PRESENT ILLNESS    Olivier Jacinto is a 81 y.o. female who presents with a chief complaint of chest pain or shortness of breath. Patient states pain started around 6:30 AM this morning. She is unclear if it woke her from sleep or not. She describes as a pressure-like sensation in the center and left side of her chest.  She is also having pain in her back but denies any radiation from the front to the back. No tearing or ripping sensation. Also feels mildly short of breath. Symptoms are not worse with inspiration. Nothing makes her symptoms better or worse. Rates her pain is 9 out of 10 in severity. No recent cough, fevers, chills or other illnesses. Patient states she does not know if she has any heart problems but does state that she follows with Dr. Elisabeth Cummins for cardiology. States she went to bed last night feeling fine. Symptoms are acute. Symptoms are severe. Patient has no other complaints at this time. Patient did state that she took 2 baby aspirin prior to coming. REVIEW OF SYSTEMS       Review of Systems   Constitutional: Negative for chills, fatigue and fever. HENT: Negative for congestion, ear pain, sore throat and trouble swallowing. Eyes: Negative for visual disturbance. Respiratory: Positive for shortness of breath. Negative for cough. Cardiovascular: Positive for chest pain. Negative for palpitations and leg swelling. Gastrointestinal: Negative for abdominal pain, blood in stool, constipation, diarrhea, nausea and vomiting. Genitourinary: Negative for dysuria and flank pain. Musculoskeletal: Negative for arthralgias, back pain, myalgias and neck pain.    Skin: Negative for color file       (Please note that portions ofthis note were completed with a voice recognition program.  Efforts were made to edit the dictations but occasionally words are mis-transcribed.)    Elizabeth Steiner DO  Attending Emergency Physician          Elizabeth Steiner DO  01/25/20 4059

## 2020-01-25 NOTE — FLOWSHEET NOTE
01/25/20 1403   Provider Notification   Reason for Communication Evaluate   Provider Name Doctor Samantha   Provider Notification Physician   Method of Communication Call   Response See orders   Recent admission for chest pain. Negative trops. History of negative stress test. egative for PE. Patient complaining now of mid upper gastric pain that radiates to right side and to back.  Patient reports taking Tylenol 3 several times a day for pain for as long as she can remember

## 2020-01-25 NOTE — PROGRESS NOTES
Alb 3.5 - 5.2 g/dL 3.8    Alkaline Phosphatase 35 - 104 U/L 64    ALT 5 - 33 U/L 14    AST <32 U/L 13    Total Bilirubin 0.3 - 1.2 mg/dL 0.28Low       Results of Liver panel noted. ALK PHOS ALT AST within limits.

## 2020-01-25 NOTE — FLOWSHEET NOTE
01/25/20 1356   Provider Notification   Reason for Communication Evaluate   Provider Name Doctor Kathren Seip   Provider Notification Physician   Method of Communication Call   Response See orders    Recent admission for chest pain. Negative trops. History of negative stress test. egative for PE. Patient complaining now of mid upper gastric pain that radiates to right side and to back. Patient reports taking Tylenol 3 several times a day for pain for as long as she can remember. GI cocktail ordered.

## 2020-01-26 LAB
INR BLD: 2.7
PROTHROMBIN TIME: 29.1 SEC (ref 11.8–14.6)

## 2020-01-26 PROCEDURE — 2580000003 HC RX 258: Performed by: FAMILY MEDICINE

## 2020-01-26 PROCEDURE — 6370000000 HC RX 637 (ALT 250 FOR IP): Performed by: INTERNAL MEDICINE

## 2020-01-26 PROCEDURE — 36415 COLL VENOUS BLD VENIPUNCTURE: CPT

## 2020-01-26 PROCEDURE — 6370000000 HC RX 637 (ALT 250 FOR IP): Performed by: FAMILY MEDICINE

## 2020-01-26 PROCEDURE — G0378 HOSPITAL OBSERVATION PER HR: HCPCS

## 2020-01-26 PROCEDURE — 99220 PR INITIAL OBSERVATION CARE/DAY 70 MINUTES: CPT | Performed by: FAMILY MEDICINE

## 2020-01-26 PROCEDURE — 85610 PROTHROMBIN TIME: CPT

## 2020-01-26 RX ORDER — ACYCLOVIR 200 MG/1
200 CAPSULE ORAL 2 TIMES DAILY
Status: DISCONTINUED | OUTPATIENT
Start: 2020-01-26 | End: 2020-01-27 | Stop reason: HOSPADM

## 2020-01-26 RX ORDER — PANTOPRAZOLE SODIUM 40 MG/1
40 TABLET, DELAYED RELEASE ORAL
Status: DISCONTINUED | OUTPATIENT
Start: 2020-01-26 | End: 2020-01-27 | Stop reason: HOSPADM

## 2020-01-26 RX ADMIN — HYDROCODONE BITARTRATE AND ACETAMINOPHEN 1 TABLET: 5; 325 TABLET ORAL at 19:50

## 2020-01-26 RX ADMIN — LEVOTHYROXINE SODIUM 137 MCG: 137 TABLET ORAL at 05:24

## 2020-01-26 RX ADMIN — AMLODIPINE BESYLATE 10 MG: 10 TABLET ORAL at 08:56

## 2020-01-26 RX ADMIN — HYDROCODONE BITARTRATE AND ACETAMINOPHEN 1 TABLET: 5; 325 TABLET ORAL at 12:29

## 2020-01-26 RX ADMIN — METOPROLOL TARTRATE 25 MG: 25 TABLET ORAL at 08:56

## 2020-01-26 RX ADMIN — WARFARIN SODIUM 2.5 MG: 2.5 TABLET ORAL at 21:12

## 2020-01-26 RX ADMIN — ACYCLOVIR 200 MG: 200 CAPSULE ORAL at 21:11

## 2020-01-26 RX ADMIN — Medication 10 ML: at 21:15

## 2020-01-26 RX ADMIN — SIMVASTATIN 40 MG: 40 TABLET, FILM COATED ORAL at 21:14

## 2020-01-26 RX ADMIN — ACYCLOVIR 800 MG: 200 CAPSULE ORAL at 08:59

## 2020-01-26 RX ADMIN — GABAPENTIN 800 MG: 400 CAPSULE ORAL at 16:35

## 2020-01-26 RX ADMIN — PANTOPRAZOLE SODIUM 40 MG: 40 TABLET, DELAYED RELEASE ORAL at 16:35

## 2020-01-26 RX ADMIN — METOPROLOL TARTRATE 25 MG: 25 TABLET ORAL at 21:13

## 2020-01-26 RX ADMIN — PANTOPRAZOLE SODIUM 40 MG: 40 TABLET, DELAYED RELEASE ORAL at 05:24

## 2020-01-26 RX ADMIN — Medication 10 ML: at 09:02

## 2020-01-26 RX ADMIN — HYDROCODONE BITARTRATE AND ACETAMINOPHEN 1 TABLET: 5; 325 TABLET ORAL at 05:24

## 2020-01-26 RX ADMIN — ASPIRIN 81 MG 81 MG: 81 TABLET ORAL at 08:55

## 2020-01-26 RX ADMIN — GABAPENTIN 800 MG: 400 CAPSULE ORAL at 21:11

## 2020-01-26 RX ADMIN — FLUTICASONE PROPIONATE 2 SPRAY: 50 SPRAY, METERED NASAL at 09:01

## 2020-01-26 RX ADMIN — GABAPENTIN 800 MG: 400 CAPSULE ORAL at 08:55

## 2020-01-26 RX ADMIN — BUPROPION HYDROCHLORIDE 300 MG: 300 TABLET, FILM COATED, EXTENDED RELEASE ORAL at 08:55

## 2020-01-26 RX ADMIN — GABAPENTIN 800 MG: 400 CAPSULE ORAL at 12:24

## 2020-01-26 ASSESSMENT — PAIN SCALES - GENERAL
PAINLEVEL_OUTOF10: 9
PAINLEVEL_OUTOF10: 7
PAINLEVEL_OUTOF10: 6
PAINLEVEL_OUTOF10: 3
PAINLEVEL_OUTOF10: 8
PAINLEVEL_OUTOF10: 7
PAINLEVEL_OUTOF10: 5

## 2020-01-26 NOTE — CONSULTS
[facility]  In PatientCardiology Consult             Date:   1/26/2020  Patient name: Oscar Hong  Date of admission:  1/25/2020  9:50 AM  MRN:   [id number]  YOB: 1950      Reason for Admission:  Chest pain    CHIEF COMPLAINT:  Chest pain     History Obtained From:  Medical record    HISTORY OF PRESENT ILLNESS:      The patient is a 81 y.o woman with h/o HTN, HLP, GERD and fibromyalgia, admitted for atypical chest pain. This occurred on awakening today and improved after SL NTG in the ER, which also caused brief hypotension. EKG showed shallow T wave inversion in leads V2 and V3, but serial hs troponins were both 6 pg/L. Her discomfort recurred early this afternoon and was relieved with a GI cocktail. She has a h/o similar pain on admission in 2018, at which time she had a normal Lexiscan stress test.    Past Medical History:   has a past medical history of Affective disorder ([facility] Arthritis, DVT (deep venous thrombosis) ([facility] Fibromyalgia, Hyperlipidemia, Hypertension, Pneumonia, Sinusitis, and Thyroid disease. Past Surgical History:   has a past surgical history that includes Colonoscopy (8/31/2011); Hysterectomy; Ectopic pregnancy surgery (Left); and Dilation and curettage of uterus. Home Medications:    Prior to Admission medications    Medication Sig Start Date End Date Taking? Authorizing Provider   gabapentin (NEURONTIN) 800 MG tablet TAKE 1 TABLET BY MOUTH FOUR TIMES DAILY 1/16/20 2/16/20 Yes VIKA Trejo CNP   metoprolol tartrate (LOPRESSOR) 25 MG tablet TAKE 1 TABLET BY MOUTH TWICE DAILY 1/16/20  Yes VIKA Trejo CNP   fluticasone (FLONASE) 50 MCG/ACT nasal spray 2 sprays by Each Nostril route daily as needed for Allergies (seasonal) 1/7/20  Yes Latanya Grey MD   traMADol (ULTRAM) 50 MG tablet Take 1 tablet by mouth every 6 hours as needed for Pain for up to 30 days.  1/2/20 2/1/20 Yes VIKA Gunderson CNP   acetaminophen-codeine m)   Wt 222 lb (100.7 kg)   SpO2 95%   BMI 36.94 kg/m²    Constitutional and General Appearance: alert, cooperative, no distress and appears stated age  [de-identified]: PERRL, no cervical lymphadenopathy. No masses palpable. Normal oral mucosa  Respiratory:  · Normal excursion and expansion without use of accessory muscles  · Resp Auscultation: Good respiratory effort. No for increased work of breathing. On auscultation: clear to auscultation bilaterally  Cardiovascular:  · The apical impulse is not displaced  · Heart tones are crisp and normal. regular S1 and S2. Murmurs: None  · Jugular venous pulsation Normal  · The carotid upstroke is normal in amplitude and contour without delay or bruit  · Peripheral pulses are symmetrical and full   Abdomen:  · No masses or tenderness  · Bowel sounds present  Extremities:  ·  No Cyanosis or Clubbing  ·  Lower extremity edema: Trace  ·  Skin: Warm and dry  Neurological:  · Alert and oriented. · Moves all extremities well  · No abnormalities of mood, affect, memory, mentation, or behavior are noted    DATA:    Diagnostics:      EKG: Sinus rhythm with non-specific T wave abnormality    2D ECHO ( 8/5/19)  Normal left ventricle size, wall thickness and function with an estimated EF  > 55%. No segmental wall motion abnormalities seen. Evidence of diastolic dysfunction. Normal right ventricular size and function. Mild mitral regurgitation. Normal tricuspid valve structure and function. Mild to moderate tricuspid regurgitation. Normal right ventricular systolic pressure. No significant pericardial effusion is seen. LEXISCAN STRESS TEST ( 10/8/18)    1. No discrete perfusion abnormality to suggest myocardial   ischemia/infarction. 2. No wall motion abnormality.  Calculated ejection fraction of 57%.    3. Risk stratification: Low         CBC:   Recent Labs     01/25/20  0950   WBC 5.8   HGB 12.9   HCT 40.0        BMP:   Recent Labs     01/25/20  0950      K 3.9 CO2 27   BUN 12   CREATININE 0.69   LABGLOM >60   GLUCOSE 114*     BNP: No results for input(s): BNP in the last 72 hours. PT/INR:   Recent Labs     01/25/20  0950   PROTIME 25.3*   INR 2.3     APTT:No results for input(s): APTT in the last 72 hours. CARDIAC ENZYMES:No results for input(s): CKTOTAL, CKMB, CKMBINDEX, TROPONINI in the last 72 hours. FASTING LIPID PANEL:  Lab Results   Component Value Date    HDL 40 07/20/2016    TRIG 370 07/20/2016     LIVER PROFILE:  Recent Labs     01/25/20  1200   AST 13   ALT 14   LABALBU 3.8         IMPRESSION:    1. Atypical chest pain likely due to GERD; no evidence of acute coronary syndrome ( shallow anterior precordial T wave inversion appears c/w previously seen non-specific T wave abnormality)  2. Essential HTN  3. Hyperlipidemia  4. Fibromyalgia  5. Anxiety/ depression  6. H/o multiple PE, stable on chronic warfarin    Patient Active Problem List   Diagnosis    Essential hypertension    Hyperlipidemia    Fibromyalgia    Sinusitis     Hypothyroidism    Acid reflux    RENEA (obstructive sleep apnea)    Depression    Anxiety    PTSD (post-traumatic stress disorder)    Recurrent acute deep vein thrombosis (DVT) of both lower extremities (HCC)    Chronic pain    IGT (impaired glucose tolerance)    Fatigue    Chronic bilateral low back pain without sciatica    Chest pain    Obesity (BMI 30-39. 5)    Fall as cause of accidental injury at home as place of occurrence    Chronic pain of both shoulders    Primary osteoarthritis involving multiple joints    Obesity, Class II, BMI 35-39.9    Chronic pulmonary embolism (HCC)       RECOMMENDATIONS:  1. Increase Protonix to 40 mg po bid  2. Continue ASA, metoprolol and simvastatin  3. Continue warfarin  4. Will schedule Lexiscan stress test for Monday 1/27/20. Discussed with patient and nursing.     Electronically signed by Tomas Bradley MD on 1/26/2020 at 2:05 AM.  [place] [facility]

## 2020-01-26 NOTE — CARE COORDINATION
ONGOING DISCHARGE PLAN:    Spoke with patient regarding discharge plan and patient confirms that plan is still to go home with no needs. Follows with [facility] coumadin clinic ( INR 2.7) to have a stress test on monday. Will continue to follow for additional discharge needs.     Electronically signed by Loyal Phoenix, RN on 1/26/2020 at 12:03 PM

## 2020-01-26 NOTE — PLAN OF CARE
Problem: Pain:  Goal: Control of acute pain  Description  Control of acute pain  1/26/2020 0351 by Tracy Villa RN  Outcome: Ongoing     Problem: Cardiac:  Goal: Hemodynamic stability will improve  Description  Hemodynamic stability will improve  1/26/2020 0351 by Tracy Villa RN  Outcome: Ongoing   Pt states that pain is decreased and remains free from any chest pain during the shift pt educated on pain scale and calling out for chest pain.

## 2020-01-26 NOTE — H&P
Family Medicine Admit Note    PCP: Neri Samuels MD    Date of Admission: 1/25/2020    Date of Service: Pt seen/examined on 1/26/2020 and Placed in Observation. Chief Complaint:  Chest pain                                  Shortness of Breath      History Of Present Illness: The patient is a 81 y.o. female who presents to [facility] with complaints of chest pain and SOB that began yesterday about 6:30 am. She describes the pain as pressure in the center of her chest. She has chronic pain issues and fibromyalgia, her back is hurting but the chest pain is not radiating from her chest. Nothing makes her pain better or worse. She states this am that the chest pain comes and goes. She denies any fever, chills, cough, congestion, abdominal pain, diarrhea, melena, hematochezia, dysuria, headaches or dizziness. Past Medical History:        Diagnosis Date    Affective disorder ([address]     seasonal    Arthritis     shoulders and lower back    DVT (deep venous thrombosis) (HCC)     has history in R leg X 4    Fibromyalgia     Hyperlipidemia     Hypertension     Pneumonia     Sinusitis     Thyroid disease     hypothyroidsm       Past Surgical History:        Procedure Laterality Date    COLONOSCOPY  8/31/2011    internal hemorrhoides    DILATION AND CURETTAGE OF UTERUS      ECTOPIC PREGNANCY SURGERY Left     HYSTERECTOMY         Medications Prior to Admission:    Prior to Admission medications    Medication Sig Start Date End Date Taking?  Authorizing Provider   gabapentin (NEURONTIN) 800 MG tablet TAKE 1 TABLET BY MOUTH FOUR TIMES DAILY 1/16/20 2/16/20 Yes Martin Ace, APRN - CNP   metoprolol tartrate (LOPRESSOR) 25 MG tablet TAKE 1 TABLET BY MOUTH TWICE DAILY 1/16/20  Yes Martin Ace, APRN - CNP   fluticasone (FLONASE) 50 MCG/ACT nasal spray 2 sprays by Each Nostril route daily as needed for Allergies (seasonal) 1/7/20  Yes Neri Samuels MD   traMADol (ULTRAM) 50 MG tablet Take 1 tablet by mouth every 6 hours as needed for Pain for up to 30 days. 1/2/20 2/1/20 Yes VIKA Spain CNP   acetaminophen-codeine (TYLENOL #3) 300-30 MG per tablet Take 1 tablet by mouth every 6 hours as needed for Pain for up to 30 days. TAKE ONE TABLET BY MOUTH EVERY 6 HOURS AS NEEDED FOR PAIN 12/27/19 1/26/20 Yes VIKA Spain CNP   acyclovir (ZOVIRAX) 200 MG capsule TAKE 1 CAPSULE BY MOUTH TWICE DAILY 12/16/19  Yes Agapito Brunner, APRN - CNP   loratadine (CLARITIN) 10 MG tablet TAKE 1 TABLET BY MOUTH DAILY AS NEEDED FOR ALLERGIES 12/16/19  Yes Agapito Brunner, APRN - CNP   Gabapentin POWD Formula 8E: diclo3%+gaba6%+DMSO5%+prilo2%+baclofen2%+lido2% with ketamine. Apply 1-2 gm topically to affected area TID-QID 10/25/19  Yes Mera Holt PA-C   pantoprazole (PROTONIX) 40 MG tablet Take 1 tablet by mouth daily 9/4/19  Yes Loki Ramires MD   simvastatin (ZOCOR) 40 MG tablet TAKE ONE (1) TABLET BY MOUTH EVERY NIGHT 8/19/19  Yes VIKA Stapleton CNP   amLODIPine (NORVASC) 10 MG tablet TAKE 1 TABLET BY MOUTH DAILY 8/14/19  Yes VIKA Spain CNP   buPROPion (WELLBUTRIN XL) 300 MG extended release tablet TAKE 1 TABLET BY MOUTH EVERY MORNING 8/14/19  Yes VIKA Spain CNP   aspirin 81 MG chewable tablet Take 1 tablet by mouth daily 8/5/19  Yes Rachele Aden MD   levothyroxine (SYNTHROID) 137 MCG tablet Take 1 tablet by mouth Daily 7/29/19  Yes Loki Ramires MD   nitroGLYCERIN (NITROSTAT) 0.4 MG SL tablet up to max of 3 total doses.  If no relief after 1 dose, call 911. 10/8/18  Yes Loki Ramires MD   warfarin (COUMADIN) 2.5 MG tablet Take 2.5 mg by mouth See Admin Instructions Indications: Sun, Tues, Wed, Thurs, Sat Dosed by  Anticoagulation Service:5 mg Mon/Thurs and 2.5 mg all other days   Yes Historical Provider, MD   warfarin (COUMADIN) 5 MG tablet Take 5 mg by mouth once a week Indications: Monday, Friday Dosed by [facility] Anticoagulation Diagnosis Date Noted    Chronic pulmonary embolism ([facility] [I27.82] 08/05/2019    Obesity, Class II, BMI 35-39.9 [E66.9] 08/05/2019    Primary osteoarthritis involving multiple joints [M15.0] 04/26/2019    Chest pain [R07.9] 07/19/2016    Chronic bilateral low back pain without sciatica [M54.5, G89.29] 06/21/2016    IGT (impaired glucose tolerance) [R73.02] 06/08/2015    Acid reflux [K21.9] 11/01/2011    Anxiety [F41.9] 11/01/2011    Depression [F32.9] 11/01/2011    Essential hypertension [I10] 11/01/2011    Fibromyalgia [M79.7] 11/01/2011    Hypothyroidism [E03.9] 11/01/2011    RENEA (obstructive sleep apnea) [G47.33] 11/01/2011         ASSESSMENT/PLAN:  Patient admitted with chest pain r/o ACS. Co-morbidities as above. Orders Placed This Encounter   Procedures    XR CHEST STANDARD (2 VW)    NM Cardiac Stress Test Nuclear Imaging    Troponin    CBC Auto Differential    Basic Metabolic Panel    D-Dimer, Quantitative    Brain Natriuretic Peptide    Protime-INR    PROTIME-INR    Liver Profile    DIET CARDIAC;    Diet NPO, After Midnight    Vital signs per unit routine    Tobacco cessation education    Telemetry monitoring    Notify physician    Up with assistance    Place intermittent pneumatic compression device    Beta Blocker Management Prior to Cardiac Stress Test    Full Code    Inpatient consult to Primary Care Provider    Inpatient consult to Cardiology    Initiate Oxygen Therapy Protocol    Nasal Cannula Oxygen    Cardiac Stress Test- W Pharm    EKG 12 Lead    EKG REPORT    PATIENT STATUS (FROM ED OR OR/PROCEDURAL) Observation         DVT Prophylaxis:   Diet: DIET CARDIAC;   Diet NPO, After Midnight  Code Status: Full Code      Dispo - admitted       @[facility]@

## 2020-01-27 ENCOUNTER — APPOINTMENT (OUTPATIENT)
Dept: NUCLEAR MEDICINE | Age: 70
End: 2020-01-27
Payer: COMMERCIAL

## 2020-01-27 VITALS
WEIGHT: 222 LBS | SYSTOLIC BLOOD PRESSURE: 129 MMHG | OXYGEN SATURATION: 96 % | HEART RATE: 68 BPM | TEMPERATURE: 97.6 F | HEIGHT: 65 IN | BODY MASS INDEX: 36.99 KG/M2 | RESPIRATION RATE: 17 BRPM | DIASTOLIC BLOOD PRESSURE: 57 MMHG

## 2020-01-27 LAB
INR BLD: 2.8
LV EF: 62 %
LVEF MODALITY: NORMAL
PROTHROMBIN TIME: 29.4 SEC (ref 11.8–14.6)

## 2020-01-27 PROCEDURE — G0378 HOSPITAL OBSERVATION PER HR: HCPCS

## 2020-01-27 PROCEDURE — 6370000000 HC RX 637 (ALT 250 FOR IP): Performed by: FAMILY MEDICINE

## 2020-01-27 PROCEDURE — 2580000003 HC RX 258: Performed by: INTERNAL MEDICINE

## 2020-01-27 PROCEDURE — 93017 CV STRESS TEST TRACING ONLY: CPT

## 2020-01-27 PROCEDURE — 3430000000 HC RX DIAGNOSTIC RADIOPHARMACEUTICAL: Performed by: INTERNAL MEDICINE

## 2020-01-27 PROCEDURE — 36415 COLL VENOUS BLD VENIPUNCTURE: CPT

## 2020-01-27 PROCEDURE — 2580000003 HC RX 258: Performed by: FAMILY MEDICINE

## 2020-01-27 PROCEDURE — A9500 TC99M SESTAMIBI: HCPCS | Performed by: INTERNAL MEDICINE

## 2020-01-27 PROCEDURE — 99217 PR OBSERVATION CARE DISCHARGE MANAGEMENT: CPT | Performed by: FAMILY MEDICINE

## 2020-01-27 PROCEDURE — 6360000002 HC RX W HCPCS: Performed by: INTERNAL MEDICINE

## 2020-01-27 PROCEDURE — 85610 PROTHROMBIN TIME: CPT

## 2020-01-27 PROCEDURE — 78452 HT MUSCLE IMAGE SPECT MULT: CPT

## 2020-01-27 RX ORDER — METOPROLOL TARTRATE 5 MG/5ML
5 INJECTION INTRAVENOUS EVERY 5 MIN PRN
Status: ACTIVE | OUTPATIENT
Start: 2020-01-27 | End: 2020-01-27

## 2020-01-27 RX ORDER — NITROGLYCERIN 0.4 MG/1
0.4 TABLET SUBLINGUAL EVERY 5 MIN PRN
Status: ACTIVE | OUTPATIENT
Start: 2020-01-27 | End: 2020-01-27

## 2020-01-27 RX ORDER — SODIUM CHLORIDE 0.9 % (FLUSH) 0.9 %
10 SYRINGE (ML) INJECTION PRN
Status: DISCONTINUED | OUTPATIENT
Start: 2020-01-27 | End: 2020-01-27 | Stop reason: SDUPTHER

## 2020-01-27 RX ORDER — SODIUM CHLORIDE 9 MG/ML
500 INJECTION, SOLUTION INTRAVENOUS CONTINUOUS PRN
Status: ACTIVE | OUTPATIENT
Start: 2020-01-27 | End: 2020-01-27

## 2020-01-27 RX ORDER — ALBUTEROL SULFATE 90 UG/1
2 AEROSOL, METERED RESPIRATORY (INHALATION) PRN
Status: ACTIVE | OUTPATIENT
Start: 2020-01-27 | End: 2020-01-27

## 2020-01-27 RX ORDER — ATROPINE SULFATE 0.1 MG/ML
0.5 INJECTION INTRAVENOUS EVERY 5 MIN PRN
Status: ACTIVE | OUTPATIENT
Start: 2020-01-27 | End: 2020-01-27

## 2020-01-27 RX ORDER — AMINOPHYLLINE DIHYDRATE 25 MG/ML
50 INJECTION, SOLUTION INTRAVENOUS PRN
Status: ACTIVE | OUTPATIENT
Start: 2020-01-27 | End: 2020-01-27

## 2020-01-27 RX ORDER — ACETAMINOPHEN AND CODEINE PHOSPHATE 300; 30 MG/1; MG/1
1 TABLET ORAL EVERY 6 HOURS PRN
Qty: 120 TABLET | Refills: 0
Start: 2020-01-27 | End: 2020-02-03

## 2020-01-27 RX ADMIN — METOPROLOL TARTRATE 25 MG: 25 TABLET ORAL at 11:45

## 2020-01-27 RX ADMIN — ASPIRIN 81 MG 81 MG: 81 TABLET ORAL at 11:53

## 2020-01-27 RX ADMIN — AMLODIPINE BESYLATE 10 MG: 10 TABLET ORAL at 11:45

## 2020-01-27 RX ADMIN — PANTOPRAZOLE SODIUM 40 MG: 40 TABLET, DELAYED RELEASE ORAL at 11:48

## 2020-01-27 RX ADMIN — ACYCLOVIR 200 MG: 200 CAPSULE ORAL at 11:46

## 2020-01-27 RX ADMIN — HYDROCODONE BITARTRATE AND ACETAMINOPHEN 1 TABLET: 5; 325 TABLET ORAL at 07:50

## 2020-01-27 RX ADMIN — FLUTICASONE PROPIONATE 2 SPRAY: 50 SPRAY, METERED NASAL at 11:46

## 2020-01-27 RX ADMIN — Medication 10 ML: at 07:18

## 2020-01-27 RX ADMIN — Medication 10 ML: at 09:27

## 2020-01-27 RX ADMIN — TETRAKIS(2-METHOXYISOBUTYLISOCYANIDE)COPPER(I) TETRAFLUOROBORATE 39.5 MILLICURIE: 1 INJECTION, POWDER, LYOPHILIZED, FOR SOLUTION INTRAVENOUS at 09:28

## 2020-01-27 RX ADMIN — TETRAKIS(2-METHOXYISOBUTYLISOCYANIDE)COPPER(I) TETRAFLUOROBORATE 11.2 MILLICURIE: 1 INJECTION, POWDER, LYOPHILIZED, FOR SOLUTION INTRAVENOUS at 07:18

## 2020-01-27 RX ADMIN — REGADENOSON 0.4 MG: 0.08 INJECTION, SOLUTION INTRAVENOUS at 09:26

## 2020-01-27 RX ADMIN — BUPROPION HYDROCHLORIDE 300 MG: 300 TABLET, FILM COATED, EXTENDED RELEASE ORAL at 11:47

## 2020-01-27 RX ADMIN — LEVOTHYROXINE SODIUM 137 MCG: 137 TABLET ORAL at 11:45

## 2020-01-27 RX ADMIN — GABAPENTIN 800 MG: 400 CAPSULE ORAL at 13:17

## 2020-01-27 RX ADMIN — HYDROCODONE BITARTRATE AND ACETAMINOPHEN 1 TABLET: 5; 325 TABLET ORAL at 15:13

## 2020-01-27 RX ADMIN — Medication 10 ML: at 08:56

## 2020-01-27 ASSESSMENT — PAIN DESCRIPTION - FREQUENCY
FREQUENCY: INTERMITTENT
FREQUENCY: INTERMITTENT

## 2020-01-27 ASSESSMENT — PAIN SCALES - GENERAL
PAINLEVEL_OUTOF10: 0
PAINLEVEL_OUTOF10: 0
PAINLEVEL_OUTOF10: 8
PAINLEVEL_OUTOF10: 0
PAINLEVEL_OUTOF10: 8
PAINLEVEL_OUTOF10: 0
PAINLEVEL_OUTOF10: 0

## 2020-01-27 ASSESSMENT — PAIN DESCRIPTION - DESCRIPTORS
DESCRIPTORS: ACHING
DESCRIPTORS: ACHING

## 2020-01-27 ASSESSMENT — PAIN DESCRIPTION - PAIN TYPE
TYPE: CHRONIC PAIN
TYPE: CHRONIC PAIN

## 2020-01-27 ASSESSMENT — PAIN DESCRIPTION - LOCATION
LOCATION: GENERALIZED
LOCATION: GENERALIZED

## 2020-01-27 NOTE — PROGRESS NOTES
Patient to stress lab from [facility] per wheelchair, explained lexiscan and consent signed, placed on ekg and vitals machine, iv flushed with normal saline, patient states she has 3/10 chest pain to back discomfort. patient resting in bed with warm blankets given, bp 112/62, hr 59, sinus jared on monitor.

## 2020-01-27 NOTE — PROCEDURES
[address] [facility]                    [facility]                              CARDIAC STRESS TEST    PATIENT NAME: Lai Mccracken                   :        1950  MED REC NO:   [id number]                              ROOM:       2090  ACCOUNT NO:   [de-identified]                           ADMIT DATE: 2020  PROVIDER:     Ayala Curry    DATE OF STUDY:  2020    ORDERING PROVIDER:  Maricel Major MD    INTERPRETING PHYSICIAN:  Hao Dougherty MD    LEXISCAN STRESS TEST    INDICATION:  CHEST PAIN    INTERPRETATION:  Resting heart rate:  59 bpm.  Resting blood pressure:  112/62 mmhg. Resting Ekg:  Normal.  Repolarization changes. Stress heart response:  Normal response. Blood pressure response:  Appropriate. Stress Ekgs:  No changes seen. No chest pain during stress or recovery. No ischemic Ekg changes. IMPRESSION:  NEGATIVE LEXISCAN STRESS TEST. THE NUCLEAR SCAN TO FOLLOW.           Rory Cade    D: 2020 11:08:03       T: 2020 11:09:55     LINDA/ISA  Job#: [id number]     Doc#: Unknown    CC:    (Retain this field even if not dictated or not decipherable)

## 2020-01-27 NOTE — PROGRESS NOTES
FAMILY MEDICINE  - PROGRESS NOTE    Date:  1/27/2020  Jennyfer Paget  [id number]      Chief Complaint   Patient presents with    Chest Pain    Shortness of Breath         Interval History:  not changed, she still reports that the pain comes and goes. She has no new complaints.       Subjective  Respiratory: positive for RENEA  Cardiovascular: positive for chest pain  Gastrointestinal: positive for reflux symptoms  Musculoskeletal:positive for arthralgias, back pain, myalgias and stiff joints  Behavioral/Psych: positive for anxiety, depression and obesity  Endocrine: positive for hypothyroid:    Objective:    BP (!) 107/46   Pulse 64   Temp 97.8 °F (36.6 °C) (Oral)   Resp 16   Ht 5' 5\" (1.651 m)   Wt 222 lb (100.7 kg)   SpO2 95%   BMI 36.94 kg/m²   General appearance - alert, well appearing, and in no distress and overweight  Mental status - alert, oriented to person, place, and time  Eyes - pupils equal and reactive, extraocular eye movements intact  Ears - hearing grossly normal bilaterally  Nose - normal and patent, no erythema, discharge or polyps  Mouth - mucous membranes moist, pharynx normal without lesions  Neck - supple, no significant adenopathy  Lymphatics - no palpable lymphadenopathy, no hepatosplenomegaly  Chest - clear to auscultation, no wheezes, rales or rhonchi, symmetric air entry  Heart - normal rate, regular rhythm, normal S1, S2, no murmurs, rubs, clicks or gallops  Abdomen - soft, nontender, nondistended, no masses or organomegaly  Breasts - not examined  Back exam - full range of motion, no tenderness, palpable spasm or pain on motion  Neurological - alert, oriented, normal speech, no focal findings or movement disorder noted  Musculoskeletal - osteoarthritic changes noted in both hands  Extremities - peripheral pulses normal, no pedal edema, no clubbing or cyanosis  Skin - normal coloration and turgor, no rashes, no suspicious skin

## 2020-01-27 NOTE — PROGRESS NOTES
LEXISCAN COMPLETED, PATIENT STATES SHE WAS ASYMPTOMATIC, BP 94/49, HR 75, CAFFEINE GIVEN AFTER ONE MINUTE.

## 2020-01-27 NOTE — PLAN OF CARE
Problem: Pain:  Goal: Control of acute pain  Description  Control of acute pain  1/27/2020 0355 by Russel Kennedy RN  Outcome: Ongoing     Problem: Pain:  Goal: Pain level will decrease  Description  Pain level will decrease  1/27/2020 0355 by Russel Kennedy RN  Outcome: Ongoing   Pt. Denies any chest pain during the shift. Problem: Cardiac:  Goal: Hemodynamic stability will improve  Description  Hemodynamic stability will improve  1/27/2020 0355 by Russel Kennedy RN  Outcome: Ongoing   Pt. Vitals remained stable during the shift.

## 2020-01-27 NOTE — PROGRESS NOTES
PATIENT RETURNED TO BASELINE, /64, HR 75, PATIENT TAKEN TO WAITING ROOM FOR FINAL STRESS SCANS PER WHEELCHAIR

## 2020-01-27 NOTE — CARE COORDINATION
DISCHARGE PLANNING NOTE:    Follow up appointment made for patient with Dr. Giuseppe Castro MD on 2/4 at 1:20 PM.  Notified patient of date and time. Patient verbalizes understanding. Appointment entered into discharge navigator.     Electronically signed by Aamir Chaidez RN on 1/27/2020 at 10:02 AM

## 2020-01-28 NOTE — DISCHARGE SUMMARY
[facility] Discharge Summary      Patient ID: Yohana Anderson    MRN: [id number]     Acct:  [de-identified]       Patient's PCP: Cece Penaloza MD    Admit Date: 1/25/2020     Discharge Date: 1/27/2020      Admitting Physician: Ccee Penaloza MD    Discharge Physician: Cece Penaloza MD     Discharge Diagnoses:    Primary Problem  Chest pain    Active Hospital Problems    Diagnosis Date Noted    Chronic pulmonary embolism ([facility] [I27.82] 08/05/2019    Obesity, Class II, BMI 35-39.9 [E66.9] 08/05/2019    Primary osteoarthritis involving multiple joints [M15.0] 04/26/2019    Chest pain [R07.9] 07/19/2016    Chronic bilateral low back pain without sciatica [M54.5, G89.29] 06/21/2016    IGT (impaired glucose tolerance) [R73.02] 06/08/2015    Acid reflux [K21.9] 11/01/2011    Anxiety [F41.9] 11/01/2011    Depression [F32.9] 11/01/2011    Essential hypertension [I10] 11/01/2011    Fibromyalgia [M79.7] 11/01/2011    Hypothyroidism [E03.9] 11/01/2011    RENEA (obstructive sleep apnea) [G47.33] 11/01/2011     Past Medical History:   Diagnosis Date    Affective disorder ([facility]     seasonal    Arthritis     shoulders and lower back    DVT (deep venous thrombosis) (HCC)     has history in R leg X 4    Fibromyalgia     Hyperlipidemia     Hypertension     Pneumonia     Sinusitis     Thyroid disease     hypothyroidsm     The patient was seen and examined on day of discharge and this discharge summary is in conjunction with any daily progress note from day of discharge. Code Status:  Full Code    Hospital Course: uncomplicated    Consults:  cardiology    Significant Diagnostic Studies: as above, and as follows: see chart    Treatments: as above    Disposition: home    Discharged Condition: Stable    Follow Up:   Cece Penaloza MD in one week    Discharge Medications:    Balbina Latif   Home Medication Instructions LPA:[id number]    Printed on:01/27/20 2017   Medication Information acetaminophen-codeine (TYLENOL #3) 300-30 MG per tablet  Take 1 tablet by mouth every 6 hours as needed for Pain for up to 30 days. TAKE ONE TABLET BY MOUTH EVERY 6 HOURS AS NEEDED FOR PAIN             acyclovir (ZOVIRAX) 200 MG capsule  TAKE 1 CAPSULE BY MOUTH TWICE DAILY             amLODIPine (NORVASC) 10 MG tablet  TAKE 1 TABLET BY MOUTH DAILY             ammonium lactate (LAC-HYDRIN) 12 % lotion  APPLY TO AFFECTED AREA(S) DAILY             aspirin 81 MG chewable tablet  Take 1 tablet by mouth daily             buPROPion (WELLBUTRIN XL) 300 MG extended release tablet  TAKE 1 TABLET BY MOUTH EVERY MORNING             fluticasone (FLONASE) 50 MCG/ACT nasal spray  2 sprays by Each Nostril route daily as needed for Allergies (seasonal)             gabapentin (NEURONTIN) 800 MG tablet  TAKE 1 TABLET BY MOUTH FOUR TIMES DAILY             Gabapentin POWD  Formula 8E: diclo3%+gaba6%+DMSO5%+prilo2%+baclofen2%+lido2% with ketamine. Apply 1-2 gm topically to affected area TID-QID             hydrocortisone 1 % cream  APPLY TO AFFECTED AREA TWICE DAILY             levothyroxine (SYNTHROID) 137 MCG tablet  Take 1 tablet by mouth Daily             lidocaine (LIDODERM) 5 %  APPLY 2 PATCHES ON TO A CLEAN DRY AREA OF THE SKIN DAILY AS NEEDED FOR PAIN             loratadine (CLARITIN) 10 MG tablet  TAKE 1 TABLET BY MOUTH DAILY AS NEEDED FOR ALLERGIES             metoprolol tartrate (LOPRESSOR) 25 MG tablet  TAKE 1 TABLET BY MOUTH TWICE DAILY             nitroGLYCERIN (NITROSTAT) 0.4 MG SL tablet  up to max of 3 total doses. If no relief after 1 dose, call 911. pantoprazole (PROTONIX) 40 MG tablet  Take 1 tablet by mouth daily             simvastatin (ZOCOR) 40 MG tablet  TAKE ONE (1) TABLET BY MOUTH EVERY NIGHT             traMADol (ULTRAM) 50 MG tablet  Take 1 tablet by mouth every 6 hours as needed for Pain for up to 30 days.              vitamin D (ERGOCALCIFEROL) 96140 units CAPS capsule  Take 1

## 2020-02-05 ENCOUNTER — HOSPITAL ENCOUNTER (OUTPATIENT)
Age: 70
Discharge: HOME | End: 2020-02-05
Payer: COMMERCIAL

## 2020-02-05 LAB
CHOLESTEROL, FASTING: 169 MG/DL
CHOLESTEROL/HDL RATIO: 3.2
GLUCOSE FASTING: 102 MG/DL (ref 70–99)
HDLC SERPL-MCNC: 53 MG/DL
LDL CHOLESTEROL: 79 MG/DL (ref 0–130)
TRIGLYCERIDE, FASTING: 187 MG/DL
VLDLC SERPL CALC-MCNC: ABNORMAL MG/DL (ref 1–30)

## 2020-02-05 PROCEDURE — 80061 LIPID PANEL: CPT

## 2020-02-05 PROCEDURE — 36415 COLL VENOUS BLD VENIPUNCTURE: CPT

## 2020-02-05 PROCEDURE — 82947 ASSAY GLUCOSE BLOOD QUANT: CPT

## 2020-02-11 ENCOUNTER — HOSPITAL ENCOUNTER (OUTPATIENT)
Dept: PHARMACY | Age: 70
Setting detail: THERAPIES SERIES
Discharge: HOME | End: 2020-02-11
Payer: COMMERCIAL

## 2020-02-11 LAB
INR BLD: 3.2
PROTIME: 38 SECONDS

## 2020-02-11 PROCEDURE — 99211 OFF/OP EST MAY X REQ PHY/QHP: CPT

## 2020-02-11 PROCEDURE — 85610 PROTHROMBIN TIME: CPT

## 2020-02-25 ENCOUNTER — APPOINTMENT (OUTPATIENT)
Dept: PHARMACY | Age: 70
End: 2020-02-25
Payer: COMMERCIAL

## 2020-02-28 ENCOUNTER — HOSPITAL ENCOUNTER (OUTPATIENT)
Dept: PHARMACY | Age: 70
Setting detail: THERAPIES SERIES
Discharge: HOME | End: 2020-02-28
Payer: COMMERCIAL

## 2020-02-28 ENCOUNTER — HOSPITAL ENCOUNTER (OUTPATIENT)
Dept: GENERAL RADIOLOGY | Age: 70
Discharge: HOME | End: 2020-03-01
Payer: COMMERCIAL

## 2020-02-28 ENCOUNTER — HOSPITAL ENCOUNTER (OUTPATIENT)
Dept: NUCLEAR MEDICINE | Age: 70
Discharge: HOME | End: 2020-03-01
Payer: COMMERCIAL

## 2020-02-28 LAB
INR BLD: 3.5
PROTIME: 42.6 SECONDS

## 2020-02-28 PROCEDURE — 78582 LUNG VENTILAT&PERFUS IMAGING: CPT

## 2020-02-28 PROCEDURE — 71046 X-RAY EXAM CHEST 2 VIEWS: CPT

## 2020-02-28 PROCEDURE — 85610 PROTHROMBIN TIME: CPT

## 2020-02-28 PROCEDURE — A9540 TC99M MAA: HCPCS | Performed by: INTERNAL MEDICINE

## 2020-02-28 PROCEDURE — 3430000000 HC RX DIAGNOSTIC RADIOPHARMACEUTICAL: Performed by: INTERNAL MEDICINE

## 2020-02-28 PROCEDURE — 2580000003 HC RX 258: Performed by: INTERNAL MEDICINE

## 2020-02-28 PROCEDURE — A9539 TC99M PENTETATE: HCPCS | Performed by: INTERNAL MEDICINE

## 2020-02-28 PROCEDURE — 99211 OFF/OP EST MAY X REQ PHY/QHP: CPT

## 2020-02-28 RX ORDER — KIT FOR THE PREPARATION OF TECHNETIUM TC 99M PENTETATE 20 MG/1
47.9 INJECTION, POWDER, LYOPHILIZED, FOR SOLUTION INTRAVENOUS; RESPIRATORY (INHALATION)
Status: COMPLETED | OUTPATIENT
Start: 2020-02-28 | End: 2020-02-28

## 2020-02-28 RX ORDER — SODIUM CHLORIDE 0.9 % (FLUSH) 0.9 %
10 SYRINGE (ML) INJECTION PRN
Status: DISCONTINUED | OUTPATIENT
Start: 2020-02-28 | End: 2020-03-02 | Stop reason: HOSPADM

## 2020-02-28 RX ADMIN — Medication 10 ML: at 10:33

## 2020-02-28 RX ADMIN — Medication 9.6 MILLICURIE: at 10:33

## 2020-02-28 RX ADMIN — KIT FOR THE PREPARATION OF TECHNETIUM TC 99M PENTETATE 47.9 MILLICURIE: 20 INJECTION, POWDER, LYOPHILIZED, FOR SOLUTION INTRAVENOUS; RESPIRATORY (INHALATION) at 10:13

## 2020-02-28 NOTE — PROGRESS NOTES
Patient states compliant with regimen. No bleeding or thromboembolic side effects noted. No significant med or dietary changes. No significant recent illness or disease state changes. PT/INR done in office per protocol. INR today is 3.5. Patient understands dosing directions and information discussed. Dosing card given to patient. Progress note faxed to office. INR elevated at 3.5.  Goal 2-3   The patient has been taking Aleve for pain  Reduce warfarin dose to 5 mg Mon/Thurs and 2.5 mg all other days  Recheck INR in one week

## 2020-03-02 RX ORDER — ASPIRIN 81 MG
TABLET,CHEWABLE ORAL
Qty: 30 TABLET | Refills: 3 | Status: SHIPPED | OUTPATIENT
Start: 2020-03-02 | End: 2020-06-30

## 2020-03-06 ENCOUNTER — HOSPITAL ENCOUNTER (OUTPATIENT)
Dept: PHARMACY | Age: 70
Setting detail: THERAPIES SERIES
Discharge: HOME | End: 2020-03-06
Payer: COMMERCIAL

## 2020-03-06 LAB
INR BLD: 3
PROTIME: 35.6 SECONDS

## 2020-03-06 PROCEDURE — 85610 PROTHROMBIN TIME: CPT

## 2020-03-06 PROCEDURE — 99211 OFF/OP EST MAY X REQ PHY/QHP: CPT

## 2020-03-19 ENCOUNTER — TELEPHONE (OUTPATIENT)
Dept: PHARMACY | Age: 70
End: 2020-03-19

## 2020-03-19 NOTE — TELEPHONE ENCOUNTER
Spoke with patient  for COVID 19 screening secondary to upcoming appointment tomorrow . At this time patient denies symptoms, recent travel and exposure. Since patient has been out of range for INR will see patient at scheduled appointment time Patient educated to call physician or go to ER with respiratory symptoms or fever and to not present to appointment if symptomatic. Will coordinate for next INR check accordingly.      Electronically signed by Joan Mtz RPH on 3/19/20 at 3:45 PM EDT

## 2020-03-20 ENCOUNTER — HOSPITAL ENCOUNTER (OUTPATIENT)
Dept: PHARMACY | Age: 70
Setting detail: THERAPIES SERIES
Discharge: HOME | End: 2020-03-20
Payer: COMMERCIAL

## 2020-03-20 LAB
INR BLD: 3
PROTIME: 35.4 SECONDS

## 2020-03-20 PROCEDURE — 85610 PROTHROMBIN TIME: CPT

## 2020-03-20 PROCEDURE — 99211 OFF/OP EST MAY X REQ PHY/QHP: CPT

## 2020-03-20 NOTE — PROGRESS NOTES
Patient states compliant all of the time with regimen. No bleeding or thromboembolic side effects noted. No significant dietary changes. Continues to have fibromyalgia pain and trying to stretch tramadol prescription. Patient did take 2 tablets of Aleve Wednesday and 1 tablet Thursday. Had not had any other Aleve since last visit. Discussed trying to limit Aleve. No significant recent illness or disease state changes. PT/INR done in office per protocol. INR was therapeutic at 3 (goal 2-3). Warfarin regimen will be continued at current dose 5 mg Mon/Thurs and 2.5 mg all other days. Will retest in 2 weeks. Ninfa Heaps has not been set up at this time. Informed patient that we would update her as soon as we had information on lab location. Patient understands dosing directions and information discussed. Dosing schedule and follow up appointment given to patient. Progress note routed to referring physicians office. Patient acknowledges working in [address] [place] [address] with Pharmacist as referred by his/her physician. Standing order for PT/INR has been placed in preparation to transition to possible remote INR monitoring given efforts to reduce the spread of COVID-19.       CLINICAL PHARMACY CONSULT: MED RECONCILIATION/REVIEW ADDENDUM    For Pharmacy Admin Tracking Only    PHSO: Yes  Total # of Interventions Recommended: 0  - Maintenance Safety Lab Monitoring #: 2  Total Interventions Accepted: 0  Time Spent (min): [address], [place]

## 2020-04-06 RX ORDER — AMMONIUM LACTATE 12 G/100G
LOTION TOPICAL
Qty: 225 ML | Refills: 1 | Status: SHIPPED | OUTPATIENT
Start: 2020-04-06 | End: 2020-05-22 | Stop reason: SDUPTHER

## 2020-04-06 RX ORDER — ACETAMINOPHEN AND CODEINE PHOSPHATE 300; 30 MG/1; MG/1
1 TABLET ORAL EVERY 6 HOURS PRN
Qty: 120 TABLET | Refills: 0 | Status: SHIPPED | OUTPATIENT
Start: 2020-04-06 | End: 2020-05-04 | Stop reason: SDUPTHER

## 2020-04-06 RX ORDER — TRAMADOL HYDROCHLORIDE 50 MG/1
50 TABLET ORAL EVERY 6 HOURS PRN
Qty: 60 TABLET | Refills: 0 | Status: SHIPPED | OUTPATIENT
Start: 2020-04-06 | End: 2020-05-04 | Stop reason: SDUPTHER

## 2020-04-06 NOTE — TELEPHONE ENCOUNTER
Please Approve or Refuse.   Send to Pharmacy per Pt's Request:  These scripts should go to [facility] ,       Next Visit Date:  Visit date not found   Last Visit Date: Visit date not found    Hemoglobin A1C (%)   Date Value   07/06/2015 5.5   04/18/2012 6.2 (H)   11/15/2011 5.9             ( goal A1C is < 7)   BP Readings from Last 3 Encounters:   02/04/20 118/68   01/27/20 (!) 129/57   01/15/20 118/62          (goal 120/80)  BUN   Date Value Ref Range Status   01/25/2020 12 8 - 23 mg/dL Final     CREATININE   Date Value Ref Range Status   01/25/2020 0.69 0.50 - 0.90 mg/dL Final     Potassium   Date Value Ref Range Status   01/25/2020 3.9 3.7 - 5.3 mmol/L Final

## 2020-04-07 ENCOUNTER — TELEPHONE (OUTPATIENT)
Dept: PHARMACY | Age: 70
End: 2020-04-07

## 2020-04-10 ENCOUNTER — HOSPITAL ENCOUNTER (OUTPATIENT)
Dept: PHARMACY | Age: 70
Setting detail: THERAPIES SERIES
Discharge: HOME | End: 2020-04-10
Payer: COMMERCIAL

## 2020-04-10 LAB
INR BLD: 3
PROTIME: 36 SECONDS

## 2020-04-10 PROCEDURE — 99211 OFF/OP EST MAY X REQ PHY/QHP: CPT

## 2020-04-10 PROCEDURE — 85610 PROTHROMBIN TIME: CPT

## 2020-04-10 NOTE — PROGRESS NOTES
Visit completed via telephone due to covid 19 precautions. Patient states compliant most of the time with regimen. No bleeding or thromboembolic side effects noted. No significant med or dietary changes. No significant recent illness or disease state changes. PT/INR done in lab per protocol. INR was therapeutic. INR of 3 at upper end of the therapeutic range  Goal 2-3  The patient was incorrectly taking warfarin 5 mg MWF and 2.5 mg all other days. The patient also taok Aleve the past two days for  Fibromyalgia Pain     Warfarin regimen will be decreased to 5 mg Mon/Thurs and 2.5 mg all other days. Will retest in 6 weeks. Patient understands dosing directions and information discussed. Dosing schedule and follow up appointment given to patient. Progress note routed to referring physicians office. 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 Standing order for PT/INR has been placed in preparation to transition to possible remote INR monitoring given efforts to reduce the spread of COVID-19.       CLINICAL PHARMACY CONSULT: MED RECONCILIATION/REVIEW ADDENDUM    For Pharmacy Admin Tracking Only    PHSO: Yes  Total # of Interventions Recommended: 1  - Decreased Dose #: 1  - Maintenance Safety Lab Monitoring #: 1  Total Interventions Accepted: 1  Time Spent (min): 20    Malena Wilkes, CodiD

## 2020-05-21 ENCOUNTER — TELEPHONE (OUTPATIENT)
Dept: PHARMACY | Age: 70
End: 2020-05-21

## 2020-05-22 ENCOUNTER — HOSPITAL ENCOUNTER (OUTPATIENT)
Dept: PHARMACY | Age: 70
Setting detail: THERAPIES SERIES
Discharge: HOME | End: 2020-05-22
Payer: COMMERCIAL

## 2020-05-22 VITALS — TEMPERATURE: 97.5 F

## 2020-05-22 LAB
INR BLD: 2.1
PROTIME: 25.4 SECONDS

## 2020-05-22 PROCEDURE — 99211 OFF/OP EST MAY X REQ PHY/QHP: CPT

## 2020-05-22 PROCEDURE — 85610 PROTHROMBIN TIME: CPT

## 2020-06-18 ENCOUNTER — TELEPHONE (OUTPATIENT)
Dept: PHARMACY | Age: 70
End: 2020-06-18

## 2020-06-19 ENCOUNTER — HOSPITAL ENCOUNTER (OUTPATIENT)
Dept: PHARMACY | Age: 70
Setting detail: THERAPIES SERIES
Discharge: HOME | End: 2020-06-19
Payer: COMMERCIAL

## 2020-06-19 VITALS — TEMPERATURE: 97.4 F

## 2020-06-19 LAB
INR BLD: 3.9
PROTIME: 47 SECONDS

## 2020-06-19 PROCEDURE — 99211 OFF/OP EST MAY X REQ PHY/QHP: CPT

## 2020-06-19 PROCEDURE — 85610 PROTHROMBIN TIME: CPT

## 2020-06-25 ENCOUNTER — TELEPHONE (OUTPATIENT)
Dept: PHARMACY | Age: 70
End: 2020-06-25

## 2020-06-26 ENCOUNTER — TELEPHONE (OUTPATIENT)
Dept: PHARMACY | Age: 70
End: 2020-06-26

## 2020-06-26 ENCOUNTER — HOSPITAL ENCOUNTER (OUTPATIENT)
Dept: PHARMACY | Age: 70
Discharge: HOME | End: 2020-06-26

## 2020-06-26 VITALS — TEMPERATURE: 98.1 F

## 2020-06-26 LAB
INR BLD: 2.6
PROTIME: 31.2 SECONDS

## 2020-06-26 PROCEDURE — 99212 OFFICE O/P EST SF 10 MIN: CPT

## 2020-06-26 PROCEDURE — 85610 PROTHROMBIN TIME: CPT

## 2020-06-26 RX ORDER — WARFARIN SODIUM 2.5 MG/1
TABLET ORAL
Qty: 90 TABLET | Refills: 2 | Status: SHIPPED | OUTPATIENT
Start: 2020-06-30 | End: 2020-08-28 | Stop reason: SDUPTHER

## 2020-06-26 RX ORDER — NAPROXEN SODIUM 220 MG
220 TABLET ORAL 2 TIMES DAILY PRN
COMMUNITY
End: 2021-04-07

## 2020-06-29 PROBLEM — F33.1 DEPRESSION, MAJOR, RECURRENT, MODERATE (HCC): Status: ACTIVE | Noted: 2020-06-29

## 2020-07-08 ENCOUNTER — TELEPHONE (OUTPATIENT)
Dept: PHARMACY | Age: 70
End: 2020-07-08

## 2020-07-08 NOTE — TELEPHONE ENCOUNTER
Spoke with patient  for COVID 19 screening secondary to upcoming appointment Friday . At this time patient denies symptoms, recent travel and exposure. Patient educated to call physician or go to ER with respiratory symptoms or fever and to not present to appointment if symptomatic. Will coordinate for next INR check accordingly.      Electronically signed by Shayne George, [address] [place] [address] on 7/8/20 at 4:52 PM

## 2020-07-15 ENCOUNTER — TELEPHONE (OUTPATIENT)
Dept: PHARMACY | Age: 70
End: 2020-07-15

## 2020-07-15 NOTE — TELEPHONE ENCOUNTER
Unable to leave message for patient for COVID 19 screening secondary to upcoming appointment tomorrow. Yvonne Hawk [facility].   [facility] Medication Management Services  [phone]

## 2020-07-16 ENCOUNTER — HOSPITAL ENCOUNTER (OUTPATIENT)
Dept: PHARMACY | Age: 70
Setting detail: THERAPIES SERIES
Discharge: HOME | End: 2020-07-16
Payer: COMMERCIAL

## 2020-07-16 VITALS — TEMPERATURE: 98 F

## 2020-07-16 LAB
INR BLD: 2.5
PROTIME: 30.4 SECONDS

## 2020-07-16 PROCEDURE — 85610 PROTHROMBIN TIME: CPT

## 2020-07-16 PROCEDURE — 99211 OFF/OP EST MAY X REQ PHY/QHP: CPT

## 2020-07-16 NOTE — PROGRESS NOTES
Patient seen in person in Medication Management Service. Patient states compliant all of the time with regimen using pill box at home. No thromboembolic side effects noted but does report increased bruising without known injury - mostly on arms and thighs. No significant med or dietary changes. Patient continues to use Tylenol #3, tramadol and napoxen for pain. Takes 1-2 Naproxen per day and about 2 tramadol per day. Pain control has not been effective per patient but is better today. Patient rates pain at 8 (0-10) today but was 9.5 the last couple days. PT/INR done via POC meter per protocol. INR was therapeutic at 2.5.  (goal 2 - 3)    Warfarin regimen will be continued at current dose of 5mg on Mon/Thur and 2.5mg all other days. Will retest in 4 weeks. Patient asked about apple cider vinegar or tumeric and informed her it may affect warfarin. Patient will inform us if she starts either. Will also inform us if she changes pain medication or amount. Patient understands dosing directions and information discussed. Dosing schedule and follow up appointment given to patient. Progress note routed to referring physicians office. 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 Standing order for PT/INR has been placed in preparation to transition to possible remote INR monitoring given efforts to reduce the spread of COVID-19.       CLINICAL PHARMACY CONSULT: MED RECONCILIATION/REVIEW ADDENDUM    For Pharmacy Admin Tracking Only    PHSO: Yes  Total # of Interventions Recommended: 0  - Maintenance Safety Lab Monitoring #: 1  Total Interventions Accepted: 0  Time Spent (min): 20    Carlton Nice, PharmD  [address]

## 2020-08-12 ENCOUNTER — TELEPHONE (OUTPATIENT)
Dept: PHARMACY | Age: 70
End: 2020-08-12

## 2020-08-12 NOTE — TELEPHONE ENCOUNTER
Spoke with patient for COVID 19 screening secondary to upcoming appointment tomorrow . At this time patient denies symptoms, recent travel and exposure. Patient will report to [facility] for INR check at scheduled time. Patient educated to call physician or go to ER with respiratory symptoms or fever and to not present to appointment if symptomatic. Will coordinate for next INR check accordingly.

## 2020-08-13 ENCOUNTER — HOSPITAL ENCOUNTER (OUTPATIENT)
Dept: PHARMACY | Age: 70
Setting detail: THERAPIES SERIES
Discharge: HOME | End: 2020-08-13
Payer: COMMERCIAL

## 2020-08-13 VITALS — TEMPERATURE: 97.7 F

## 2020-08-13 LAB
INR BLD: 2.6
PROTIME: 31.6 SECONDS

## 2020-08-13 PROCEDURE — 99211 OFF/OP EST MAY X REQ PHY/QHP: CPT

## 2020-08-13 PROCEDURE — 85610 PROTHROMBIN TIME: CPT

## 2020-08-13 NOTE — PROGRESS NOTES
Patient seen in person in Medication Management Service. Patient states compliant most of the time with regimen. No bleeding or thromboembolic side effects noted. No significant med or dietary changes. No significant recent illness or disease state changes. PT/INR done via POC meter per protocol. INR was therapeutic at 2.6.  (goal 2 - 3)    Warfarin regimen will be continued at current dose 5mg Mon/Thurs and 2.5 mg all other days. Will retest in 4 weeks. The patient has not taken any Aleve this past week    Patient understands dosing directions and information discussed. Dosing schedule and follow up appointment given to patient. Progress note routed to referring physicians office. Patient acknowledges working in [address] [place] [address] with Pharmacist as referred by his/her physician. Standing order for PT/INR has been placed in preparation to transition to possible remote INR monitoring given efforts to reduce the spread of COVID-19.       CLINICAL PHARMACY CONSULT: MED RECONCILIATION/REVIEW ADDENDUM    For Pharmacy Admin Tracking Only    PHSO: No  Total # of Interventions Recommended: 0    - Maintenance Safety Lab Monitoring #: 1  Total Interventions Accepted: 0  Time Spent (min): 20    Tri Singh PharmD

## 2020-08-31 RX ORDER — TRAMADOL HYDROCHLORIDE 50 MG/1
50 TABLET ORAL 2 TIMES DAILY PRN
Qty: 60 TABLET | Refills: 0 | Status: SHIPPED | OUTPATIENT
Start: 2020-08-31 | End: 2020-09-03 | Stop reason: SDUPTHER

## 2020-08-31 RX ORDER — ACETAMINOPHEN AND CODEINE PHOSPHATE 300; 30 MG/1; MG/1
1 TABLET ORAL EVERY 6 HOURS PRN
Qty: 120 TABLET | Refills: 0 | Status: SHIPPED | OUTPATIENT
Start: 2020-08-31 | End: 2020-09-03 | Stop reason: SDUPTHER

## 2020-09-09 ENCOUNTER — TELEPHONE (OUTPATIENT)
Dept: PHARMACY | Age: 70
End: 2020-09-09

## 2020-09-10 ENCOUNTER — HOSPITAL ENCOUNTER (OUTPATIENT)
Dept: PHARMACY | Age: 70
Setting detail: THERAPIES SERIES
Discharge: HOME | End: 2020-09-10
Payer: COMMERCIAL

## 2020-09-10 VITALS — TEMPERATURE: 97.3 F

## 2020-09-10 LAB
INR BLD: 2
PROTIME: 24.6 SECONDS

## 2020-09-10 PROCEDURE — 99212 OFFICE O/P EST SF 10 MIN: CPT

## 2020-09-10 PROCEDURE — 85610 PROTHROMBIN TIME: CPT

## 2020-09-10 NOTE — PROGRESS NOTES
Patient seen in person in Medication Management Service. Patient states compliant all of the time with regimen. No bleeding or thromboembolic side effects noted. No significant med or dietary changes. Patient plans to increase dark green consumption, recommend staying from kale. Twice a week  Patient increased gabapentin to four times daily, previously was only 2-3 time a day. No significant recent illness or disease state changes. No upcoming surgeries or procedures. PT/INR done via POC meter per protocol. INR was therapeutic. INR = 2  INR goal range 2-3   Warfarin regimen will be increased to 5 mg MWF and 2.5 mg all other days due to increase in greenstarting tommorrow. Will retest in 2 weeks. Patient understands dosing directions and information discussed. Dosing schedule and follow up appointment given to patient. Progress note routed to referring physicians office. 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 Standing order for PT/INR has been placed in preparation to transition to possible remote INR monitoring given efforts to reduce the spread of COVID-19.       CLINICAL PHARMACY CONSULT: MED RECONCILIATION/REVIEW ADDENDUM    For Pharmacy Admin Tracking Only    PHSO: Yes  Total # of Interventions Recommended: 2  - Increased Dose #: 2  - Maintenance Safety Lab Monitoring #: 1  Total Interventions Accepted: 2  Time Spent (min): [address], PharmD  [address]

## 2020-09-14 ENCOUNTER — HOSPITAL ENCOUNTER (OUTPATIENT)
Dept: GENERAL RADIOLOGY | Age: 70
Discharge: HOME | End: 2020-09-16
Payer: COMMERCIAL

## 2020-09-14 ENCOUNTER — HOSPITAL ENCOUNTER (OUTPATIENT)
Age: 70
Discharge: HOME | End: 2020-09-16
Payer: COMMERCIAL

## 2020-09-14 PROCEDURE — 71046 X-RAY EXAM CHEST 2 VIEWS: CPT

## 2020-09-23 ENCOUNTER — TELEPHONE (OUTPATIENT)
Dept: PHARMACY | Age: 70
End: 2020-09-23

## 2020-09-24 ENCOUNTER — HOSPITAL ENCOUNTER (OUTPATIENT)
Dept: PHARMACY | Age: 70
Setting detail: THERAPIES SERIES
Discharge: HOME | End: 2020-09-24
Payer: COMMERCIAL

## 2020-09-24 VITALS — TEMPERATURE: 97.8 F

## 2020-09-24 LAB
INR BLD: 2
PROTIME: 23.8 SECONDS

## 2020-09-24 PROCEDURE — 85610 PROTHROMBIN TIME: CPT

## 2020-09-24 PROCEDURE — 99211 OFF/OP EST MAY X REQ PHY/QHP: CPT

## 2020-10-07 ENCOUNTER — TELEPHONE (OUTPATIENT)
Dept: PHARMACY | Age: 70
End: 2020-10-07

## 2020-10-08 ENCOUNTER — HOSPITAL ENCOUNTER (OUTPATIENT)
Dept: PHARMACY | Age: 70
Setting detail: THERAPIES SERIES
Discharge: HOME | End: 2020-10-08
Payer: COMMERCIAL

## 2020-10-08 VITALS — TEMPERATURE: 97.6 F

## 2020-10-08 LAB
INR BLD: 3.8
PROTIME: 45.8 SECONDS

## 2020-10-08 PROCEDURE — 99211 OFF/OP EST MAY X REQ PHY/QHP: CPT

## 2020-10-08 PROCEDURE — 85610 PROTHROMBIN TIME: CPT

## 2020-10-14 ENCOUNTER — TELEPHONE (OUTPATIENT)
Dept: PHARMACY | Age: 70
End: 2020-10-14

## 2020-10-14 NOTE — TELEPHONE ENCOUNTER
Spoke with patient for COVID 19 screening secondary to upcoming appointment tomorrow . At this time patient denies symptoms, recent (previous 14 days) positive covid test, recent travel and exposure. Patient will report to [facility] at scheduled time. Patient educated to call physician or go to ER with respiratory symptoms or fever and to not present to appointment if symptomatic. Will coordinate for next appointment accordingly.      Boston Lara, Pharm D, [facility]  [facility]  10/14/2020 1:13 PM

## 2020-10-15 ENCOUNTER — HOSPITAL ENCOUNTER (OUTPATIENT)
Dept: PHARMACY | Age: 70
Setting detail: THERAPIES SERIES
Discharge: HOME | End: 2020-10-15
Payer: COMMERCIAL

## 2020-10-15 LAB
INR BLD: 3.3
PROTIME: 39.5 SECONDS

## 2020-10-15 PROCEDURE — 85610 PROTHROMBIN TIME: CPT

## 2020-10-15 PROCEDURE — 99212 OFFICE O/P EST SF 10 MIN: CPT

## 2020-10-15 NOTE — PROGRESS NOTES
Patient seen in person in Medication Management Service. Patient states compliant all of the time with regimen. No bleeding or thromboembolic side effects noted. No significant med or dietary changes. No significant recent illness or disease state changes. Patient reports that she took 2 Aleve tablets this week. She has not started eating spinach like she has planned. PT/INR done via POC meter per protocol. INR was supratherapeutic at 3.3.  (goal 2 - 3)    Warfarin regimen will be held for one dose on 10/15 and resume 5 mg on MWF and 2.5 mg on the remaining days. I recommended reducing the patients dose to 5 mg on Mon/Wed and 2.5 mg on the remaining days given the 2 elevated INR readings in the past couple visits. The patient is adamant on staying on her current dose and maybe changing at her next visit. Will retest in 2 weeks. Patient understands dosing directions and information discussed. Dosing schedule and follow up appointment given to patient. Progress note routed to referring physicians office. Patient acknowledges working in [address] [place] [address] with Pharmacist as referred by his/her physician. Standing order for PT/INR has been placed in preparation to transition to possible remote INR monitoring given efforts to reduce the spread of COVID-19.     CLINICAL PHARMACY CONSULT: MED RECONCILIATION/REVIEW ADDENDUM    For Pharmacy Admin Tracking Only    PHSO: Yes  Total # of Interventions Recommended: 0  Total Interventions Accepted: 0  Time Spent (min): [address] Bela Pino PharmD  [address]

## 2020-10-26 ENCOUNTER — HOSPITAL ENCOUNTER (OUTPATIENT)
Age: 70
Setting detail: SPECIMEN
Discharge: HOME | End: 2020-10-26
Payer: COMMERCIAL

## 2020-10-26 LAB
ALBUMIN SERPL-MCNC: 3.7 G/DL (ref 3.5–5.2)
ALBUMIN/GLOBULIN RATIO: 1.2 (ref 1–2.5)
ALP BLD-CCNC: 63 U/L (ref 35–104)
ALT SERPL-CCNC: 12 U/L (ref 5–33)
ANION GAP SERPL CALCULATED.3IONS-SCNC: 12 MMOL/L (ref 9–17)
AST SERPL-CCNC: 13 U/L
BILIRUB SERPL-MCNC: 0.27 MG/DL (ref 0.3–1.2)
BUN BLDV-MCNC: 9 MG/DL (ref 8–23)
BUN/CREAT BLD: ABNORMAL (ref 9–20)
CALCIUM SERPL-MCNC: 9.2 MG/DL (ref 8.6–10.4)
CHLORIDE BLD-SCNC: 102 MMOL/L (ref 98–107)
CHOLESTEROL/HDL RATIO: 3.6
CHOLESTEROL: 194 MG/DL
CO2: 28 MMOL/L (ref 20–31)
CREAT SERPL-MCNC: 0.69 MG/DL (ref 0.5–0.9)
GFR AFRICAN AMERICAN: >60 ML/MIN
GFR NON-AFRICAN AMERICAN: >60 ML/MIN
GFR SERPL CREATININE-BSD FRML MDRD: ABNORMAL ML/MIN/{1.73_M2}
GFR SERPL CREATININE-BSD FRML MDRD: ABNORMAL ML/MIN/{1.73_M2}
GLUCOSE BLD-MCNC: 92 MG/DL (ref 70–99)
HCT VFR BLD CALC: 42.4 % (ref 36.3–47.1)
HDLC SERPL-MCNC: 54 MG/DL
HEMOGLOBIN: 12.7 G/DL (ref 11.9–15.1)
LDL CHOLESTEROL: 82 MG/DL (ref 0–130)
MCH RBC QN AUTO: 26.8 PG (ref 25.2–33.5)
MCHC RBC AUTO-ENTMCNC: 30 G/DL (ref 28.4–34.8)
MCV RBC AUTO: 89.6 FL (ref 82.6–102.9)
NRBC AUTOMATED: 0 PER 100 WBC
PDW BLD-RTO: 14.7 % (ref 11.8–14.4)
PLATELET # BLD: 246 K/UL (ref 138–453)
PMV BLD AUTO: 9.8 FL (ref 8.1–13.5)
POTASSIUM SERPL-SCNC: 4.4 MMOL/L (ref 3.7–5.3)
RBC # BLD: 4.73 M/UL (ref 3.95–5.11)
SODIUM BLD-SCNC: 142 MMOL/L (ref 135–144)
THYROXINE, FREE: 1.3 NG/DL (ref 0.93–1.7)
TOTAL PROTEIN: 6.9 G/DL (ref 6.4–8.3)
TRIGL SERPL-MCNC: 292 MG/DL
TSH SERPL DL<=0.05 MIU/L-ACNC: 4.26 MIU/L (ref 0.3–5)
VLDLC SERPL CALC-MCNC: ABNORMAL MG/DL (ref 1–30)
WBC # BLD: 6.3 K/UL (ref 3.5–11.3)

## 2020-10-27 LAB
ESTIMATED AVERAGE GLUCOSE: 120 MG/DL
HBA1C MFR BLD: 5.8 % (ref 4–6)

## 2020-10-28 ENCOUNTER — TELEPHONE (OUTPATIENT)
Dept: PHARMACY | Age: 70
End: 2020-10-28

## 2020-10-28 NOTE — TELEPHONE ENCOUNTER
Spoke with patient for COVID 19 screening secondary to upcoming appointment tomorrow . At this time patient denies symptoms, recent (previous 14 days) positive covid test, recent travel and exposure. Patient will report to [facility] at scheduled time. Patient educated to call physician or go to ER with respiratory symptoms or fever and to not present to appointment if symptomatic. Will coordinate for next appointment accordingly.      Madi Archer, Pharm D, [facility]  [facility]  10/28/2020 9:45 AM

## 2020-10-29 ENCOUNTER — HOSPITAL ENCOUNTER (OUTPATIENT)
Dept: PHARMACY | Age: 70
Setting detail: THERAPIES SERIES
Discharge: HOME | End: 2020-10-29
Payer: COMMERCIAL

## 2020-10-29 VITALS — TEMPERATURE: 97 F

## 2020-10-29 LAB
INR BLD: 2.2
PROTIME: 26.1 SECONDS

## 2020-10-29 PROCEDURE — 85610 PROTHROMBIN TIME: CPT | Performed by: PHARMACIST

## 2020-10-29 PROCEDURE — 99211 OFF/OP EST MAY X REQ PHY/QHP: CPT | Performed by: PHARMACIST

## 2020-10-29 NOTE — PROGRESS NOTES
.Patient seen in person in Medication Management Service. Patient states compliant all of the time with regimen. No bleeding or thromboembolic side effects noted. No significant med or dietary changes. No significant recent illness or disease state changes. PT/INR done via POC meter per protocol. INR was therapeutic at 2.7.  (goal 2.0 - 3.0)    Warfarin regimen will be 5 Mon, Wed, Fri and 2.5 mg all other days . Will retest in 2 weeks. Patient understands dosing directions and information discussed. Dosing schedule and follow up appointment given to patient. Progress note routed to referring physicians office. Patient acknowledges working in [address] [place] [address] with Pharmacist as referred by his/her physician. Standing order for PT/INR has been placed in preparation to transition to possible remote INR monitoring given efforts to reduce the spread of COVID-19.       CLINICAL PHARMACY CONSULT: MED RECONCILIATION/REVIEW ADDENDUM    For Pharmacy Admin Tracking Only    PHSO: Yes  Total # of Interventions Recommended: 0    - Maintenance Safety Lab Monitoring #: 1  Total Interventions Accepted: 0  Time Spent (min): 20    Nima Quiroz, PharmD  [address]

## 2020-11-11 ENCOUNTER — TELEPHONE (OUTPATIENT)
Dept: PHARMACY | Age: 70
End: 2020-11-11

## 2020-11-11 NOTE — TELEPHONE ENCOUNTER
Spoke with patient for COVID 19 screening secondary to upcoming appointment tomorrow . At this time patient denies symptoms, recent (previous 14 days) positive covid test, recent travel and exposure. Patient will report to [facility] at scheduled time. Patient educated to call physician or go to ER with respiratory symptoms or fever and to not present to appointment if symptomatic. Will coordinate for next appointment accordingly.

## 2020-11-12 ENCOUNTER — HOSPITAL ENCOUNTER (OUTPATIENT)
Dept: PHARMACY | Age: 70
Setting detail: THERAPIES SERIES
Discharge: HOME | End: 2020-11-12
Payer: COMMERCIAL

## 2020-11-12 VITALS — TEMPERATURE: 97.5 F

## 2020-11-12 LAB
INR BLD: 3
PROTIME: 35.9 SECONDS

## 2020-11-12 PROCEDURE — 99211 OFF/OP EST MAY X REQ PHY/QHP: CPT

## 2020-11-12 PROCEDURE — 85610 PROTHROMBIN TIME: CPT

## 2020-11-12 NOTE — PROGRESS NOTES
Patient seen in person in Medication Management Service. Patient states compliant most of the time with regimen. No bleeding or thromboembolic side effects noted. No significant med or dietary changes. No significant recent illness or disease state changes. PT/INR done via POC meter per protocol. INR was therapeutic at 3.  (goal 2 - 3)  The patient has increased pain of 9/10 but has not taken any Aleve this past week    Warfarin regimen will be continued at current dose 5 mg MWF and 2.5 mg all other days. Will retest in 4 weeks. Patient understands dosing directions and information discussed. Dosing schedule and follow up appointment given to patient. Progress note routed to referring physicians office. Patient acknowledges working in [address] [place] [address] with Pharmacist as referred by his/her physician. Standing order for PT/INR has been placed in preparation to transition to possible remote INR monitoring given efforts to reduce the spread of COVID-19.       CLINICAL PHARMACY CONSULT: MED RECONCILIATION/REVIEW ADDENDUM    For Pharmacy Admin Tracking Only    PHSO: Yes  Total # of Interventions Recommended: 0    - Maintenance Safety Lab Monitoring #: 1    Total Interventions Accepted: 0  Time Spent (min): 20    Bill Parks, CodiD

## 2020-11-20 ENCOUNTER — HOSPITAL ENCOUNTER (OUTPATIENT)
Dept: GENERAL RADIOLOGY | Age: 70
Discharge: HOME | End: 2020-11-22
Payer: COMMERCIAL

## 2020-11-20 ENCOUNTER — HOSPITAL ENCOUNTER (OUTPATIENT)
Age: 70
Discharge: HOME | End: 2020-11-22
Payer: COMMERCIAL

## 2020-11-20 PROCEDURE — 71046 X-RAY EXAM CHEST 2 VIEWS: CPT

## 2020-12-09 ENCOUNTER — TELEPHONE (OUTPATIENT)
Dept: PHARMACY | Age: 70
End: 2020-12-09

## 2020-12-09 NOTE — TELEPHONE ENCOUNTER
Spoke with patient for COVID 19 screening secondary to upcoming appointment tomorrow . At this time patient denies symptoms, recent (previous 14 days) positive covid test, recent travel and exposure. Patient will report to [facility] at scheduled time. Patient educated to call physician or go to ER with respiratory symptoms or fever and to not present to appointment if symptomatic. Will coordinate for next appointment accordingly.

## 2020-12-10 ENCOUNTER — HOSPITAL ENCOUNTER (OUTPATIENT)
Dept: PHARMACY | Age: 70
Setting detail: THERAPIES SERIES
Discharge: HOME | End: 2020-12-10
Payer: COMMERCIAL

## 2020-12-10 VITALS — TEMPERATURE: 96.5 F

## 2020-12-10 LAB
INR BLD: 2.7
PROTIME: 32.9 SECONDS

## 2020-12-10 PROCEDURE — 85610 PROTHROMBIN TIME: CPT

## 2020-12-10 PROCEDURE — 99211 OFF/OP EST MAY X REQ PHY/QHP: CPT

## 2020-12-10 NOTE — PROGRESS NOTES
Patient seen in person in Medication Management Service. Patient states compliant most of the time with regimen. No bleeding or thromboembolic side effects noted. No significant med or dietary changes. No significant recent illness or disease state changes. PT/INR done via POC meter per protocol. INR was therapeutic at 2.7.  (goal 2 - 3)  The patient has not taken any Aleve this past week    Warfarin regimen will be continued at current dose 5 mg MWF and 2.5 mg all other days. Will retest in 4 weeks. Patient understands dosing directions and information discussed. Dosing schedule and follow up appointment given to patient. Progress note routed to referring physicians office. Patient acknowledges working in [address] [place] [address] with Pharmacist as referred by his/her physician. Standing order for PT/INR has been placed in preparation to transition to possible remote INR monitoring given efforts to reduce the spread of COVID-19.       CLINICAL PHARMACY CONSULT: MED RECONCILIATION/REVIEW ADDENDUM    For Pharmacy Admin Tracking Only    PHSO: Yes  Total # of Interventions Recommended: 0    - Maintenance Safety Lab Monitoring #: 1    Total Interventions Accepted: 0  Time Spent (min): 20    Ulysses Mans, PharmD

## 2020-12-23 ENCOUNTER — TELEPHONE (OUTPATIENT)
Dept: PHARMACY | Age: 70
End: 2020-12-23

## 2020-12-23 RX ORDER — WARFARIN SODIUM 5 MG/1
TABLET ORAL
Qty: 100 TABLET | Refills: 3 | Status: SHIPPED | OUTPATIENT
Start: 2020-12-23 | End: 2021-08-05 | Stop reason: SDUPTHER

## 2021-01-04 ENCOUNTER — HOSPITAL ENCOUNTER (OUTPATIENT)
Dept: CT IMAGING | Age: 71
Discharge: HOME | End: 2021-01-06
Payer: COMMERCIAL

## 2021-01-04 DIAGNOSIS — I26.99 RECURRENT PULMONARY EMBOLISM (HCC): ICD-10-CM

## 2021-01-04 LAB
BUN BLDV-MCNC: 15 MG/DL (ref 8–23)
CREAT SERPL-MCNC: 0.76 MG/DL (ref 0.5–0.9)
GFR AFRICAN AMERICAN: >60 ML/MIN
GFR NON-AFRICAN AMERICAN: >60 ML/MIN
GFR SERPL CREATININE-BSD FRML MDRD: NORMAL ML/MIN/{1.73_M2}
GFR SERPL CREATININE-BSD FRML MDRD: NORMAL ML/MIN/{1.73_M2}

## 2021-01-04 PROCEDURE — 84520 ASSAY OF UREA NITROGEN: CPT

## 2021-01-04 PROCEDURE — 82565 ASSAY OF CREATININE: CPT

## 2021-01-04 PROCEDURE — 2580000003 HC RX 258: Performed by: NURSE PRACTITIONER

## 2021-01-04 PROCEDURE — 36415 COLL VENOUS BLD VENIPUNCTURE: CPT

## 2021-01-04 PROCEDURE — 6360000004 HC RX CONTRAST MEDICATION: Performed by: NURSE PRACTITIONER

## 2021-01-04 PROCEDURE — 71260 CT THORAX DX C+: CPT

## 2021-01-04 RX ORDER — SODIUM CHLORIDE 0.9 % (FLUSH) 0.9 %
10 SYRINGE (ML) INJECTION PRN
Status: DISCONTINUED | OUTPATIENT
Start: 2021-01-04 | End: 2021-01-07 | Stop reason: HOSPADM

## 2021-01-04 RX ORDER — 0.9 % SODIUM CHLORIDE 0.9 %
80 INTRAVENOUS SOLUTION INTRAVENOUS ONCE
Status: COMPLETED | OUTPATIENT
Start: 2021-01-04 | End: 2021-01-04

## 2021-01-04 RX ADMIN — IOPAMIDOL 75 ML: 755 INJECTION, SOLUTION INTRAVENOUS at 15:01

## 2021-01-04 RX ADMIN — SODIUM CHLORIDE 80 ML: 9 INJECTION, SOLUTION INTRAVENOUS at 15:01

## 2021-01-04 RX ADMIN — Medication 10 ML: at 15:01

## 2021-01-06 ENCOUNTER — TELEPHONE (OUTPATIENT)
Dept: PHARMACY | Age: 71
End: 2021-01-06

## 2021-01-07 ENCOUNTER — HOSPITAL ENCOUNTER (OUTPATIENT)
Dept: PHARMACY | Age: 71
Setting detail: THERAPIES SERIES
Discharge: HOME | End: 2021-01-07
Payer: COMMERCIAL

## 2021-01-07 VITALS — TEMPERATURE: 96 F

## 2021-01-07 DIAGNOSIS — I82.403 RECURRENT ACUTE DEEP VEIN THROMBOSIS (DVT) OF BOTH LOWER EXTREMITIES (HCC): ICD-10-CM

## 2021-01-07 LAB
INR BLD: 3.3
PROTIME: 39.9 SECONDS

## 2021-01-07 PROCEDURE — 85610 PROTHROMBIN TIME: CPT

## 2021-01-07 PROCEDURE — 99212 OFFICE O/P EST SF 10 MIN: CPT

## 2021-01-07 NOTE — PROGRESS NOTES
Patient seen in person in Medication Management Service. Patient states compliant all of the time with regimen. No bleeding or thromboembolic side effects noted. No significant med or dietary changes. Patient is ordered gabapentin 4 times daily, now taking twice daily due to side effects. Pain level still high  Patient states she has not beeneating much the last few days, no appetitive  No significant recent illness or disease state changes. PT/INR done via POC meter per protocol. INR was supratherapeutic at 3.3.  (goal 2 - 3)    Warfarin regimen will be decreased to 2.5 mg tomorrow, then resume 5 mg MWF and .5 mg all other days. Will retest in 2 weeks. Patient understands dosing directions and information discussed. Dosing schedule and follow up appointment given to patient. Progress note routed to referring physicians office. Patient acknowledges working in [address] [place] [address] with Pharmacist as referred by his/her physician. Standing order for PT/INR has been placed in preparation to transition to possible remote INR monitoring given efforts to reduce the spread of COVID-19. CLINICAL PHARMACY CONSULT: MED RECONCILIATION/REVIEW ADDENDUM    For Pharmacy Admin Tracking Only    PHSO: Yes  Total # of Interventions Recommended: 1  - Decreased Dose #: 1  - Maintenance Safety Lab Monitoring #: 1  Total Interventions Accepted: 1  Time Spent (min): [address], RPh.   [facility] Medication Management Services  [phone]

## 2021-01-20 ENCOUNTER — TELEPHONE (OUTPATIENT)
Dept: PHARMACY | Age: 71
End: 2021-01-20

## 2021-01-21 ENCOUNTER — HOSPITAL ENCOUNTER (OUTPATIENT)
Dept: PHARMACY | Age: 71
Setting detail: THERAPIES SERIES
Discharge: HOME | End: 2021-01-21
Payer: COMMERCIAL

## 2021-01-21 VITALS — TEMPERATURE: 96.9 F

## 2021-01-21 DIAGNOSIS — I82.403 RECURRENT ACUTE DEEP VEIN THROMBOSIS (DVT) OF BOTH LOWER EXTREMITIES (HCC): ICD-10-CM

## 2021-01-21 LAB
INR BLD: 2.9
PROTIME: 34.5 SECONDS

## 2021-01-21 PROCEDURE — 99211 OFF/OP EST MAY X REQ PHY/QHP: CPT

## 2021-01-21 PROCEDURE — 85610 PROTHROMBIN TIME: CPT

## 2021-02-15 ENCOUNTER — HOSPITAL ENCOUNTER (OUTPATIENT)
Dept: WOMENS IMAGING | Age: 71
Discharge: HOME | End: 2021-02-17
Payer: COMMERCIAL

## 2021-02-15 DIAGNOSIS — Z12.31 ENCOUNTER FOR SCREENING MAMMOGRAM FOR BREAST CANCER: ICD-10-CM

## 2021-02-15 PROCEDURE — 77063 BREAST TOMOSYNTHESIS BI: CPT

## 2021-03-01 ENCOUNTER — TELEPHONE (OUTPATIENT)
Dept: PHARMACY | Age: 71
End: 2021-03-01

## 2021-03-01 PROBLEM — J45.30 MILD PERSISTENT ASTHMA WITHOUT COMPLICATION: Status: ACTIVE | Noted: 2021-03-01

## 2021-03-01 NOTE — TELEPHONE ENCOUNTER
Attempted to call patient for COVID 19 screening secondary to upcoming appointment tomorrow . At this time patient is unavailable so voicemail left for patient to call us with any covid symptoms, recent (previous 14 days) positive covid test, contact or recent travel. Patient will report to [facility] at scheduled time. Patient educated to call physician or go to ER with respiratory symptoms or fever and to not present to appointment if symptomatic. Will coordinate for next appointment accordingly.

## 2021-03-05 ENCOUNTER — TELEPHONE (OUTPATIENT)
Dept: PHARMACY | Age: 71
End: 2021-03-05

## 2021-03-05 NOTE — TELEPHONE ENCOUNTER
Spoke with patient for COVID 19 screening secondary to upcoming appointment tomorrow . At this time patient denies symptoms, recent (previous 14 days) positive covid test, recent travel and exposure. Patient will report to [facility] at scheduled time. Patient educated to call physician or go to ER with respiratory symptoms or fever and to not present to appointment if symptomatic. Will coordinate for next appointment accordingly. Gertrude Cummings RPh.   [facility] Medication Management Services  [phone]

## 2021-03-10 ENCOUNTER — HOSPITAL ENCOUNTER (OUTPATIENT)
Dept: PHARMACY | Age: 71
Setting detail: THERAPIES SERIES
Discharge: HOME | End: 2021-03-10
Payer: COMMERCIAL

## 2021-03-10 VITALS — TEMPERATURE: 97.3 F

## 2021-03-10 DIAGNOSIS — I82.403 RECURRENT ACUTE DEEP VEIN THROMBOSIS (DVT) OF BOTH LOWER EXTREMITIES (HCC): ICD-10-CM

## 2021-03-10 LAB
INR BLD: 2.6
PROTIME: 30.9 SECONDS

## 2021-03-10 PROCEDURE — 99211 OFF/OP EST MAY X REQ PHY/QHP: CPT

## 2021-03-10 PROCEDURE — 85610 PROTHROMBIN TIME: CPT

## 2021-03-10 NOTE — PROGRESS NOTES
Patient seen in person in Medication Management Service. Patient states compliant most of the time with regimen. No bleeding or thromboembolic side effects noted. No significant med or dietary changes. No significant recent illness or disease state changes. PT/INR done via POC meter per protocol. INR was therapeutic at 2.6.  (goal 2 - 3)  The patient is now taking one dropperful of CBD oil daily  She has not had to take any Naproxen since starting this for pain    Warfarin regimen will be continued at current dose 5 mg MWF and 2.5 mg all other days. Will retest in 4 weeks. Patient understands dosing directions and information discussed. Dosing schedule and follow up appointment given to patient. Progress note routed to referring physicians office. Patient acknowledges working in [address] [place] [address] with Pharmacist as referred by his/her physician. Standing order for PT/INR has been placed in preparation to transition to possible remote INR monitoring given efforts to reduce the spread of COVID-19.       CLINICAL PHARMACY CONSULT: MED RECONCILIATION/REVIEW ADDENDUM    For Pharmacy Admin Tracking Only    PHSO: Yes  Total # of Interventions Recommended: 0    - Maintenance Safety Lab Monitoring #: 1  Total Interventions Accepted: 0  Time Spent (min): 20    Codi NunezD

## 2021-03-15 ENCOUNTER — APPOINTMENT (OUTPATIENT)
Dept: PHARMACY | Age: 71
End: 2021-03-15
Payer: COMMERCIAL

## 2021-03-30 ENCOUNTER — APPOINTMENT (OUTPATIENT)
Dept: GENERAL RADIOLOGY | Age: 71
End: 2021-03-30
Payer: COMMERCIAL

## 2021-03-30 ENCOUNTER — APPOINTMENT (OUTPATIENT)
Dept: CT IMAGING | Age: 71
End: 2021-03-30
Payer: COMMERCIAL

## 2021-03-30 ENCOUNTER — HOSPITAL ENCOUNTER (EMERGENCY)
Age: 71
Discharge: HOME | End: 2021-03-30
Attending: EMERGENCY MEDICINE
Payer: COMMERCIAL

## 2021-03-30 VITALS
BODY MASS INDEX: 38.32 KG/M2 | RESPIRATION RATE: 20 BRPM | SYSTOLIC BLOOD PRESSURE: 135 MMHG | HEIGHT: 65 IN | DIASTOLIC BLOOD PRESSURE: 76 MMHG | TEMPERATURE: 98 F | HEART RATE: 71 BPM | WEIGHT: 230 LBS | OXYGEN SATURATION: 96 %

## 2021-03-30 DIAGNOSIS — R06.02 SHORTNESS OF BREATH: Primary | ICD-10-CM

## 2021-03-30 DIAGNOSIS — R07.9 CHEST PAIN, UNSPECIFIED TYPE: ICD-10-CM

## 2021-03-30 LAB
ABSOLUTE EOS #: 0.1 K/UL (ref 0–0.4)
ABSOLUTE IMMATURE GRANULOCYTE: ABNORMAL K/UL (ref 0–0.3)
ABSOLUTE LYMPH #: 3.3 K/UL (ref 1–4.8)
ABSOLUTE MONO #: 1.1 K/UL (ref 0.1–1.3)
ANION GAP SERPL CALCULATED.3IONS-SCNC: 8 MMOL/L (ref 9–17)
BASOPHILS # BLD: 1 % (ref 0–2)
BASOPHILS ABSOLUTE: 0.1 K/UL (ref 0–0.2)
BNP INTERPRETATION: NORMAL
BUN BLDV-MCNC: 14 MG/DL (ref 8–23)
BUN/CREAT BLD: ABNORMAL (ref 9–20)
CALCIUM SERPL-MCNC: 9.4 MG/DL (ref 8.6–10.4)
CHLORIDE BLD-SCNC: 104 MMOL/L (ref 98–107)
CO2: 27 MMOL/L (ref 20–31)
CREAT SERPL-MCNC: 0.66 MG/DL (ref 0.5–0.9)
DIFFERENTIAL TYPE: ABNORMAL
EOSINOPHILS RELATIVE PERCENT: 1 % (ref 0–4)
GFR AFRICAN AMERICAN: >60 ML/MIN
GFR NON-AFRICAN AMERICAN: >60 ML/MIN
GFR SERPL CREATININE-BSD FRML MDRD: ABNORMAL ML/MIN/{1.73_M2}
GFR SERPL CREATININE-BSD FRML MDRD: ABNORMAL ML/MIN/{1.73_M2}
GLUCOSE BLD-MCNC: 87 MG/DL (ref 70–99)
HCT VFR BLD CALC: 37.8 % (ref 36–46)
HEMOGLOBIN: 12.2 G/DL (ref 12–16)
IMMATURE GRANULOCYTES: ABNORMAL %
INR BLD: 2.5
LYMPHOCYTES # BLD: 30 % (ref 24–44)
MCH RBC QN AUTO: 27.7 PG (ref 26–34)
MCHC RBC AUTO-ENTMCNC: 32.1 G/DL (ref 31–37)
MCV RBC AUTO: 86.2 FL (ref 80–100)
MONOCYTES # BLD: 10 % (ref 1–7)
MYOGLOBIN: 35 NG/ML (ref 25–58)
NRBC AUTOMATED: ABNORMAL PER 100 WBC
PARTIAL THROMBOPLASTIN TIME: 28.9 SEC (ref 24–36)
PDW BLD-RTO: 14.9 % (ref 11.5–14.9)
PLATELET # BLD: 224 K/UL (ref 150–450)
PLATELET ESTIMATE: ABNORMAL
PMV BLD AUTO: 6.8 FL (ref 6–12)
POTASSIUM SERPL-SCNC: 3.5 MMOL/L (ref 3.7–5.3)
PRO-BNP: 28 PG/ML
PROTHROMBIN TIME: 26.9 SEC (ref 11.8–14.6)
RBC # BLD: 4.39 M/UL (ref 4–5.2)
RBC # BLD: ABNORMAL 10*6/UL
SEG NEUTROPHILS: 58 % (ref 36–66)
SEGMENTED NEUTROPHILS ABSOLUTE COUNT: 6.6 K/UL (ref 1.3–9.1)
SODIUM BLD-SCNC: 139 MMOL/L (ref 135–144)
TROPONIN INTERP: NORMAL
TROPONIN T: NORMAL NG/ML
TROPONIN, HIGH SENSITIVITY: <6 NG/L (ref 0–14)
WBC # BLD: 11.2 K/UL (ref 3.5–11)
WBC # BLD: ABNORMAL 10*3/UL

## 2021-03-30 PROCEDURE — 84484 ASSAY OF TROPONIN QUANT: CPT

## 2021-03-30 PROCEDURE — 83880 ASSAY OF NATRIURETIC PEPTIDE: CPT

## 2021-03-30 PROCEDURE — 80048 BASIC METABOLIC PNL TOTAL CA: CPT

## 2021-03-30 PROCEDURE — 36415 COLL VENOUS BLD VENIPUNCTURE: CPT

## 2021-03-30 PROCEDURE — 71045 X-RAY EXAM CHEST 1 VIEW: CPT

## 2021-03-30 PROCEDURE — 99284 EMERGENCY DEPT VISIT MOD MDM: CPT

## 2021-03-30 PROCEDURE — 2580000003 HC RX 258: Performed by: EMERGENCY MEDICINE

## 2021-03-30 PROCEDURE — 83874 ASSAY OF MYOGLOBIN: CPT

## 2021-03-30 PROCEDURE — 93005 ELECTROCARDIOGRAM TRACING: CPT | Performed by: EMERGENCY MEDICINE

## 2021-03-30 PROCEDURE — 85025 COMPLETE CBC W/AUTO DIFF WBC: CPT

## 2021-03-30 PROCEDURE — 85730 THROMBOPLASTIN TIME PARTIAL: CPT

## 2021-03-30 PROCEDURE — 6360000004 HC RX CONTRAST MEDICATION: Performed by: EMERGENCY MEDICINE

## 2021-03-30 PROCEDURE — 71260 CT THORAX DX C+: CPT

## 2021-03-30 PROCEDURE — 85610 PROTHROMBIN TIME: CPT

## 2021-03-30 RX ORDER — SODIUM CHLORIDE 0.9 % (FLUSH) 0.9 %
10 SYRINGE (ML) INJECTION PRN
Status: DISCONTINUED | OUTPATIENT
Start: 2021-03-30 | End: 2021-03-30 | Stop reason: HOSPADM

## 2021-03-30 RX ORDER — 0.9 % SODIUM CHLORIDE 0.9 %
80 INTRAVENOUS SOLUTION INTRAVENOUS ONCE
Status: COMPLETED | OUTPATIENT
Start: 2021-03-30 | End: 2021-03-30

## 2021-03-30 RX ADMIN — IOPAMIDOL 75 ML: 755 INJECTION, SOLUTION INTRAVENOUS at 13:22

## 2021-03-30 RX ADMIN — SODIUM CHLORIDE, PRESERVATIVE FREE 10 ML: 5 INJECTION INTRAVENOUS at 13:22

## 2021-03-30 RX ADMIN — SODIUM CHLORIDE 80 ML: 9 INJECTION, SOLUTION INTRAVENOUS at 13:22

## 2021-03-30 ASSESSMENT — ENCOUNTER SYMPTOMS
VOMITING: 0
DIARRHEA: 0
FACIAL SWELLING: 0
CHEST TIGHTNESS: 0
COUGH: 0
ABDOMINAL PAIN: 0
COLOR CHANGE: 0
BACK PAIN: 0
EYE DISCHARGE: 0
WHEEZING: 0
SHORTNESS OF BREATH: 1
TROUBLE SWALLOWING: 0
SINUS PRESSURE: 0
EYE REDNESS: 0
RHINORRHEA: 0
SORE THROAT: 0
NAUSEA: 0
BLOOD IN STOOL: 0
EYE PAIN: 0
CONSTIPATION: 0

## 2021-03-30 NOTE — ED NOTES
Pt resting in bed alert x3 , drinking pop, denies needs , will d/c      Willi Daily, RN  03/30/21 9950

## 2021-03-30 NOTE — ED PROVIDER NOTES
[address]  eMERGENCY dEPARTMENT eNCOUnter      Pt Name: Izzy Hinojosa  MRN: [id number]  [place] 1950  Date of evaluation: 3/30/21      CHIEF COMPLAINT       Chief Complaint   Patient presents with    Shortness of Breath         HISTORY OF PRESENT ILLNESS    Izzy Hinojosa is a 78 y.o. female who presents complaining of shortness of breath. Patient was sent over from her pulmonologist office for shortness of breath. Patient states that she is been having shortness of breath ongoing for more than a week now. Patient is very familiar with her asthma symptoms and this is not similar. Patient states she does have pain kind going underneath the left breast and radiating around to her back. Patient has never had symptoms like this before. Patient has had PEs in the past and is currently on Coumadin. Patient has some swelling in her legs but states that is normal for her. REVIEW OF SYSTEMS       Review of Systems   Constitutional: Negative for activity change, appetite change, chills, diaphoresis and fever. HENT: Negative for congestion, ear pain, facial swelling, nosebleeds, rhinorrhea, sinus pressure, sore throat and trouble swallowing. Eyes: Negative for pain, discharge and redness. Respiratory: Positive for shortness of breath. Negative for cough, chest tightness and wheezing. Cardiovascular: Positive for chest pain. Negative for palpitations and leg swelling. Gastrointestinal: Negative for abdominal pain, blood in stool, constipation, diarrhea, nausea and vomiting. Genitourinary: Negative for difficulty urinating, dysuria, flank pain, frequency, genital sores and hematuria. Musculoskeletal: Negative for arthralgias, back pain, gait problem, joint swelling, myalgias and neck pain. Skin: Negative for color change, pallor, rash and wound. Neurological: Negative for dizziness, tremors, seizures, syncope, speech difficulty, weakness, numbness and headaches. Psychiatric/Behavioral: Negative for confusion, decreased concentration, hallucinations, self-injury, sleep disturbance and suicidal ideas. PAST MEDICAL HISTORY     Past Medical History:   Diagnosis Date    Affective disorder ([address]     seasonal    Arthritis     shoulders and lower back    DVT (deep venous thrombosis) (HCC)     has history in R leg X 4    Fibromyalgia     Hyperlipidemia     Hypertension     Pneumonia     Sinusitis     Thyroid disease     hypothyroidsm       SURGICAL HISTORY       Past Surgical History:   Procedure Laterality Date    COLONOSCOPY  8/31/2011    internal hemorrhoides    DILATION AND CURETTAGE OF UTERUS      ECTOPIC PREGNANCY SURGERY Left     HYSTERECTOMY         CURRENT MEDICATIONS       Current Discharge Medication List      CONTINUE these medications which have NOT CHANGED    Details   fluticasone (FLONASE) 50 MCG/ACT nasal spray SPRAY 2 SPRAYS INTO EACH NOSTRIL DAILY AS NEEDED FOR ALLERGIES  Qty: 16 g, Refills: 2    Associated Diagnoses: Seasonal allergies      buPROPion (WELLBUTRIN XL) 300 MG extended release tablet Take 1 tablet by mouth every morning  Qty: 90 tablet, Refills: 0    Associated Diagnoses: Depression, unspecified depression type; Anxiety      lidocaine (LIDODERM) 5 % PLACE 1 PATCH ONTO THE SKIN ONCE DAILY. PLACE FOR 12 HOURS ON AND REMOVE FOR 12 HOURS  Qty: 30 patch, Refills: 2    Associated Diagnoses: Fibromyalgia; Chronic pain of both shoulders; Primary osteoarthritis involving multiple joints      albuterol (PROVENTIL) (2.5 MG/3ML) 0.083% nebulizer solution Take 3 mLs by nebulization every 6 hours as needed for Wheezing  Qty: 120 each, Refills: 1    Associated Diagnoses: Mild persistent asthma without complication      acetaminophen-codeine (TYLENOL #3) 300-30 MG per tablet Take 1 tablet by mouth every 6 hours as needed for Pain for up to 30 days.   Qty: 120 tablet, Refills: 0    Comments: Reduce doses taken as pain becomes manageable  Associated Diagnoses: Primary osteoarthritis involving multiple joints; Fibromyalgia      traMADol (ULTRAM) 50 MG tablet Take 1 tablet by mouth 2 times daily as needed for Pain for up to 30 days. Qty: 60 tablet, Refills: 0    Comments: Reduce doses taken as pain becomes manageable  Associated Diagnoses: Fibromyalgia; Primary osteoarthritis involving multiple joints      ASPIRIN LOW DOSE 81 MG chewable tablet CHEW AND SWALLOW TAKE 1 TABLET BY MOUTH DAILY  Qty: 30 tablet, Refills: 11      amLODIPine (NORVASC) 10 MG tablet Take 1 tablet by mouth daily  Qty: 90 tablet, Refills: 1    Comments: This prescription was filled on 2/3/2020. Any refills authorized will be placed on file. Associated Diagnoses: Essential hypertension      vitamin D (ERGOCALCIFEROL) 1.25 MG (08261 UT) CAPS capsule TAKE 1 CAPSULE BY MOUTH ONCE WEEKLY EVERY SUNDAY  Qty: 12 capsule, Refills: 1    Comments: This prescription was filled on 7/28/2020. Any refills authorized will be placed on file. metoprolol tartrate (LOPRESSOR) 25 MG tablet TAKE 1 TABLET BY MOUTH TWICE DAILY  Qty: 180 tablet, Refills: 3    Associated Diagnoses: Essential hypertension      levothyroxine (SYNTHROID) 137 MCG tablet Take 1 tablet by mouth Daily  Qty: 90 tablet, Refills: 1    Associated Diagnoses: Acquired hypothyroidism      Blood Pressure Monitoring (BP MONITOR-STETHOSCOPE) KIT 1 each by Does not apply route daily as needed (high blood pressure)  Qty: 1 kit, Refills: 0    Associated Diagnoses: Essential hypertension      gabapentin (NEURONTIN) 800 MG tablet TAKE 1 TABLET BY MOUTH FOUR TIMES DAILY  Qty: 120 tablet, Refills: 3    Comments: This prescription was filled on 5/1/2020. Any refills authorized will be placed on file. Associated Diagnoses: Fibromyalgia      nitroGLYCERIN (NITROSTAT) 0.4 MG SL tablet up to max of 3 total doses. If no relief after 1 dose, call 911. Qty: 25 tablet, Refills: 1    Associated Diagnoses: Essential hypertension;  Chest pain, unspecified type warfarin (COUMADIN) 5 MG tablet Take 5 mg Mon, Wed, Fri and 2.5 mg all other days or as directed per coumadin clinic  Qty: 100 tablet, Refills: 3      hydrocortisone 1 % cream APPLY TO AFFECTED AREA TWICE DAILY  Qty: 30 g, Refills: 3    Associated Diagnoses: Atopic dermatitis, unspecified type      acyclovir (ZOVIRAX) 200 MG capsule TAKE 1 CAPSULE BY MOUTH TWICE DAILY  Qty: 60 capsule, Refills: 3      simvastatin (ZOCOR) 40 MG tablet TAKE 1 TABLET BY MOUTH EVERY EVENING  Qty: 90 tablet, Refills: 10      ammonium lactate (LAC-HYDRIN) 12 % lotion APPLY TO THE AFFECTED AREA(S) DAILY  Qty: 225 g, Refills: 1    Associated Diagnoses: Dry skin      pantoprazole (PROTONIX) 40 MG tablet Take 1 tablet by mouth daily as needed (acid reflux)  Qty: 30 tablet, Refills: 2    Comments: This prescription was filled on 2/3/2020. Any refills authorized will be placed on file. Associated Diagnoses: Gastroesophageal reflux disease without esophagitis      naproxen sodium (ANAPROX) 220 MG tablet Take 220 mg by mouth 2 times daily as needed for Pain      Gabapentin POWD Formula 8E: diclo3%+gaba6%+DMSO5%+prilo2%+baclofen2%+lido2% with ketamine. Apply 1-2 gm topically to affected area TID-QID  Qty: 120 g, Refills: 5    Associated Diagnoses: Chronic pain of both shoulders; Fibromyalgia; Primary osteoarthritis involving multiple joints      albuterol sulfate  (90 Base) MCG/ACT inhaler              ALLERGIES     is allergic to cymbalta [duloxetine hcl]; lisinopril; and robaxin [methocarbamol]. FAMILY HISTORY     [unfilled]     SOCIAL HISTORY      reports that she has never smoked. She has never used smokeless tobacco. She reports that she does not drink alcohol or use drugs. PHYSICAL EXAM     INITIAL VITALS: /77   Pulse 67   Resp 24   Ht 5' 5\" (1.651 m)   Wt 230 lb (104.3 kg)   SpO2 94%   BMI 38.27 kg/m²      Physical Exam  Vitals signs and nursing note reviewed.    Constitutional:       General: She is not in acute distress. Appearance: She is well-developed. She is not diaphoretic. HENT:      Head: Normocephalic and atraumatic. Eyes:      General: No scleral icterus. Right eye: No discharge. Left eye: No discharge. Conjunctiva/sclera: Conjunctivae normal.      Pupils: Pupils are equal, round, and reactive to light. Cardiovascular:      Rate and Rhythm: Normal rate and regular rhythm. Heart sounds: Normal heart sounds. No murmur. No friction rub. No gallop. Pulmonary:      Effort: Pulmonary effort is normal. No respiratory distress. Breath sounds: Normal breath sounds. No wheezing or rales. Chest:      Chest wall: No tenderness. Abdominal:      General: Bowel sounds are normal. There is no distension. Palpations: Abdomen is soft. There is no mass. Tenderness: There is no abdominal tenderness. There is no guarding or rebound. Musculoskeletal: Normal range of motion. General: No tenderness. Skin:     General: Skin is warm and dry. Coloration: Skin is not pale. Findings: No erythema or rash. Neurological:      Mental Status: She is alert and oriented to person, place, and time. Cranial Nerves: No cranial nerve deficit. Sensory: No sensory deficit. Motor: No abnormal muscle tone. Coordination: Coordination normal.      Deep Tendon Reflexes: Reflexes normal.   Psychiatric:         Behavior: Behavior normal.         Thought Content: Thought content normal.         Judgment: Judgment normal.         MEDICAL DECISION MAKING:     Patient is not having any wheezing and is not hypoxic. She is on Coumadin I will check her INR if that is low then I have to be highly suspicious for the possibility of a PE even though the patient is not tachycardic.     DIAGNOSTIC RESULTS     EKG: All EKG's are interpreted by the Emergency Department Physician who either signs or Co-signs this chart in the absence of a cardiologist.    EKG Interpretation Interpreted by emergency department physician    Rhythm: normal sinus   Rate: normal  Axis: normal  Ectopy: none  Conduction: normal  ST Segments: nonspecific changes  T Waves: non specific changes  Q Waves: none    Clinical Impression: EKG: normal sinus rhythm, nonspecific ST and T waves changes. [facility]        RADIOLOGY:All plain film, CT, MRI, and formal ultrasound images (except ED bedside ultrasound)are read by the radiologist and interpretations are directly viewed by the emergency physician. Xr Chest Portable    Result Date: 3/30/2021  EXAMINATION: ONE XRAY VIEW OF THE CHEST 3/30/2021 11:55 am COMPARISON: November 20, 2020 HISTORY: ORDERING SYSTEM PROVIDED HISTORY: Chest Pain TECHNOLOGIST PROVIDED HISTORY: Chest Pain Reason for Exam: Pt states chest pains. Acuity: Unknown Type of Exam: Unknown Additional signs and symptoms: Pt states chest pains. FINDINGS: The lungs are without acute focal process. There is no effusion or pneumothorax. The cardiomediastinal silhouette is stable. The osseous structures are stable. No acute process. Stable compared to prior study     Ct Chest Pulmonary Embolism W Contrast    Result Date: 3/30/2021  EXAMINATION: CTA OF THE CHEST 3/30/2021 1:07 pm TECHNIQUE: CTA of the chest was performed after the administration of intravenous contrast.  Multiplanar reformatted images are provided for review. MIP images are provided for review. Dose modulation, iterative reconstruction, and/or weight based adjustment of the mA/kV was utilized to reduce the radiation dose to as low as reasonably achievable. COMPARISON: Chest radiograph today.   Chest CT exams 01/04/2021, 08/02/2019 and 07/19/2016 HISTORY: ORDERING SYSTEM PROVIDED HISTORY: AGATA ELENA TECHNOLOGIST PROVIDED HISTORY: AGATA ELENA Decision Support Exception->Emergency Medical Condition (MA) Reason for Exam: chest pain, sob Acuity: Unknown Type of Exam: Unknown FINDINGS: Pulmonary Arteries: Pulmonary arteries are adequately opacified for evaluation. Small residual chronic thrombus within segmental branches in the right lower lobe again demonstrated. No evidence for acute pulmonary embolism main pulmonary artery is normal in caliber. Mediastinum: No evidence of mediastinal lymphadenopathy. The heart and pericardium demonstrate no acute abnormality. There is no acute abnormality of the thoracic aorta. Lungs/pleura: Expiratory phase of imaging. Mild dependent subsegmental atelectasis. No consolidation. No evidence for edema. No effusion. Stable pleural based 3 mm nodule in the right middle lobe since at least 2016 consistent with a benign process for which no further evaluation is necessary. The central airway is patent. Upper Abdomen: No acute findings. Soft Tissues/Bones: No acute bone or soft tissue abnormality. 1.  Unchanged nonocclusive chronic embolism in the right lower lobe. No acute pulmonary embolism. 2.  Subsegmental atelectasis in the lung bases. No acute airspace disease identified. LABS: All lab results were reviewed byjuan pablolf, and all abnormals are listed below.   Labs Reviewed   BASIC METABOLIC PANEL - Abnormal; Notable for the following components:       Result Value    Potassium 3.5 (*)     Anion Gap 8 (*)     All other components within normal limits   CBC WITH AUTO DIFFERENTIAL - Abnormal; Notable for the following components:    WBC 11.2 (*)     Monocytes 10 (*)     All other components within normal limits   PROTIME-INR - Abnormal; Notable for the following components:    Protime 26.9 (*)     All other components within normal limits   BRAIN NATRIURETIC PEPTIDE   TROP/MYOGLOBIN   APTT   TROP/MYOGLOBIN         EMERGENCY DEPARTMENT COURSE:   Vitals:    Vitals:    03/30/21 1230 03/30/21 1245 03/30/21 1254 03/30/21 1300   BP: 128/78 125/79  104/77   Pulse: 69 90 67    Resp: 24      SpO2: 96% 95% 95% 94%   Weight:       Height:           The patient was given the following medications while in the emergency department:     Orders Placed This Encounter   Medications    iopamidol (ISOVUE-370) 76 % injection 75 mL    sodium chloride flush 0.9 % injection 10 mL    0.9 % sodium chloride bolus       -------------------------  1:09 PM EDT  Patient was updated on results. I spoke with Dr. Ashly Rea who is requesting that we do a CT scan asked and if that and the second troponin are normal then she feels the patient is okay to be discharged home with something for pain. 2:05 PM EDT  Patient was updated on the results and plan for discharge home. CRITICAL CARE:   none    CONSULTS:  IP CONSULT TO PRIMARY CARE PROVIDER    PROCEDURES:  none    FINAL IMPRESSION      1. Shortness of breath    2.  Chest pain, unspecified type          DISPOSITION/PLAN   DISPOSITION Decision To Discharge 03/30/2021 02:04:39 PM      PATIENT REFERRED TO:  Catarina Lucio MD  [address] [facility]  [address]  [address] [facility] [address] [phone]    In 3 days      [facility]  [facility]  [address] [facility]  [phone]    If symptoms worsen      DISCHARGE MEDICATIONS:  Current Discharge Medication List          (Please note that portions of this note were completed with a voice recognition program.  Efforts were made to edit the dictations but occasionally words are mis-transcribed.)    Liz Cordoba MD  Attending Moy Cota MD  03/30/21 [id number]

## 2021-03-31 ENCOUNTER — CARE COORDINATION (OUTPATIENT)
Dept: CARE COORDINATION | Age: 71
End: 2021-03-31

## 2021-03-31 ENCOUNTER — TELEPHONE (OUTPATIENT)
Dept: PHARMACY | Age: 71
End: 2021-03-31

## 2021-03-31 LAB
EKG ATRIAL RATE: 69 BPM
EKG P AXIS: 48 DEGREES
EKG P-R INTERVAL: 152 MS
EKG Q-T INTERVAL: 394 MS
EKG QRS DURATION: 100 MS
EKG QTC CALCULATION (BAZETT): 422 MS
EKG R AXIS: 18 DEGREES
EKG T AXIS: 19 DEGREES
EKG VENTRICULAR RATE: 69 BPM

## 2021-03-31 PROCEDURE — 93010 ELECTROCARDIOGRAM REPORT: CPT | Performed by: INTERNAL MEDICINE

## 2021-03-31 NOTE — CARE COORDINATION
Patient contacted regarding recent discharge and COVID-19 risk. Discussed COVID-19 related testing which was not done at this time. Test results were N/A. Patient informed of results, if available? Yes and N/A     Care Transition Nurse/ Ambulatory Care Manager contacted the patient by telephone to perform post discharge assessment. Verified name and  with patient as identifiers. Patient has following risk factors of: asthma and > 61years of age. CTN/ACM reviewed discharge instructions, medical action plan and red flags related to discharge diagnosis. Reviewed and educated them on any new and changed medications related to discharge diagnosis. Advised obtaining a 90-day supply of all daily and as-needed medications. Education provided regarding infection prevention, and signs and symptoms of COVID-19 and when to seek medical attention with patient who verbalized understanding. Discussed exposure protocols and quarantine from [address] you at higher risk for severe illness  and given an opportunity for questions and concerns. The patient agrees to contact the COVID-19 hotline [phone] or PCP office for questions related to their healthcare. CTN/ACM provided contact information for future reference. From CDC: Are you at higher risk for severe illness?  Wash your hands often.  Avoid close contact (6 feet, which is about two arm lengths) with people who are sick.  Put distance between yourself and other people if COVID-19 is spreading in your community.  Clean and disinfect frequently touched surfaces.  Avoid all cruise travel and non-essential air travel.  Call your healthcare professional if you have concerns about COVID-19 and your underlying condition or if you are sick. For more information on steps you can take to protect yourself, see CDC's How to Protect Yourself    No further follow up required based on severity of symptoms and risk factors.     Advance Care Planning  People with COVID-19 may have no symptoms, mild symptoms, such as fever, cough, and shortness of breath or they may have more severe illness, developing severe and fatal pneumonia. As a result, Advance Care Planning with attention to naming a health care decision maker (someone you trust to make healthcare decisions for you if you could not speak for yourself) and sharing other health care preferences is important BEFORE a possible health crisis. Please contact your Primary Care Provider to discuss Advance Care Planning. Preventing the Spread of Coronavirus Disease 2019 in Homes and Residential Communities  For the most recent information go to [email or web address]    Prevention steps for People with confirmed or suspected COVID-19 (including persons under investigation) who do not need to be hospitalized  and   People with confirmed COVID-19 who were hospitalized and determined to be medically stable to go home    Your healthcare provider and public health staff will evaluate whether you can be cared for at home. If it is determined that you do not need to be hospitalized and can be isolated at home, you will be monitored by staff from your local or state health department. You should follow the prevention steps below until a healthcare provider or local or state health department says you can return to your normal activities. Stay home except to get medical care  People who are mildly ill with COVID-19 are able to isolate at home during their illness. You should restrict activities outside your home, except for getting medical care. Do not go to work, school, or public areas. Avoid using public transportation, ride-sharing, or taxis. Separate yourself from other people and animals in your home  People: As much as possible, you should stay in a specific room and away from other people in your home.  Also, you should use a separate bathroom, if available. Animals: You should restrict contact with pets and other animals while you are sick with COVID-19, just like you would around other people. Although there have not been reports of pets or other animals becoming sick with COVID-19, it is still recommended that people sick with COVID-19 limit contact with animals until more information is known about the virus. When possible, have another member of your household care for your animals while you are sick. If you are sick with COVID-19, avoid contact with your pet, including petting, snuggling, being kissed or licked, and sharing food. If you must care for your pet or be around animals while you are sick, wash your hands before and after you interact with pets and wear a facemask. Call ahead before visiting your doctor  If you have a medical appointment, call the healthcare provider and tell them that you have or may have COVID-19. This will help the healthcare providers office take steps to keep other people from getting infected or exposed. Wear a facemask  You should wear a facemask when you are around other people (e.g., sharing a room or vehicle) or pets and before you enter a healthcare providers office. If you are not able to wear a facemask (for example, because it causes trouble breathing), then people who live with you should not stay in the same room with you, or they should wear a facemask if they enter your room. Cover your coughs and sneezes  Cover your mouth and nose with a tissue when you cough or sneeze. Throw used tissues in a lined trash can. Immediately wash your hands with soap and water for at least 20 seconds or, if soap and water are not available, clean your hands with an alcohol-based hand  that contains at least 60% alcohol.   Clean your hands often  Wash your hands often with soap and water for at least 20 seconds, especially after blowing your nose, coughing, or sneezing; going to the bathroom; and before eating or preparing food. If soap and water are not readily available, use an alcohol-based hand  with at least 60% alcohol, covering all surfaces of your hands and rubbing them together until they feel dry. Soap and water are the best option if hands are visibly dirty. Avoid touching your eyes, nose, and mouth with unwashed hands. Avoid sharing personal household items  You should not share dishes, drinking glasses, cups, eating utensils, towels, or bedding with other people or pets in your home. After using these items, they should be washed thoroughly with soap and water. Clean all high-touch surfaces everyday  High touch surfaces include counters, tabletops, doorknobs, bathroom fixtures, toilets, phones, keyboards, tablets, and bedside tables. Also, clean any surfaces that may have blood, stool, or body fluids on them. Use a household cleaning spray or wipe, according to the label instructions. Labels contain instructions for safe and effective use of the cleaning product including precautions you should take when applying the product, such as wearing gloves and making sure you have good ventilation during use of the product. Monitor your symptoms  Seek prompt medical attention if your illness is worsening (e.g., difficulty breathing). Before seeking care, call your healthcare provider and tell them that you have, or are being evaluated for, COVID-19. Put on a facemask before you enter the facility. These steps will help the healthcare providers office to keep other people in the office or waiting room from getting infected or exposed. Ask your healthcare provider to call the local or state health department. Persons who are placed under active monitoring or facilitated self-monitoring should follow instructions provided by their local health department or occupational health professionals, as appropriate. When working with your local health department check their available hours.   If you have a medical emergency and need to call 911, notify the dispatch personnel that you have, or are being evaluated for COVID-19. If possible, put on a facemask before emergency medical services arrive. Discontinuing home isolation  Patients with confirmed COVID-19 should remain under home isolation precautions until the risk of secondary transmission to others is thought to be low. The decision to discontinue home isolation precautions should be made on a case-by-case basis, in consultation with healthcare providers and state and local health departments.

## 2021-03-31 NOTE — ACP (ADVANCE CARE PLANNING)
Advance Care Planning   Healthcare Decision Maker:    Primary Decision Maker: Ronaldo Cadet Child - [phone]    Secondary Decision Maker: John Guillermo - Child - [phone]    Supplemental (Other) Decision Maker: Geetha Funes - Brother/Sister - [phone]    Click here to complete Healthcare Decision Makers including selection of the Healthcare Decision Maker Relationship (ie \"Primary\"). Today we documented Decision Maker(s) consistent with Legal Next of Kin hierarchy.

## 2021-03-31 NOTE — TELEPHONE ENCOUNTER
Patient seen in [facility] ER yesterday for shortness of breath. CT scan to check for PE -  Unchanged nonocclusive chronic embolism in the right lower lobe.  No   acute pulmonary embolism. INR was 2.5. Pt has an appointment for April 7 scheduled already.    No changes to warfarin dosage or followup  Shira Nurse, Pharm D, [facility]  3/31/2021 7:13 AM

## 2021-04-07 ENCOUNTER — HOSPITAL ENCOUNTER (OUTPATIENT)
Dept: PHARMACY | Age: 71
Setting detail: THERAPIES SERIES
Discharge: HOME | End: 2021-04-07
Payer: COMMERCIAL

## 2021-04-07 DIAGNOSIS — I82.403 RECURRENT ACUTE DEEP VEIN THROMBOSIS (DVT) OF BOTH LOWER EXTREMITIES (HCC): ICD-10-CM

## 2021-04-07 LAB
INR BLD: 3.6
PROTIME: 43.7 SECONDS

## 2021-04-07 PROCEDURE — 99212 OFFICE O/P EST SF 10 MIN: CPT

## 2021-04-07 PROCEDURE — 85610 PROTHROMBIN TIME: CPT

## 2021-04-07 RX ORDER — MONTELUKAST SODIUM 10 MG/1
10 TABLET ORAL NIGHTLY
COMMUNITY
End: 2021-08-31 | Stop reason: SDUPTHER

## 2021-04-14 ENCOUNTER — HOSPITAL ENCOUNTER (OUTPATIENT)
Dept: PHARMACY | Age: 71
Setting detail: THERAPIES SERIES
Discharge: HOME | End: 2021-04-14
Payer: COMMERCIAL

## 2021-04-14 DIAGNOSIS — I82.403 RECURRENT ACUTE DEEP VEIN THROMBOSIS (DVT) OF BOTH LOWER EXTREMITIES (HCC): ICD-10-CM

## 2021-04-14 LAB
INR BLD: 2.7
PROTIME: 32 SECONDS

## 2021-04-14 PROCEDURE — 85610 PROTHROMBIN TIME: CPT

## 2021-04-14 PROCEDURE — 99211 OFF/OP EST MAY X REQ PHY/QHP: CPT

## 2021-04-14 NOTE — PROGRESS NOTES
Patient seen in person in Medication Management Service. Patient states compliant most of the time with regimen. No bleeding or thromboembolic side effects noted. No significant med or dietary changes. No significant recent illness or disease state changes. PT/INR done via POC meter per protocol. INR was therapeutic at 2.7.  (goal 2 - 3)    Warfarin regimen will be continued at current dose 5 mg Mon/Fri and 2.5 mg all other days. Will retest in 2 weeks. Patient understands dosing directions and information discussed. Dosing schedule and follow up appointment given to patient. Progress note routed to referring physicians office. 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 COVID 19 screening completed. At this time patient denies symptoms, recent travel and exposure. Patient educated to screen for temperature and COVID-19 symptoms prior to coming to clinic for next appointment. They are instructed to call the clinic to reschedule if they have any symptoms. Standing order for PT/INR has been placed in preparation to transition to possible remote INR monitoring given efforts to reduce the spread of COVID-19.       CLINICAL PHARMACY CONSULT: MED RECONCILIATION/REVIEW ADDENDUM    For Pharmacy Admin Tracking Only    PHSO: Yes  Total # of Interventions Recommended: 0    - Maintenance Safety Lab Monitoring #: 1  Total Interventions Accepted: 0  Time Spent (min): 20    Branden Crum PharmD

## 2021-04-19 ENCOUNTER — HOSPITAL ENCOUNTER (OUTPATIENT)
Dept: NON INVASIVE DIAGNOSTICS | Age: 71
Discharge: HOME | End: 2021-04-19
Payer: COMMERCIAL

## 2021-04-19 DIAGNOSIS — R06.02 SHORTNESS OF BREATH: ICD-10-CM

## 2021-04-19 LAB
LV EF: 55 %
LVEF MODALITY: NORMAL

## 2021-04-19 PROCEDURE — 93306 TTE W/DOPPLER COMPLETE: CPT

## 2021-04-28 ENCOUNTER — HOSPITAL ENCOUNTER (OUTPATIENT)
Dept: PHARMACY | Age: 71
Setting detail: THERAPIES SERIES
Discharge: HOME | End: 2021-04-28
Payer: COMMERCIAL

## 2021-04-28 DIAGNOSIS — I82.403 RECURRENT ACUTE DEEP VEIN THROMBOSIS (DVT) OF BOTH LOWER EXTREMITIES (HCC): ICD-10-CM

## 2021-04-28 LAB
INR BLD: 2.1
PROTIME: 25.3 SECONDS

## 2021-04-28 PROCEDURE — 99211 OFF/OP EST MAY X REQ PHY/QHP: CPT

## 2021-04-28 PROCEDURE — 85610 PROTHROMBIN TIME: CPT

## 2021-04-28 NOTE — PROGRESS NOTES
Patient seen in person in Medication Management Service. Patient states compliant all of the time with regimen. No bleeding or thromboembolic side effects noted. No significant med or dietary changes. Is restarting CBD oil. No significant recent illness or disease state changes. PT/INR done via POC meter per protocol. INR was therapeutic at 2.1.  (goal 2 - 3)    Warfarin regimen will be continued at current dose 5 mg Mon/Fri and 2.5 mg all other days. Will retest in 2 weeks. Patient understands dosing directions and information discussed. Dosing schedule and follow up appointment given to patient. Progress note routed to referring physicians office. 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 COVID 19 screening completed. At this time patient denies symptoms, recent travel and exposure. Patient educated to screen for temperature and COVID-19 symptoms prior to coming to clinic for next appointment. They are instructed to call the clinic to reschedule if they have any symptoms. Standing order for PT/INR has been placed in preparation to transition to possible remote INR monitoring given efforts to reduce the spread of COVID-19.       For Pharmacy Admin Tracking Only     Intervention Detail:    Total # of Interventions Recommended: 0   Total # of Interventions Accepted: 0   Time Spent (min): 20

## 2021-05-20 ENCOUNTER — HOSPITAL ENCOUNTER (OUTPATIENT)
Dept: PHARMACY | Age: 71
Setting detail: THERAPIES SERIES
Discharge: HOME | End: 2021-05-20
Payer: COMMERCIAL

## 2021-05-20 DIAGNOSIS — I82.403 RECURRENT ACUTE DEEP VEIN THROMBOSIS (DVT) OF BOTH LOWER EXTREMITIES (HCC): Primary | ICD-10-CM

## 2021-05-20 LAB
INR BLD: 3.1
PROTIME: 36.8 SECONDS

## 2021-05-20 PROCEDURE — 85610 PROTHROMBIN TIME: CPT

## 2021-05-20 PROCEDURE — 99212 OFFICE O/P EST SF 10 MIN: CPT

## 2021-05-20 NOTE — PROGRESS NOTES
Patient seen in person in Medication Management Service. Patient states compliant all of the time with regimen. No bleeding or thromboembolic side effects noted. No significant med or dietary changes. No significant recent illness or disease state changes. Patient had a bit of CBD oil the past two weeks, this can raise INR. Patient states she is not going to take CBD oil over the next two weeks to see if her INR normalizes. No other reason found for INR increase, patient cannot remember if she had less greens. PT/INR done via POC meter per protocol. INR was supratherapeutic at 3.1.  (goal 2 - 3)    Warfarin regimen will be held for one day, 5/20, then continued at current dose of 5 mg Monday, Friday and 2.5 mg all other days. Will retest in 2 weeks. Patient understands dosing directions and information discussed. Dosing schedule and follow up appointment given to patient. Progress note routed to referring physicians office. Patient acknowledges working in [address] [place] [address] with Pharmacist as referred by his/her physician/provider. COVID 19 screening completed. At this time patient denies symptoms, recent travel and exposure. Patient educated to screen for temperature and COVID-19 symptoms prior to coming to clinic for next appointment. They are instructed to call the clinic to reschedule if they have any symptoms. Standing order for PT/INR has been placed in preparation to transition to possible remote INR monitoring given efforts to reduce the spread of COVID-19. For Pharmacy Admin Tracking Only     Intervention Detail: Dose Adjustment: 1: reason: Therapy Optimization   Total # of Interventions Recommended: 1   Total # of Interventions Accepted: 1   Time Spent (min): [address] PharmD.  [facility]) 5/20/2021 3:51 PM

## 2021-06-03 ENCOUNTER — HOSPITAL ENCOUNTER (OUTPATIENT)
Dept: PHARMACY | Age: 71
Setting detail: THERAPIES SERIES
Discharge: HOME | End: 2021-06-03
Payer: COMMERCIAL

## 2021-06-03 DIAGNOSIS — I82.403 RECURRENT ACUTE DEEP VEIN THROMBOSIS (DVT) OF BOTH LOWER EXTREMITIES (HCC): Primary | ICD-10-CM

## 2021-06-03 LAB
INR BLD: 3.9
PROTIME: 46.3 SECONDS

## 2021-06-03 PROCEDURE — 85610 PROTHROMBIN TIME: CPT

## 2021-06-03 PROCEDURE — 99212 OFFICE O/P EST SF 10 MIN: CPT

## 2021-06-09 ENCOUNTER — OFFICE VISIT (OUTPATIENT)
Dept: ORTHOPEDIC SURGERY | Age: 71
End: 2021-06-09
Payer: COMMERCIAL

## 2021-06-09 VITALS — HEIGHT: 65 IN | BODY MASS INDEX: 38.32 KG/M2 | WEIGHT: 230 LBS | RESPIRATION RATE: 18 BRPM

## 2021-06-09 DIAGNOSIS — M25.552 HIP PAIN, LEFT: Primary | ICD-10-CM

## 2021-06-09 DIAGNOSIS — M25.562 ACUTE PAIN OF LEFT KNEE: ICD-10-CM

## 2021-06-09 DIAGNOSIS — M70.62 GREATER TROCHANTERIC BURSITIS OF LEFT HIP: ICD-10-CM

## 2021-06-09 DIAGNOSIS — M25.462 EFFUSION OF BURSA OF LEFT KNEE: ICD-10-CM

## 2021-06-09 PROCEDURE — 1036F TOBACCO NON-USER: CPT | Performed by: PHYSICIAN ASSISTANT

## 2021-06-09 PROCEDURE — G8427 DOCREV CUR MEDS BY ELIG CLIN: HCPCS | Performed by: PHYSICIAN ASSISTANT

## 2021-06-09 PROCEDURE — 3017F COLORECTAL CA SCREEN DOC REV: CPT | Performed by: PHYSICIAN ASSISTANT

## 2021-06-09 PROCEDURE — 99214 OFFICE O/P EST MOD 30 MIN: CPT | Performed by: PHYSICIAN ASSISTANT

## 2021-06-09 PROCEDURE — G8399 PT W/DXA RESULTS DOCUMENT: HCPCS | Performed by: PHYSICIAN ASSISTANT

## 2021-06-09 PROCEDURE — G8417 CALC BMI ABV UP PARAM F/U: HCPCS | Performed by: PHYSICIAN ASSISTANT

## 2021-06-09 PROCEDURE — 1123F ACP DISCUSS/DSCN MKR DOCD: CPT | Performed by: PHYSICIAN ASSISTANT

## 2021-06-09 PROCEDURE — 1090F PRES/ABSN URINE INCON ASSESS: CPT | Performed by: PHYSICIAN ASSISTANT

## 2021-06-09 PROCEDURE — 4040F PNEUMOC VAC/ADMIN/RCVD: CPT | Performed by: PHYSICIAN ASSISTANT

## 2021-06-09 RX ORDER — METHYLPREDNISOLONE 4 MG/1
4 TABLET ORAL SEE ADMIN INSTRUCTIONS
Qty: 1 KIT | Refills: 0 | Status: SHIPPED | OUTPATIENT
Start: 2021-06-09 | End: 2021-06-15

## 2021-06-11 ENCOUNTER — APPOINTMENT (OUTPATIENT)
Dept: PHARMACY | Age: 71
End: 2021-06-11
Payer: COMMERCIAL

## 2021-06-17 ENCOUNTER — HOSPITAL ENCOUNTER (OUTPATIENT)
Dept: PHARMACY | Age: 71
Setting detail: THERAPIES SERIES
Discharge: HOME | End: 2021-06-17
Payer: COMMERCIAL

## 2021-06-17 DIAGNOSIS — I82.403 RECURRENT ACUTE DEEP VEIN THROMBOSIS (DVT) OF BOTH LOWER EXTREMITIES (HCC): Primary | ICD-10-CM

## 2021-06-17 LAB
INR BLD: 3.8
PROTIME: 45.7 SECONDS

## 2021-06-17 PROCEDURE — 99212 OFFICE O/P EST SF 10 MIN: CPT

## 2021-06-17 PROCEDURE — 85610 PROTHROMBIN TIME: CPT

## 2021-06-24 ENCOUNTER — TELEPHONE (OUTPATIENT)
Dept: PHARMACY | Age: 71
End: 2021-06-24

## 2021-06-24 NOTE — TELEPHONE ENCOUNTER
Patient called in to inform us she would not be able to make it to her INR appointment today. She has a flat tire and was not able to get it fixed or find transportation to make it. When asked when she'd like to reschedule she said she would call us to reschedule once she either fixes the tire or gets a ride. Indicated the importance of INR monitoring. Last INR was supra therapeutic at 3.8. Patient reports no signs of bleeding and voices understanding that she should contact us as soon as she can. Skyler Mills, PharmD.  [facility]) 6/24/2021 3:21 PM

## 2021-06-30 ENCOUNTER — TELEPHONE (OUTPATIENT)
Dept: PHARMACY | Age: 71
End: 2021-06-30

## 2021-07-06 ENCOUNTER — HOSPITAL ENCOUNTER (OUTPATIENT)
Dept: PHARMACY | Age: 71
Setting detail: THERAPIES SERIES
Discharge: HOME | End: 2021-07-06
Payer: COMMERCIAL

## 2021-07-06 DIAGNOSIS — I82.403 RECURRENT ACUTE DEEP VEIN THROMBOSIS (DVT) OF BOTH LOWER EXTREMITIES (HCC): Primary | ICD-10-CM

## 2021-07-06 LAB
INR BLD: 3
PROTIME: 36 SECONDS

## 2021-07-06 PROCEDURE — 85610 PROTHROMBIN TIME: CPT

## 2021-07-06 PROCEDURE — 99211 OFF/OP EST MAY X REQ PHY/QHP: CPT

## 2021-07-06 NOTE — PROGRESS NOTES
Patient seen in person in Medication Management Service. Patient states compliant most of the time with regimen. No bleeding or thromboembolic side effects noted. No significant med or dietary changes. No significant recent illness or disease state changes. PT/INR done via POC meter per protocol. INR was therapeutic at 3.  (goal 2 - 3)  The patient has been taking 1-2 Aleve daily for fibromyalgia pain (8/10)    Warfarin regimen will be continued at current dose 5 mg Mondays and 2.5 mg all other days. Will retest in 2 weeks. Patient understands dosing directions and information discussed. Dosing schedule and follow up appointment given to patient. Progress note routed to referring physicians office. Patient acknowledges working in [address] [place] [address] with Pharmacist as referred by his/her physician/provider. COVID 19 screening completed. At this time patient denies symptoms, recent travel and exposure. Patient educated to screen for temperature and COVID-19 symptoms prior to coming to clinic for next appointment. They are instructed to call the clinic to reschedule if they have any symptoms. Standing order for PT/INR has been placed in preparation to transition to possible remote INR monitoring given efforts to reduce the spread of COVID-19.       For Pharmacy Admin Tracking Only     Intervention Detail:    Total # of Interventions Recommended: 0   Total # of Interventions Accepted: 0   Time Spent (min): 20

## 2021-07-08 ENCOUNTER — OFFICE VISIT (OUTPATIENT)
Dept: ORTHOPEDIC SURGERY | Age: 71
End: 2021-07-08
Payer: COMMERCIAL

## 2021-07-08 VITALS — HEIGHT: 66 IN | WEIGHT: 227 LBS | BODY MASS INDEX: 36.48 KG/M2

## 2021-07-08 DIAGNOSIS — M70.62 GREATER TROCHANTERIC BURSITIS OF LEFT HIP: ICD-10-CM

## 2021-07-08 DIAGNOSIS — G89.29 CHRONIC LEFT SHOULDER PAIN: Primary | ICD-10-CM

## 2021-07-08 DIAGNOSIS — M25.512 CHRONIC LEFT SHOULDER PAIN: Primary | ICD-10-CM

## 2021-07-08 PROCEDURE — 99213 OFFICE O/P EST LOW 20 MIN: CPT | Performed by: PHYSICIAN ASSISTANT

## 2021-07-08 PROCEDURE — 1090F PRES/ABSN URINE INCON ASSESS: CPT | Performed by: PHYSICIAN ASSISTANT

## 2021-07-08 PROCEDURE — G8399 PT W/DXA RESULTS DOCUMENT: HCPCS | Performed by: PHYSICIAN ASSISTANT

## 2021-07-08 PROCEDURE — 4040F PNEUMOC VAC/ADMIN/RCVD: CPT | Performed by: PHYSICIAN ASSISTANT

## 2021-07-08 PROCEDURE — G8427 DOCREV CUR MEDS BY ELIG CLIN: HCPCS | Performed by: PHYSICIAN ASSISTANT

## 2021-07-08 PROCEDURE — 1123F ACP DISCUSS/DSCN MKR DOCD: CPT | Performed by: PHYSICIAN ASSISTANT

## 2021-07-08 PROCEDURE — 3017F COLORECTAL CA SCREEN DOC REV: CPT | Performed by: PHYSICIAN ASSISTANT

## 2021-07-08 PROCEDURE — 1036F TOBACCO NON-USER: CPT | Performed by: PHYSICIAN ASSISTANT

## 2021-07-08 PROCEDURE — G8417 CALC BMI ABV UP PARAM F/U: HCPCS | Performed by: PHYSICIAN ASSISTANT

## 2021-07-14 ENCOUNTER — OFFICE VISIT (OUTPATIENT)
Dept: ORTHOPEDIC SURGERY | Age: 71
End: 2021-07-14
Payer: COMMERCIAL

## 2021-07-14 VITALS — HEIGHT: 60 IN | WEIGHT: 227 LBS | BODY MASS INDEX: 44.57 KG/M2

## 2021-07-14 DIAGNOSIS — M25.512 CHRONIC LEFT SHOULDER PAIN: Primary | ICD-10-CM

## 2021-07-14 DIAGNOSIS — G89.29 CHRONIC LEFT SHOULDER PAIN: Primary | ICD-10-CM

## 2021-07-14 PROCEDURE — G8427 DOCREV CUR MEDS BY ELIG CLIN: HCPCS | Performed by: PHYSICIAN ASSISTANT

## 2021-07-14 PROCEDURE — 1090F PRES/ABSN URINE INCON ASSESS: CPT | Performed by: PHYSICIAN ASSISTANT

## 2021-07-14 PROCEDURE — G8399 PT W/DXA RESULTS DOCUMENT: HCPCS | Performed by: PHYSICIAN ASSISTANT

## 2021-07-14 PROCEDURE — G8417 CALC BMI ABV UP PARAM F/U: HCPCS | Performed by: PHYSICIAN ASSISTANT

## 2021-07-14 PROCEDURE — 3017F COLORECTAL CA SCREEN DOC REV: CPT | Performed by: PHYSICIAN ASSISTANT

## 2021-07-14 PROCEDURE — 1123F ACP DISCUSS/DSCN MKR DOCD: CPT | Performed by: PHYSICIAN ASSISTANT

## 2021-07-14 PROCEDURE — 4040F PNEUMOC VAC/ADMIN/RCVD: CPT | Performed by: PHYSICIAN ASSISTANT

## 2021-07-14 PROCEDURE — 20610 DRAIN/INJ JOINT/BURSA W/O US: CPT | Performed by: PHYSICIAN ASSISTANT

## 2021-07-14 PROCEDURE — 1036F TOBACCO NON-USER: CPT | Performed by: PHYSICIAN ASSISTANT

## 2021-07-14 RX ORDER — TRIAMCINOLONE ACETONIDE 40 MG/ML
40 INJECTION, SUSPENSION INTRA-ARTICULAR; INTRAMUSCULAR ONCE
Status: COMPLETED | OUTPATIENT
Start: 2021-07-14 | End: 2021-07-14

## 2021-07-14 RX ORDER — LIDOCAINE HYDROCHLORIDE 10 MG/ML
3 INJECTION, SOLUTION INFILTRATION; PERINEURAL ONCE
Status: COMPLETED | OUTPATIENT
Start: 2021-07-14 | End: 2021-07-14

## 2021-07-14 RX ADMIN — TRIAMCINOLONE ACETONIDE 40 MG: 40 INJECTION, SUSPENSION INTRA-ARTICULAR; INTRAMUSCULAR at 14:56

## 2021-07-14 RX ADMIN — LIDOCAINE HYDROCHLORIDE 3 ML: 10 INJECTION, SOLUTION INFILTRATION; PERINEURAL at 14:54

## 2021-07-14 NOTE — PROGRESS NOTES
Orthopedic Shoulder Encounter Note     Chief complaint: left shoulder pain    HPI: Aster العراقي is a 73 y.o.  right-hand dominant female who presents for evaluation of her left shoulder. Please see appointment note from last week for further detailed history. Patient was evaluated and thought to have evidence of rotator cuff impingement but unfortunately radiographs could not be performed at that time due to malfunction of our x-ray machine. She was instructed follow-up today for reevaluation of this left shoulder as well as radiographs of the left shoulder. Patient presents today stating she has had minimal improvement of pain. Pain continues to be most severe over the anterolateral shoulder. Pain is aggravated by any attempt at shoulder height and above range of motion. She also notes weakness on this left arm. Patient with history of injury 1998 to this left shoulder that was treated nonoperatively however at this time she has had pain for approximately 1-1/2 weeks without any injury, trauma or fall. She denies any radiation of pain into the neck or left upper extremity. No paresthesias or recent fever or chills. Previous treatment:    NSAIDs: None    Physical Therapy:  none    Injections: None     Surgeries: None    Review of Systems:   Constitutional: Negative for fever, chills, sweats. Pain Score:  10 - Worst pain ever  Neurological: Negative for headache, numbness, or weakness. Musculoskeletal: As noted in HPI     Physical Exam:        Past Medical History  Gabby De Anda  has a past medical history of Affective disorder ([address] Arthritis, DVT (deep venous thrombosis) ([address] Fibromyalgia, Hyperlipidemia, Hypertension, Pneumonia, Sinusitis, and Thyroid disease. Past Surgical History  Gabby De Anda  has a past surgical history that includes Colonoscopy (8/31/2011); Hysterectomy; Ectopic pregnancy surgery (Left); and Dilation and curettage of uterus.     Current Medications  Current Outpatient tablet 3    levothyroxine (SYNTHROID) 137 MCG tablet Take 1 tablet by mouth Daily 90 tablet 1    Blood Pressure Monitoring (BP MONITOR-STETHOSCOPE) KIT 1 each by Does not apply route daily as needed (high blood pressure) 1 kit 0    nitroGLYCERIN (NITROSTAT) 0.4 MG SL tablet up to max of 3 total doses. If no relief after 1 dose, call 911. 25 tablet 1    warfarin (COUMADIN) 5 MG tablet Take 5 mg Mon, Wed, Fri and 2.5 mg all other days or as directed per coumadin clinic (Patient taking differently: Take 5 mg Mon and 2.5 mg all other days or as directed per coumadin clinic) 100 tablet 3    hydrocortisone 1 % cream APPLY TO AFFECTED AREA TWICE DAILY 30 g 3    ammonium lactate (LAC-HYDRIN) 12 % lotion APPLY TO THE AFFECTED AREA(S) DAILY 225 g 1    pantoprazole (PROTONIX) 40 MG tablet Take 1 tablet by mouth daily as needed (acid reflux) 30 tablet 2     No current facility-administered medications for this visit. Allergies  Allergies have been reviewed. Natalya Leigh is allergic to cymbalta [duloxetine hcl], lisinopril, and robaxin [methocarbamol]. Social History  Natalya Leigh  reports that she has never smoked. She has never used smokeless tobacco. She reports that she does not drink alcohol and does not use drugs. Family History  Kaylynn's family history includes Cervical Cancer in her mother; Heart Disease in her father and mother. Physical Exam:     Ht 5' (1.524 m)   Wt 227 lb (103 kg)   BMI 44.33 kg/m²    Constitutional: Patient is oriented to person, place, and time. Patient appears well-developed and well nourished. Mental Status/psychiatric: Behavior is normal. Thought content normal.  HENT: Negative otherwise noted  Head: Normocephalic and Atraumatic  Nose: Normal  Respiratory/Cardio: Effort normal. No respiratory distress. Neck: Normal range of motion Neck supple. Gait: normal    Shoulder:    Skin: Skin is warm and dry; no swelling or obvious muscular atrophy.    Vasculature: 2+ radial pulses bilaterally  Neuro: Sensation grossly intact to light touch diffusely  Tenderness: Mild tenderness over the bicipital groove. No tenderness to the [facility] joint, lateral posterior shoulder. ROM: (Degrees)    Right   A P   Left   A P    Elevation  150    Elevation  70 150  Abduction  170    Abduction  90  120  ER   80    ER   60 70  IR   L1    IR   L5   90 abd/ER      90 abd/ER     90 abd/IR      90 abd/IR     Crepitation  No    Crepitation No  Dyskenesia  No    Dyskenesia No      Muscle strength:    Right       Left    Deltoid   5    Deltoid   3  Supraspinatus  5    Supraspinatus  3  ER   5    ER   4  IR   5    IR   5    Special tests    Right   Rotator Cuff    Left    n   Painful arc    y   n   Pain with ER    n    n   Neer's     y    n   Hawkin's    y    n   Drop Arm    n  n   Lift off/Belly Press   n  n   ER Lag    y          [facility] Joint  n   AC tenderness   n  n   Cross-chest adduction  n       Labrum/biceps    n   Hamlin's    y   n   Biceps sheer    n      n   Speed's/Yergason's   n    n   Tenderness Biceps Groove  y    n   Jorge's    n         Instability  n   Ant Apprehension   n    n   Post Apprehension   n    n   Ant Load shift    n    n   Post Load shift   n   n   Sulcus     n  n   Generalized Laxity   n  n   Relocation test   n  n   Crank test     n  n   Flex-superior escape  n       Imaging:  Xrays: 4 views of the left shoulder obtained on 7/14/2021 were independently reviewed  Indications: Left shoulder pain  Findings: 4 views of the left shoulder demonstrates glenohumeral joint space appears to be maintained. There is moderate degenerative changes of the acromioclavicular joint. Subacromial spurring present. There is no evidence of acute fracture or other acute osseous abnormality. Patient does appear to have cystic changes in the lateral acromion and proximal greater tuberosity.   Impression: Mild to moderate degenerative changes of the acromioclavicular joint minimal degenerative changes of the glenohumeral joint. Subacromial spurring present. Impression/Plan:   Marilin Cardoza is a 73 y.o. old female with left shoulder pain that is consistent with rotator cuff tendinitis. We discussed treatment options available to her, including nonoperative and operative intervention. At this time I believe she will benefit from conservative management. Consequently, a prescription for therapy was provided. We also discussed use of a prescription NSAID versus a diagnostic/therapeutic cortisone injection and at this time she has elected to proceed with  a cortisone injection which was administered as outlined below. Upon reevaluation after the injection patient had significant improvement of active abduction and forward flexion both to approximately 130 degrees. She had increased strength with abduction and and supraspinatus testing as well but still slightly decreased compared to right arm. I'll see her back my clinic in 6 weeks for reevaluation but she was encouraged to return or call earlier with questions and/or concerns. Procedure: left shoulder subacromial space injection  Following an appropriate discussion with the patient regarding the risks and benefits of the procedure she consented to proceed. her left shoulder was prepped using chlorhexadine solution. Using aseptic technique and through a posterior approach, her left shoulder subacromial space was injected with a 4 cc mixture of 1cc 40mg/ml kenalog and 3 cc of 1% lidocaine without epinephrine. A band aid was applied to the injection site. she tolerated the injection with no immediate adverse reactions. This note is created with the assistance of a speech recognition program.  While intending to generate adocument that actually reflects the content of the visit, the document can still have some errors including those of syntax and sound a like substitutions which may escape proof reading.   It such instances, actual meaningcan be extrapolated by contextual diversion.     NA = Not assessed  RTC = Rotator cuff  RCT = Rotator cuff tear  ER = External rotation  IR = Internal rotation  AC = Acromioclavicular  GH = Glenohumeral  n = No  y = Yes

## 2021-07-14 NOTE — PROGRESS NOTES
[address] [facility], [address] [place], [address] [place] [address], [address] [facility]           Dept Phone: [phone]           Dept Fax:  [phone] [address] [facility], [place]          Dept Phone: [phone]           Dept Fax:  [phone]      Chief Compliant:  Chief Complaint   Patient presents with    Follow-up     Lt hip & knee pain    Shoulder Pain     Lt shld        History of Present Illness:  Gabby De Anda returns today. This is a 73 y.o. female who presents to the clinic today for follow up of left hip pain. .  Patient was found to have trochanteric bursitis on 6/9/2020 appointment with me she was started on Medrol Dosepak at that time. Patient returns today noting significant improvement in left hip and knee pain. She is actually not concerned with the states she would like to be evaluated for her left shoulder. Left shoulder:  Patient reports this left shoulder has been intermittently bothering her since an initial injury 1998 however 3 days ago she had a severe increase of pain without any injury, trauma or fall. She has limited range of motion and weakness in this left shoulder secondary to the pain. She does not have any radiation of pain into the neck or the left upper extremity. She denies any numbness or tingling. Review of Systems   Constitutional: Negative for fever, chills, sweats, recent illness, or recent injury. Neurological: Negative for headaches, numbness, or weakness. Integumentary: Negative for rash, itching, ecchymosis, abrasions, or laceration. Musculoskeletal: Positive for Follow-up (Lt hip & knee pain) and Shoulder Pain (Lt shld)       Physical Exam:  Constitutional: Patient is oriented to person, place, and time. Patient appears well-developed and well nourished.    Musculoskeletal:    left Shoulder    Tenderness: Moderate tenderness over the bicipital groove. No tenderness to the [facility] joint, lateral or posterior shoulder. Range of Motion:      Active Abduction: 90     Passive Abduction: 120     Forward Flexion: 70 (150 Passively)     Extension: 10     External Rotation: 50     Internal Rotation 0 Deg: L5     Internal Rotation 90 Deg:        Muscle Strength:      Abduction: 3/5     Internal Rotation: 5/5     External Rotation: 5/5     Supraspinatus: 4/5     Subscapularis: 5/5     Biceps: 5/5       Tests:      Impingement: Positive     [place]: Positive     Cross Arm: Negative     Apprehension: Negative     Sulcus SIgn: Negative     Drop Arm Test: Negative     Neurological: Patient is alert and oriented to person, place, and time. Normal strenght. No sensory deficit. Skin: Skin is warm and dry  Psychiatric: Behavior is normal. Thought content normal.  Nursing note and vitals reviewed. Labs and Imaging:     XR taken today:  No results found. .    Assessment and Plan:  1. Chronic left shoulder pain    2. Greater trochanteric bursitis of left hip              PLAN:  This is a 73 y.o. female who presents to the clinic today for follow up left hip and left knee pain which have resolved at this time. Patient has she would like to be evaluated for her left shoulder. Examination is consistent with rotator cuff impingement however we are unable to obtain x-rays today    Unfortunately are unable to perform x-rays of this left shoulder to adequately assess patient's problem secondary to the x-ray room being shut down rather than waiting patient elected to proceed with follow-up appointment next week for reevaluation of this left shoulder and x-rays at that time    Please note that this chart was generated using voice recognition Dragon dictation software. Although every effort was made to ensure the accuracy of this automated transcription, some errors in transcription may have occurred.

## 2021-07-21 ENCOUNTER — HOSPITAL ENCOUNTER (OUTPATIENT)
Dept: PHARMACY | Age: 71
Setting detail: THERAPIES SERIES
Discharge: HOME | End: 2021-07-21
Payer: COMMERCIAL

## 2021-07-21 DIAGNOSIS — I82.403 RECURRENT ACUTE DEEP VEIN THROMBOSIS (DVT) OF BOTH LOWER EXTREMITIES (HCC): Primary | ICD-10-CM

## 2021-07-21 LAB
INR BLD: 2.7
PROTIME: 32.6 SECONDS

## 2021-07-21 PROCEDURE — 99211 OFF/OP EST MAY X REQ PHY/QHP: CPT

## 2021-07-21 PROCEDURE — 85610 PROTHROMBIN TIME: CPT

## 2021-07-21 NOTE — PROGRESS NOTES
FMLA form faxed to [employer]. Original mailed to patient. Form fee paid. Request complete. Patient seen in person in Medication Management Service. Patient states compliant all of the time with regimen. No bleeding or thromboembolic side effects noted. No significant med or dietary changes. Did take ibuprofen the last two days, has not been taken CBD for several months  No significant recent illness or disease state changes. Patient wants to incorporate spinach back in her diet, will try to eat nor more than 1/2 cup twice a week, will need monitor INR closely  Plans to start today    PT/INR done via POC meter per protocol. INR was therapeutic at 2.7.  (goal 2 - 3)    Warfarin regimen will be continued at current dose 5 mg Monday and 2.5 mg all other days. Will retest in 3 weeks. Patient understands dosing directions and information discussed. Dosing schedule and follow up appointment given to patient. Progress note routed to referring physicians office. Patient acknowledges working in [address] [place] [address] with Pharmacist as referred by his/her physician/provider. COVID 19 screening completed. At this time patient denies symptoms, recent travel and exposure. Patient educated to screen for temperature and COVID-19 symptoms prior to coming to clinic for next appointment. They are instructed to call the clinic to reschedule if they have any symptoms. Standing order for PT/INR has been placed in preparation to transition to possible remote INR monitoring given efforts to reduce the spread of COVID-19.  Santa Turner [facility].   [facility]  [phone]      For Pharmacy Admin Tracking Only     Time Spent (min): 20

## 2021-08-05 ENCOUNTER — HOSPITAL ENCOUNTER (OUTPATIENT)
Dept: PHARMACY | Age: 71
Setting detail: THERAPIES SERIES
Discharge: HOME | End: 2021-08-05
Payer: COMMERCIAL

## 2021-08-05 DIAGNOSIS — I82.403 RECURRENT ACUTE DEEP VEIN THROMBOSIS (DVT) OF BOTH LOWER EXTREMITIES (HCC): Primary | ICD-10-CM

## 2021-08-05 LAB
INR BLD: 1.5
PROTIME: 18.3 SECONDS

## 2021-08-05 PROCEDURE — 85610 PROTHROMBIN TIME: CPT

## 2021-08-05 PROCEDURE — 99213 OFFICE O/P EST LOW 20 MIN: CPT

## 2021-08-05 RX ORDER — WARFARIN SODIUM 2.5 MG/1
TABLET ORAL
Qty: 90 TABLET | Refills: 1 | Status: SHIPPED | OUTPATIENT
Start: 2021-08-05 | End: 2021-12-30 | Stop reason: SDUPTHER

## 2021-08-05 NOTE — PROGRESS NOTES
Patient seen in person in Medication Management Service. Patient states compliant all of the time with regimen. Patient uses pill box she fills every two weeks. Patient states the shape of her warfarin tablets have changed from oval to round. No bleeding or thromboembolic side effects noted. No significant med changes. Patient ate 1/2 cup spinach only about one day a week rather than two. No cranberry, grapefruit or black licorice. No significant recent illness or disease state changes. PT/INR done via POC meter per protocol. INR was subtherapeutic at 1.5.  (goal 2 - 3)    Warfarin regimen will be continued at current dose of 5mg on Mondays and 2.5mg all other days but will have patient take 5mg today. Will retest in 1 weeks. If still low at next appt will adjust as needed. RX for warfarin 2.5mg tablets #90 with 1 refill sent to pharmacy    Patient understands dosing directions and information discussed. Dosing schedule and follow up appointment given to patient. Progress note routed to referring physicians office. Patient acknowledges working in [address] [place] [address] with Pharmacist as referred by his/her physician/provider. COVID 19 screening completed. At this time patient denies symptoms, recent travel and exposure. Patient educated to screen for temperature and COVID-19 symptoms prior to coming to clinic for next appointment. They are instructed to call the clinic to reschedule if they have any symptoms. Standing order for PT/INR has been placed in preparation to transition to possible remote INR monitoring given efforts to reduce the spread of COVID-19. For Pharmacy Admin Tracking Only     Intervention Detail: Dose Adjustment: 1, reason: Therapy Optimization   Total # of Interventions Recommended: 1   Total # of Interventions Accepted: 1   Time Spent (min): 20       Carlton Nice RP,Pharm. D,, BCPS, CACP  8/5/2021  11:49 AM

## 2021-08-12 ENCOUNTER — HOSPITAL ENCOUNTER (OUTPATIENT)
Dept: PHARMACY | Age: 71
Setting detail: THERAPIES SERIES
Discharge: HOME | End: 2021-08-12
Payer: COMMERCIAL

## 2021-08-12 DIAGNOSIS — I82.403 RECURRENT ACUTE DEEP VEIN THROMBOSIS (DVT) OF BOTH LOWER EXTREMITIES (HCC): Primary | ICD-10-CM

## 2021-08-12 LAB
INR BLD: 2
PROTIME: 23.8 SECONDS

## 2021-08-12 PROCEDURE — 99212 OFFICE O/P EST SF 10 MIN: CPT

## 2021-08-12 PROCEDURE — 85610 PROTHROMBIN TIME: CPT

## 2021-08-12 RX ORDER — WARFARIN SODIUM 5 MG/1
5 TABLET ORAL SEE ADMIN INSTRUCTIONS
COMMUNITY
End: 2021-12-30 | Stop reason: SDUPTHER

## 2021-08-12 NOTE — PROGRESS NOTES
Patient seen in person in Medication Management Service. Patient states compliant all of the time with regimen. No bleeding or thromboembolic side effects noted. No significant med or dietary changes. No spinach this week, will try to avoid for now   No significant recent illness or disease state changes. PT/INR done via POC meter per protocol. INR was therapeutic at 2.  (goal 2 - 3)    Warfarin regimen will be increased to 5 mg Mon/Thur and 2.5 mg all other days. Will retest in 2 weeks. Patient understands dosing directions and information discussed. Dosing schedule and follow up appointment given to patient. Progress note routed to referring physicians office. Patient acknowledges working in [address] [place] [address] with Pharmacist as referred by his/her physician/provider. COVID 19 screening completed. At this time patient denies symptoms, recent travel and exposure. Patient educated to screen for temperature and COVID-19 symptoms prior to coming to clinic for next appointment. They are instructed to call the clinic to reschedule if they have any symptoms. Standing order for PT/INR has been placed in preparation to transition to possible remote INR monitoring given efforts to reduce the spread of COVID-19. Bryson Arteaga [facility].   [facility] Medication Management Services  [phone]    For Pharmacy Admin Tracking Only     Intervention Detail: Dose Adjustment: 1, reason: Therapy Optimization   Total # of Interventions Recommended: 1   Total # of Interventions Accepted: 1   Time Spent (min): 20

## 2021-08-25 ENCOUNTER — HOSPITAL ENCOUNTER (OUTPATIENT)
Dept: PHARMACY | Age: 71
Setting detail: THERAPIES SERIES
Discharge: HOME | End: 2021-08-25
Payer: COMMERCIAL

## 2021-08-25 ENCOUNTER — OFFICE VISIT (OUTPATIENT)
Dept: ORTHOPEDIC SURGERY | Age: 71
End: 2021-08-25
Payer: COMMERCIAL

## 2021-08-25 VITALS — WEIGHT: 227 LBS | HEIGHT: 60 IN | BODY MASS INDEX: 44.57 KG/M2

## 2021-08-25 DIAGNOSIS — M70.62 GREATER TROCHANTERIC BURSITIS OF LEFT HIP: Primary | ICD-10-CM

## 2021-08-25 DIAGNOSIS — M25.512 CHRONIC LEFT SHOULDER PAIN: ICD-10-CM

## 2021-08-25 DIAGNOSIS — G89.29 CHRONIC LEFT SHOULDER PAIN: ICD-10-CM

## 2021-08-25 LAB
INR BLD: 1.5
PROTIME: 17.8 SECONDS

## 2021-08-25 PROCEDURE — G8428 CUR MEDS NOT DOCUMENT: HCPCS | Performed by: PHYSICIAN ASSISTANT

## 2021-08-25 PROCEDURE — 1090F PRES/ABSN URINE INCON ASSESS: CPT | Performed by: PHYSICIAN ASSISTANT

## 2021-08-25 PROCEDURE — 99211 OFF/OP EST MAY X REQ PHY/QHP: CPT

## 2021-08-25 PROCEDURE — 1036F TOBACCO NON-USER: CPT | Performed by: PHYSICIAN ASSISTANT

## 2021-08-25 PROCEDURE — G8417 CALC BMI ABV UP PARAM F/U: HCPCS | Performed by: PHYSICIAN ASSISTANT

## 2021-08-25 PROCEDURE — 4040F PNEUMOC VAC/ADMIN/RCVD: CPT | Performed by: PHYSICIAN ASSISTANT

## 2021-08-25 PROCEDURE — G8399 PT W/DXA RESULTS DOCUMENT: HCPCS | Performed by: PHYSICIAN ASSISTANT

## 2021-08-25 PROCEDURE — 1123F ACP DISCUSS/DSCN MKR DOCD: CPT | Performed by: PHYSICIAN ASSISTANT

## 2021-08-25 PROCEDURE — 85610 PROTHROMBIN TIME: CPT

## 2021-08-25 PROCEDURE — 99214 OFFICE O/P EST MOD 30 MIN: CPT | Performed by: PHYSICIAN ASSISTANT

## 2021-08-25 PROCEDURE — 20610 DRAIN/INJ JOINT/BURSA W/O US: CPT | Performed by: PHYSICIAN ASSISTANT

## 2021-08-25 PROCEDURE — 3017F COLORECTAL CA SCREEN DOC REV: CPT | Performed by: PHYSICIAN ASSISTANT

## 2021-08-25 RX ORDER — BETAMETHASONE SODIUM PHOSPHATE AND BETAMETHASONE ACETATE 3; 3 MG/ML; MG/ML
12 INJECTION, SUSPENSION INTRA-ARTICULAR; INTRALESIONAL; INTRAMUSCULAR; SOFT TISSUE ONCE
Status: COMPLETED | OUTPATIENT
Start: 2021-08-25 | End: 2021-08-25

## 2021-08-25 RX ORDER — LIDOCAINE HYDROCHLORIDE 10 MG/ML
4 INJECTION, SOLUTION INFILTRATION; PERINEURAL ONCE
Status: COMPLETED | OUTPATIENT
Start: 2021-08-25 | End: 2021-08-25

## 2021-08-25 RX ADMIN — LIDOCAINE HYDROCHLORIDE 4 ML: 10 INJECTION, SOLUTION INFILTRATION; PERINEURAL at 10:58

## 2021-08-25 RX ADMIN — BETAMETHASONE SODIUM PHOSPHATE AND BETAMETHASONE ACETATE 12 MG: 3; 3 INJECTION, SUSPENSION INTRA-ARTICULAR; INTRALESIONAL; INTRAMUSCULAR; SOFT TISSUE at 10:57

## 2021-08-25 NOTE — PROGRESS NOTES
[facility], [address] [facility], [address] [place] [address], [address]           Dept Phone: [phone]           Dept Fax:  [phone] [facility], [place]          Dept Phone: [phone]           Dept Fax:  [phone]      Chief Compliant:  Chief Complaint   Patient presents with    Hip Pain     left        History of Present Illness: This is a 73 y.o. female who presents to the clinic today for evaluation of had concerns including Hip Pain (left). Returns today for reevaluation of left hip and left shoulder pain. Please see previous clinic notes for more detailed history. Left shoulder:  Was last seen 6 weeks ago for her left shoulder. She underwent a subacromial injection reports this provided complete resolution of her pain she has no complaints regards her left shoulder today. Left Hip:  Returns today for reevaluation of left hip/leg pain. The majority the pain is over the lateral hip and is aggravated by laying on this left side as well as prolonged walking or standing. Patient was seen for this issue back in June found to have trochanteric bursitis. At that time patient elected to proceed with a oral steroid prescription which she states initially did very well however over the last 2 to 3 weeks her pain has gradually returned to the previous level. Patient was provided with home exercise program at that time but reports she has not been participating in this daily. Patient denies any new injury, trauma or fall she denies any hip swelling, warmth, fever or chills. She reports this pain travels down the left leg into the left knee however it seems to stem from the hip.        Past History:    Current Outpatient Medications:     warfarin (COUMADIN) 5 MG tablet, Take 5 mg by mouth 5, Disp: , Rfl:     warfarin (COUMADIN) 2.5 MG tablet, Take 5 mg Mon and 2.5mg all other days (Patient taking differently: Take 5 mg Mon/Thu and 2.5mg all other days), Disp: 90 tablet, Rfl: 1    acetaminophen-codeine (TYLENOL #3) 300-30 MG per tablet, Take 1 tablet by mouth every 6 hours as needed for Pain for up to 30 days. , Disp: 120 tablet, Rfl: 0    traMADol (ULTRAM) 50 MG tablet, Take 1 tablet by mouth 2 times daily as needed for Pain for up to 30 days. , Disp: 60 tablet, Rfl: 0    buPROPion (WELLBUTRIN XL) 150 MG extended release tablet, Take 1 tablet by mouth every morning, Disp: 30 tablet, Rfl: 5    buPROPion (WELLBUTRIN XL) 300 MG extended release tablet, Take 1 tablet by mouth every morning, Disp: 90 tablet, Rfl: 0    simvastatin (ZOCOR) 40 MG tablet, TAKE 1 TABLET BY MOUTH EVERY EVENING, Disp: 90 tablet, Rfl: 10    albuterol sulfate  (90 Base) MCG/ACT inhaler, Inhale 1 puff into the lungs every 6 hours as needed for Wheezing, Disp: 2 Inhaler, Rfl: 3    albuterol (PROVENTIL) (2.5 MG/3ML) 0.083% nebulizer solution, Take 3 mLs by nebulization every 6 hours as needed for Wheezing, Disp: 120 each, Rfl: 1    Gabapentin POWD, Formula 8E: diclo3%+gaba6%+DMSO5%+prilo2%+baclofen2%+lido2% with ketamine. Apply 1-2 gm topically to affected area TID-QID, Disp: 120 g, Rfl: 5    acyclovir (ZOVIRAX) 200 MG capsule, TAKE 1 CAPSULE BY MOUTH TWICE DAILY, Disp: 60 capsule, Rfl: 3    gabapentin (NEURONTIN) 800 MG tablet, TAKE 1 TABLET BY MOUTH FOUR TIMES DAILY as needed, Disp: 120 tablet, Rfl: 2    lidocaine (LIDODERM) 5 %, PLACE 1 PATCH ONTO THE SKIN ONCE DAILY.  PLACE FOR 12 HOURS ON AND REMOVE FOR 12 HOURS, Disp: 30 patch, Rfl: 3    montelukast (SINGULAIR) 10 MG tablet, Take 10 mg by mouth nightly, Disp: , Rfl:     fluticasone (FLONASE) 50 MCG/ACT nasal spray, SPRAY 2 SPRAYS INTO EACH NOSTRIL DAILY AS NEEDED FOR ALLERGIES, Disp: 16 g, Rfl: 2    ASPIRIN LOW DOSE 81 MG chewable tablet, CHEW AND SWALLOW TAKE 1 TABLET BY MOUTH DAILY, Disp: 30 tablet, Rfl: 11    amLODIPine (NORVASC) 10 MG tablet, Take 1 tablet by mouth daily, Disp: 90 tablet, Rfl: 1    vitamin D (ERGOCALCIFEROL) 1.25 MG (43582 UT) CAPS capsule, TAKE 1 CAPSULE BY MOUTH ONCE WEEKLY EVERY SUNDAY, Disp: 12 capsule, Rfl: 1    metoprolol tartrate (LOPRESSOR) 25 MG tablet, TAKE 1 TABLET BY MOUTH TWICE DAILY, Disp: 180 tablet, Rfl: 3    levothyroxine (SYNTHROID) 137 MCG tablet, Take 1 tablet by mouth Daily, Disp: 90 tablet, Rfl: 1    Blood Pressure Monitoring (BP MONITOR-STETHOSCOPE) KIT, 1 each by Does not apply route daily as needed (high blood pressure), Disp: 1 kit, Rfl: 0    nitroGLYCERIN (NITROSTAT) 0.4 MG SL tablet, up to max of 3 total doses.  If no relief after 1 dose, call 911., Disp: 25 tablet, Rfl: 1    hydrocortisone 1 % cream, APPLY TO AFFECTED AREA TWICE DAILY, Disp: 30 g, Rfl: 3    ammonium lactate (LAC-HYDRIN) 12 % lotion, APPLY TO THE AFFECTED AREA(S) DAILY, Disp: 225 g, Rfl: 1    pantoprazole (PROTONIX) 40 MG tablet, Take 1 tablet by mouth daily as needed (acid reflux), Disp: 30 tablet, Rfl: 2  Allergies   Allergen Reactions    Cymbalta [Duloxetine Hcl]     Lisinopril Other (See Comments)     SEVER COUGH    Robaxin [Methocarbamol] Swelling     Social History     Socioeconomic History    Marital status: Single     Spouse name: Not on file    Number of children: 2    Years of education: 13    Highest education level: Not on file   Occupational History    Occupation: disabled      Comment: SSI    Tobacco Use    Smoking status: Never Smoker    Smokeless tobacco: Never Used   Vaping Use    Vaping Use: Never used   Substance and Sexual Activity    Alcohol use: No     Alcohol/week: 0.0 standard drinks     Comment: never used     Drug use: No    Sexual activity: Never   Other Topics Concern    Not on file   Social History Narrative    Not on file     Social Determinants of Health     Financial Resource Strain: Low Risk     Difficulty of Paying Living numbness, or weakness. Integumentary: Negative for rash, itching, laceration, or abrasion. Musculoskeletal: Positive for Hip Pain (left)       Physical Exam:  Constitutional: Patient is oriented to person, place, and time. Patient appears well-developed and well nourished. HENT: Negative otherwise noted  Head: Normocephalic and Atraumatic  Nose: Normal  Eyes: Conjunctivae and EOM are normal  Neck: Normal range of motion Neck supple. Respiratory/Cardio: Effort normal. No respiratory distress. Musculoskeletal:    left Hip    Tenderness: Severe tenderness over the left greater trochanter. Mild tenderness over the proximal IT band. No tenderness to the anterior posterior hip       Range of Motion:      Extension: 10     Flexion: 120     Internal Rotation: 30     External Rotation: 40     Abduction: 40     Adduction:  30       Muscle Strength      Abduction: 5/5     Adduction: 5/5     Flexion: 5/5         Gait: Antalgic   Yvette Test: Negative   Andi Test: Positive     left Shoulder    Tenderness: None       Range of Motion:      Active Abduction: 160     Passive Abduction: 170     Forward Flexion: 150     Extension: 30     External Rotation: 80     Internal Rotation 0 Deg: Normal     Internal Rotation 90 Deg: Normal       Muscle Strength:      Abduction: 5/5     Internal Rotation: 5/5     External Rotation: 5/5     Supraspinatus: 5/5     Subscapularis: 5/5     Biceps: 5/5       Tests:      Impingement: Negative     [place]: Negative     Cross Arm: Negative     Apprehension: Negative     Sulcus SIgn: Negative     Drop Arm Test: Negative     Neurological: Patient is alert and oriented to person, place, and time. Normal strenght. No sensory deficit. Skin: Skin is warm and dry  Psychiatric: Behavior is normal. Thought content normal.  Nursing note and vitals reviewed.      Labs and Imaging:     XR SHOULDER LEFT (MIN 2 VIEWS)  Imaging:  Xrays: 4 views of the left shoulder obtained on 7/14/2021 were independently reviewed  Indications: Left shoulder pain  Findings: 4 views of the left shoulder demonstrates glenohumeral joint   space appears to be maintained. There is moderate degenerative changes of   the acromioclavicular joint. Subacromial spurring present. There is no   evidence of acute fracture or other acute osseous abnormality. Patient   does appear to have cystic changes in the lateral acromion and proximal   greater tuberosity. Impression: Mild to moderate degenerative changes of the acromioclavicular   joint minimal degenerative changes of the glenohumeral joint. Subacromial   spurring present. X-rays taken in clinic today and preliminarily reviewed by me:           Orders Placed This Encounter   Procedures    20610 - DRAIN/INJECT LARGE JOINT BURSA       Assessment and Plan:  1. Greater trochanteric bursitis of left hip    2. Chronic left shoulder pain          PLAN:  Dipti Apple is a 73 y.o. old female ration of left hip and left shoulder pain. Patient notes complete resolution of left shoulder pain after subacromial injection. Examination notes significant provement in range of motion and strength. left hip pain that is consistent with left trochanteric bursitis  We discussed treatment options available to her, including nonoperative and operative intervention. At this time I believe she will benefit from conservative management. Consequently, an extensive home exercise program was provided. We also discussed use the possibility of a cortisone injection and at this time she has elected to proceed with as described below    I'll see her back my clinic on PRN basis but she was encouraged to return or call earlier with questions and/or concerns.     Procedure: left trochanteric bursal celestone injection  An informed verbal consent for the procedure was obtained and risks including, but not limited to: allergy to medications, injection, bleeding, stiffness of joint, recurrence of symptoms, loss of function, swelling, drainage, irrigation, need for surgery and pseudo-septic inflammation, were explained to the patient. Also, discussed was the potential for further injections, irrigation and debridement and surgery. Alternate means of treatment have also been discussed with the patient. Administrations This Visit     betamethasone acetate-betamethasone sodium phosphate (CELESTONE) injection 12 mg     Admin Date  08/25/2021  10:57 Action  Given Dose  12 mg Route  Intra-articular Site  Hip Left Administered By  Navarro Taylor LPN    Ordering Provider: Kennis Mcardle, PA [address]: [id number]    Lot#: [id number]    : [id number] [facility]    Patient Supplied?: No          lidocaine 1 % injection 4 mL     Admin Date  08/25/2021  10:58 Action  Given Dose  4 mL Route  Intra-articular Site  Hip Left Administered By  Navarro Taylor LPN    Ordering Provider: Kennis Mcardle, [place] [address]: [id number]    Lot#: [id number]. 1    : [id number] [facility]    Patient Supplied?: No                Following an appropriate discussion with the patient regarding the risks and benefits of the procedure she consented to proceed. her left trochanteric bursa was prepped using betadine solution. Using aseptic technique and through a lateral approach, her left trochanteric bursa was injected superficially with 4 cc of 1% lidocaine without epinephrine and subsequently with 2 cc of 6 mg/mL Celestone into the bursal space. A band aid was applied to the injection site. she tolerated the injection with no immediate adverse reactions. Electronically signed by Kennis Mcardle, PA on 8/25/21 at 11:10 AM EDT        Please note that this chart was generated using voice recognition Dragon dictation software. Although every effort was made to ensure the accuracy of this automated transcription, some errors in transcription may have occurred.

## 2021-08-25 NOTE — PROGRESS NOTES
Patient seen in person in Medication Management Service. Patient states compliant most of the time with regimen. No bleeding or thromboembolic side effects noted. No significant med or dietary changes. No significant recent illness or disease state changes. PT/INR done via POC meter per protocol. INR was subtherapeutic at 1.5.  (goal 2 - 3)  The patient did have diarrhea yesterday    Warfarin regimen will be increased to 5 mg MWF and 2.5 mg all other days. Will retest in 1 week. Patient understands dosing directions and information discussed. Dosing schedule and follow up appointment given to patient. Progress note routed to referring physicians office. 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 COVID 19 screening completed. At this time patient denies symptoms, recent travel and exposure. Patient educated to screen for temperature and COVID-19 symptoms prior to coming to clinic for next appointment. They are instructed to call the clinic to reschedule if they have any symptoms. Standing order for PT/INR has been placed in preparation to transition to possible remote INR monitoring given efforts to reduce the spread of COVID-19.       For Pharmacy Admin Tracking Only     Intervention Detail: Dose Adjustment: 1, reason: Therapy Optimization   Total # of Interventions Recommended: 1   Total # of Interventions Accepted: 1   Time Spent (min): 20

## 2021-09-01 ENCOUNTER — HOSPITAL ENCOUNTER (OUTPATIENT)
Dept: GENERAL RADIOLOGY | Age: 71
Discharge: HOME | End: 2021-09-03
Payer: COMMERCIAL

## 2021-09-01 ENCOUNTER — HOSPITAL ENCOUNTER (OUTPATIENT)
Dept: PHARMACY | Age: 71
Setting detail: THERAPIES SERIES
Discharge: HOME | End: 2021-09-01
Payer: COMMERCIAL

## 2021-09-01 ENCOUNTER — HOSPITAL ENCOUNTER (OUTPATIENT)
Age: 71
Discharge: HOME | End: 2021-09-03
Payer: COMMERCIAL

## 2021-09-01 DIAGNOSIS — I82.403 RECURRENT ACUTE DEEP VEIN THROMBOSIS (DVT) OF BOTH LOWER EXTREMITIES (HCC): Primary | ICD-10-CM

## 2021-09-01 DIAGNOSIS — R06.02 SHORTNESS OF BREATH: ICD-10-CM

## 2021-09-01 LAB
INR BLD: 3.1
PROTIME: 36.9 SECONDS

## 2021-09-01 PROCEDURE — 71046 X-RAY EXAM CHEST 2 VIEWS: CPT

## 2021-09-01 PROCEDURE — 85610 PROTHROMBIN TIME: CPT

## 2021-09-01 PROCEDURE — 99211 OFF/OP EST MAY X REQ PHY/QHP: CPT

## 2021-09-01 NOTE — PROGRESS NOTES
Patient seen in person in Medication Management Service. Patient states compliant all of the time with regimen. No bleeding or thromboembolic side effects noted. No significant dietary changes. Patient has stayed away from spinach. No significant recent illness or disease state changes. Patient did take ibuprofen 800 mg twice yesterday for pain due to running out of tramadol. Will be picking up the tramadol today. PT/INR done via POC meter per protocol. INR was supratherapeutic at 3.1.  (goal 2 - 3)  Pt has had ibuprofen 1600 mg in the past couple weeks. Will be picking up tramadol and stop ibuprofen. Warfarin regimen will be 2.5 mg today and then resume 5 mg MWF and 2.5 mg all other days. Will retest in 1 week. Patient understands dosing directions and information discussed. Dosing schedule and follow up appointment given to patient. Progress note routed to referring physicians office. 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 COVID 19 screening completed. At this time patient denies symptoms, recent travel and exposure. Patient educated to screen for temperature and COVID-19 symptoms prior to coming to clinic for next appointment. They are instructed to call the clinic to reschedule if they have any symptoms. Standing order for PT/INR has been placed in preparation to transition to possible remote INR monitoring given efforts to reduce the spread of COVID-19.       For Pharmacy Admin Tracking Only     Intervention Detail: Dose Adjustment: 1, reason: Therapy Optimization   Total # of Interventions Recommended: 1   Total # of Interventions Accepted: 1   Time Spent (min): 20

## 2021-09-10 ENCOUNTER — HOSPITAL ENCOUNTER (OUTPATIENT)
Dept: PHARMACY | Age: 71
Setting detail: THERAPIES SERIES
Discharge: HOME | End: 2021-09-10
Payer: COMMERCIAL

## 2021-09-10 LAB
INR BLD: 3.9
PROTIME: 47.1 SECONDS

## 2021-09-10 PROCEDURE — 99211 OFF/OP EST MAY X REQ PHY/QHP: CPT

## 2021-09-10 PROCEDURE — 85610 PROTHROMBIN TIME: CPT

## 2021-09-10 NOTE — PROGRESS NOTES
Patient seen in person in Medication Management Service. Patient states compliant most of the time with regimen. No bleeding or thromboembolic side effects noted. No significant med or dietary changes. No significant recent illness or disease state changes. PT/INR done via POC meter per protocol. INR was supratherapeutic at 3.9.  (goal 2 - 3)  The patient rates her myalgia pain at 9/10  She is now taking tramadol instead of ibuprofen    Warfarin regimen will be held for one day, then resumed at reduced warfarin dose of 5 mg Mon/Thurs and 2.5 mg all other days. Will retest in 1 week. Patient understands dosing directions and information discussed. Dosing schedule and follow up appointment given to patient. Progress note routed to referring physicians office. 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 COVID 19 screening completed. At this time patient denies symptoms, recent travel and exposure. Patient educated to screen for temperature and COVID-19 symptoms prior to coming to clinic for next appointment. They are instructed to call the clinic to reschedule if they have any symptoms. Standing order for PT/INR has been placed in preparation to transition to possible remote INR monitoring given efforts to reduce the spread of COVID-19.       For Pharmacy Admin Tracking Only     Intervention Detail: Dose Adjustment: 1, reason: Therapy De-escalation   Total # of Interventions Recommended: 1   Total # of Interventions Accepted: 1   Time Spent (min): 20

## 2021-09-17 ENCOUNTER — HOSPITAL ENCOUNTER (OUTPATIENT)
Dept: PHARMACY | Age: 71
Setting detail: THERAPIES SERIES
Discharge: HOME | End: 2021-09-17
Payer: COMMERCIAL

## 2021-09-17 DIAGNOSIS — I82.403 RECURRENT ACUTE DEEP VEIN THROMBOSIS (DVT) OF BOTH LOWER EXTREMITIES (HCC): Primary | ICD-10-CM

## 2021-09-17 LAB
INR BLD: 3
PROTIME: 35.7 SECONDS

## 2021-09-17 PROCEDURE — 85610 PROTHROMBIN TIME: CPT

## 2021-09-17 PROCEDURE — 99211 OFF/OP EST MAY X REQ PHY/QHP: CPT

## 2021-09-17 NOTE — PROGRESS NOTES
Patient seen in person in Medication Management Service. Patient states compliant all of the time with regimen. No bleeding or thromboembolic side effects noted. No significant med or dietary changes. No significant recent illness or disease state changes. PT/INR done via POC meter per protocol. INR was therapeutic at 3.  (goal 2 - 3)    Warfarin regimen will be decreased to 5 mg Thurs and 2.5 mg all other days  Will retest in 2 weeks. Patient understands dosing directions and information discussed. Dosing schedule and follow up appointment given to patient. Progress note routed to referring physicians office. 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 COVID 19 screening completed. At this time patient denies symptoms, recent travel and exposure. Patient educated to screen for temperature and COVID-19 symptoms prior to coming to clinic for next appointment. They are instructed to call the clinic to reschedule if they have any symptoms. Standing order for PT/INR has been placed in preparation to transition to possible remote INR monitoring given efforts to reduce the spread of COVID-19.       For Pharmacy Admin Tracking Only     Intervention Detail: Dose Adjustment: 1, reason: Therapy Optimization   Total # of Interventions Recommended: 1   Total # of Interventions Accepted: 1   Time Spent (min): 20

## 2021-10-01 ENCOUNTER — HOSPITAL ENCOUNTER (OUTPATIENT)
Age: 71
Discharge: HOME | End: 2021-10-01
Payer: COMMERCIAL

## 2021-10-01 ENCOUNTER — HOSPITAL ENCOUNTER (OUTPATIENT)
Dept: PHARMACY | Age: 71
Setting detail: THERAPIES SERIES
Discharge: HOME | End: 2021-10-01
Payer: COMMERCIAL

## 2021-10-01 DIAGNOSIS — I82.403 RECURRENT ACUTE DEEP VEIN THROMBOSIS (DVT) OF BOTH LOWER EXTREMITIES (HCC): Primary | ICD-10-CM

## 2021-10-01 LAB
EOSINOPHILS ABSOLUTE: 130 /UL (ref 0–450)
EOSINOPHILS RELATIVE PERCENT: 2 %
INR BLD: 3.8
PROTIME: 45.1 SECONDS
WBC # BLD: 6.5 K/UL (ref 3.5–11)

## 2021-10-01 PROCEDURE — 36415 COLL VENOUS BLD VENIPUNCTURE: CPT

## 2021-10-01 PROCEDURE — 86003 ALLG SPEC IGE CRUDE XTRC EA: CPT

## 2021-10-01 PROCEDURE — 82785 ASSAY OF IGE: CPT

## 2021-10-01 PROCEDURE — 85610 PROTHROMBIN TIME: CPT

## 2021-10-01 PROCEDURE — 99212 OFFICE O/P EST SF 10 MIN: CPT

## 2021-10-01 PROCEDURE — 85048 AUTOMATED LEUKOCYTE COUNT: CPT

## 2021-10-01 NOTE — PROGRESS NOTES
Patient seen in person in Medication Management Service. Patient states compliant most of the time with regimen. However, patient thinks there is a chance she went back to 5 mg Mon/Thurs and 2.5 mg all other days instead of 5 mg on Thursday only. Discussed always looking at dosing card when setting up medications. No bleeding or thromboembolic side effects noted. No significant med or dietary changes. No significant recent illness or disease state changes. Continues to have a lot of pain. PT/INR done via POC meter per protocol. INR was supratherapeutic at 3.8.  (goal 2 - 3)    Warfarin regimen will be held for 1 day and then reduced to 2.5 mg daily. Will retest in 1 week. Patient understands dosing directions and information discussed. Dosing schedule and follow up appointment given to patient. Progress note routed to referring physicians office. Patient acknowledges working in [address] [place] [address] with Pharmacist as referred by his/her physician/provider. COVID 19 screening completed. At this time patient denies symptoms, recent travel and exposure. Patient educated to screen for temperature and COVID-19 symptoms prior to coming to clinic for next appointment. They are instructed to call the clinic to reschedule if they have any symptoms. Standing order for PT/INR has been placed in preparation to transition to possible remote INR monitoring given efforts to reduce the spread of COVID-19.       For Pharmacy Admin Tracking Only     Intervention Detail: Dose Adjustment: 1, reason: Therapy Optimization   Total # of Interventions Recommended: 1   Total # of Interventions Accepted: 1   Time Spent (min): 20

## 2021-10-05 LAB
2000687N OAK TREE IGE: <0.1 KU/L (ref 0–0.34)
ALLERGEN BERMUDA GRASS IGE: 0.44 KU/L (ref 0–0.34)
ALLERGEN BIRCH IGE: <0.1 KU/L (ref 0–0.34)
ALLERGEN DOG DANDER IGE: <0.1 KU/L (ref 0–0.34)
ALLERGEN GERMAN COCKROACH IGE: <0.1 KU/L (ref 0–0.34)
ALLERGEN HORMODENDRUM IGE: <0.1 KUL/L (ref 0–0.34)
ALLERGEN MOUSE EPITHELIA IGE: <0.1 KU/L (ref 0–0.34)
ALLERGEN PECAN TREE IGE: <0.1 KU/L (ref 0–0.34)
ALLERGEN PIGWEED ROUGH IGE: <0.1 KU/L (ref 0–0.34)
ALLERGEN SHEEP SORREL (W18) IGE: <0.1 KU/L (ref 0–0.34)
ALLERGEN TREE SYCAMORE: <0.1 KU/L (ref 0–0.34)
ALLERGEN WALNUT TREE IGE: <0.1 KU/L (ref 0–0.34)
ALLERGEN WHITE MULBERRY TREE, IGE: <0.1 KU/L (ref 0–0.34)
ALLERGEN, TREE, WHITE ASH IGE: <0.1 KU/L (ref 0–0.34)
ALTERNARIA ALTERNATA: 0.82 KU/L (ref 0–0.34)
ASPERGILLUS FUMIGATUS: <0.1 KU/L (ref 0–0.34)
CAT DANDER ANTIBODY: 0.28 KU/L (ref 0–0.34)
COTTONWOOD TREE: <0.1 KU/L (ref 0–0.34)
D. FARINAE: <0.1 KU/L (ref 0–0.34)
D. PTERONYSSINUS: <0.1 KU/L (ref 0–0.34)
ELM TREE: <0.1 KU/L (ref 0–0.34)
IGE: 231 IU/ML
MAPLE/BOXELDER TREE: 0.16 KU/L (ref 0–0.34)
MOUNTAIN CEDAR TREE: <0.1 KU/L (ref 0–0.34)
MUCOR RACEMOSUS: <0.1 KU/L (ref 0–0.34)
P. NOTATUM: <0.1 KU/L (ref 0–0.34)
RUSSIAN THISTLE: <0.1 KU/L (ref 0–0.34)
SHORT RAGWD(A ARTEMIS.) IGE: 0.11 KU/L (ref 0–0.34)
TIMOTHY GRASS: 4.6 KU/L (ref 0–0.34)

## 2021-10-07 ENCOUNTER — OFFICE VISIT (OUTPATIENT)
Dept: ORTHOPEDIC SURGERY | Age: 71
End: 2021-10-07
Payer: COMMERCIAL

## 2021-10-07 VITALS — HEIGHT: 60 IN | WEIGHT: 227 LBS | BODY MASS INDEX: 44.57 KG/M2

## 2021-10-07 DIAGNOSIS — R60.0 BILATERAL LOWER EXTREMITY EDEMA: ICD-10-CM

## 2021-10-07 DIAGNOSIS — M70.62 GREATER TROCHANTERIC BURSITIS OF LEFT HIP: Primary | ICD-10-CM

## 2021-10-07 DIAGNOSIS — I82.403 RECURRENT ACUTE DEEP VEIN THROMBOSIS (DVT) OF BOTH LOWER EXTREMITIES (HCC): ICD-10-CM

## 2021-10-07 PROCEDURE — 3017F COLORECTAL CA SCREEN DOC REV: CPT | Performed by: PHYSICIAN ASSISTANT

## 2021-10-07 PROCEDURE — G8417 CALC BMI ABV UP PARAM F/U: HCPCS | Performed by: PHYSICIAN ASSISTANT

## 2021-10-07 PROCEDURE — G8427 DOCREV CUR MEDS BY ELIG CLIN: HCPCS | Performed by: PHYSICIAN ASSISTANT

## 2021-10-07 PROCEDURE — 1036F TOBACCO NON-USER: CPT | Performed by: PHYSICIAN ASSISTANT

## 2021-10-07 PROCEDURE — 1090F PRES/ABSN URINE INCON ASSESS: CPT | Performed by: PHYSICIAN ASSISTANT

## 2021-10-07 PROCEDURE — G8399 PT W/DXA RESULTS DOCUMENT: HCPCS | Performed by: PHYSICIAN ASSISTANT

## 2021-10-07 PROCEDURE — 4040F PNEUMOC VAC/ADMIN/RCVD: CPT | Performed by: PHYSICIAN ASSISTANT

## 2021-10-07 PROCEDURE — G8484 FLU IMMUNIZE NO ADMIN: HCPCS | Performed by: PHYSICIAN ASSISTANT

## 2021-10-07 PROCEDURE — 1123F ACP DISCUSS/DSCN MKR DOCD: CPT | Performed by: PHYSICIAN ASSISTANT

## 2021-10-07 PROCEDURE — 99214 OFFICE O/P EST MOD 30 MIN: CPT | Performed by: PHYSICIAN ASSISTANT

## 2021-10-07 NOTE — PROGRESS NOTES
[address], [address] [place], [address] [facility], [address] [facility]           Dept Phone: [phone]           Dept Fax:  [phone] [address]           Mika Richards          Dept Phone: [phone]           Dept Fax:  [phone]      Chief Compliant:  Chief Complaint   Patient presents with    Hip Pain     left    Leg Pain     left        History of Present Illness:  Rebekah Duane returns today. This is a 73 y.o. female who presents to the clinic today for follow up of left leg pain. Please see previous clinic notes for more detailed history. Patient was last evaluated on 8/25/2021 for chronic left hip and left knee pain. At that time she underwent a trochanteric bursal injection. reports this injection provided significant relief of pain for the last 5 weeks but over the last week her pain has gradually worsened but is still better than prior to the injection. She states her main concern today is actually pain and swelling of the left lower extremity. She reports this is a chronic issue over a number of years but over the last few weeks it has been progressively worsening. She denies any injury, trauma or fall. No lower leg warmth, redness, numbness or tingling, fever or chills. Patient does have a history of DVT in the past as well as poor vein health and hypertension. Review of Systems   Constitutional: Negative for fever, chills, sweats, recent illness, or recent injury. Neurological: Negative for headaches, numbness, or weakness. Integumentary: Negative for rash, itching, ecchymosis, abrasions, or laceration. Musculoskeletal: Positive for Hip Pain (left) and Leg Pain (left)       Physical Exam:  Constitutional: Patient is oriented to person, place, and time. Patient appears well-developed and well nourished.    Musculoskeletal: left Hip    Tenderness: Moderate tenderness over the left greater trochanter. Mild tenderness over the proximal IT band. No tenderness to the anterior posterior hip       Range of Motion:      Extension: 10     Flexion: 120     Internal Rotation: 30     External Rotation: 40     Abduction: 40     Adduction:  30       Muscle Strength      Abduction: 5/5     Adduction: 5/5     Flexion: 5/5         Gait: Antalgic   Yvette Test: Negative   Andi Test: Positive     left Knee    Swelling: Mild   Effusion: Trace   Tenderness: Medial joint line       Range of Motion:      Extension: -5     Flexion: 110       McMurrays: Negative   Anterior Lachmann: Negative   Posterior Lachmann: Negative   Anterior Drawer: Negative   Posterior Drawer: Negative   Varus Stress Test: Negative   Valgus Stress Test: Negative   Pivot Shift: Negative   Patellar Apprehension: No     Examination of the lower extremities demonstrate 2+ pitting edema to the mid tibial level. She does have chronic venous changes. Some diffuse trigger point tenderness over the lower ankle but there is no evidence of calf tenderness, negative Homans' sign. Sensation intact to light touch in all distal dermatomes    Neurological: Patient is alert and oriented to person, place, and time. Normal strenght. No sensory deficit. Skin: Skin is warm and dry  Psychiatric: Behavior is normal. Thought content normal.  Nursing note and vitals reviewed. Labs and Imaging:     XR taken today:  No results found. No new x-rays are taken today however those from 6/9/2021 are independently reviewed by me demonstrating minimal degenerative changes and normal anatomic alignment seen on AP pelvis lateral view of the left hip. Assessment and Plan:  1. 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  Bilateral lower extremity edema              PLAN:  This is a 73 y.o. female who presents to the clinic today for follow up left leg pain including left hip and left knee. Patient underwent trochanteric bursal injection on 8/25/2021 of this left hip and reports this injection provided significant relief of pain for the last 5 weeks but over the last week her pain has gradually worsened but is still better than prior to the injection. She states her main concern today is actually pain and swelling of the left lower extremity. She reports this is a chronic issue over a number of years but over the last few weeks it has been progressively worsening. On examination patient has 2+ pitting edema bilaterally and some mild tenderness over the lateral lower leg but no calf tenderness, negative Homans' sign and no evidence of DVT. Patient reports she does have a history of DVT x4 most recently in 2012. Based on examination I do not suspect a DVT given she does not have any unilateral significant worsening edema, calf pain or calf tenderness. I do believe patient would benefit from a referral to vascular given her history and her chronic lower extremity edema this provided today. Regards to patient's chronic knee pain and history of left knee OA she is offered a corticosteroid injection today but declined at this time. Discussed possibility of referral to PT for chronic knee and hip pain however patient declined at this time elected for home exercise program which she already has. We will see patient back on as needed basis however she may call return sooner for any questions or concerns    Please note that this chart was generated using voice recognition Dragon dictation software. Although every effort was made to ensure the accuracy of this automated transcription, some errors in transcription may have occurred.

## 2021-10-08 ENCOUNTER — HOSPITAL ENCOUNTER (OUTPATIENT)
Dept: PHARMACY | Age: 71
Setting detail: THERAPIES SERIES
Discharge: HOME | End: 2021-10-08
Payer: COMMERCIAL

## 2021-10-08 DIAGNOSIS — I82.403 RECURRENT ACUTE DEEP VEIN THROMBOSIS (DVT) OF BOTH LOWER EXTREMITIES (HCC): Primary | ICD-10-CM

## 2021-10-08 LAB
INR BLD: 2.1
PROTIME: 24.6 SECONDS

## 2021-10-08 PROCEDURE — 99211 OFF/OP EST MAY X REQ PHY/QHP: CPT

## 2021-10-08 PROCEDURE — 85610 PROTHROMBIN TIME: CPT

## 2021-10-08 NOTE — PROGRESS NOTES
Patient seen in person in Medication Management Service. Patient states compliant all of the time with regimen. No bleeding or thromboembolic side effects noted. No significant med or dietary changes. No significant recent illness or disease state changes. PT/INR done via POC meter per protocol. INR was therapeutic at 2.1.  (goal 2 - 3)    Warfarin regimen will be continued at current dose 2.5 mg daily. Will retest in 2 weeks. Patient understands dosing directions and information discussed. Dosing schedule and follow up appointment given to patient. Progress note routed to referring physicians office. Patient acknowledges working in [address] [place] [address] with Pharmacist as referred by his/her physician/provider. COVID 19 screening completed. At this time patient denies symptoms, recent travel and exposure. Patient educated to screen for temperature and COVID-19 symptoms prior to coming to clinic for next appointment. They are instructed to call the clinic to reschedule if they have any symptoms. Standing order for PT/INR has been placed in preparation to transition to possible remote INR monitoring given efforts to reduce the spread of COVID-19.       For Pharmacy Admin Tracking Only     Intervention Detail:    Total # of Interventions Recommended: 0   Total # of Interventions Accepted: 0   Time Spent (min): 20

## 2021-10-15 ENCOUNTER — HOSPITAL ENCOUNTER (OUTPATIENT)
Dept: PHARMACY | Age: 71
Setting detail: THERAPIES SERIES
Discharge: HOME | End: 2021-10-15
Payer: COMMERCIAL

## 2021-10-15 LAB
INR BLD: 2.3
PROTIME: 27.5 SECONDS

## 2021-10-15 PROCEDURE — 85610 PROTHROMBIN TIME: CPT

## 2021-10-15 PROCEDURE — 99211 OFF/OP EST MAY X REQ PHY/QHP: CPT

## 2021-10-21 ENCOUNTER — OFFICE VISIT (OUTPATIENT)
Dept: ORTHOPEDIC SURGERY | Age: 71
End: 2021-10-21
Payer: COMMERCIAL

## 2021-10-21 ENCOUNTER — HOSPITAL ENCOUNTER (OUTPATIENT)
Age: 71
Setting detail: SPECIMEN
Discharge: HOME | End: 2021-10-21
Payer: COMMERCIAL

## 2021-10-21 VITALS — WEIGHT: 227 LBS | BODY MASS INDEX: 44.57 KG/M2 | RESPIRATION RATE: 14 BRPM | HEIGHT: 60 IN

## 2021-10-21 DIAGNOSIS — G89.29 CHRONIC PAIN OF LEFT KNEE: ICD-10-CM

## 2021-10-21 DIAGNOSIS — M70.62 GREATER TROCHANTERIC BURSITIS OF LEFT HIP: Primary | ICD-10-CM

## 2021-10-21 DIAGNOSIS — M25.562 CHRONIC PAIN OF LEFT KNEE: ICD-10-CM

## 2021-10-21 PROCEDURE — G8484 FLU IMMUNIZE NO ADMIN: HCPCS | Performed by: PHYSICIAN ASSISTANT

## 2021-10-21 PROCEDURE — 86146 BETA-2 GLYCOPROTEIN ANTIBODY: CPT

## 2021-10-21 PROCEDURE — 3017F COLORECTAL CA SCREEN DOC REV: CPT | Performed by: PHYSICIAN ASSISTANT

## 2021-10-21 PROCEDURE — 36415 COLL VENOUS BLD VENIPUNCTURE: CPT

## 2021-10-21 PROCEDURE — 81241 F5 GENE: CPT

## 2021-10-21 PROCEDURE — 81240 F2 GENE: CPT

## 2021-10-21 PROCEDURE — 99214 OFFICE O/P EST MOD 30 MIN: CPT | Performed by: PHYSICIAN ASSISTANT

## 2021-10-21 PROCEDURE — 1090F PRES/ABSN URINE INCON ASSESS: CPT | Performed by: PHYSICIAN ASSISTANT

## 2021-10-21 PROCEDURE — 1123F ACP DISCUSS/DSCN MKR DOCD: CPT | Performed by: PHYSICIAN ASSISTANT

## 2021-10-21 PROCEDURE — G8417 CALC BMI ABV UP PARAM F/U: HCPCS | Performed by: PHYSICIAN ASSISTANT

## 2021-10-21 PROCEDURE — 4040F PNEUMOC VAC/ADMIN/RCVD: CPT | Performed by: PHYSICIAN ASSISTANT

## 2021-10-21 PROCEDURE — G8399 PT W/DXA RESULTS DOCUMENT: HCPCS | Performed by: PHYSICIAN ASSISTANT

## 2021-10-21 PROCEDURE — 85300 ANTITHROMBIN III ACTIVITY: CPT

## 2021-10-21 PROCEDURE — 1036F TOBACCO NON-USER: CPT | Performed by: PHYSICIAN ASSISTANT

## 2021-10-21 PROCEDURE — 86147 CARDIOLIPIN ANTIBODY EA IG: CPT

## 2021-10-21 PROCEDURE — G8427 DOCREV CUR MEDS BY ELIG CLIN: HCPCS | Performed by: PHYSICIAN ASSISTANT

## 2021-10-21 RX ORDER — METHYLPREDNISOLONE 4 MG/1
4 TABLET ORAL SEE ADMIN INSTRUCTIONS
Qty: 1 KIT | Refills: 0 | Status: SHIPPED | OUTPATIENT
Start: 2021-10-21 | End: 2021-10-27

## 2021-10-21 NOTE — PROGRESS NOTES
[facility], [address]               [place], [place] [address]           Dept Phone: [phone]           Dept Fax:  [phone] [address]           Mika Richards          Dept Phone: [phone]           Dept Fax:  [phone]      Chief Compliant:  Chief Complaint   Patient presents with    Hip Pain     left    Leg Pain     left        History of Present Illness: This is a 73 y.o. female who presents to the clinic today for evaluation of had concerns including Hip Pain (left) and Leg Pain (left). Ms. Vince Lassiter returns today for reevaluation of the left leg pain. She reports that the majority of her pain is actually in the left knee. She has been seen for this issue in the past and at visit on 10/7/2021 she reports that this pain was actually doing better. She was offered a left knee corticosteroid injection and referral to physical therapy however patient declined both of these at that time. Patient reports unfortunately last 2 to 3 days her pain actually has worsened quite a bit denies any injury or trauma. She states yesterday her pain got up to a 10/10 but today it is approximately eight 8/10. She reports pain all the way down the aspect of the lateral left leg but is most severe in the left knee. Pain continues to be aggravated by any prolonged period of walking or standing pain does seem to be relieved by rest.    Patient continues using lidocaine patches which do seem to help with her pain she also uses Biofreeze which does note some mild temporary relief. She is not taking any oral medication at this time.        Past History:    Current Outpatient Medications:     methylPREDNISolone (MEDROL DOSEPACK) 4 MG tablet, Take 1 tablet by mouth See Admin Instructions for 6 days Take by mouth., Disp: 1 kit, Rfl: 0    diclofenac sodium (VOLTAREN) 1 % GEL, Apply 4 g topically 4 times daily, Disp: 150 g, Rfl: 1    acetaminophen-codeine (TYLENOL #3) 300-30 MG per tablet, Take 1 tablet by mouth every 6 hours as needed for Pain for up to 30 days. , Disp: 120 tablet, Rfl: 0    traMADol (ULTRAM) 50 MG tablet, Take 1 tablet by mouth 2 times daily as needed for Pain for up to 30 days. , Disp: 60 tablet, Rfl: 0    buPROPion (WELLBUTRIN XL) 300 MG extended release tablet, Take 1 tablet by mouth every morning, Disp: 90 tablet, Rfl: 0    amLODIPine (NORVASC) 10 MG tablet, Take 1 tablet by mouth daily, Disp: 90 tablet, Rfl: 1    acyclovir (ZOVIRAX) 200 MG capsule, TAKE 1 CAPSULE BY MOUTH TWICE DAILY, Disp: 60 capsule, Rfl: 3    levothyroxine (SYNTHROID) 137 MCG tablet, TAKE 1 TABLET BY MOUTH DAILY, Disp: 90 tablet, Rfl: 1    albuterol (PROVENTIL) (2.5 MG/3ML) 0.083% nebulizer solution, Take 3 mLs by nebulization every 6 hours as needed for Wheezing, Disp: 120 each, Rfl: 1    vitamin D (ERGOCALCIFEROL) 1.25 MG (02842 UT) CAPS capsule, TAKE 1 CAPSULE BY MOUTH ONCE WEEKLY EVERY SUNDAY, Disp: 12 capsule, Rfl: 1    montelukast (SINGULAIR) 10 MG tablet, Take 1 tablet by mouth nightly, Disp: 30 tablet, Rfl: 5    lidocaine (LIDODERM) 5 %, PLACE 1 PATCH ONTO SKIN ONCE DAILY, 12 HOURS ON AND REMOVE FOR 12 HOURS, Disp: 30 patch, Rfl: 3    warfarin (COUMADIN) 5 MG tablet, Take 5 mg by mouth See Admin Instructions Dosed by [facility] Medication Management: 2.5 mg daily, Disp: , Rfl:     warfarin (COUMADIN) 2.5 MG tablet, Take 5 mg Mon and 2.5mg all other days (Patient taking differently: Take 2.5 mg by mouth See Admin Instructions Dosed by [facility] Anticoagulation Service: 2.5 mg daily), Disp: 90 tablet, Rfl: 1    buPROPion (WELLBUTRIN XL) 150 MG extended release tablet, Take 1 tablet by mouth every morning, Disp: 30 tablet, Rfl: 5    simvastatin (ZOCOR) 40 MG tablet, TAKE 1 TABLET BY MOUTH EVERY EVENING, Disp: 90 tablet, Rfl: 10   albuterol sulfate  (90 Base) MCG/ACT inhaler, Inhale 1 puff into the lungs every 6 hours as needed for Wheezing, Disp: 2 Inhaler, Rfl: 3    Gabapentin POWD, Formula 8E: diclo3%+gaba6%+DMSO5%+prilo2%+baclofen2%+lido2% with ketamine. Apply 1-2 gm topically to affected area TID-QID, Disp: 120 g, Rfl: 5    gabapentin (NEURONTIN) 800 MG tablet, TAKE 1 TABLET BY MOUTH FOUR TIMES DAILY as needed, Disp: 120 tablet, Rfl: 2    fluticasone (FLONASE) 50 MCG/ACT nasal spray, SPRAY 2 SPRAYS INTO EACH NOSTRIL DAILY AS NEEDED FOR ALLERGIES, Disp: 16 g, Rfl: 2    ASPIRIN LOW DOSE 81 MG chewable tablet, CHEW AND SWALLOW TAKE 1 TABLET BY MOUTH DAILY, Disp: 30 tablet, Rfl: 11    metoprolol tartrate (LOPRESSOR) 25 MG tablet, TAKE 1 TABLET BY MOUTH TWICE DAILY, Disp: 180 tablet, Rfl: 3    Blood Pressure Monitoring (BP MONITOR-STETHOSCOPE) KIT, 1 each by Does not apply route daily as needed (high blood pressure), Disp: 1 kit, Rfl: 0    nitroGLYCERIN (NITROSTAT) 0.4 MG SL tablet, up to max of 3 total doses.  If no relief after 1 dose, call 911., Disp: 25 tablet, Rfl: 1    hydrocortisone 1 % cream, APPLY TO AFFECTED AREA TWICE DAILY, Disp: 30 g, Rfl: 3    ammonium lactate (LAC-HYDRIN) 12 % lotion, APPLY TO THE AFFECTED AREA(S) DAILY, Disp: 225 g, Rfl: 1    pantoprazole (PROTONIX) 40 MG tablet, Take 1 tablet by mouth daily as needed (acid reflux), Disp: 30 tablet, Rfl: 2  Allergies   Allergen Reactions    Cymbalta [Duloxetine Hcl]     Lisinopril Other (See Comments)     SEVER COUGH    Robaxin [Methocarbamol] Swelling     Social History     Socioeconomic History    Marital status: Single     Spouse name: Not on file    Number of children: 2    Years of education: 13    Highest education level: Not on file   Occupational History    Occupation: disabled      Comment: SSI    Tobacco Use    Smoking status: Never Smoker    Smokeless tobacco: Never Used   Vaping Use    Vaping Use: Never used   Substance and Sexual Activity    Alcohol use: No     Alcohol/week: 0.0 standard drinks     Comment: never used     Drug use: No    Sexual activity: Never   Other Topics Concern    Not on file   Social History Narrative    Not on file     Social Determinants of Health     Financial Resource Strain: Low Risk     Difficulty of Paying Living Expenses: Not hard at all   Food Insecurity: No Food Insecurity    Worried About Running Out of Food in the Last Year: Never true    Jah of Food in the Last Year: Never true   Transportation Needs: No Transportation Needs    Lack of Transportation (Medical): No    Lack of Transportation (Non-Medical): No   Physical Activity:     Days of Exercise per Week:     Minutes of Exercise per Session:    Stress:     Feeling of Stress :    Social Connections:     Frequency of Communication with Friends and Family:     Frequency of Social Gatherings with Friends and Family:     Attends Mandaeism Services:     Active Member of Clubs or Organizations:     Attends Club or Organization Meetings:     Marital Status:    Intimate Partner Violence:     Fear of Current or Ex-Partner:     Emotionally Abused:     Physically Abused:     Sexually Abused:      Past Medical History:   Diagnosis Date    Affective disorder ([facility] [address]     seasonal    Arthritis     shoulders and lower back    DVT (deep venous thrombosis) (HCC)     has history in R leg X 4    Fibromyalgia     Hyperlipidemia     Hypertension     Pneumonia     Sinusitis     Thyroid disease     hypothyroidsm     Past Surgical History:   Procedure Laterality Date    COLONOSCOPY  8/31/2011    internal hemorrhoides    DILATION AND CURETTAGE OF UTERUS      ECTOPIC PREGNANCY SURGERY Left     HYSTERECTOMY       Family History   Problem Relation Age of Onset    Cervical Cancer Mother     Heart Disease Mother     Heart Disease Father         Review of Systems   Constitutional: Negative for fever, chills, sweats.    Eyes: Negative for changes in vision, or pain. HENT: Negative for ear ache, epistaxis, or sore throat. Respiratory/Cardio: Negative for Chest pain, palpitations, SOB, or cough. Gastrointestinal: Negative for abdominal pain, N/V/D. Genitourinary: Negative for dysuria, frequency, urgency, or hematuria. Neurological: Negative for headache, numbness, or weakness. Integumentary: Negative for rash, itching, laceration, or abrasion. Musculoskeletal: Positive for Hip Pain (left) and Leg Pain (left)       Physical Exam:  Constitutional: Patient is oriented to person, place, and time. Patient appears well-developed and well nourished. HENT: Negative otherwise noted  Head: Normocephalic and Atraumatic  Nose: Normal  Eyes: Conjunctivae and EOM are normal  Neck: Normal range of motion Neck supple. Respiratory/Cardio: Effort normal. No respiratory distress. Musculoskeletal:    left Knee:     Skin: warm and dry, no rash or erythema  Vasculature: 2+ pedal pulses bilaterally  Neuro: Sensation grossly intact to light touch diffusely  Alignment: Genu varum  Tenderness: Diffuse point tenderness around the knee most significant in the lateral joint line and over the distal IT band. No tenderness to quad/patellar tendon, pes anserine bursa or posterior knee.   Effusion: None    ROM: (Degrees)       A P       Extension  0 0       Flexion   115 120       Crepitation  Yes       Muscle strength:         Flexion   5      Extension  5      SLR   5        Extensor lag   y          Special testing:  y    Pain with deep knee flexion     y    Patellar grind       n    Patellar apprehension      n    Patellar glide         n    Lachman       n    Anterior drawer      n    Pivot shift       n    Posterior drawer      n    Dial test       n    Posterolateral drawer      n    Posterior Sag       n    MCL        n    LCL          mild    Medial joint line tenderness     moderate   Lateral joint line tenderness     n    Appley's         Neurological: Patient is alert and oriented to person, place, and time. Normal strenght. No sensory deficit. Skin: Skin is warm and dry  Psychiatric: Behavior is normal. Thought content normal.  Nursing note and vitals reviewed. Labs and Imaging:       No new x-rays are taken today however those of the left knee and left hip from 6/9/2021 visit are again reviewed by me. Left knee with moderate tricompartmental degenerative changes with patellofemoral spurring. Left hip with minimal degenerative changes present. Femoral acetabular joint space is maintained. Orders Placed This Encounter   Procedures   [address]     Referral Priority:   Routine     Referral Type:   Eval and Treat     Referral Reason:   Specialty Services Required     Requested Specialty:   Physical Therapy     Number of Visits Requested:   1       Assessment and Plan:  1. Greater trochanteric bursitis of left hip    2. Chronic pain of left knee          PLAN:  Angelina Henry is a 73 y.o. old female who presents for re-evaluation of chronic left leg pain. She reports that this pain has been significantly worse over the last 2 to 3 days without any injury or trauma. On examination it seems like the majority of patient's pain is stemming from the left knee consistent with her left knee OA. She does have some evidence of trochanteric bursitis/IT band pain however her knee seems to be the main culprit. Patient with history of chronic vascular changes however was recently evaluated by her vascular specialist who reports the they believe the majority of the pain is related to the arthritis in her knee. 1.  I strongly recommended patient pursue a corticosteroid injection of this left knee since she continues to have intermittent pain however patient declined at this time. 2.  I did convince patient that she would benefit from physical therapy and a referral is provided today. 3.   In light of foregoing the corticosteroid injection she elected for a Medrol Dosepak which is electronically sent to patient pharmacy  4. We will also try to get her insurance to approve Voltaren gel as I think she would benefit from a topical medication given that she cannot take oral NSAIDs. 5.  Recommend follow-up in 6 weeks however patient may call return sooner for any questions or concerns          Electronically signed by GILDA Baxter on 10/21/21 at 11:03 AM EDT        Please note that this chart was generated using voice recognition Dragon dictation software. Although every effort was made to ensure the accuracy of this automated transcription, some errors in transcription may have occurred.

## 2021-10-22 LAB — AT-III ACTIVITY: 106 % (ref 83–122)

## 2021-10-25 LAB
FACTOR V MUTATION: NEGATIVE
SPECIMEN: NORMAL

## 2021-10-26 LAB
ANTICARDIOLIPIN IGA ANTIBODY: 1.3 APL (ref 0–14)
ANTICARDIOLIPIN IGG ANTIBODY: <0.5 GPL (ref 0–10)
BETA 2 GLYCOPROT.1 IGA AB: 1.4 ELISA U/ML (ref 0–7)
BETA 2 GLYCOPROT.1 IGG AB: <0.6 ELISA U/ML (ref 0–7)
BETA 2 GLYCOPROT.1 IGM AB: <0.9 ELISA U/ML (ref 0–7)
CARDIOLIPIN AB IGM: <0.8 MPL (ref 0–10)
PROTHROMBIN G20210A MUTATION: NEGATIVE
PT PCR SPECIMEN: NORMAL

## 2021-11-04 ENCOUNTER — APPOINTMENT (OUTPATIENT)
Dept: PHYSICAL THERAPY | Age: 71
End: 2021-11-04
Payer: COMMERCIAL

## 2021-11-11 ENCOUNTER — HOSPITAL ENCOUNTER (OUTPATIENT)
Dept: PHYSICAL THERAPY | Age: 71
Setting detail: THERAPIES SERIES
Discharge: HOME | End: 2021-11-11
Payer: COMMERCIAL

## 2021-11-11 PROCEDURE — 97110 THERAPEUTIC EXERCISES: CPT

## 2021-11-11 PROCEDURE — 97161 PT EVAL LOW COMPLEX 20 MIN: CPT

## 2021-11-11 NOTE — CONSULTS
[address] [facility]  [address] [facility]         Phone: [phone]       Fax: [phone]    Physical Therapy Lower Extremity Evaluation    Date:  2021  Patient: Stella Dominguez  : 1950  MRN: [id number]  Physician:  GILDA Alfaro    Insurance: Oksana Prudent  # of visits allowed/remainin per calendar year  Auth: Auth Required after eval  Medical Diagnosis: M70.62 (ICD-10-CM) - Greater trochanteric bursitis of left hip   M25.562, G89.29 (ICD-10-CM) - Chronic pain of left knee   Rehab Codes: M 25.55, M 25.57,   Onset date:  May 2020  Next Dr's appt. : tbd  Visit Count:     Cancel/No Show: 0/0    Subjective:   CC: Patient comes to therapy noting chronic L knee and hip pain that began in May 2020 after injuring L foot. Pt states she stubbed L foot at home, causing a limp when she walked. After walking a few weeks with limp pt noticed gradual onset of L knee/hip pain which improved with use of SPC. Pain now remains in knee/hip and worsens with walking, transitional movements, and stairs. Pt reports injection to L hip a few months ago with minimal improvement of symptoms. Intermittent B knee buckling noted with no LOB or falls. Pt denies radiating pain into low back or down L LE, as well as no N/T.     HPI: (onset date) May 2020 patient stubbed foot at home causing antalgic gait. After weeks of antalgic gait, gradual onset of L knee/hip pain which has progressively worsened. Pt denies previous surgery or therapy for pain.      PMHx: [] Unremarkable [] Diabetes [x] HTN  [] Pacemaker   [] MI/Heart Problems [] Cancer [x] Arthritis [x] Other: fibromyalgia              [x] Refer to full medical chart  In EPIC     Comorbidities:   [x] Obesity [] Dialysis  [] Other:   [] Asthma/COPD [] Dementia [] Other:   [] Stroke [] Sleep apnea [] Other:   [] Vascular disease [] Rheumatic disease [] Other:     Tests: [x] X-Ray: [] MRI:  [] Other:     21 XR KNEE LEFT Narrative   X-rays taken in clinic today and preliminarily reviewed by me:   AP bilateral knees standing lateral view of the left knee demonstrates    mild medial and patellofemoral compartmental narrowing of the left knee.     Osteophytes present in both compartments.  No evidence of acute fracture.     Right knee on AP view demonstrates minimal medial compartmental narrowing.      6/9/21 XR HIP LEFT    Narrative   X-rays taken in clinic today and preliminarily reviewed by me:   AP pelvis and lateral view of the left hip demonstrates joint spaces are    maintained without any evidence of significant degenerative changes, AVN    or flattening of the femoral head.  Mild greater trochanteric spurring     Medications: [x] Refer to full medical record [] None [] Other:  Allergies:      [x] Refer to full medical record [] None [] Other:    Pain location:  L hip    Pain rating/description at rest:  7/10   Pain rating/description at best:  3/10  Improves with: rest, lidocaine patches, heat, med cream,ibuprofen, pressure against L hip    Pain rating/description at worst:   10/10  Worsens with: walking/standing especially with a limp     Sleep quality/quanity  always turning, poor sleep but usual   Sleeping position: side lying          Function:  Hand Dominance  [x] Right  [] Left              Current level of function:     ADLs Independent in all except:   Dressing:  No difficulty     Mobility:  Walking - pain after a few minutes but can push through for a little while    Take breaks to complete ADLs   Getting in/out of car difficult and painful  Pauses after performing sit to stand to improve balance      Driving: indep     Grocery shopping: holds onto cart for support       Home Set up  no RUI, 1 [address], apartment, live alone,    Job Description  goes on a walk every night inside building       [] Normal Duty  [] Light Duty   [] Off D/T Condition  [] Retired    [x] Not Employed    []  Disability  Return to work:    Gait Device: SPC at home but no AD in daily ambulation     Distance: a few minutes pain starts, can continue for ~15 min   Assistance: indep   Impairments: slow julien, decreased stance time on L LE, decreased B hip extension, decreased B step length (R>L)           Objective:    ROM  ° A/P STRENGTH TESTS (+/-) Left Right Not Tested    Left Right Left Right Ant. Drawer - - []   Hip Flex 90*  4/5 4+/5 Post. Drawer - - []   Ext   NT NT Lachmans   []   ER     Valgus Stress +  []   IR     Varus Stress   []   ABD   3/5* 4-/5 Pierres   []   ADD     Apleys Comp.   []   Knee Flex 98 115 4/5 5/5 Apleys Dist.   []   Ext -5 0 4/5 5/5 Hip Scouring   []   Ankle DF   5/5 5/5 DAVIDs +  []   PF     Piriformis +  []   INV     Andis +  []   * = pt reports pain     OBSERVATION No Deficit Deficit Not Tested Comments   Posture       Forward Head [] [x] []    Rounded Shoulders [] [x] []    LE bony alignment  [] [x] [] B genu valgus    Leg Length Discrp [x] [] []    Slumped Sitting [x] [] []    Palpation [] [x] [] Diffuse point tenderness around the knee most significant at patellar tendon and medial joint line. TTP to L ITB, glut med, piriformis, and greater trochanter    Sensation [x] [] []    Edema [] [x] [] Increased L knee swelling    Neurological [] [] [x]    Patellar Mobility [] [x] [] Decreased L patella mobility in all planes with increased medial translation    Step test 4\" [] [] [x]    Squat  [] [x] []      Lumbar ROM L R      Flexion  wnl      Extension  wnl wnl     Side-bending  wnl wnl     Rotation  wnl wnl     Ervin Tests ? Pain ?  Pain No Change Not Tested   RFIS []  []  [x]  []    HAMZAH []  []  [x]  []    RFIL []  []  []  []    REIL []  []  []  []      BALANCE Left  Right   Tandem Stance (Eyes Open) 20 20   Tandem Stance (Eyes Closed) NT NT   Single Leg (Eyes Open) 6 20   Single Leg (Eyes Closed NT NT   Other: NBOS EC  NT                                         Zapien Fall Risk Assessment    Patient Name:  Lewis Diaz Kody  : 1950    Risk Factor Scale  Score   History of Falls [x] Yes (2 years ago)   [] No 25  0 25   Secondary Diagnosis [] Yes  [x] No 15  0 0   Ambulatory Aid [] Furniture  [] Crutches/cane/walker  [x] None/bedrest/wheelchair/nurse 30  15  0 0   IV/Heparin Lock [] Yes  [x] No 20  0 0   Gait/Transferring [] Impaired  [] Weak  [x] Normal/bedrest/immobile 20  10  0 0   Mental Status [] Forgets limitations  [x] Oriented to own ability 15  0 0      Total:     Based on the Assessment score: check the appropriate box. []  No intervention needed   Low =   Score of 0-24    [x]  Use standard prevention interventions Moderate =  Score of 24-44   [x] Give patient handout and discuss fall prevention strategies   [x] Establish goal of education for patient/family RE: fall prevention strategies    []  Use high risk prevention interventions High = Score of 45 and higher   [] Give patient handout and discuss fall prevention strategies   [] Establish goal of education for patient/family Re: fall prevention strategies   [] Discuss lifeline / other resources    Electronically signed by:   Charley Hay PT  Date: 2021    Assessment: [x] Patient is a pleasant 74 yo female reporting chronic L knee/hip pain after injuring L foot in May 2020. Pain gradually worsened leading to difficulty with walking and standing, requiring frequent rest breaks to complete ADLs. Decreased L knee and hip ROM noted causing L antalgic gait pattern. Impaired L hip/knee strength demonstrated impacting L knee stability and balance. Upon palpation, tenderness noted to L patellar tendon, medial joint line, and L greater trochanter. Patient's S/S consistent with L knee and hip bursitis worsened by OA. Patient would benefit from skilled therapy to decrease symptoms, improve L knee/hip strength and ROM to complete ADLs and return to PLOF.        Functional Assessment tool: LEFS   Functional Assessment score: 80%     STG: (to be met in 6 treatments)  1. ? Pain: Pt will report decreased pain 5/10 or less to decrease difficulties with ADLs  2. ? ROM: Pt will demonstrate increased L knee/hip ROM to decrease gait impairments   3. ? Strength: Pt will demonstrate improved L hip strength 4-/5 or higher to progress walking tolerance to 20 minutes or more   4. Independent with Home Exercise Programs  5. Demonstrates knowledge of fall preventions to decrease risk of falls     LTG: (to be met in 12 treatments)  1. Pt will demonstrate improved L knee ROM WNL to normalize gait with no AD    2. Pt will demonstrate increased L knee strength 5/5 and hip strength 4/5 or higher to return to PLOF  3. Pt will report improved walking tolerance leading to decreased rest breaks to complete ADLs  4. Pt will demonstrate improved L knee stability evident by performing L SLS for 20 seconds or more                     Patient goals: pain relief, or at least increase tolerance       Evaluation Complexity:  History (Personal factors, comorbidities) [] 0 [x] 1-2 [] 3+   Exam (limitations, restrictions) [] 1-2 [x] 3 [] 4+   Clinical presentation (progression) [x] Stable [] Evolving  [] Unstable   Decision Making [x] Low [] Moderate [] High    [x] Low Complexity [] Moderate Complexity [] High Complexity       Rehab Potential:  [x] Good  [] Fair  [] Poor   Suggested Professional Referral:  [x] No  [] Yes:  Barriers to Goal Achievement[de-identified]  [x] No  [] Yes:  Domestic Concerns:  [x] No  [] Yes:     Pt. Education:  [x] Plans/Goals, Risks/Benefits discussed  [x] Home exercise program    Method of Education: [x] Verbal  [x] Demo  [x] Written  Comprehension of Education:  [x] Verbalizes understanding. [x] Demonstrates understanding. [x] Needs Review. [] Demonstrates/verbalizes understanding of HEP/Ed previously given.     Treatment Plan:  [x] Therapeutic Exercise   35147  [] Iontophoresis: 4 mg/mL Dexamethasone Sodium Phosphate  mAmin  93637   [] Therapeutic Activity  47785 [x] Vasopneumatic cold with compression  [id number]    [] Gait Training   [id number] [] Ultrasound   [id number]   [x] Neuromuscular Re-education  [id number] [] Electrical Stimulation Unattended  87837   [x] Manual Therapy  28624 [] Electrical Stimulation Attended  [id number]   [x] Instruction in HEP  [] Lumbar/Cervical Traction  [id number]   [] Aquatic Therapy   19696 [x] Cold/hotpack    [] Massage   68979      [] Dry Needling, 1 or 2 muscles  17695   [] Biofeedback, first 15 minutes   60582  [] Biofeedback, additional 15 minutes   66244 [] Dry Needling, 3 or more muscles  45520       Frequency:   2x/week for 12 visits      Todays Treatment:  Modalities: vaso/cold pack to L hip and knee to decrease swelling and inflammation   Precautions:  Exercises:  Exercise Reps/ Time Weight/ Level Comments         Heel slides      SLR      Figure 4/piriformis stretch      Clamshells             education 5 min  Importance of therapy, use of ice to decrease pain/swelling/inflammation    Other: HEP to be provided once auth received     Specific Instructions for next treatment: improved L knee/hip ROM and strength, improve weight tolerance to L LE to progress balance, normalize gait       Treatment Charges: Mins Units   [x] Evaluation       [x]  Low       []  Moderate       []  High 35 1   []  Modalities     [x]  Ther Exercise 5 0   []  Manual Therapy     []  Ther Activities     []  Aquatics     []  Neuromuscular     []  Gait Training     []  Dry Needling           1-2 muscles     []  Dry Needling           3 or more          muscles     [] Vasocompression     []  Other         TOTAL TREATMENT TIME: 35    Time in: 8:40am      Time Out: 9:15am     Electronically signed by: Margie Giron PT

## 2021-11-12 ENCOUNTER — HOSPITAL ENCOUNTER (OUTPATIENT)
Dept: PHARMACY | Age: 71
Setting detail: THERAPIES SERIES
Discharge: HOME | End: 2021-11-12
Payer: COMMERCIAL

## 2021-11-12 LAB
INR BLD: 1.9
PROTIME: 22.4 SECONDS

## 2021-11-12 PROCEDURE — 85610 PROTHROMBIN TIME: CPT

## 2021-11-12 PROCEDURE — 99211 OFF/OP EST MAY X REQ PHY/QHP: CPT

## 2021-11-12 NOTE — PROGRESS NOTES
Patient seen in person in Medication Management Service. Patient states compliant most of the time with regimen. No bleeding or thromboembolic side effects noted. No significant med or dietary changes. No significant recent illness or disease state changes. PT/INR done via POC meter per protocol. INR was subtherapeutic at 1.9.  (goal 2 - 3)    Warfarin regimen will be increased to 3.75 mg Tues/Fri and 2.5 mg all other days. Will retest in 2 weeks. Patient rates body pain at 9/10  Alternating Tylenol and Tramadol for this  The patient did take Ibuprofen 800 mg daily several days this past week    Patient understands dosing directions and information discussed. Dosing schedule and follow up appointment given to patient. Progress note routed to referring physicians office. Patient acknowledges working in [address] [place] [address] with Pharmacist as referred by his/her physician/provider. COVID 19 screening completed. At this time patient denies symptoms, recent travel and exposure. Patient educated to screen for temperature and COVID-19 symptoms prior to coming to clinic for next appointment. They are instructed to call the clinic to reschedule if they have any symptoms. Standing order for PT/INR has been placed in preparation to transition to possible remote INR monitoring given efforts to reduce the spread of COVID-19.       For Pharmacy Admin Tracking Only     Intervention Detail: Dose Adjustment: 1, reason: Therapy Optimization   Total # of Interventions Recommended: 1   Total # of Interventions Accepted: 1   Time Spent (min): 20

## 2021-11-22 ENCOUNTER — HOSPITAL ENCOUNTER (OUTPATIENT)
Dept: PHYSICAL THERAPY | Age: 71
Setting detail: THERAPIES SERIES
Discharge: HOME | End: 2021-11-22
Payer: COMMERCIAL

## 2021-11-22 NOTE — PROGRESS NOTES
[] [address]  [place]: [phone]   F: [phone]     Physical Therapy Cancel/No Show note    Date: 2021  Patient: Virgilio Beltrán  : 1950  MRN: [id number]    Visit Count:   Cancels/No Shows to date:     For today's appointment patient:    [x]  Cancelled    [] Rescheduled appointment    [] No-show     Reason given by patient:    []  Patient ill    []  Conflicting appointment    [x] No transportation      [] Conflict with work    [] No reason given    [] Weather related    [] [id number]    [] Other:      Comments:        [] Next appointment was confirmed    Electronically signed by: Lenin Wright PTA

## 2021-11-23 ENCOUNTER — APPOINTMENT (OUTPATIENT)
Dept: PHYSICAL THERAPY | Age: 71
End: 2021-11-23
Payer: COMMERCIAL

## 2021-11-29 ENCOUNTER — HOSPITAL ENCOUNTER (OUTPATIENT)
Dept: PHYSICAL THERAPY | Age: 71
Setting detail: THERAPIES SERIES
Discharge: HOME | End: 2021-11-29
Payer: COMMERCIAL

## 2021-11-29 PROCEDURE — 97110 THERAPEUTIC EXERCISES: CPT

## 2021-11-29 NOTE — PROGRESS NOTES
[address] [facility]   [facility] [address]   Phone: [phone]   Fax: [phone]    Physical Therapy Daily Treatment Note      Date:  2021  Patient Name:  Hilda Lane    :  1950  MRN: [id number]  Physician:      GILDA Hilliard                   Insurance: [address] [place] [address]  # of visits allowed/remainin per calendar year  Auth DOS 11/15 - 1/15/22: [id number] - 39, [id number] - 6, [id number] - 12   Medical Diagnosis: M70.62 (ICD-10-CM) - Greater trochanteric bursitis of left hip             M25.562, G89.29 (ICD-10-CM) - Chronic pain of left knee     Rehab Codes: M 25.55, M 25.57,   Onset date:    May 2020                    Next Dr's appt. : tbd  Visit Count:                                 Cancel/No Show: 1/0    Subjective:    Patient has no new complaints since initial evaluation. Patient reports her pain has not gotten better or worse. Pain:  [x] Yes  [] No Location: L knee, L hip Pain Rating: (0-10 scale) 7/10  Pain altered Tx:  [] No  [] Yes  Action:  Comments:    Objective:  Modalities:   Precautions:  Exercises:  Exercise Reps/ Time Weight/ Level Completed  Today Comments   nustep 5' 2 x           Quad set  10\"x10  x    SAQ 3\"x10  2 sets   x    Bridge 10x2  x    SLR 10x  x    BKFO 10x2 red x    Heel slides 10x  x           Sidelying clamshells  10x2  x    sidelying hip abd  10x2  x           Hamstring stretch  30\"x3  x    Piriformis stretch  30\"x3  x    Figure 4 stretch  30\"x3  x    Supine calf stretch  30x2\"   x      Specific Instructions for next treatment:improved L knee/hip ROM and strength, improve weight tolerance to L LE to progress balance, normalize gait       Assessment:  Patient completed all exercise to tolerance with multiple instances of increased pain in either left knee or left hip.  Patient required cueing to execute exercise properly with proper muscle activation associated with exercises versus using momentum and accessory muscles to complete. Multiple rest breaks given throughout session due to increased pains or muscle cramps. Patient denied use of ice this date but verbally confirmed to complete at home. [] Progressing toward goals. [] No change. [] Other:    [] Patient would continue to benefit from skilled physical therapy services in order to: decrease symptoms, improve L knee/hip strength and ROM to complete ADLs and return to PLOF. STG: (to be met in 6 treatments)  ? Pain: Pt will report decreased pain 5/10 or less to decrease difficulties with ADLs  ? ROM: Pt will demonstrate increased L knee/hip ROM to decrease gait impairments   ? Strength: Pt will demonstrate improved L hip strength 4-/5 or higher to progress walking tolerance to 20 minutes or more   Independent with Home Exercise Programs  Demonstrates knowledge of fall preventions to decrease risk of falls      LTG: (to be met in 12 treatments)  Pt will demonstrate improved L knee ROM WNL to normalize gait with no AD    Pt will demonstrate increased L knee strength 5/5 and hip strength 4/5 or higher to return to PLOF  Pt will report improved walking tolerance leading to decreased rest breaks to complete ADLs  Pt will demonstrate improved L knee stability evident by performing L SLS for 20 seconds or more                    Patient goals: pain relief, or at least increase tolerance     Pt. Education:  [x] Yes  [] No  [] Reviewed Prior HEP/Ed  Method of Education: [x] Verbal  [] Demo  [] Written  Comprehension of Education:  [x] Verbalizes understanding. [] Demonstrates understanding. [x] Needs review. - review compliance   [] Demonstrates/verbalizes HEP/Ed previously given. Plan: [x] Continue per plan of care.    [] Other:      Treatment Charges: Mins Units   []  Modalities     []  Ther Exercise 40 3   []  Manual Therapy     []  Ther Activities     []  Aquatics     []  Neuromuscular     [] Vasocompression     [] Gait Training     [] Dry needling        [] 1 or 2 muscles        [] 3 or more muscles     []  Other     Total Treatment time 40 3     Time In:115pm            Time Out: 200pm    Electronically signed by:  Jeff Rosenthal PTA

## 2021-12-01 ENCOUNTER — OFFICE VISIT (OUTPATIENT)
Dept: ORTHOPEDIC SURGERY | Age: 71
End: 2021-12-01
Payer: COMMERCIAL

## 2021-12-01 ENCOUNTER — HOSPITAL ENCOUNTER (OUTPATIENT)
Dept: PHARMACY | Age: 71
Setting detail: THERAPIES SERIES
Discharge: HOME | End: 2021-12-01
Payer: COMMERCIAL

## 2021-12-01 VITALS — RESPIRATION RATE: 14 BRPM | HEIGHT: 60 IN | BODY MASS INDEX: 44.76 KG/M2 | WEIGHT: 228 LBS

## 2021-12-01 DIAGNOSIS — G89.29 CHRONIC PAIN OF LEFT KNEE: Primary | ICD-10-CM

## 2021-12-01 DIAGNOSIS — M25.562 CHRONIC PAIN OF LEFT KNEE: Primary | ICD-10-CM

## 2021-12-01 DIAGNOSIS — M23.92 INTERNAL DERANGEMENT OF LEFT KNEE: ICD-10-CM

## 2021-12-01 LAB
INR BLD: 2.3
PROTIME: 27.4 SECONDS

## 2021-12-01 PROCEDURE — G8427 DOCREV CUR MEDS BY ELIG CLIN: HCPCS | Performed by: PHYSICIAN ASSISTANT

## 2021-12-01 PROCEDURE — 1090F PRES/ABSN URINE INCON ASSESS: CPT | Performed by: PHYSICIAN ASSISTANT

## 2021-12-01 PROCEDURE — 3017F COLORECTAL CA SCREEN DOC REV: CPT | Performed by: PHYSICIAN ASSISTANT

## 2021-12-01 PROCEDURE — 1036F TOBACCO NON-USER: CPT | Performed by: PHYSICIAN ASSISTANT

## 2021-12-01 PROCEDURE — 4040F PNEUMOC VAC/ADMIN/RCVD: CPT | Performed by: PHYSICIAN ASSISTANT

## 2021-12-01 PROCEDURE — 1123F ACP DISCUSS/DSCN MKR DOCD: CPT | Performed by: PHYSICIAN ASSISTANT

## 2021-12-01 PROCEDURE — G8484 FLU IMMUNIZE NO ADMIN: HCPCS | Performed by: PHYSICIAN ASSISTANT

## 2021-12-01 PROCEDURE — 99214 OFFICE O/P EST MOD 30 MIN: CPT | Performed by: PHYSICIAN ASSISTANT

## 2021-12-01 PROCEDURE — 85610 PROTHROMBIN TIME: CPT

## 2021-12-01 PROCEDURE — G8417 CALC BMI ABV UP PARAM F/U: HCPCS | Performed by: PHYSICIAN ASSISTANT

## 2021-12-01 PROCEDURE — 99211 OFF/OP EST MAY X REQ PHY/QHP: CPT

## 2021-12-01 PROCEDURE — G8399 PT W/DXA RESULTS DOCUMENT: HCPCS | Performed by: PHYSICIAN ASSISTANT

## 2021-12-01 NOTE — PROGRESS NOTES
[facility], [address] [facility], [address] [place] [address], [id number] [place] [address]           Dept Phone: [phone]           Dept Fax:  [phone] [facility], [place]          Dept Phone: [phone]           Dept Fax:  [phone]      Chief Compliant:  Chief Complaint   Patient presents with    Knee Pain     left        History of Present Illness: This is a 73 y.o. female who presents to the clinic today for evaluation of had concerns including Knee Pain (left). Ms. Milagros bOrien is a 42-year-old female returns today for reevaluation of left knee pain. Please see previous clinic notes for more detailed history. Patient last seen on 10/21/2021 for this issue found to have left knee OA which was consistent with her pain. She was initially evaluated by me earlier this year left hip pain consistent with trochanteric bursitis. She underwent a trochanteric bursal injection at that time which actually did quite a bit better. The majority of her pain continues to be most significant in the left knee. At last visit she was referred to physical therapy and unfortunately has only been to 1 visit due to the significant increase of pain with that visit she was unable to tolerate anymore. Patient states over the last week her pain has felt \"different\". She has started to have severe pain over the medial aspect of the knee that continues to be aggravated by any twisting or turning movement. She also has pain with walking and prolonged period of standing.   She notes intermittent swelling but denies any knee joint warmth, redness, fever or chills       Past History:    Current Outpatient Medications:     gabapentin (NEURONTIN) 800 MG tablet, TAKE 1 TABLET BY MOUTH TWICE DAILY as needed, Disp: 60 tablet, Rfl: 2    fluticasone (FLONASE) 50 MCG/ACT nasal spray, SPRAY 2 SPRAYS INTO EACH NOSTRIL DAILY AS NEEDED FOR ALLERGIES, Disp: 16 g, Rfl: 2    ammonium lactate (LAC-HYDRIN) 12 % lotion, APPLY TO THE AFFECTED AREA(S) DAILY, Disp: 225 g, Rfl: 1    diclofenac sodium (VOLTAREN) 1 % GEL, Apply 4 g topically 4 times daily, Disp: 150 g, Rfl: 1    buPROPion (WELLBUTRIN XL) 300 MG extended release tablet, Take 1 tablet by mouth every morning, Disp: 90 tablet, Rfl: 0    amLODIPine (NORVASC) 10 MG tablet, Take 1 tablet by mouth daily, Disp: 90 tablet, Rfl: 1    acyclovir (ZOVIRAX) 200 MG capsule, TAKE 1 CAPSULE BY MOUTH TWICE DAILY, Disp: 60 capsule, Rfl: 3    levothyroxine (SYNTHROID) 137 MCG tablet, TAKE 1 TABLET BY MOUTH DAILY, Disp: 90 tablet, Rfl: 1    albuterol (PROVENTIL) (2.5 MG/3ML) 0.083% nebulizer solution, Take 3 mLs by nebulization every 6 hours as needed for Wheezing, Disp: 120 each, Rfl: 1    vitamin D (ERGOCALCIFEROL) 1.25 MG (53955 UT) CAPS capsule, TAKE 1 CAPSULE BY MOUTH ONCE WEEKLY EVERY SUNDAY, Disp: 12 capsule, Rfl: 1    montelukast (SINGULAIR) 10 MG tablet, Take 1 tablet by mouth nightly, Disp: 30 tablet, Rfl: 5    lidocaine (LIDODERM) 5 %, PLACE 1 PATCH ONTO SKIN ONCE DAILY, 12 HOURS ON AND REMOVE FOR 12 HOURS, Disp: 30 patch, Rfl: 3    warfarin (COUMADIN) 5 MG tablet, Take 5 mg by mouth See Admin Instructions Dosed by [facility] Medication Management: 3.75 mg Tues/Fri and 2.5 mg all other days, Disp: , Rfl:     warfarin (COUMADIN) 2.5 MG tablet, Take 5 mg Mon and 2.5mg all other days (Patient taking differently: Take 2.5 mg by mouth See Admin Instructions Dosed by [facility] Anticoagulation Service: 3.75 mg Tues/Fri and 2.5 mg all other days), Disp: 90 tablet, Rfl: 1    buPROPion (WELLBUTRIN XL) 150 MG extended release tablet, Take 1 tablet by mouth every morning, Disp: 30 tablet, Rfl: 5    simvastatin (ZOCOR) 40 MG tablet, TAKE 1 TABLET BY MOUTH EVERY EVENING, Disp: 90 tablet, Rfl: 10    albuterol sulfate  (90 Base) MCG/ACT inhaler, Inhale 1 puff into the lungs every 6 hours as needed for Wheezing, Disp: 2 Inhaler, Rfl: 3    Gabapentin POWD, Formula 8E: diclo3%+gaba6%+DMSO5%+prilo2%+baclofen2%+lido2% with ketamine. Apply 1-2 gm topically to affected area TID-QID (Patient not taking: Reported on 11/10/2021), Disp: 120 g, Rfl: 5    ASPIRIN LOW DOSE 81 MG chewable tablet, CHEW AND SWALLOW TAKE 1 TABLET BY MOUTH DAILY (Patient not taking: Reported on 11/10/2021), Disp: 30 tablet, Rfl: 11    metoprolol tartrate (LOPRESSOR) 25 MG tablet, TAKE 1 TABLET BY MOUTH TWICE DAILY, Disp: 180 tablet, Rfl: 3    Blood Pressure Monitoring (BP MONITOR-STETHOSCOPE) KIT, 1 each by Does not apply route daily as needed (high blood pressure), Disp: 1 kit, Rfl: 0    nitroGLYCERIN (NITROSTAT) 0.4 MG SL tablet, up to max of 3 total doses. If no relief after 1 dose, call 911.  (Patient not taking: Reported on 11/10/2021), Disp: 25 tablet, Rfl: 1    hydrocortisone 1 % cream, APPLY TO AFFECTED AREA TWICE DAILY, Disp: 30 g, Rfl: 3    pantoprazole (PROTONIX) 40 MG tablet, Take 1 tablet by mouth daily as needed (acid reflux) (Patient not taking: Reported on 11/10/2021), Disp: 30 tablet, Rfl: 2  Allergies   Allergen Reactions    Cymbalta [Duloxetine Hcl]     Lisinopril Other (See Comments)     SEVER COUGH    Robaxin [Methocarbamol] Swelling     Social History     Socioeconomic History    Marital status: Single     Spouse name: Not on file    Number of children: 2    Years of education: 15    Highest education level: Not on file   Occupational History    Occupation: disabled      Comment: SSI    Tobacco Use    Smoking status: Never Smoker    Smokeless tobacco: Never Used   Vaping Use    Vaping Use: Never used   Substance and Sexual Activity    Alcohol use: No     Alcohol/week: 0.0 standard drinks     Comment: never used     Drug use: No    Sexual activity: Never   Other Topics Concern    Not on file   Social History Narrative    Not on file     Social Determinants of Health     Financial Resource Strain: Low Risk     Difficulty of Paying Living Expenses: Not hard at all   Food Insecurity: No Food Insecurity    Worried About Running Out of Food in the Last Year: Never true    Jah of Food in the Last Year: Never true   Transportation Needs: No Transportation Needs    Lack of Transportation (Medical): No    Lack of Transportation (Non-Medical):  No   Physical Activity:     Days of Exercise per Week: Not on file    Minutes of Exercise per Session: Not on file   Stress:     Feeling of Stress : Not on file   Social Connections:     Frequency of Communication with Friends and Family: Not on file    Frequency of Social Gatherings with Friends and Family: Not on file    Attends Buddhist Services: Not on file    Active Member of [address] [place] [address] [employer] or Organizations: Not on file    Attends Club or Organization Meetings: Not on file    Marital Status: Not on file   Intimate Partner Violence:     Fear of Current or Ex-Partner: Not on file    Emotionally Abused: Not on file    Physically Abused: Not on file    Sexually Abused: Not on file   Housing Stability:     Unable to Pay for Housing in the Last Year: Not on file    Number of [place] in the Last Year: Not on file    Unstable Housing in the Last Year: Not on file     Past Medical History:   Diagnosis Date    Affective disorder ([address]     seasonal    Arthritis     shoulders and lower back    DVT (deep venous thrombosis) (HCC)     has history in R leg X 4    Fibromyalgia     Hyperlipidemia     Hypertension     Pneumonia     Sinusitis     Thyroid disease     hypothyroidsm     Past Surgical History:   Procedure Laterality Date    COLONOSCOPY  8/31/2011    internal hemorrhoides    DILATION AND CURETTAGE OF UTERUS      ECTOPIC PREGNANCY SURGERY Left     HYSTERECTOMY       Family History   Problem Relation Age of Onset    Cervical Cancer Mother     Heart Disease Mother     Heart Disease Father         Review of Systems   Constitutional: Negative for fever, chills, sweats. Eyes: Negative for changes in vision, or pain. HENT: Negative for ear ache, epistaxis, or sore throat. Respiratory/Cardio: Negative for Chest pain, palpitations, SOB, or cough. Gastrointestinal: Negative for abdominal pain, N/V/D. Genitourinary: Negative for dysuria, frequency, urgency, or hematuria. Neurological: Negative for headache, numbness, or weakness. Integumentary: Negative for rash, itching, laceration, or abrasion. Musculoskeletal: Positive for Knee Pain (left)       Physical Exam:  Constitutional: Patient is oriented to person, place, and time. Patient appears well-developed and well nourished. HENT: Negative otherwise noted  Head: Normocephalic and Atraumatic  Nose: Normal  Eyes: Conjunctivae and EOM are normal  Neck: Normal range of motion Neck supple. Respiratory/Cardio: Effort normal. No respiratory distress. Musculoskeletal:    Left Knee:     Skin: warm and dry, no rash or erythema  Vasculature: 2+ pedal pulses bilaterally  Neuro: Sensation grossly intact to light touch diffusely  Alignment: Normal  Tenderness: Severe tenderness to medial joint line. No tenderness to quad/patellar tendon, pes anserine bursa or posterior knee.   Effusion: Moderate    ROM: (Degrees)       A P       Extension  -5        Flexion   110 120       Crepitation  Yes       Muscle strength:         Flexion   5      Extension  5      SLR   5        Extensor lag   y          Special testing:  y    Pain with deep knee flexion     y    Patellar grind       n    Patellar apprehension      n    Patellar glide         n    Lachman       n    Anterior drawer      n    Pivot shift       n    Posterior drawer      n    Dial test       n    Posterolateral drawer      n    Posterior Sag       n    MCL        n    LCL          Moderate   Medial joint line tenderness     n    Lateral joint line tenderness     y    McMkathleeney's         Neurological: Patient is alert and oriented to person, place, and time. Normal strenght. No sensory deficit. Skin: Skin is warm and dry  Psychiatric: Behavior is normal. Thought content normal.  Nursing note and vitals reviewed. Labs and Imaging:     No new x-rays are taken today however those from 6/9/2021 are again available for review demonstrating mild medial patellofemoral compartmental narrowing. No evidence of acute fracture. No orders of the defined types were placed in this encounter. Assessment and Plan:  1. Chronic pain of left knee    2. Internal derangement of left knee          PLAN:  Shabana Shell is a 73 y.o. old female who presents to the clinic today for evaluation of left knee pain concerning for possible medial meniscus tear of left  knee. Patient has chronic pain in this left knee consistent with osteoarthritis however the last 1 week she has had significantly increased pain to the medial joint line made worse with twisting and turning and pivoting motion. Today she has a positive Pierre's and Susiey's concern for medial meniscus tear. Recommend further diagnostic valuation with an MRI of the left knee. Discussed treatment options available to her including nonoperative and operative intervention. Patient is educated that should this MRI demonstrated a medial meniscus tear she would be a candidate for left knee arthroscopy partial medial meniscectomy however there is the option of nonoperative treatment as well in the form of corticosteroid injection and physical therapy. For pain relief recommend continue on meloxicam we will see patient back upon completion of the MRI. Electronically signed by GILDA Leos on 12/1/21 at 3:17 PM EST        Please note that this chart was generated using voice recognition Dragon dictation software.   Although every effort was made to ensure the accuracy of this automated transcription, some errors in transcription may have occurred.

## 2021-12-01 NOTE — PROGRESS NOTES
Patient seen in person in Medication Management Service. Patient states compliant most of the time with regimen. No bleeding or thromboembolic side effects noted. No significant med or dietary changes. No significant recent illness or disease state changes. PT/INR done via POC meter per protocol. INR was therapeutic at 2.3.  (goal 2 - 3)  The patient has not used any ibuprofen this past week    Warfarin regimen will be continued at current dose 3.75 mg Tues/Fri and 2.5 mg all other days. Will retest in 4 weeks. Patient understands dosing directions and information discussed. Dosing schedule and follow up appointment given to patient. Progress note routed to referring physicians office. 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 COVID 19 screening completed. At this time patient denies symptoms, recent travel and exposure. Patient educated to screen for temperature and COVID-19 symptoms prior to coming to clinic for next appointment. They are instructed to call the clinic to reschedule if they have any symptoms. Standing order for PT/INR has been placed in preparation to transition to possible remote INR monitoring given efforts to reduce the spread of COVID-19.       For Pharmacy Admin Tracking Only     Intervention Detail:    Total # of Interventions Recommended: 0   Total # of Interventions Accepted: 0   Time Spent (min): 20

## 2021-12-02 ENCOUNTER — HOSPITAL ENCOUNTER (OUTPATIENT)
Dept: PHYSICAL THERAPY | Age: 71
Setting detail: THERAPIES SERIES
Discharge: HOME | End: 2021-12-02

## 2021-12-20 ENCOUNTER — HOSPITAL ENCOUNTER (OUTPATIENT)
Age: 71
Discharge: HOME | End: 2021-12-20
Payer: COMMERCIAL

## 2021-12-20 DIAGNOSIS — R73.02 IGT (IMPAIRED GLUCOSE TOLERANCE): ICD-10-CM

## 2021-12-20 DIAGNOSIS — I10 ESSENTIAL HYPERTENSION: ICD-10-CM

## 2021-12-20 DIAGNOSIS — E78.2 MIXED HYPERLIPIDEMIA: ICD-10-CM

## 2021-12-20 DIAGNOSIS — E03.9 ACQUIRED HYPOTHYROIDISM: ICD-10-CM

## 2021-12-20 LAB
ABSOLUTE EOS #: 0.2 K/UL (ref 0–0.4)
ABSOLUTE IMMATURE GRANULOCYTE: ABNORMAL K/UL (ref 0–0.3)
ABSOLUTE LYMPH #: 1.6 K/UL (ref 1–4.8)
ABSOLUTE MONO #: 0.6 K/UL (ref 0.1–1.3)
ALBUMIN SERPL-MCNC: 4 G/DL (ref 3.5–5.2)
ALBUMIN/GLOBULIN RATIO: NORMAL (ref 1–2.5)
ALP BLD-CCNC: 83 U/L (ref 35–104)
ALT SERPL-CCNC: 13 U/L (ref 5–33)
ANION GAP SERPL CALCULATED.3IONS-SCNC: 11 MMOL/L (ref 9–17)
AST SERPL-CCNC: 13 U/L
BASOPHILS # BLD: 1 % (ref 0–2)
BASOPHILS ABSOLUTE: 0 K/UL (ref 0–0.2)
BILIRUB SERPL-MCNC: 0.3 MG/DL (ref 0.3–1.2)
BUN BLDV-MCNC: 10 MG/DL (ref 8–23)
BUN/CREAT BLD: NORMAL (ref 9–20)
CALCIUM SERPL-MCNC: 9.4 MG/DL (ref 8.6–10.4)
CHLORIDE BLD-SCNC: 105 MMOL/L (ref 98–107)
CHOLESTEROL, FASTING: 205 MG/DL
CHOLESTEROL/HDL RATIO: 3.6
CO2: 28 MMOL/L (ref 20–31)
CREAT SERPL-MCNC: 0.71 MG/DL (ref 0.5–0.9)
DIFFERENTIAL TYPE: ABNORMAL
EOSINOPHILS RELATIVE PERCENT: 3 % (ref 0–4)
GFR AFRICAN AMERICAN: >60 ML/MIN
GFR NON-AFRICAN AMERICAN: >60 ML/MIN
GFR SERPL CREATININE-BSD FRML MDRD: NORMAL ML/MIN/{1.73_M2}
GFR SERPL CREATININE-BSD FRML MDRD: NORMAL ML/MIN/{1.73_M2}
GLUCOSE BLD-MCNC: 99 MG/DL (ref 70–99)
HCT VFR BLD CALC: 39.8 % (ref 36–46)
HDLC SERPL-MCNC: 57 MG/DL
HEMOGLOBIN: 12.9 G/DL (ref 12–16)
IMMATURE GRANULOCYTES: ABNORMAL %
LDL CHOLESTEROL: 91 MG/DL (ref 0–130)
LYMPHOCYTES # BLD: 28 % (ref 24–44)
MCH RBC QN AUTO: 28.6 PG (ref 26–34)
MCHC RBC AUTO-ENTMCNC: 32.5 G/DL (ref 31–37)
MCV RBC AUTO: 88.2 FL (ref 80–100)
MONOCYTES # BLD: 10 % (ref 1–7)
NRBC AUTOMATED: ABNORMAL PER 100 WBC
PDW BLD-RTO: 13.2 % (ref 11.5–14.9)
PLATELET # BLD: 243 K/UL (ref 150–450)
PLATELET ESTIMATE: ABNORMAL
PMV BLD AUTO: 6.5 FL (ref 6–12)
POTASSIUM SERPL-SCNC: 4.1 MMOL/L (ref 3.7–5.3)
RBC # BLD: 4.51 M/UL (ref 4–5.2)
RBC # BLD: ABNORMAL 10*6/UL
SEG NEUTROPHILS: 58 % (ref 36–66)
SEGMENTED NEUTROPHILS ABSOLUTE COUNT: 3.3 K/UL (ref 1.3–9.1)
SODIUM BLD-SCNC: 144 MMOL/L (ref 135–144)
THYROXINE, FREE: 0.94 NG/DL (ref 0.93–1.7)
TOTAL PROTEIN: 7.3 G/DL (ref 6.4–8.3)
TRIGLYCERIDE, FASTING: 284 MG/DL
TSH SERPL DL<=0.05 MIU/L-ACNC: 5.78 MIU/L (ref 0.3–5)
VLDLC SERPL CALC-MCNC: ABNORMAL MG/DL (ref 1–30)
WBC # BLD: 5.7 K/UL (ref 3.5–11)
WBC # BLD: ABNORMAL 10*3/UL

## 2021-12-20 PROCEDURE — 36415 COLL VENOUS BLD VENIPUNCTURE: CPT

## 2021-12-20 PROCEDURE — 84439 ASSAY OF FREE THYROXINE: CPT

## 2021-12-20 PROCEDURE — 84443 ASSAY THYROID STIM HORMONE: CPT

## 2021-12-20 PROCEDURE — 85025 COMPLETE CBC W/AUTO DIFF WBC: CPT

## 2021-12-20 PROCEDURE — 80053 COMPREHEN METABOLIC PANEL: CPT

## 2021-12-20 PROCEDURE — 80061 LIPID PANEL: CPT

## 2021-12-29 ENCOUNTER — HOSPITAL ENCOUNTER (OUTPATIENT)
Dept: PHARMACY | Age: 71
Setting detail: THERAPIES SERIES
Discharge: HOME | End: 2021-12-29
Payer: COMMERCIAL

## 2021-12-29 LAB
INR BLD: 3.6
PROTIME: 43.5 SECONDS

## 2021-12-29 PROCEDURE — 99211 OFF/OP EST MAY X REQ PHY/QHP: CPT

## 2021-12-29 PROCEDURE — 99212 OFFICE O/P EST SF 10 MIN: CPT

## 2021-12-29 PROCEDURE — 85610 PROTHROMBIN TIME: CPT

## 2021-12-29 NOTE — PROGRESS NOTES
Patient seen in person in Medication Management Service. Patient states compliant most of the time with regimen. No bleeding or thromboembolic side effects noted. No significant med or dietary changes. No significant recent illness or disease state changes. PT/INR done via POC meter per protocol. INR was supratherapeutic at 3.6.  (goal 2 - 3)  No cause identified for this increase in the INR  The last INR was 2.3 with no change made in the warfarin dose  The patient has not been taking any Ibuprofen  She has increased the gabapentin to 800 mg tid    Warfarin regimen will be held for one day, then resume 3.75 mg Tues/Fri and 2.5 mg all other days. Will retest in 1 week. Patient understands dosing directions and information discussed. Dosing schedule and follow up appointment given to patient. Progress note routed to referring physicians office. 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 COVID 19 screening completed. At this time patient denies symptoms, recent travel and exposure. Patient educated to screen for temperature and COVID-19 symptoms prior to coming to clinic for next appointment. They are instructed to call the clinic to reschedule if they have any symptoms. Standing order for PT/INR has been placed in preparation to transition to possible remote INR monitoring given efforts to reduce the spread of COVID-19.       For Pharmacy Admin Tracking Only     Intervention Detail: Dose Adjustment: 1, reason: Therapy De-escalation   Total # of Interventions Recommended: 1   Total # of Interventions Accepted: 1   Time Spent (min): 20

## 2021-12-30 DIAGNOSIS — M70.62 GREATER TROCHANTERIC BURSITIS OF LEFT HIP: ICD-10-CM

## 2021-12-30 DIAGNOSIS — G89.29 CHRONIC PAIN OF LEFT KNEE: ICD-10-CM

## 2021-12-30 DIAGNOSIS — M25.562 CHRONIC PAIN OF LEFT KNEE: ICD-10-CM

## 2022-01-06 ENCOUNTER — HOSPITAL ENCOUNTER (OUTPATIENT)
Dept: PHARMACY | Age: 72
Setting detail: THERAPIES SERIES
Discharge: HOME | End: 2022-01-06
Payer: COMMERCIAL

## 2022-01-06 DIAGNOSIS — I82.403 RECURRENT ACUTE DEEP VEIN THROMBOSIS (DVT) OF BOTH LOWER EXTREMITIES (HCC): Primary | ICD-10-CM

## 2022-01-06 LAB
INR BLD: 2.3
PROTIME: 27.6 SECONDS

## 2022-01-06 PROCEDURE — 85610 PROTHROMBIN TIME: CPT

## 2022-01-06 PROCEDURE — 99211 OFF/OP EST MAY X REQ PHY/QHP: CPT

## 2022-01-06 NOTE — PROGRESS NOTES
Patient seen in person in Medication Management Service. Patient states compliant all of the time with regimen. No bleeding or thromboembolic side effects noted. No significant med or dietary changes. No significant recent illness or disease state changes. PT/INR done via POC meter per protocol. INR was therapeutic at 2.3.  (goal 2 - 3)    Warfarin regimen will be continued at current dose 3.75 mg Tuesdays and Fridays and 2.5 mg all other days. Will retest in 4 weeks. Patient understands dosing directions and information discussed. Dosing schedule and follow up appointment given to patient. Progress note routed to referring physicians office. 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 COVID 19 screening completed. At this time patient denies symptoms, recent travel and exposure. Patient educated to screen for temperature and COVID-19 symptoms prior to coming to clinic for next appointment. They are instructed to call the clinic to reschedule if they have any symptoms. Standing order for PT/INR has been placed in preparation to transition to possible remote INR monitoring given efforts to reduce the spread of COVID-19.       For Pharmacy Admin Tracking Only       Total # of Interventions Recommended: 0   Total # of Interventions Accepted: 0   Time Spent (min): Jacques Caldera, PharmD 1/6/2022 2:50 PM  Sweetie. Mina Mcqueen 44 PGY1 Resident

## 2022-01-25 NOTE — DISCHARGE SUMMARY
[x] [facility]) @ [facility]  [address] [facility] [address] [place], [address] [address]  Phone [phone]  Fax [phone]    Physical Therapy Discharge Note    Date: 2022      Patient: Magali Crisostomo  : 1950  MRN: [id number]    Ceclementina [id number] Gutierrez                   Insurance: [facility]  # of visits allowed/remainin per calendar year  Auth DOS 11/15 - 1/15/22: [id number] - 39, [id number] - 6, [id number] - 12   Medical Diagnosis: M70.62 (ICD-10-CM) - Greater trochanteric bursitis of left hip             M25.562, G89.29 (ICD-10-CM) - Chronic pain of left knee     Rehab Codes: M 25.55, M 25.57,   Onset date:    May 2020                    Next Dr's appt. : tbd  Visit Count:                                 Cancel/No Show: 3/0               [] Patient recovered from conditions. Treatment goals were met. [] Patient received maximum benefit. No further therapy indicated at this time. [] Patient demonstrated improvement from condition with  ** Of  ** Short term goals met. []Patient demonstrated improvement from condition with **   Of **  Long term goals met. [] Patient to continue exercise/home instructions independently. [x] Therapy interrupted due to:   Pt cancelled last session and has not scheduled additional appointments. Pt not seen since 21. [x] Patient has 2 or more no shows/cancels, is discontinued per our policy. [] Patient has completed prescribed number of treatment sessions. [] Other:        It Is My Understanding That The:  [] Patient returned to work. [] Patient demonstrated improved level of function. [] Patient returned to previous functional level.   [x] Patient's current functional status is unknown due to no-shows  [] Other:     Recommendations/Comments:       Treatment Included:     [x] Therapeutic Exercise   54086  [] Iontophoresis: 4 mg/mL Dexamethasone Sodium Phosphate  mAmin  15705   [] Therapeutic Activity  09201 [] Vasopneumatic cold with compression  [id number]    [] Gait Training   [facility] [] Ultrasound   [id number]   [] Neuromuscular Re-education  [id number] [] Electrical Stimulation Unattended  96184   [x] Manual Therapy  98827 [] Electrical Stimulation Attended  [id number]   [x] Instruction in HEP  [] Lumbar/Cervical Traction  [id number]   [] Aquatic Therapy   [id number] [x] Cold/hotpack    [] Massage   87942      [] Dry Needling, 1 or 2 muscles  96439   [] Biofeedback, first 15 minutes   90631  [] Biofeedback, additional 15 minutes   10617 [] Dry Needling, 3 or more muscles  78740                If you have any questions or concerns regarding this patient's care, please contact us.    Thank you for your referral.      Electronically signed by: Lorene Worthy PT

## 2022-02-10 ENCOUNTER — HOSPITAL ENCOUNTER (OUTPATIENT)
Dept: PHARMACY | Age: 72
Setting detail: THERAPIES SERIES
Discharge: HOME | End: 2022-02-10
Payer: COMMERCIAL

## 2022-02-10 LAB
INR BLD: 1.2
PROTIME: 13.8 SECONDS

## 2022-02-10 PROCEDURE — 99212 OFFICE O/P EST SF 10 MIN: CPT

## 2022-02-10 PROCEDURE — 85610 PROTHROMBIN TIME: CPT

## 2022-02-10 NOTE — PROGRESS NOTES
Patient seen in person in Medication Management Service. Patient states compliant most of the time with regimen. No bleeding or thromboembolic side effects noted. No significant med or dietary changes. No significant recent illness or disease state changes. PT/INR done via POC meter per protocol. INR was subtherapeutic at 1.2.  (goal 2 - 3)    Warfarin regimen will be increased to 5 mg Mon/Thurs and 2.5 mg all other days. Will retest in 1 week. Patient understands dosing directions and information discussed. Dosing schedule and follow up appointment given to patient. Progress note routed to referring physicians office. 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 COVID 19 screening completed. At this time patient denies symptoms, recent travel and exposure. Patient educated to screen for temperature and COVID-19 symptoms prior to coming to clinic for next appointment. They are instructed to call the clinic to reschedule if they have any symptoms. Standing order for PT/INR has been placed in preparation to transition to possible remote INR monitoring given efforts to reduce the spread of COVID-19.       For Pharmacy Admin Tracking Only     Intervention Detail: Dose Adjustment: 1, reason: Therapy Optimization   Total # of Interventions Recommended: 1   Total # of Interventions Accepted: 1   Time Spent (min): 20

## 2022-02-18 ENCOUNTER — HOSPITAL ENCOUNTER (OUTPATIENT)
Dept: PHARMACY | Age: 72
Setting detail: THERAPIES SERIES
Discharge: HOME | End: 2022-02-18
Payer: COMMERCIAL

## 2022-02-18 LAB
INR BLD: 1.9
PROTIME: 22.9 SECONDS

## 2022-02-18 PROCEDURE — 85610 PROTHROMBIN TIME: CPT

## 2022-02-18 PROCEDURE — 99212 OFFICE O/P EST SF 10 MIN: CPT

## 2022-02-18 NOTE — PROGRESS NOTES
Patient seen in person in Medication Management Service. Patient states compliant most of the time with regimen. No bleeding or thromboembolic side effects noted. No significant med or dietary changes. No significant recent illness or disease state changes. PT/INR done via POC meter per protocol. INR was subtherapeutic at 1.9.  (goal 2 - 3)    Warfarin regimen will be increased to 5 mg MWF and 2.5 mg all other days. Will retest in 2 weeks. Patient understands dosing directions and information discussed. Dosing schedule and follow up appointment given to patient. Progress note routed to referring physicians office. 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 COVID 19 screening completed. At this time patient denies symptoms, recent travel and exposure. Patient educated to screen for temperature and COVID-19 symptoms prior to coming to clinic for next appointment. They are instructed to call the clinic to reschedule if they have any symptoms. Standing order for PT/INR has been placed in preparation to transition to possible remote INR monitoring given efforts to reduce the spread of COVID-19.       For Pharmacy Admin Tracking Only     Intervention Detail: Dose Adjustment: 1, reason: Therapy Optimization   Total # of Interventions Recommended: 1   Total # of Interventions Accepted: 1   Time Spent (min): 20

## 2022-03-04 ENCOUNTER — HOSPITAL ENCOUNTER (OUTPATIENT)
Dept: PHARMACY | Age: 72
Setting detail: THERAPIES SERIES
Discharge: HOME | End: 2022-03-04
Payer: COMMERCIAL

## 2022-03-04 LAB
INR BLD: 3.3
PROTIME: 39.7 SECONDS

## 2022-03-04 PROCEDURE — 85610 PROTHROMBIN TIME: CPT

## 2022-03-04 PROCEDURE — 99212 OFFICE O/P EST SF 10 MIN: CPT

## 2022-03-04 NOTE — PROGRESS NOTES
Patient seen in person in Medication Management Service. Patient states compliant most of the time with regimen. No bleeding or thromboembolic side effects noted. No significant med or dietary changes. No significant recent illness or disease state changes. PT/INR done via POC meter per protocol. INR was supratherapeutic at 3.3.  (goal 2 - 3)    Warfarin regimen will be decreased to 5 mg Mon/Thurs and 2.5 mg all other days. Will retest in 2 weeks. Patient understands dosing directions and information discussed. Dosing schedule and follow up appointment given to patient. Progress note routed to referring physicians office. 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 COVID 19 screening completed. At this time patient denies symptoms, recent travel and exposure. Patient educated to screen for temperature and COVID-19 symptoms prior to coming to clinic for next appointment. They are instructed to call the clinic to reschedule if they have any symptoms. Standing order for PT/INR has been placed in preparation to transition to possible remote INR monitoring given efforts to reduce the spread of COVID-19.       For Pharmacy Admin Tracking Only     Intervention Detail: Dose Adjustment: 1, reason: Therapy De-escalation   Total # of Interventions Recommended: 1   Total # of Interventions Accepted: 1   Time Spent (min): 20

## 2022-03-18 ENCOUNTER — HOSPITAL ENCOUNTER (OUTPATIENT)
Dept: PHARMACY | Age: 72
Setting detail: THERAPIES SERIES
Discharge: HOME | End: 2022-03-18
Payer: COMMERCIAL

## 2022-03-18 LAB
INR BLD: 3.6
PROTIME: 43.7 SECONDS

## 2022-03-18 PROCEDURE — 99212 OFFICE O/P EST SF 10 MIN: CPT

## 2022-03-18 PROCEDURE — 85610 PROTHROMBIN TIME: CPT

## 2022-03-18 NOTE — PROGRESS NOTES
Patient seen in person in Medication Management Service. Patient states compliant most of the time with regimen. No bleeding or thromboembolic side effects noted. No significant med or dietary changes. No significant recent illness or disease state changes. PT/INR done via POC meter per protocol. INR was supratherapeutic at 3.6.  (goal 2 - 3)    Warfarin regimen will be held for one day ,then resumed at reduced dose of 5 mg Wed and 2.5 mg all other days. Will retest in 1 week. Patient understands dosing directions and information discussed. Dosing schedule and follow up appointment given to patient. Progress note routed to referring physicians office. 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 COVID 19 screening completed. At this time patient denies symptoms, recent travel and exposure. Patient educated to screen for temperature and COVID-19 symptoms prior to coming to clinic for next appointment. They are instructed to call the clinic to reschedule if they have any symptoms. Standing order for PT/INR has been placed in preparation to transition to possible remote INR monitoring given efforts to reduce the spread of COVID-19.       For Pharmacy Admin Tracking Only     Intervention Detail: Dose Adjustment: 1, reason: Therapy De-escalation   Total # of Interventions Recommended: 1   Total # of Interventions Accepted: 1   Time Spent (min): 20

## 2022-03-22 ENCOUNTER — TELEPHONE (OUTPATIENT)
Dept: PHARMACY | Age: 72
End: 2022-03-22

## 2022-03-25 ENCOUNTER — HOSPITAL ENCOUNTER (OUTPATIENT)
Dept: PHARMACY | Age: 72
Setting detail: THERAPIES SERIES
Discharge: HOME | End: 2022-03-25
Payer: COMMERCIAL

## 2022-03-25 LAB
INR BLD: 3
PROTIME: 36.1 SECONDS

## 2022-03-25 PROCEDURE — 99211 OFF/OP EST MAY X REQ PHY/QHP: CPT

## 2022-03-25 PROCEDURE — 85610 PROTHROMBIN TIME: CPT

## 2022-03-25 NOTE — PROGRESS NOTES
Patient seen in person in Medication Management Service. Patient states compliant most of the time with regimen. No bleeding or thromboembolic side effects noted. No significant med or dietary changes. No significant recent illness or disease state changes. PT/INR done via POC meter per protocol. INR was therapeutic at 3.  (goal 2 - 3)    Warfarin regimen will be continued at current dose 5 mg Wed and 2.5 mg all other days. Will retest in 2 weeks. The patient is breathing better and never began the prednisone taper  She will notify our clinic if she does    Patient understands dosing directions and information discussed. Dosing schedule and follow up appointment given to patient. Progress note routed to referring physicians office. Patient acknowledges working in [address] [place] [address] with Pharmacist as referred by his/her physician/provider. COVID 19 screening completed. At this time patient denies symptoms, recent travel and exposure. Patient educated to screen for temperature and COVID-19 symptoms prior to coming to clinic for next appointment. They are instructed to call the clinic to reschedule if they have any symptoms. Standing order for PT/INR has been placed in preparation to transition to possible remote INR monitoring given efforts to reduce the spread of COVID-19.       For Pharmacy Admin Tracking Only     Intervention Detail:    Total # of Interventions Recommended: 0   Total # of Interventions Accepted: 0   Time Spent (min): 20

## 2022-03-28 DIAGNOSIS — M25.562 CHRONIC PAIN OF LEFT KNEE: ICD-10-CM

## 2022-03-28 DIAGNOSIS — G89.29 CHRONIC PAIN OF LEFT KNEE: ICD-10-CM

## 2022-03-28 DIAGNOSIS — M70.62 GREATER TROCHANTERIC BURSITIS OF LEFT HIP: ICD-10-CM

## 2022-04-08 ENCOUNTER — HOSPITAL ENCOUNTER (OUTPATIENT)
Dept: PHARMACY | Age: 72
Setting detail: THERAPIES SERIES
Discharge: HOME | End: 2022-04-08
Payer: COMMERCIAL

## 2022-04-08 LAB
INR BLD: 1.7
PROTIME: 20.3 SECONDS

## 2022-04-08 PROCEDURE — 99211 OFF/OP EST MAY X REQ PHY/QHP: CPT

## 2022-04-08 PROCEDURE — 85610 PROTHROMBIN TIME: CPT

## 2022-04-08 NOTE — PROGRESS NOTES
Patient seen in person in Medication Management Service. Patient states compliant most of the time with regimen. No bleeding or thromboembolic side effects noted. No significant med or dietary changes. No significant recent illness or disease state changes. PT/INR done via POC meter per protocol. INR was subtherapeutic at 1.7.  (goal 2 - 3)  The patient did consume broccoli this past week  The last INR was 3 with no change made in the warfarin dose  Warfarin regimen will be continued at current dose 5 mg Fridays and 2.5 mg all other days. Will retest in 2 weeks. Patient understands dosing directions and information discussed. Dosing schedule and follow up appointment given to patient. Progress note routed to referring physicians office. Patient acknowledges working in [address] [place] [address] with Pharmacist as referred by his/her physician/provider. COVID 19 screening completed. At this time patient denies symptoms, recent travel and exposure. Patient educated to screen for temperature and COVID-19 symptoms prior to coming to clinic for next appointment. They are instructed to call the clinic to reschedule if they have any symptoms. Standing order for PT/INR has been placed in preparation to transition to possible remote INR monitoring given efforts to reduce the spread of COVID-19.       For Pharmacy Admin Tracking Only     Intervention Detail:    Total # of Interventions Recommended: 0   Total # of Interventions Accepted: 0   Time Spent (min): 20

## 2022-04-22 ENCOUNTER — HOSPITAL ENCOUNTER (OUTPATIENT)
Age: 72
Discharge: HOME | End: 2022-04-22

## 2022-04-22 ENCOUNTER — HOSPITAL ENCOUNTER (OUTPATIENT)
Dept: PHARMACY | Age: 72
Setting detail: THERAPIES SERIES
Discharge: HOME | End: 2022-04-22
Payer: COMMERCIAL

## 2022-04-22 DIAGNOSIS — I82.403 RECURRENT ACUTE DEEP VEIN THROMBOSIS (DVT) OF BOTH LOWER EXTREMITIES (HCC): Primary | ICD-10-CM

## 2022-04-22 LAB
INR BLD: 1.7
PROTIME: 20.9 SECONDS

## 2022-04-22 PROCEDURE — 99213 OFFICE O/P EST LOW 20 MIN: CPT

## 2022-04-22 PROCEDURE — 86147 CARDIOLIPIN ANTIBODY EA IG: CPT

## 2022-04-22 PROCEDURE — 81240 F2 GENE: CPT

## 2022-04-22 PROCEDURE — 81241 F5 GENE: CPT

## 2022-04-22 PROCEDURE — 85610 PROTHROMBIN TIME: CPT

## 2022-04-22 PROCEDURE — 86146 BETA-2 GLYCOPROTEIN ANTIBODY: CPT

## 2022-04-22 PROCEDURE — 36415 COLL VENOUS BLD VENIPUNCTURE: CPT

## 2022-04-22 PROCEDURE — 85300 ANTITHROMBIN III ACTIVITY: CPT

## 2022-04-22 NOTE — PROGRESS NOTES
Patient seen in person in Medication Management Service. Patient states compliant all of the time with regimen. Patient uses a pill box. No bleeding side effects noted. Patient has a couple bruises on her arms. Unsure if she bumped into anything. No significant med or dietary changes. Vegetables the same. Patient does plan to eat more vegetables in the summer. Discussed consistency. Patient did take tums on Monday and Tuesday morning. Takes warfarin in the evening so not at the same time. No significant recent illness or disease state changes. PT/INR done via POC meter per protocol. INR was subtherapeutic at 1.7.  (goal 2 - 3)    Warfarin regimen will be increased to 5 mg Tues/Fri and 2.5 mg all other days. Will retest in 2 weeks. Patient understands dosing directions and information discussed. Dosing schedule and follow up appointment given to patient. Progress note routed to referring physicians office. Patient acknowledges working in [address] [place] [address] with Pharmacist as referred by his/her physician/provider. COVID 19 screening completed. At this time patient denies symptoms, recent travel and exposure. Patient educated to screen for temperature and COVID-19 symptoms prior to coming to clinic for next appointment. They are instructed to call the clinic to reschedule if they have any symptoms. Standing order for PT/INR has been placed in preparation to transition to possible remote INR monitoring given efforts to reduce the spread of COVID-19.       For Pharmacy Admin Tracking Only     Intervention Detail: Dose Adjustment: 1, reason: Therapy Optimization   Total # of Interventions Recommended: 1   Total # of Interventions Accepted: 1   Time Spent (min): 20

## 2022-04-23 LAB — AT-III ACTIVITY: 84 % (ref 83–122)

## 2022-04-25 LAB
ANTICARDIOLIPIN IGG ANTIBODY: 1 GPL (ref 0–10)
BETA 2 GLYCOPROT.1 IGA AB: 2.5 ELISA U/ML (ref 0–7)
BETA 2 GLYCOPROT.1 IGG AB: 1.5 ELISA U/ML (ref 0–7)
BETA 2 GLYCOPROT.1 IGM AB: <0.9 ELISA U/ML (ref 0–7)
CARDIOLIPIN AB IGM: <0.8 MPL (ref 0–10)

## 2022-05-06 ENCOUNTER — TELEPHONE (OUTPATIENT)
Dept: PHARMACY | Age: 72
End: 2022-05-06

## 2022-05-06 ENCOUNTER — HOSPITAL ENCOUNTER (OUTPATIENT)
Dept: PHARMACY | Age: 72
Setting detail: THERAPIES SERIES
Discharge: HOME | End: 2022-05-06
Payer: COMMERCIAL

## 2022-05-06 LAB
INR BLD: 2.6
PROTIME: 30.9 SECONDS

## 2022-05-06 PROCEDURE — 99211 OFF/OP EST MAY X REQ PHY/QHP: CPT

## 2022-05-06 PROCEDURE — 85610 PROTHROMBIN TIME: CPT

## 2022-05-06 NOTE — TELEPHONE ENCOUNTER
Called to Notify patient of Maxi Rosario Up time on June 3rd at 1130  No Answer  Vm Full  Left F Number to call back

## 2022-05-20 ENCOUNTER — HOSPITAL ENCOUNTER (EMERGENCY)
Age: 72
Discharge: HOME | End: 2022-05-20
Attending: EMERGENCY MEDICINE
Payer: COMMERCIAL

## 2022-05-20 ENCOUNTER — APPOINTMENT (OUTPATIENT)
Dept: GENERAL RADIOLOGY | Age: 72
End: 2022-05-20
Payer: COMMERCIAL

## 2022-05-20 VITALS
DIASTOLIC BLOOD PRESSURE: 84 MMHG | OXYGEN SATURATION: 96 % | HEART RATE: 89 BPM | RESPIRATION RATE: 21 BRPM | SYSTOLIC BLOOD PRESSURE: 137 MMHG | TEMPERATURE: 98.1 F

## 2022-05-20 DIAGNOSIS — R79.1 SUBTHERAPEUTIC INTERNATIONAL NORMALIZED RATIO (INR): ICD-10-CM

## 2022-05-20 DIAGNOSIS — R07.9 CHEST PAIN, UNSPECIFIED TYPE: Primary | ICD-10-CM

## 2022-05-20 LAB
ABSOLUTE EOS #: 0 K/UL (ref 0–0.4)
ABSOLUTE LYMPH #: 0.5 K/UL (ref 1–4.8)
ABSOLUTE MONO #: 0.1 K/UL (ref 0.1–1.3)
ALBUMIN SERPL-MCNC: 4.2 G/DL (ref 3.5–5.2)
ALP BLD-CCNC: 91 U/L (ref 35–104)
ALT SERPL-CCNC: 12 U/L (ref 5–33)
ANION GAP SERPL CALCULATED.3IONS-SCNC: 14 MMOL/L (ref 9–17)
AST SERPL-CCNC: 14 U/L
BASOPHILS # BLD: 1 % (ref 0–2)
BASOPHILS ABSOLUTE: 0 K/UL (ref 0–0.2)
BILIRUB SERPL-MCNC: 0.26 MG/DL (ref 0.3–1.2)
BUN BLDV-MCNC: 9 MG/DL (ref 8–23)
CALCIUM SERPL-MCNC: 9.5 MG/DL (ref 8.6–10.4)
CHLORIDE BLD-SCNC: 100 MMOL/L (ref 98–107)
CO2: 23 MMOL/L (ref 20–31)
CREAT SERPL-MCNC: 0.77 MG/DL (ref 0.5–0.9)
EOSINOPHILS RELATIVE PERCENT: 0 % (ref 0–4)
GFR AFRICAN AMERICAN: >60 ML/MIN
GFR NON-AFRICAN AMERICAN: >60 ML/MIN
GFR SERPL CREATININE-BSD FRML MDRD: ABNORMAL ML/MIN/{1.73_M2}
GLUCOSE BLD-MCNC: 178 MG/DL (ref 70–99)
HCT VFR BLD CALC: 40.2 % (ref 36–46)
HEMOGLOBIN: 13.1 G/DL (ref 12–16)
INR BLD: 1.9
LYMPHOCYTES # BLD: 9 % (ref 24–44)
MAGNESIUM: 1.7 MG/DL (ref 1.6–2.6)
MCH RBC QN AUTO: 27.5 PG (ref 26–34)
MCHC RBC AUTO-ENTMCNC: 32.6 G/DL (ref 31–37)
MCV RBC AUTO: 84.4 FL (ref 80–100)
MONOCYTES # BLD: 1 % (ref 1–7)
PDW BLD-RTO: 15.2 % (ref 11.5–14.9)
PLATELET # BLD: 212 K/UL (ref 150–450)
PMV BLD AUTO: 7.1 FL (ref 6–12)
POTASSIUM SERPL-SCNC: 3.8 MMOL/L (ref 3.7–5.3)
PROTHROMBIN TIME: 21.5 SEC (ref 11.8–14.6)
RBC # BLD: 4.76 M/UL (ref 4–5.2)
SEG NEUTROPHILS: 89 % (ref 36–66)
SEGMENTED NEUTROPHILS ABSOLUTE COUNT: 5.1 K/UL (ref 1.3–9.1)
SODIUM BLD-SCNC: 137 MMOL/L (ref 135–144)
TOTAL PROTEIN: 7.3 G/DL (ref 6.4–8.3)
TROPONIN, HIGH SENSITIVITY: 7 NG/L (ref 0–14)
TROPONIN, HIGH SENSITIVITY: <6 NG/L (ref 0–14)
WBC # BLD: 5.8 K/UL (ref 3.5–11)

## 2022-05-20 PROCEDURE — 93005 ELECTROCARDIOGRAM TRACING: CPT | Performed by: EMERGENCY MEDICINE

## 2022-05-20 PROCEDURE — 71045 X-RAY EXAM CHEST 1 VIEW: CPT

## 2022-05-20 PROCEDURE — 36415 COLL VENOUS BLD VENIPUNCTURE: CPT

## 2022-05-20 PROCEDURE — 6370000000 HC RX 637 (ALT 250 FOR IP): Performed by: EMERGENCY MEDICINE

## 2022-05-20 PROCEDURE — 85610 PROTHROMBIN TIME: CPT

## 2022-05-20 PROCEDURE — 84484 ASSAY OF TROPONIN QUANT: CPT

## 2022-05-20 PROCEDURE — 83735 ASSAY OF MAGNESIUM: CPT

## 2022-05-20 PROCEDURE — 99285 EMERGENCY DEPT VISIT HI MDM: CPT

## 2022-05-20 PROCEDURE — 80053 COMPREHEN METABOLIC PANEL: CPT

## 2022-05-20 PROCEDURE — 85025 COMPLETE CBC W/AUTO DIFF WBC: CPT

## 2022-05-20 RX ORDER — ASPIRIN 325 MG
325 TABLET ORAL ONCE
Status: COMPLETED | OUTPATIENT
Start: 2022-05-20 | End: 2022-05-20

## 2022-05-20 RX ORDER — HYDROCODONE BITARTRATE AND ACETAMINOPHEN 5; 325 MG/1; MG/1
1 TABLET ORAL ONCE
Status: COMPLETED | OUTPATIENT
Start: 2022-05-20 | End: 2022-05-20

## 2022-05-20 RX ADMIN — HYDROCODONE BITARTRATE AND ACETAMINOPHEN 1 TABLET: 5; 325 TABLET ORAL at 16:27

## 2022-05-20 RX ADMIN — ASPIRIN 325 MG ORAL TABLET 325 MG: 325 PILL ORAL at 14:43

## 2022-05-20 ASSESSMENT — ENCOUNTER SYMPTOMS
SHORTNESS OF BREATH: 1
NAUSEA: 0
COUGH: 0
ABDOMINAL PAIN: 0

## 2022-05-20 ASSESSMENT — PAIN - FUNCTIONAL ASSESSMENT: PAIN_FUNCTIONAL_ASSESSMENT: 0-10

## 2022-05-20 ASSESSMENT — PAIN DESCRIPTION - LOCATION: LOCATION: HEAD;CHEST

## 2022-05-20 ASSESSMENT — LIFESTYLE VARIABLES: HOW OFTEN DO YOU HAVE A DRINK CONTAINING ALCOHOL: NEVER

## 2022-05-20 ASSESSMENT — PAIN SCALES - GENERAL: PAINLEVEL_OUTOF10: 7

## 2022-05-20 NOTE — ED TRIAGE NOTES
Patient reports chest pain that started last night in the center of her chest when she was sleeping. Reports she took 2 nitro and \"fell asleep\". Patient stated she took 2 nitro again this am last one was around 1145am. Patient reports nausea. Denies hx of MI or stroke. Reports hx of CP. Hx of DVTs and is on a  Blood thinner.

## 2022-05-20 NOTE — ED NOTES
Patient assisted to bathroom, urine sample.  Patient given a warm blanket      Valera Frankel, RN  05/20/22 0611

## 2022-05-20 NOTE — ED PROVIDER NOTES
[address] [facility]  EMERGENCY DEPARTMENT ENCOUNTER      Pt Name: Jayesh Hightower  MRN: [id number]  Josephgflazara 1950  Date of evaluation: 5/20/22    CHIEF COMPLAINT       Chief Complaint   Patient presents with    Chest Pain     HISTORY OF PRESENT ILLNESS   73 y.o. female presents with c/o chest pain. Symptoms started last night around 12:30am.  Pain is described as \"pressure \"  in character, severe in severity. The pain is located primarily in the retrosternal area with radiation towards her back. The pain has been constant in course. The patient tried nitroglycerin prior to arrival with some relief of symptoms. The patient reports a history of similar symptoms. Review of the electronic medical record shows she was placed in the hospital in January 2020 and evaluated for chest pain. A stress test at that time was negative for any sign of reversible cardiac ischemia. 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 She has is on coumadin and has been adherent to her therapy. REVIEW OF SYSTEMS       Review of Systems   Constitutional: Positive for fatigue. Negative for fever. Respiratory: Positive for shortness of breath. Negative for cough. Cardiovascular: Positive for chest pain. Gastrointestinal: Negative for abdominal pain and nausea. Genitourinary: Negative for difficulty urinating. Musculoskeletal: Negative for neck pain. Skin: Negative for rash. Neurological: Negative for headaches.        PAST MEDICAL HISTORY     Past Medical History:   Diagnosis Date    Affective disorder ([address]     seasonal    Arthritis     shoulders and lower back    DVT (deep venous thrombosis) (HCC)     has history in R leg X 4    Fibromyalgia     Hyperlipidemia     Hypertension     Pneumonia     Sinusitis     Thyroid disease     hypothyroidsm       SURGICAL HISTORY       Past Surgical History:   Procedure Laterality Date    COLONOSCOPY  8/31/2011    internal hemorrhoides    DILATION AND CURETTAGE OF UTERUS  ECTOPIC PREGNANCY SURGERY Left     HYSTERECTOMY         CURRENT MEDICATIONS       Discharge Medication List as of 5/20/2022  5:25 PM      CONTINUE these medications which have NOT CHANGED    Details   traMADol (ULTRAM) 50 MG tablet Take 1 tablet by mouth 2 times daily as needed for Pain for up to 30 days. , Disp-60 tablet, R-0Normal      acetaminophen-codeine (TYLENOL #3) 300-30 MG per tablet Take 1 tablet by mouth every 6 hours as needed for Pain for up to 30 days. , Disp-120 tablet, R-0Normal      loratadine (CLARITIN) 10 MG tablet TAKE 1 TABLET BY MOUTH DAILY AS NEEDED FOR ALLERGIES, Disp-30 tablet, R-1This prescription was filled on 4/4/2022. Any refills authorized will be placed on file. Normal      fluticasone (FLONASE) 50 MCG/ACT nasal spray SPRAY 2 SPRAYS INTO EACH NOSTRIL DAILY AS NEEDED FOR ALLERGIES, Disp-16 g, R-2Normal      albuterol (PROVENTIL) (2.5 MG/3ML) 0.083% nebulizer solution Take 3 mLs by nebulization every 6 hours as needed for Wheezing, Disp-120 mL, R-1Normal      ammonium lactate (LAC-HYDRIN) 12 % lotion APPLY TO THE AFFECTED AREA(S) DAILY, Disp-225 g, R-1, Normal      !! buPROPion (WELLBUTRIN XL) 300 MG extended release tablet TAKE 1 TABLET BY MOUTH EVERY MORNING, Disp-90 tablet, R-0Normal      diclofenac sodium (VOLTAREN) 1 % GEL APPLY 4 GRAMS TOPICALLY FOUR TIMES DAILY, Disp-100 g, R-1, Normal      amLODIPine (NORVASC) 10 MG tablet TAKE 1 TABLET BY MOUTH DAILY, Disp-90 tablet, R-1This prescription was filled on 12/30/2021. Any refills authorized will be placed on file. Normal      metoprolol tartrate (LOPRESSOR) 25 MG tablet TAKE 1 TABLET BY MOUTH TWICE DAILY, Disp-180 tablet, R-1This prescription was filled on 12/30/2021. Any refills authorized will be placed on file. Normal      vitamin D (ERGOCALCIFEROL) 1.25 MG (87071 UT) CAPS capsule TAKE 1 CAPSULE BY MOUTH ONCE WEEKLY EVERY SUNDAY, Disp-12 capsule, R-1This prescription was filled on 7/28/2020.  Any refills authorized will be placed MONITOR-STETHOSCOPE) KIT DAILY PRN Starting Fri 1/29/2021, Disp-1 kit, R-0, Print      nitroGLYCERIN (NITROSTAT) 0.4 MG SL tablet up to max of 3 total doses. If no relief after 1 dose, call 911., Disp-25 tablet, R-1Normal      hydrocortisone 1 % cream APPLY TO AFFECTED AREA TWICE DAILY, Disp-30 g,R-3, Normal      pantoprazole (PROTONIX) 40 MG tablet Take 1 tablet by mouth daily as needed (acid reflux), Disp-30 tablet, R-2This prescription was filled on 2/3/2020. Any refills authorized will be placed on file. Normal       !! - Potential duplicate medications found. Please discuss with provider. ALLERGIES     is allergic to cymbalta [duloxetine hcl], duloxetine, lisinopril, and robaxin [methocarbamol]. FAMILY HISTORY     She indicated that the status of her mother is unknown. She indicated that the status of her father is unknown. SOCIAL HISTORY      reports that she has never smoked. She has never used smokeless tobacco. She reports that she does not drink alcohol and does not use drugs. PHYSICAL EXAM     INITIAL VITALS: /84   Pulse 89   Temp 98.1 °F (36.7 °C) (Oral)   Resp 21   SpO2 96%   Gen: NAD  Head: Normocephalic, atraumatic, no diaphoresis  Eye: Pupils equal round reactive to light, no conjunctivitis  ENT: MMM  Neck: no JVD  Heart: Regular rate and rhythm no murmurs, no rubs  Lungs: Clear to auscultation bilaterally, no respiratory distress, no crackles, no rales  Chest wall: No crepitus, no focal tenderness palpation  Abdomen: Soft, nontender, nondistended, with no peritoneal signs  Neurologic: Patient is alert and oriented x3, motor and sensation is intact in all 4 extremities, fluent speech  Extremities: Full range of motion, no cyanosis, bilateral equal edema, no signs of trauma, no tenderness to palpation, no calf ttp    MEDICAL DECISION MAKING:     MDM  73 y.o. female presenting with chest pain. Differential diagnosis of ACS, Pna, lung mass, pneumothorax, symptomatic anemia. CBC, biochemical profile, troponin, EKG, chest x-ray ordered. Treatment with aspirin. Emergency Department course:    Laboratory studies reviewed:  WBC count is normal  Hemoglobin is normal  Plt are normal  Biochemical profile shows   Electrolytes show no significant abnormalities  Renal function is normal  Liver function tests show no abnormalities  Troponin is not elevated x 2  EKG shows no ischemic changes. INR 1.9  Chest xray shows no sign of heart failure, lung mass, pneumothorax, or pneumonia. Pt reassessed and they are pain free. D/w pt the results, treatment plan, warning precautions for prompt ED return and importance of close OP FU, she verbalizes understanding and agrees with the treatment plan. DIAGNOSTIC RESULTS     EKG: All EKG's are interpreted by the Emergency Department Physician who either signs or Co-signs this chart in the absence of a cardiologist.    EKG shows a sinus rhythm. HR is 98, , , , no RUI, No STD, TWI, the axis is normal.          RADIOLOGY:All plain film, CT, MRI, and formal ultrasound images (except ED bedside ultrasound) are read by the radiologist and the images and interpretations are directly viewed by the emergency physician. XR CHEST PORTABLE   Final Result   Unremarkable portable frontal view of the chest.  No evidence of acute   cardiopulmonary process. LABS: All lab results were reviewed by myself, and all abnormals are listed below.   Labs Reviewed   CBC WITH AUTO DIFFERENTIAL - Abnormal; Notable for the following components:       Result Value    RDW 15.2 (*)     Seg Neutrophils 89 (*)     Lymphocytes 9 (*)     Absolute Lymph # 0.50 (*)     All other components within normal limits   COMPREHENSIVE METABOLIC PANEL - Abnormal; Notable for the following components:    Glucose 178 (*)     Total Bilirubin 0.26 (*)     All other components within normal limits   PROTIME-INR - Abnormal; Notable for the following components:    Protime 21.5 (*)     All other components within normal limits   TROPONIN   TROPONIN   MAGNESIUM       EMERGENCY DEPARTMENT COURSE:   Vitals:    Vitals:    05/20/22 1625 05/20/22 1712 05/20/22 1732 05/20/22 1755   BP: 138/83   137/84   Pulse: 95 81 95 89   Resp: 27 15 25 21   Temp:       TempSrc:       SpO2: 94% 97% 98% 96%       The patient was given the following medications while in the emergency department:  Orders Placed This Encounter   Medications    aspirin tablet 325 mg    HYDROcodone-acetaminophen (NORCO) 5-325 MG per tablet 1 tablet     -------------------------  CRITICAL CARE:   CONSULTS: None  PROCEDURES: Procedures     FINAL IMPRESSION      1. Chest pain, unspecified type    2.  Subtherapeutic international normalized ratio (INR)          DISPOSITION/PLAN   DISPOSITION Decision To Discharge 05/20/2022 05:25:20 PM      PATIENT REFERRED TO:  Kate Ramirez MD  [address] [facility]  [address] [facility],[address]  [address] [facility]  [phone]    In 3 days      [facility]  [facility]  [address] [facility]  [phone]    If symptoms worsen      DISCHARGE MEDICATIONS:  Discharge Medication List as of 5/20/2022  5:25 PM            Stella Storey MD  Attending Emergency Physician                     Stella Storey MD  05/20/22 7125

## 2022-05-20 NOTE — ED NOTES
Blood was drawn and sent prior to orders, Lab notified orders are in.       Jillian Mendoza RN  05/20/22 5929

## 2022-05-21 DIAGNOSIS — M70.62 GREATER TROCHANTERIC BURSITIS OF LEFT HIP: ICD-10-CM

## 2022-05-21 DIAGNOSIS — M25.562 CHRONIC PAIN OF LEFT KNEE: ICD-10-CM

## 2022-05-21 DIAGNOSIS — G89.29 CHRONIC PAIN OF LEFT KNEE: ICD-10-CM

## 2022-05-21 LAB
EKG ATRIAL RATE: 98 BPM
EKG P AXIS: 43 DEGREES
EKG P-R INTERVAL: 168 MS
EKG Q-T INTERVAL: 376 MS
EKG QRS DURATION: 100 MS
EKG QTC CALCULATION (BAZETT): 480 MS
EKG R AXIS: 8 DEGREES
EKG T AXIS: 19 DEGREES
EKG VENTRICULAR RATE: 98 BPM

## 2022-05-21 PROCEDURE — 93010 ELECTROCARDIOGRAM REPORT: CPT | Performed by: INTERNAL MEDICINE

## 2022-05-23 ENCOUNTER — TELEPHONE (OUTPATIENT)
Dept: PHARMACY | Age: 72
End: 2022-05-23

## 2022-05-23 NOTE — TELEPHONE ENCOUNTER
Patient was in [facility] ER on 5/20 for chest pain. INR found to be 1.9. Spoke with patient. Patient denies any missed doses of warfarin. No increase in green vegetables. Pt reports she has been taking 5 mg Tues/Fri and 2.5 mg all other days, but on Saturday (day of ER visit) took increase of 5 mg instead of 2.5 mg. Patient has INR check scheduled for Friday. Will check INR at that time.    Felicitas Bamberger, Pharm D, [facility]  5/23/2022 2:56 PM

## 2022-06-03 ENCOUNTER — APPOINTMENT (OUTPATIENT)
Dept: PHARMACY | Age: 72
End: 2022-06-03
Payer: COMMERCIAL

## 2022-06-04 DIAGNOSIS — M25.562 CHRONIC PAIN OF LEFT KNEE: ICD-10-CM

## 2022-06-04 DIAGNOSIS — M70.62 GREATER TROCHANTERIC BURSITIS OF LEFT HIP: ICD-10-CM

## 2022-06-04 DIAGNOSIS — G89.29 CHRONIC PAIN OF LEFT KNEE: ICD-10-CM

## 2022-06-08 ENCOUNTER — HOSPITAL ENCOUNTER (OUTPATIENT)
Age: 72
Discharge: HOME | End: 2022-06-08
Payer: COMMERCIAL

## 2022-06-08 ENCOUNTER — HOSPITAL ENCOUNTER (OUTPATIENT)
Dept: PHARMACY | Age: 72
Setting detail: THERAPIES SERIES
Discharge: HOME | End: 2022-06-08
Payer: COMMERCIAL

## 2022-06-08 DIAGNOSIS — E78.2 MIXED HYPERLIPIDEMIA: ICD-10-CM

## 2022-06-08 DIAGNOSIS — E03.9 ACQUIRED HYPOTHYROIDISM: ICD-10-CM

## 2022-06-08 LAB
CHOLESTEROL, FASTING: 171 MG/DL
CHOLESTEROL/HDL RATIO: 3.1
HDLC SERPL-MCNC: 55 MG/DL
INR BLD: 1.9
LDL CHOLESTEROL: 71 MG/DL (ref 0–130)
PROTIME: 22.3 SECONDS
THYROXINE, FREE: 1.23 NG/DL (ref 0.93–1.7)
TRIGLYCERIDE, FASTING: 225 MG/DL
TSH SERPL DL<=0.05 MIU/L-ACNC: 0.45 UIU/ML (ref 0.3–5)

## 2022-06-08 PROCEDURE — 84439 ASSAY OF FREE THYROXINE: CPT

## 2022-06-08 PROCEDURE — 84443 ASSAY THYROID STIM HORMONE: CPT

## 2022-06-08 PROCEDURE — 36415 COLL VENOUS BLD VENIPUNCTURE: CPT

## 2022-06-08 PROCEDURE — 99212 OFFICE O/P EST SF 10 MIN: CPT

## 2022-06-08 PROCEDURE — 85610 PROTHROMBIN TIME: CPT

## 2022-06-08 PROCEDURE — 80061 LIPID PANEL: CPT

## 2022-06-08 NOTE — PROGRESS NOTES
Patient seen in person in Medication Management Service. Patient states compliant most of the time with regimen. No bleeding or thromboembolic side effects noted. No significant med or dietary changes. No significant recent illness or disease state changes. PT/INR done via POC meter per protocol. INR was subtherapeutic at 1.9.  (goal 2 - 3)  The last INR was 2.6 with no change made in the warfarin dosage  The patient has been taking Tums and her warfarin at the same time  She will separate administration times of these by 2-3 hours  Warfarin regimen will be continued at current dose 5 mg Tues/Fri and 2.5 mg all other days. Will retest in 2 weeks. Patient understands dosing directions and information discussed. Dosing schedule and follow up appointment given to patient. Progress note routed to referring physicians office. Patient acknowledges working in [address] [place] [address] with Pharmacist as referred by his/her physician/provider. COVID 19 screening completed. At this time patient denies symptoms, recent travel and exposure. Patient educated to screen for temperature and COVID-19 symptoms prior to coming to clinic for next appointment. They are instructed to call the clinic to reschedule if they have any symptoms. Standing order for PT/INR has been placed in preparation to transition to possible remote INR monitoring given efforts to reduce the spread of COVID-19.       For Pharmacy Admin Tracking Only     Intervention Detail:    Total # of Interventions Recommended: 0   Total # of Interventions Accepted: 0   Time Spent (min): 20 Epidermal Autograft Text: The defect edges were debeveled with a #15 scalpel blade.  Given the location of the defect, shape of the defect and the proximity to free margins an epidermal autograft was deemed most appropriate.  Using a sterile surgical marker, the primary defect shape was transferred to the donor site. The epidermal graft was then harvested.  The skin graft was then placed in the primary defect and oriented appropriately.

## 2022-06-22 ENCOUNTER — HOSPITAL ENCOUNTER (OUTPATIENT)
Dept: PHARMACY | Age: 72
Setting detail: THERAPIES SERIES
Discharge: HOME | End: 2022-06-22
Payer: COMMERCIAL

## 2022-06-22 LAB
INR BLD: 2.5
PROTIME: 30.2 SECONDS

## 2022-06-22 PROCEDURE — 99211 OFF/OP EST MAY X REQ PHY/QHP: CPT

## 2022-06-22 PROCEDURE — 85610 PROTHROMBIN TIME: CPT

## 2022-06-22 NOTE — PROGRESS NOTES
Patient seen in person in Medication Management Service. Patient states compliant most of the time with regimen. No bleeding or thromboembolic side effects noted. No significant med or dietary changes. No significant recent illness or disease state changes. PT/INR done via POC meter per protocol. INR was therapeutic at 2.5.  (goal 2 - 3)    Warfarin regimen will be continued at current dose 5 mg Tues/Fri and 2.5 mg all other days. Will retest in 4 weeks. The patient is currently out of Tramadol  Her pain had been well Controlled witih the Tramadol, Gabapentin and Tylenol  She will check with Dr Tianna Peng about a refill vs using Ibuprofen again which interacts with warfarin    Patient understands dosing directions and information discussed. Dosing schedule and follow up appointment given to patient. Progress note routed to referring physicians office. Patient acknowledges working in [address] [place] [address] with Pharmacist as referred by his/her physician/provider. COVID 19 screening completed. At this time patient denies symptoms, recent travel and exposure. Patient educated to screen for temperature and COVID-19 symptoms prior to coming to clinic for next appointment. They are instructed to call the clinic to reschedule if they have any symptoms. Standing order for PT/INR has been placed in preparation to transition to possible remote INR monitoring given efforts to reduce the spread of COVID-19.       For Pharmacy Admin Tracking Only     Intervention Detail:    Total # of Interventions Recommended: 0   Total # of Interventions Accepted: 0   Time Spent (min): 20

## 2022-07-20 ENCOUNTER — HOSPITAL ENCOUNTER (OUTPATIENT)
Dept: PHARMACY | Age: 72
Setting detail: THERAPIES SERIES
Discharge: HOME | End: 2022-07-20
Payer: COMMERCIAL

## 2022-07-20 DIAGNOSIS — I82.403 RECURRENT ACUTE DEEP VEIN THROMBOSIS (DVT) OF BOTH LOWER EXTREMITIES (HCC): Primary | ICD-10-CM

## 2022-07-20 LAB
INR BLD: 2.1
PROTIME: 25.2 SECONDS

## 2022-07-20 PROCEDURE — 99212 OFFICE O/P EST SF 10 MIN: CPT

## 2022-07-20 PROCEDURE — 85610 PROTHROMBIN TIME: CPT

## 2022-07-20 NOTE — PROGRESS NOTES
Patient seen in person in Medication Management Service. Patient states compliant all of the time with regimen. No bleeding or thromboembolic side effects noted. Pain has increased recently. Using Tylenol #3, Voltaran gel, and gabapentin. Is not taking pantoprazole. Last filled in . No significant recent illness or disease state changes. Pt wants to eat spinach. States she misses it so much and can be consistent. Would like to eat a cup of spinach 3 times weekly    PT/INR done via POC meter per protocol. INR was therapeutic at 2.1.  (goal 2 - 3)    Warfarin regimen will be increased to 5 mg Tues/Thurs/Sat and 2.5 mg all other days. Will retest in 2 weeks. Patient understands dosing directions and information discussed. Dosing schedule and follow up appointment given to patient. Progress note routed to referring physicians office. Patient acknowledges working in [address] [place] [address] with Pharmacist as referred by his/her physician/provider. COVID 19 screening completed. At this time patient denies symptoms, recent travel and exposure. Patient educated to screen for temperature and COVID-19 symptoms prior to coming to clinic for next appointment. They are instructed to call the clinic to reschedule if they have any symptoms. Standing order for PT/INR has been placed in preparation to transition to possible remote INR monitoring given efforts to reduce the spread of COVID-19.       For Pharmacy Admin Tracking Only    Intervention Detail: Adherence Monitorin and Dose Adjustment: 1, reason: Therapy Optimization  Total # of Interventions Recommended: 2  Total # of Interventions Accepted: 2  Time Spent (min): 20

## 2022-08-02 DIAGNOSIS — M70.62 GREATER TROCHANTERIC BURSITIS OF LEFT HIP: ICD-10-CM

## 2022-08-02 DIAGNOSIS — G89.29 CHRONIC PAIN OF LEFT KNEE: ICD-10-CM

## 2022-08-02 DIAGNOSIS — M25.562 CHRONIC PAIN OF LEFT KNEE: ICD-10-CM

## 2022-08-03 ENCOUNTER — HOSPITAL ENCOUNTER (OUTPATIENT)
Dept: PHARMACY | Age: 72
Setting detail: THERAPIES SERIES
Discharge: HOME | End: 2022-08-03
Payer: COMMERCIAL

## 2022-08-03 DIAGNOSIS — I82.403 RECURRENT ACUTE DEEP VEIN THROMBOSIS (DVT) OF BOTH LOWER EXTREMITIES (HCC): Primary | ICD-10-CM

## 2022-08-03 LAB
INR BLD: 3.5
PROTIME: 42 SECONDS

## 2022-08-03 PROCEDURE — 99212 OFFICE O/P EST SF 10 MIN: CPT

## 2022-08-03 PROCEDURE — 85610 PROTHROMBIN TIME: CPT

## 2022-08-04 NOTE — PROGRESS NOTES
[facility], [address] [place], [address] [facility], [address] [facility]           Dept Phone: [phone]           Dept Fax:  [phone]           [place]          Dept Phone: [phone]           Dept Fax:  [phone]      Chief Compliant:  Chief Complaint   Patient presents with    Leg Pain     foot knee and hip pain        History of Present Illness: This is a 78 y.o. female who presents to the clinic today for evaluation of left leg pain. Pain is most severe of the left hip and left knee. She also notes pain in the left foot however she is currently being treated by a podiatrist for this. Patient reports she had an injury approximately 1 year ago in which she broke 2 of her toes she states this has altered her gait trying to take weight off this foot and she believes this is contributing to her pain in the knee and the hip. Left hip:  Pain in the hip has been present for approximately 3 weeks without any new injury or trauma. She states she really only feels it if she notices that she is \"limping \"secondary to the foot. When she does have pain is most severe over the lateral aspect of the left hip and is aggravated by prolonged walking she also has some pain when laying on this left side. She denies any numbness or tingling any fever or chills. Left knee:  Pain has been present for approximately 2 weeks. It started when she was actually at the podiatrist office and was crossing her left leg over her right leg when in the room she noticed a \"pop\" to the knee and had a sudden onset of pain. Associated with swelling however patient denies any joint warmth, redness, fever or chills. Pain is mostly to the medial aspect and is aggravated by walking and weightbearing.   .     Past History:    Current Outpatient Medications:    (SYNTHROID) 137 MCG tablet, Take 1 tablet by mouth Daily, Disp: 90 tablet, Rfl: 1    Blood Pressure Monitoring (BP MONITOR-STETHOSCOPE) KIT, 1 each by Does not apply route daily as needed (high blood pressure), Disp: 1 kit, Rfl: 0    nitroGLYCERIN (NITROSTAT) 0.4 MG SL tablet, up to max of 3 total doses.  If no relief after 1 dose, call 911., Disp: 25 tablet, Rfl: 1    warfarin (COUMADIN) 5 MG tablet, Take 5 mg Mon, Wed, Fri and 2.5 mg all other days or as directed per coumadin clinic (Patient taking differently: Take 5 mg Mon and 2.5 mg all other days or as directed per coumadin clinic), Disp: 100 tablet, Rfl: 3    hydrocortisone 1 % cream, APPLY TO AFFECTED AREA TWICE DAILY, Disp: 30 g, Rfl: 3    simvastatin (ZOCOR) 40 MG tablet, TAKE 1 TABLET BY MOUTH EVERY EVENING, Disp: 90 tablet, Rfl: 10    ammonium lactate (LAC-HYDRIN) 12 % lotion, APPLY TO THE AFFECTED AREA(S) DAILY, Disp: 225 g, Rfl: 1    pantoprazole (PROTONIX) 40 MG tablet, Take 1 tablet by mouth daily as needed (acid reflux), Disp: 30 tablet, Rfl: 2  Allergies   Allergen Reactions    Cymbalta [Duloxetine Hcl]     Lisinopril Other (See Comments)     SEVER COUGH    Robaxin [Methocarbamol] Swelling     Social History     Socioeconomic History    Marital status: Single     Spouse name: Not on file    Number of children: 2    Years of education: 13    Highest education level: Not on file   Occupational History    Occupation: disabled      Comment: SSI    Tobacco Use    Smoking status: Never Smoker    Smokeless tobacco: Never Used   Vaping Use    Vaping Use: Never used   Substance and Sexual Activity    Alcohol use: No     Alcohol/week: 0.0 standard drinks     Comment: never used     Drug use: No    Sexual activity: Never   Other Topics Concern    Not on file   Social History Narrative    Not on file     Social Determinants of Health     Financial Resource Strain: Low Risk     Difficulty of Paying Living Expenses: Not hard at all   Food Insecurity: No Food Insecurity    Worried About Running Out of Food in the Last Year: Never true    Ran Out of Food in the Last Year: Never true   Transportation Needs: No Transportation Needs    Lack of Transportation (Medical): No    Lack of Transportation (Non-Medical): No   Physical Activity:     Days of Exercise per Week:     Minutes of Exercise per Session:    Stress:     Feeling of Stress :    Social Connections:     Frequency of Communication with Friends and Family:     Frequency of Social Gatherings with Friends and Family:     Attends Religion Services:     Active Member of Clubs or Organizations:     Attends Club or Organization Meetings:     Marital Status:    Intimate Partner Violence:     Fear of Current or Ex-Partner:     Emotionally Abused:     Physically Abused:     Sexually Abused:      Past Medical History:   Diagnosis Date    Affective disorder ([facility] [address]     seasonal    Arthritis     shoulders and lower back    DVT (deep venous thrombosis) (HCC)     has history in R leg X 4    Fibromyalgia     Hyperlipidemia     Hypertension     Pneumonia     Sinusitis     Thyroid disease     hypothyroidsm     Past Surgical History:   Procedure Laterality Date    COLONOSCOPY  8/31/2011    internal hemorrhoides    DILATION AND CURETTAGE OF UTERUS      ECTOPIC PREGNANCY SURGERY Left     HYSTERECTOMY       Family History   Problem Relation Age of Onset    Cervical Cancer Mother     Heart Disease Mother     Heart Disease Father         Review of Systems   Constitutional: Negative for fever, chills, sweats. Eyes: Negative for changes in vision, or pain. HENT: Negative for ear ache, epistaxis, or sore throat. Respiratory/Cardio: Negative for Chest pain, palpitations, SOB, or cough. Gastrointestinal: Negative for abdominal pain, N/V/D. Genitourinary: Negative for dysuria, frequency, urgency, or hematuria. Neurological: Negative for headache, numbness, or weakness. Integumentary: Negative for rash, itching, laceration, or abrasion. Musculoskeletal: Positive for Leg Pain (foot knee and hip pain)       Physical Exam:  Constitutional: Patient is oriented to person, place, and time. Patient appears well-developed and well nourished. HENT: Negative otherwise noted  Head: Normocephalic and Atraumatic  Nose: Normal  Eyes: Conjunctivae and EOM are normal  Neck: Normal range of motion Neck supple. Respiratory/Cardio: Effort normal. No respiratory distress. Musculoskeletal:    left Hip    Tenderness: Severe tenderness over the left greater trochanter. Mild tenderness over the proximal IT band. No tenderness to the anterior posterior hip       Range of Motion:      Extension: 10     Flexion: 120     Internal Rotation: 30     External Rotation: 40     Abduction: 40     Adduction:  30       Muscle Strength      Abduction: 5/5     Adduction: 5/5     Flexion: 5/5         Gait: Antalgic   Yvette Test: Negative   Andi Test: Positive     left Knee    Swelling: Moderate   Effusion: 2+   Tenderness: Medial joint line, Lateral joint line and Patellar tendon       Range of Motion:      Extension: -5     Flexion: 80       McMurrays: Negative   Anterior Lachmann: Negative   Posterior Lachmann: Negative   Anterior Drawer: Negative   Posterior Drawer: Negative   Varus Stress Test: Negative   Valgus Stress Test: Negative   Pivot Shift: Negative   Patellar Apprehension: No     Neurological: Patient is alert and oriented to person, place, and time. Normal strenght. No sensory deficit. Skin: Skin is warm and dry  Psychiatric: Behavior is normal. Thought content normal.  Nursing note and vitals reviewed. Labs and Imaging:     XR taken today:  No results found. X-rays taken in clinic today and preliminarily reviewed by me:  AP bilateral knees standing lateral view of the left knee demonstrates mild medial and patellofemoral compartmental narrowing of the left knee.   Osteophytes present in both compartments. No evidence of acute fracture. Right knee on AP view demonstrates minimal medial compartmental narrowing. AP pelvis and lateral view of the left hip demonstrates joint spaces are maintained without any evidence of significant degenerative changes, AVN or flattening of the femoral head. Mild greater trochanteric spurring       Orders Placed This Encounter   Procedures    XR HIP 1 VW W PELVIS LEFT     Standing Status:   Future     Number of Occurrences:   1     Standing Expiration Date:   6/8/2022    XR KNEE LEFT (1-2 VIEWS)     Standing Status:   Future     Standing Expiration Date:   6/9/2022       Assessment and Plan:  1. Hip pain, left    2. Greater trochanteric bursitis of left hip    3. Acute pain of left knee    4. Effusion of bursa of left knee          PLAN:  This is a 78 y.o. female who presents to the clinic today for evaluation of 2-week history of left knee pain and 3-week history of left hip pain. Likely secondary to antalgic gait due to chronic foot injury. Left Knee Pain:  1: There is no evidence of ligamentous or meniscal pathology. Patient does have's an effusion and some evidence of degenerative changes on the left knee. 2.  Discussed treatment options available to the patient. Discussed aspiration and cortisone injection versus oral steroid prescription. Patient elected to proceed with oral steroid prescription. 3.  Medrol Dosepak electronically sent to patient pharmacy    Left trochanteric bursitis  1. Left hip pain that is consistent with left trochanteric bursitis    2. We discussed treatment options available to her, including nonoperative and operative intervention. At this time I believe she will benefit from conservative management. 3. Consequently, an extensive home exercise program was provided.     4. We also discussed use the possibility of a cortisone injection and at this time she has elected to hold off and opted for the oral Medrol Dosepak. Follow-up in 4 weeks for reevaluation however patient may call return sooner for any questions or concerns              Please note that this chart was generated using voice recognition Dragon dictation software. Although every effort was made to ensure the accuracy of this automated transcription, some errors in transcription may have occurred. Advancement-Rotation Flap Text: The defect edges were debeveled with a #15 scalpel blade.  Given the location of the defect, shape of the defect and the proximity to free margins an advancement-rotation flap was deemed most appropriate.  Using a sterile surgical marker, an appropriate flap was drawn incorporating the defect and placing the expected incisions within the relaxed skin tension lines where possible. The area thus outlined was incised deep to adipose tissue with a #15 scalpel blade.  The skin margins were undermined to an appropriate distance in all directions utilizing iris scissors.

## 2022-08-11 ENCOUNTER — HOSPITAL ENCOUNTER (OUTPATIENT)
Dept: PHARMACY | Age: 72
Setting detail: THERAPIES SERIES
Discharge: HOME | End: 2022-08-11
Payer: COMMERCIAL

## 2022-08-11 DIAGNOSIS — I82.403 RECURRENT ACUTE DEEP VEIN THROMBOSIS (DVT) OF BOTH LOWER EXTREMITIES (HCC): Primary | ICD-10-CM

## 2022-08-11 LAB
INR BLD: 2.3
PROTIME: 27.2 SECONDS

## 2022-08-11 PROCEDURE — 85610 PROTHROMBIN TIME: CPT

## 2022-08-11 PROCEDURE — 99211 OFF/OP EST MAY X REQ PHY/QHP: CPT

## 2022-08-25 ENCOUNTER — HOSPITAL ENCOUNTER (OUTPATIENT)
Dept: PHARMACY | Age: 72
Setting detail: THERAPIES SERIES
Discharge: HOME | End: 2022-08-25
Payer: COMMERCIAL

## 2022-08-25 DIAGNOSIS — I82.403 RECURRENT ACUTE DEEP VEIN THROMBOSIS (DVT) OF BOTH LOWER EXTREMITIES (HCC): Primary | ICD-10-CM

## 2022-08-25 LAB
INR BLD: 3.2
PROTIME: 38.6 SECONDS

## 2022-08-25 PROCEDURE — 99212 OFFICE O/P EST SF 10 MIN: CPT

## 2022-08-25 PROCEDURE — 85610 PROTHROMBIN TIME: CPT

## 2022-08-25 NOTE — PROGRESS NOTES
Patient seen in person in Medication Management Service. Patient states compliant all of the time with regimen. No bleeding or thromboembolic side effects noted. No significant med changes. Patient has had spinach once weekly since the last visit to clinic. Discussed consistency  No significant recent illness or disease state changes. PT/INR done via POC meter per protocol. INR was supratherapeutic at 3.2.  (goal 2 - 3)    Pt wants to eat spinach twice weekly and is agreeable to remain consistent. Warfarin regimen will be continued at current dose 5 mg Tues/Th/Sat and 2.5 mg all other days with increase in spinach. Will retest in 2 weeks. Patient understands dosing directions and information discussed. Dosing schedule and follow up appointment given to patient. Progress note routed to referring physicians office. 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 COVID 19 screening completed. At this time patient denies symptoms, recent travel and exposure. Patient educated to screen for temperature and COVID-19 symptoms prior to coming to clinic for next appointment. They are instructed to call the clinic to reschedule if they have any symptoms. Standing order for PT/INR has been placed in preparation to transition to possible remote INR monitoring given efforts to reduce the spread of COVID-19.       For Pharmacy Admin Tracking Only    Intervention Detail: Adherence Monitorin  Total # of Interventions Recommended: 1  Total # of Interventions Accepted: 1  Time Spent (min): 20

## 2022-09-08 ENCOUNTER — HOSPITAL ENCOUNTER (OUTPATIENT)
Dept: PHARMACY | Age: 72
Setting detail: THERAPIES SERIES
Discharge: HOME | End: 2022-09-08
Payer: COMMERCIAL

## 2022-09-08 DIAGNOSIS — M70.62 GREATER TROCHANTERIC BURSITIS OF LEFT HIP: ICD-10-CM

## 2022-09-08 DIAGNOSIS — M25.562 CHRONIC PAIN OF LEFT KNEE: ICD-10-CM

## 2022-09-08 DIAGNOSIS — G89.29 CHRONIC PAIN OF LEFT KNEE: ICD-10-CM

## 2022-09-08 DIAGNOSIS — I82.403 RECURRENT ACUTE DEEP VEIN THROMBOSIS (DVT) OF BOTH LOWER EXTREMITIES (HCC): Primary | ICD-10-CM

## 2022-09-08 LAB
INR BLD: 2.4
PROTIME: 28.6 SECONDS

## 2022-09-08 PROCEDURE — 85610 PROTHROMBIN TIME: CPT

## 2022-09-08 PROCEDURE — 99211 OFF/OP EST MAY X REQ PHY/QHP: CPT

## 2022-09-08 NOTE — PROGRESS NOTES
Patient seen in person in Medication Management Service. Patient states compliant all of the time with regimen. No bleeding or thromboembolic side effects noted. No significant med changes. Patient was going to eat spinach twice weekly, but states she forgot and then didn't want to eat the day before her appointment. Stressed consistency with patient. No significant recent illness or disease state changes. PT/INR done via POC meter per protocol. INR was therapeutic at 2.4.  (goal 2 - 3)    Warfarin regimen will be continued at current dose 5 mg Tues/Th/Sat and 2.5 mg all other days. Will retest in 3 weeks. Patient understands dosing directions and information discussed. Dosing schedule and follow up appointment given to patient. Progress note routed to referring physicians office. 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 COVID 19 screening completed. At this time patient denies symptoms, recent travel and exposure. Patient educated to screen for temperature and COVID-19 symptoms prior to coming to clinic for next appointment. They are instructed to call the clinic to reschedule if they have any symptoms. Standing order for PT/INR has been placed in preparation to transition to possible remote INR monitoring given efforts to reduce the spread of COVID-19.       For Pharmacy Admin Tracking Only    Intervention Detail: Adherence Monitorin  Total # of Interventions Recommended: 1  Total # of Interventions Accepted: 1  Time Spent (min): 20

## 2022-09-09 NOTE — TELEPHONE ENCOUNTER
TEQUILAF 8/2/22    Confirmed with pt that she made the request. She also scheduled an appt to f/u on left leg and would also like to be seen for her right knee/leg.

## 2022-09-14 ENCOUNTER — OFFICE VISIT (OUTPATIENT)
Dept: ORTHOPEDIC SURGERY | Age: 72
End: 2022-09-14
Payer: COMMERCIAL

## 2022-09-14 VITALS — BODY MASS INDEX: 38.71 KG/M2 | HEIGHT: 61 IN | WEIGHT: 205 LBS | RESPIRATION RATE: 14 BRPM

## 2022-09-14 DIAGNOSIS — G89.29 CHRONIC PAIN OF LEFT KNEE: ICD-10-CM

## 2022-09-14 DIAGNOSIS — G89.29 CHRONIC PAIN OF RIGHT KNEE: Primary | ICD-10-CM

## 2022-09-14 DIAGNOSIS — M25.561 CHRONIC PAIN OF RIGHT KNEE: Primary | ICD-10-CM

## 2022-09-14 DIAGNOSIS — M17.0 BILATERAL PRIMARY OSTEOARTHRITIS OF KNEE: ICD-10-CM

## 2022-09-14 DIAGNOSIS — M25.562 CHRONIC PAIN OF LEFT KNEE: ICD-10-CM

## 2022-09-14 PROCEDURE — G8417 CALC BMI ABV UP PARAM F/U: HCPCS | Performed by: PHYSICIAN ASSISTANT

## 2022-09-14 PROCEDURE — 1090F PRES/ABSN URINE INCON ASSESS: CPT | Performed by: PHYSICIAN ASSISTANT

## 2022-09-14 PROCEDURE — G8399 PT W/DXA RESULTS DOCUMENT: HCPCS | Performed by: PHYSICIAN ASSISTANT

## 2022-09-14 PROCEDURE — 1123F ACP DISCUSS/DSCN MKR DOCD: CPT | Performed by: PHYSICIAN ASSISTANT

## 2022-09-14 PROCEDURE — 1036F TOBACCO NON-USER: CPT | Performed by: PHYSICIAN ASSISTANT

## 2022-09-14 PROCEDURE — G8427 DOCREV CUR MEDS BY ELIG CLIN: HCPCS | Performed by: PHYSICIAN ASSISTANT

## 2022-09-14 PROCEDURE — 99214 OFFICE O/P EST MOD 30 MIN: CPT | Performed by: PHYSICIAN ASSISTANT

## 2022-09-14 PROCEDURE — 3017F COLORECTAL CA SCREEN DOC REV: CPT | Performed by: PHYSICIAN ASSISTANT

## 2022-09-14 NOTE — PROGRESS NOTES
[address] [facility], [address] [place], [address] [place], [address] [facility]           Dept Phone: [phone]           Dept Fax:  [phone] [facility], [place]          Dept Phone: [phone]           Dept Fax:  [phone]      Chief Compliant:  Chief Complaint   Patient presents with    Knee Pain     B/L        History of Present Illness: This is a 81 y.o. female who presents to the clinic today for evaluation of had concerns including Knee Pain (B/L). Ms. Skaggs Client is a 37-year-old female who returns today for evaluation of chronic bilateral knee pain. Patient has been evaluated for chronic left knee pain in the past infection was last seen in December 2021 an MRI of the left knee was ordered due to concern of her medial meniscus tear. She states she has had right knee pain all along however the last 6 months or so her right knee pain has progressively worsened to the level of her left. Pain is most severe to medial aspect of bilateral knees. She denies any injury, trauma or fall prior to onset of pain. Patient has tried Voltaren gel which does provide moderate temporary relief of pain. No history of corticosteroid or Visco injections in either knee. No history of knee surgery. Past History:    Current Outpatient Medications:     traMADol (ULTRAM) 50 MG tablet, Take 1 tablet by mouth 2 times daily as needed for Pain for up to 30 days. , Disp: 60 tablet, Rfl: 0    acetaminophen-codeine (TYLENOL #3) 300-30 MG per tablet, Take 1 tablet by mouth every 6 hours as needed for Pain for up to 30 days. , Disp: 120 tablet, Rfl: 0    diclofenac sodium (VOLTAREN) 1 % GEL, APPLY 4 GRAMS TOPICALLY TO AFFECTED AREA FOUR TIMES DAILY, Disp: 100 g, Rfl: 1    amLODIPine (NORVASC) 10 MG tablet, Take 1 tablet by mouth daily, Disp: 90 tablet, Rfl: 1    metoprolol tartrate (LOPRESSOR) 25 MG tablet, TAKE 1 TABLET BY MOUTH TWICE DAILY, Disp: 180 tablet, Rfl: 1    loratadine (CLARITIN) 10 MG tablet, TAKE 1 TABLET BY MOUTH DAILY AS NEEDED FOR ALLERGIES, Disp: 30 tablet, Rfl: 1    ADVAIR DISKUS 250-50 MCG/ACT AEPB diskus inhaler, INHALE ONE PUFF INTO THE LUNGS TWICE DAILY, Disp: , Rfl:     buPROPion (WELLBUTRIN XL) 300 MG extended release tablet, Take 1 tablet by mouth every morning, Disp: 90 tablet, Rfl: 0    hydrocortisone 1 % cream, APPLY TO AFFECTED AREA TWICE DAILY, Disp: 30 g, Rfl: 3    albuterol (PROVENTIL) (2.5 MG/3ML) 0.083% nebulizer solution, Take 3 mLs by nebulization every 6 hours as needed for Wheezing, Disp: 120 mL, Rfl: 1    acyclovir (ZOVIRAX) 200 MG capsule, TAKE 1 CAPSULE BY MOUTH TWICE DAILY, Disp: 60 capsule, Rfl: 3    fluticasone (FLONASE) 50 MCG/ACT nasal spray, SPRAY 2 SPRAYS INTO EACH NOSTRIL DAILY AS NEEDED FOR ALLERGIES, Disp: 16 g, Rfl: 2    ammonium lactate (LAC-HYDRIN) 12 % lotion, APPLY TO THE AFFECTED AREA(S) DAILY, Disp: 225 g, Rfl: 1    vitamin D (ERGOCALCIFEROL) 1.25 MG (72772 UT) CAPS capsule, TAKE 1 CAPSULE BY MOUTH ONCE WEEKLY EVERY SUNDAY (Patient taking differently: TAKE 1 CAPSULE BY MOUTH ONCE WEEKLY EVERY SATURDAY), Disp: 12 capsule, Rfl: 1    ASPIRIN LOW DOSE 81 MG chewable tablet, CHEW AND SWALLOW ONE TABLET BY MOUTH DAILY, Disp: 30 tablet, Rfl: 11    gabapentin (NEURONTIN) 800 MG tablet, TAKE 1 TABLET BY MOUTH TWICE DAILY as needed, Disp: 60 tablet, Rfl: 3    albuterol sulfate  (90 Base) MCG/ACT inhaler, Inhale 1 puff into the lungs every 6 hours as needed for Wheezing, Disp: 1 each, Rfl: 1    lidocaine (LIDODERM) 5 %, PLACE 1 PATCH ONTO SKIN ONCE DAILY, 12 HOURS ON AND REMOVE FOR 12 HOURS, Disp: 30 patch, Rfl: 3    warfarin (COUMADIN) 2.5 MG tablet, Take 1 tablet by mouth See Admin Instructions Dosed by [facility] Anticoagulation Service: 3.75 mg Tues/Fri and 2.5 mg all other days (Patient taking differently: Take 2.5 mg by mouth See Admin Instructions Dosed by [facility] Anticoagulation Service: 5 mg Fridays and 2.5 mg all other days), Disp: 30 tablet, Rfl: 5    warfarin (COUMADIN) 5 MG tablet, Take 1 tablet by mouth See Admin Instructions Dosed by [facility] Medication Management: 3.75 mg Tues/Fri and 2.5 mg all other days (Patient taking differently: Take 5 mg by mouth See Admin Instructions Dosed by [facility] Medication Management: 5 mg Fridays and 2.5 mg all other days), Disp: 30 tablet, Rfl: 5    buPROPion (WELLBUTRIN XL) 150 MG extended release tablet, Take 1 tablet by mouth every morning, Disp: 30 tablet, Rfl: 5    levothyroxine (SYNTHROID) 137 MCG tablet, TAKE 1 TABLET BY MOUTH DAILY, Disp: 90 tablet, Rfl: 1    montelukast (SINGULAIR) 10 MG tablet, Take 1 tablet by mouth nightly, Disp: 30 tablet, Rfl: 5    simvastatin (ZOCOR) 40 MG tablet, TAKE 1 TABLET BY MOUTH EVERY EVENING, Disp: 90 tablet, Rfl: 10    Blood Pressure Monitoring (BP MONITOR-STETHOSCOPE) KIT, 1 each by Does not apply route daily as needed (high blood pressure), Disp: 1 kit, Rfl: 0    nitroGLYCERIN (NITROSTAT) 0.4 MG SL tablet, up to max of 3 total doses.  If no relief after 1 dose, call 911., Disp: 25 tablet, Rfl: 1  Allergies   Allergen Reactions    Cymbalta [Duloxetine Hcl]     Duloxetine     Lisinopril Other (See Comments)     SEVER COUGH    Robaxin [Methocarbamol] Swelling     Social History     Socioeconomic History    Marital status: Single     Spouse name: Not on file    Number of children: 2    Years of education: 15    Highest education level: Not on file   Occupational History    Occupation: disabled      Comment: SSI    Tobacco Use    Smoking status: Never    Smokeless tobacco: Never   Vaping Use    Vaping Use: Never used   Substance and Sexual Activity    Alcohol use: No     Alcohol/week: 0.0 standard drinks     Comment: never used     Drug use: No    Sexual activity: Never   Other Topics noted  Head: Normocephalic and Atraumatic  Nose: Normal  Eyes: Conjunctivae and EOM are normal  Neck: Normal range of motion Neck supple. Respiratory/Cardio: Effort normal. No respiratory distress. Musculoskeletal:    knee: Bilateral    Skin: warm and dry, no rash or erythema  Vasculature: 2+ pedal pulses bilaterally  Neuro: Sensation grossly intact to light touch diffusely  Alignment: Normal  Tenderness: Left knee: Moderate tenderness to medial joint line. No tenderness to quad/patellar tendon, pes anserine bursa or posterior knee. Right knee: Moderate tenderness to medial joint line. No tenderness to lateral joint line or posterior knee. Effusion: difficult to assess given body habitus    ROM: (Degrees)    Right   A P   Left   A P    Extension  0 0   Extension  0 0  Flexion   115 120   Flexion   115  120    Crepitation  Yes    Crepitation  Yes      Muscle strength:    Right       Left    Flexion   5    Flexion   5  Extension  5    Extension  5  SLR   5    SLR   5    Extensor lag   n    Extensor lag  n      Special testing:    Right          Left    y    Pain with deep knee flexion   y  y    Patellar grind     y  n    Patellar apprehension    n  n    Patellar glide     n    n    Lachman     n  n    Anterior drawer    n  n    Pivot shift     n  n    Posterior drawer    n  n    Dial test     n  n    Posterolateral drawer    n  n    Posterior Sag     n  n    MCL      n  n    LCL      n    y    Medial joint line tenderness   y  n    Lateral joint line tenderness   n  n    Appley's     y      Neurological: Patient is alert and oriented to person, place, and time. Normal strenght. No sensory deficit. Skin: Skin is warm and dry  Psychiatric: Behavior is normal. Thought content normal.  Nursing note and vitals reviewed.      Labs and Imaging:       X-rays taken in clinic today and preliminarily reviewed by me 9/14/22:  AP bilateral knees standing lateral view of the right knee demonstrate moderate tricompartmental degenerative changes most severe in the patellofemoral and medial compartment. Tricompartmental spurring present. Left knee with similar degenerative changes in the medial patellofemoral compartment slightly more advanced. No evidence of acute fracture or other acute osseous abnormality. Orders Placed This Encounter   Procedures    XR KNEE RIGHT (1-2 VIEWS)     Standing Status:   Future     Number of Occurrences:   1     Standing Expiration Date:   9/12/2023    XR KNEE LEFT (1-2 VIEWS)     Standing Status:   Future     Number of Occurrences:   1     Standing Expiration Date:   9/14/2023       Assessment and Plan:  1. Chronic pain of right knee    2. Chronic pain of left knee          PLAN:  Cisco Guerrero is a 81 y.o. old female with bilateral knee osteoarthritis. I had a discussion with the patient with regards to the nature and extent of her problem. We also discussed treatment options available to her including non-operative and operative intervention. To this end we discussed use of NSAIDs, cortisone and viscosupplementation injections, weight loss, activity modification, physical therapy, bracing, and use of assistive walking devices. We also had discussions about total knee arthroplasties. As outlined above she has attempted treatment to include use of topical voltaren gel and activity modification. I do question the possibility of medial meniscus tear on the left knee however given the amount of arthritis in her knee I would recommend proceeding conservatively at this time I do believe patient would greatly benefit from a intra-articular corticosteroid injection however given that she continues to get relief from topical Voltaren she would like to hold off on any injections at this time. In Light of this we will see patient back on appearing basis.           Electronically signed by GILDA Kennedy on 9/14/22 at 4:24 PM EDT        Please note that this chart was generated using voice recognition Dragon dictation software. Although every effort was made to ensure the accuracy of this automated transcription, some errors in transcription may have occurred.

## 2022-09-21 ENCOUNTER — HOSPITAL ENCOUNTER (OUTPATIENT)
Age: 72
Discharge: HOME | End: 2022-09-23
Payer: COMMERCIAL

## 2022-09-21 ENCOUNTER — HOSPITAL ENCOUNTER (OUTPATIENT)
Age: 72
Discharge: HOME | End: 2022-09-21
Payer: COMMERCIAL

## 2022-09-21 ENCOUNTER — HOSPITAL ENCOUNTER (OUTPATIENT)
Dept: GENERAL RADIOLOGY | Age: 72
Discharge: HOME | End: 2022-09-23
Payer: COMMERCIAL

## 2022-09-21 DIAGNOSIS — Z77.9 HISTORY OF INHALATIONAL EXPOSURE TO TOXIN: ICD-10-CM

## 2022-09-21 DIAGNOSIS — R05.3 PERSISTENT COUGH: ICD-10-CM

## 2022-09-21 LAB
EOSINOPHILS ABSOLUTE: 176 /UL (ref 0–450)
EOSINOPHILS RELATIVE PERCENT: 4 %
WBC # BLD: 4.4 K/UL (ref 3.5–11)

## 2022-09-21 PROCEDURE — 82785 ASSAY OF IGE: CPT

## 2022-09-21 PROCEDURE — 86698 HISTOPLASMA ANTIBODY: CPT

## 2022-09-21 PROCEDURE — 86003 ALLG SPEC IGE CRUDE XTRC EA: CPT

## 2022-09-21 PROCEDURE — 85048 AUTOMATED LEUKOCYTE COUNT: CPT

## 2022-09-21 PROCEDURE — 36415 COLL VENOUS BLD VENIPUNCTURE: CPT

## 2022-09-21 PROCEDURE — 71046 X-RAY EXAM CHEST 2 VIEWS: CPT

## 2022-09-23 LAB
2000687N OAK TREE IGE: <0.1 KU/L (ref 0–0.34)
ALLERGEN BERMUDA GRASS IGE: 0.37 KU/L (ref 0–0.34)
ALLERGEN BIRCH IGE: <0.1 KU/L (ref 0–0.34)
ALLERGEN DOG DANDER IGE: 0.17 KU/L (ref 0–0.34)
ALLERGEN GERMAN COCKROACH IGE: <0.1 KU/L (ref 0–0.34)
ALLERGEN HELMINTHOSPORIUM HALODES: <0.1 KU/L (ref 0–0.34)
ALLERGEN HORMODENDRUM IGE: <0.1 KUL/L (ref 0–0.34)
ALLERGEN MOUSE EPITHELIA IGE: <0.1 KU/L (ref 0–0.34)
ALLERGEN PECAN TREE IGE: <0.1 KU/L (ref 0–0.34)
ALLERGEN PHOMA BETAE: <0.1 KU/L (ref 0–0.34)
ALLERGEN PIGWEED ROUGH IGE: <0.1 KU/L (ref 0–0.34)
ALLERGEN SHEEP SORREL (W18) IGE: <0.1 KU/L (ref 0–0.34)
ALLERGEN STEMPHYLIUM HERBARUM IGE: 0.33 KU/L (ref 0–0.34)
ALLERGEN TREE SYCAMORE: <0.1 KU/L (ref 0–0.34)
ALLERGEN TRICHOPHYTON MENTAGROPHYTES IGE: <0.1 KU/L (ref 0–0.34)
ALLERGEN WALNUT TREE IGE: <0.1 KU/L (ref 0–0.34)
ALLERGEN WHITE MULBERRY TREE, IGE: <0.1 KU/L (ref 0–0.34)
ALLERGEN, TREE, WHITE ASH IGE: <0.1 KU/L (ref 0–0.34)
ALTERNARIA ALTERNATA: 1.05 KU/L (ref 0–0.34)
ASPERGILLUS FUMIGATUS: <0.1 KU/L (ref 0–0.34)
CANDIDA ALBICANS IGE: 0.88 KU/L (ref 0–0.34)
CAT DANDER ANTIBODY: 0.28 KU/L (ref 0–0.34)
COTTONWOOD TREE: <0.1 KU/L (ref 0–0.34)
D. FARINAE: <0.1 KU/L (ref 0–0.34)
D. PTERONYSSINUS: <0.1 KU/L (ref 0–0.34)
ELM TREE: <0.1 KU/L (ref 0–0.34)
IGE: 227 IU/ML
MAPLE/BOXELDER TREE: 0.1 KU/L (ref 0–0.34)
MOUNTAIN CEDAR TREE: <0.1 KU/L (ref 0–0.34)
MUCOR RACEMOSUS: <0.1 KU/L (ref 0–0.34)
P. NOTATUM: <0.1 KU/L (ref 0–0.34)
RUSSIAN THISTLE: <0.1 KU/L (ref 0–0.34)
SHORT RAGWD(A ARTEMIS.) IGE: 0.16 KU/L (ref 0–0.34)
TIMOTHY GRASS: 7.94 KU/L (ref 0–0.34)

## 2022-09-27 LAB
HISTOPLASMA ABS, ID: NOT DETECTED
HISTOPLASMA ANTIBODY MYCELIAL CF: NORMAL
HISTOPLASMA ANTIBODY YEAST CF: NORMAL

## 2022-09-29 ENCOUNTER — HOSPITAL ENCOUNTER (OUTPATIENT)
Dept: PHARMACY | Age: 72
Setting detail: THERAPIES SERIES
Discharge: HOME | End: 2022-09-29
Payer: COMMERCIAL

## 2022-09-29 LAB
INR BLD: 3.5
PROTIME: 42.4 SECONDS

## 2022-09-29 PROCEDURE — 99212 OFFICE O/P EST SF 10 MIN: CPT

## 2022-09-29 PROCEDURE — 85610 PROTHROMBIN TIME: CPT

## 2022-09-29 NOTE — PROGRESS NOTES
Patient seen in person in Medication Management Service. Patient states compliant most of the time with regimen. No bleeding or thromboembolic side effects noted. No significant med or dietary changes. No significant recent illness or disease state changes. PT/INR done via POC meter per protocol. INR was supratherapeutic at 3.5.  (goal 2 - 3)  The patient did not eat any spinach this week  She wants to resume eating spinach twice weekly     Warfarin regimen will be held for one day, then resume 5 mg Tues-Thurs-Sat and 2.5 mg all other days. Will retest in 2 weeks. Patient understands dosing directions and information discussed. Dosing schedule and follow up appointment given to patient. Progress note routed to referring physicians office. Patient acknowledges working in [address] [place] [address] with Pharmacist as referred by his/her physician/provider. COVID 19 screening completed. At this time patient denies symptoms, recent travel and exposure. Patient educated to screen for temperature and COVID-19 symptoms prior to coming to clinic for next appointment. They are instructed to call the clinic to reschedule if they have any symptoms. Standing order for PT/INR has been placed in preparation to transition to possible remote INR monitoring given efforts to reduce the spread of COVID-19.       For Pharmacy Admin Tracking Only    Intervention Detail: Dose Adjustment: 1, reason: Therapy De-escalation  Total # of Interventions Recommended: 1  Total # of Interventions Accepted: 1  Time Spent (min): 20

## 2022-09-30 ENCOUNTER — TELEPHONE (OUTPATIENT)
Dept: PHARMACY | Age: 72
End: 2022-09-30

## 2022-09-30 ENCOUNTER — OFFICE VISIT (OUTPATIENT)
Dept: ORTHOPEDIC SURGERY | Age: 72
End: 2022-09-30
Payer: COMMERCIAL

## 2022-09-30 VITALS — RESPIRATION RATE: 16 BRPM | HEIGHT: 61 IN | BODY MASS INDEX: 40.59 KG/M2 | WEIGHT: 215 LBS

## 2022-09-30 DIAGNOSIS — M75.81 RIGHT ROTATOR CUFF TENDINITIS: Primary | ICD-10-CM

## 2022-09-30 DIAGNOSIS — M25.511 RIGHT SHOULDER PAIN, UNSPECIFIED CHRONICITY: ICD-10-CM

## 2022-09-30 PROCEDURE — 1123F ACP DISCUSS/DSCN MKR DOCD: CPT | Performed by: ORTHOPAEDIC SURGERY

## 2022-09-30 PROCEDURE — 3017F COLORECTAL CA SCREEN DOC REV: CPT | Performed by: ORTHOPAEDIC SURGERY

## 2022-09-30 PROCEDURE — G8399 PT W/DXA RESULTS DOCUMENT: HCPCS | Performed by: ORTHOPAEDIC SURGERY

## 2022-09-30 PROCEDURE — G8417 CALC BMI ABV UP PARAM F/U: HCPCS | Performed by: ORTHOPAEDIC SURGERY

## 2022-09-30 PROCEDURE — 1036F TOBACCO NON-USER: CPT | Performed by: ORTHOPAEDIC SURGERY

## 2022-09-30 PROCEDURE — 1090F PRES/ABSN URINE INCON ASSESS: CPT | Performed by: ORTHOPAEDIC SURGERY

## 2022-09-30 PROCEDURE — 99214 OFFICE O/P EST MOD 30 MIN: CPT | Performed by: ORTHOPAEDIC SURGERY

## 2022-09-30 PROCEDURE — G8427 DOCREV CUR MEDS BY ELIG CLIN: HCPCS | Performed by: ORTHOPAEDIC SURGERY

## 2022-09-30 RX ORDER — DICLOFENAC SODIUM 75 MG/1
75 TABLET, DELAYED RELEASE ORAL 2 TIMES DAILY WITH MEALS
Qty: 28 TABLET | Refills: 0 | Status: SHIPPED | OUTPATIENT
Start: 2022-09-30 | End: 2022-10-14

## 2022-09-30 NOTE — TELEPHONE ENCOUNTER
Patient called Medication Management clinic this afternoon to report that she will be starting a new medication, diclofenac 75 mg 1 tablet two times daily for 14 days. Patient can't start the medication until Monday or Tuesday when the medication can be delivered. Pt has an appointment scheduled for 1 week from Monday. INR was elevated at visit yesterday, but patient is increasing her spinach.  Will check INR in 10 days (6 days after starting diclofenac)  Cecelia Thakur, Pharm D, [facility]  9/30/2022 1:57 PM

## 2022-09-30 NOTE — PROGRESS NOTES
Orthopedic Shoulder Encounter Note     Chief complaint: right shoulder pain    HPI: Shabana Shell is a 81 y.o.  right-hand dominant female who presents for evaluation of her right shoulder. She indicates that she has been dealing with pain in her shoulder for years. Is usually intermittent in nature and localized to the anterolateral aspect of the shoulder. Is typically painful when she reaches out with this arm. It sometimes bothers her positionally at night but does not interrupt her sleep. She denies having any associated weakness or stiffness and denies any precipitating trauma or injury. Her pain does not radiate and is not associate with any numbness or tingling. Previous treatment:    NSAIDs: Ibuprofen    Physical Therapy: Yes in the remote past    Injections: Left shoulder subacromial cortisone injection on 7/14/2021 by Gemma Fischer PA-C    Surgeries: None    Review of Systems:   Constitutional: Negative for fever, chills, sweats. Pain Score:   8  Neurological: Negative for headache, numbness, or weakness. Musculoskeletal: As noted in HPI     Past Medical History  Yoana Srivastava  has a past medical history of Affective disorder ([facility] [address] Arthritis, DVT (deep venous thrombosis) ([facility] [address] Fibromyalgia, Hyperlipidemia, Hypertension, Pneumonia, Sinusitis, and Thyroid disease. Past Surgical History  Yoana Srivastava  has a past surgical history that includes Colonoscopy (8/31/2011); Hysterectomy; Ectopic pregnancy surgery (Left); and Dilation and curettage of uterus. Current Medications  Current Outpatient Medications   Medication Sig Dispense Refill    traMADol (ULTRAM) 50 MG tablet Take 1 tablet by mouth 2 times daily as needed for Pain for up to 30 days. 60 tablet 0    acetaminophen-codeine (TYLENOL #3) 300-30 MG per tablet Take 1 tablet by mouth every 6 hours as needed for Pain for up to 30 days.  120 tablet 0    diclofenac sodium (VOLTAREN) 1 % GEL APPLY 4 GRAMS TOPICALLY TO AFFECTED AREA FOUR TIMES DAILY 100 g 1    amLODIPine (NORVASC) 10 MG tablet Take 1 tablet by mouth daily 90 tablet 1    metoprolol tartrate (LOPRESSOR) 25 MG tablet TAKE 1 TABLET BY MOUTH TWICE DAILY 180 tablet 1    loratadine (CLARITIN) 10 MG tablet TAKE 1 TABLET BY MOUTH DAILY AS NEEDED FOR ALLERGIES 30 tablet 1    ADVAIR DISKUS 250-50 MCG/ACT AEPB diskus inhaler INHALE ONE PUFF INTO THE LUNGS TWICE DAILY      buPROPion (WELLBUTRIN XL) 300 MG extended release tablet Take 1 tablet by mouth every morning 90 tablet 0    hydrocortisone 1 % cream APPLY TO AFFECTED AREA TWICE DAILY 30 g 3    albuterol (PROVENTIL) (2.5 MG/3ML) 0.083% nebulizer solution Take 3 mLs by nebulization every 6 hours as needed for Wheezing 120 mL 1    acyclovir (ZOVIRAX) 200 MG capsule TAKE 1 CAPSULE BY MOUTH TWICE DAILY 60 capsule 3    fluticasone (FLONASE) 50 MCG/ACT nasal spray SPRAY 2 SPRAYS INTO EACH NOSTRIL DAILY AS NEEDED FOR ALLERGIES 16 g 2    ammonium lactate (LAC-HYDRIN) 12 % lotion APPLY TO THE AFFECTED AREA(S) DAILY 225 g 1    vitamin D (ERGOCALCIFEROL) 1.25 MG (02856 UT) CAPS capsule TAKE 1 CAPSULE BY MOUTH ONCE WEEKLY EVERY SUNDAY (Patient taking differently: TAKE 1 CAPSULE BY MOUTH ONCE WEEKLY EVERY SATURDAY) 12 capsule 1    ASPIRIN LOW DOSE 81 MG chewable tablet CHEW AND SWALLOW ONE TABLET BY MOUTH DAILY 30 tablet 11    gabapentin (NEURONTIN) 800 MG tablet TAKE 1 TABLET BY MOUTH TWICE DAILY as needed 60 tablet 3    albuterol sulfate  (90 Base) MCG/ACT inhaler Inhale 1 puff into the lungs every 6 hours as needed for Wheezing 1 each 1    lidocaine (LIDODERM) 5 % PLACE 1 PATCH ONTO SKIN ONCE DAILY, 12 HOURS ON AND REMOVE FOR 12 HOURS 30 patch 3    warfarin (COUMADIN) 2.5 MG tablet Take 1 tablet by mouth See Admin Instructions Dosed by [facility] Anticoagulation Service: 3.75 mg Tues/Fri and 2.5 mg all other days (Patient taking differently: Take 2.5 mg by mouth See Admin Instructions Dosed by [facility] Anticoagulation Service: 5 mg Fridays and 2.5 mg all other days) 30 tablet 5    warfarin (COUMADIN) 5 MG tablet Take 1 tablet by mouth See Admin Instructions Dosed by [facility]: 3.75 mg Tues/Fri and 2.5 mg all other days (Patient taking differently: Take 5 mg by mouth See Admin Instructions Dosed by [facility] Management: 5 mg Fridays and 2.5 mg all other days) 30 tablet 5    buPROPion (WELLBUTRIN XL) 150 MG extended release tablet Take 1 tablet by mouth every morning 30 tablet 5    levothyroxine (SYNTHROID) 137 MCG tablet TAKE 1 TABLET BY MOUTH DAILY 90 tablet 1    montelukast (SINGULAIR) 10 MG tablet Take 1 tablet by mouth nightly 30 tablet 5    simvastatin (ZOCOR) 40 MG tablet TAKE 1 TABLET BY MOUTH EVERY EVENING 90 tablet 10    Blood Pressure Monitoring (BP MONITOR-STETHOSCOPE) KIT 1 each by Does not apply route daily as needed (high blood pressure) 1 kit 0    nitroGLYCERIN (NITROSTAT) 0.4 MG SL tablet up to max of 3 total doses. If no relief after 1 dose, call 911. 25 tablet 1     No current facility-administered medications for this visit. Allergies  Allergies have been reviewed. Crystal Teran is allergic to cymbalta [duloxetine hcl], duloxetine, lisinopril, and robaxin [methocarbamol]. Social History  Crystal Teran  reports that she has never smoked. She has never used smokeless tobacco. She reports that she does not drink alcohol and does not use drugs. Family History  Kaylynn's family history includes Cervical Cancer in her mother; Heart Disease in her father and mother. Physical Exam:     Resp 16   Ht 5' 1\" (1.549 m)   Wt 215 lb (97.5 kg)   BMI 40.62 kg/m²    Constitutional: Patient is oriented to person, place, and time. Patient appears well-developed and well nourished.    Mental Status/psychiatric: Behavior is normal. Thought content normal.  HENT: Negative otherwise noted  Head: Normocephalic and Atraumatic  Nose: Normal  Respiratory/Cardio: Effort normal. No respiratory distress. Shoulder:    Skin: Skin is warm and dry; no swelling or obvious muscular atrophy. Vasculature: 2+ radial pulses bilaterally  Neuro: Sensation grossly intact to light touch diffusely  Tenderness: Tender to palpation over the anterior aspect of the right shoulder    ROM: (Degrees)    Right   A P   Left   A P    Elevation  70 135   Elevation  135   Abduction  105 130   Abduction  150    ER   60 75   ER   60   IR   Buttock   IR   T10   90 abd/ER      90 abd/ER     90 abd/IR      90 abd/IR     Crepitation  No    Crepitation No  Dyskenesia  No    Dyskenesia No      Muscle strength:    Right       Left    Deltoid   5    Deltoid   5  Supraspinatus  3    Supraspinatus  5  ER   3    ER   3  IR   5    IR   5    Special tests    Right   Rotator Cuff    Left    y   Painful arc    n   n   Pain with ER    n    y   Neer's     n    y   Hawkin's    n    n   Drop Arm    n  n   Lift off/Belly Press   n  n   ER Lag    n          [facility]  n   AC tenderness   n  n   Cross-chest adduction  n       Labrum/biceps    n   Cottle's    n   n   Biceps sheer    n      y   Speed's/Yergason's   n    y   Tenderness Biceps Groove  n    n   Jorge's    n         Instability  n   Ant Apprehension   n    n   Post Apprehension   n    n   Ant Load shift    n    n   Post Load shift   n   n   Sulcus     n  n   Generalized Laxity   n  n   Relocation test   n  n   Crank test     n  n   Flex-superior escape  n       Imaging:  Xrays: 4 views of the right shoulder obtained on 9/30/2022 were independently reviewed  Indications: Right shoulder pain  Findings: Normal glenohumeral and acromioclavicular joint spaces. Mild osteophytic change involving the distal clavicle at the acromioclavicular joint. Calcification along the insertion of the coracoacromial ligament at its insertion on the acromion versus a type III acromion.   Impression: Right shoulder radiographs with mild degenerative changes at the acromioclavicular joint as outlined above.    Impression/Plan:     Donavon Ochoa is a 81 y.o. old female with right shoulder pain likely due to a rotator cuff tear. I did have a discussion with the patient educating her about this and discussed treatment options available to her including nonoperative and operative intervention. I did recommend proceeding conservatively at this time with a course of physical therapy and either cortisone injection or short course of a prescription anti-inflammatory. She elected to proceed with a prescription anti-inflammatory which I believe is reasonable. A prescription for Voltaren was electronically sent to her pharmacy and a prescription for physical therapy was also provided. Anticipate gradual improvement or resolution of her symptoms with this treatment and so I will see her back in my clinic as needed but she may return or call at anytime with persistent or worsening symptoms and with any questions or concerns. This note is created with the assistance of a speech recognition program.  While intending to generate adocument that actually reflects the content of the visit, the document can still have some errors including those of syntax and sound a like substitutions which may escape proof reading. It such instances, actual meaningcan be extrapolated by contextual diversion.     NA = Not assessed  RTC = Rotator cuff  RCT = Rotator cuff tear  ER = External rotation  IR = Internal rotation  AC = Acromioclavicular  GH = Glenohumeral  n = No  y = Yes

## 2022-10-08 DIAGNOSIS — M75.81 RIGHT ROTATOR CUFF TENDINITIS: ICD-10-CM

## 2022-10-10 RX ORDER — DICLOFENAC SODIUM 75 MG/1
75 TABLET, DELAYED RELEASE ORAL 2 TIMES DAILY WITH MEALS
Qty: 28 TABLET | Refills: 0 | OUTPATIENT
Start: 2022-10-10 | End: 2022-10-24

## 2022-10-11 ENCOUNTER — HOSPITAL ENCOUNTER (OUTPATIENT)
Dept: PHARMACY | Age: 72
Setting detail: THERAPIES SERIES
Discharge: HOME | End: 2022-10-11
Payer: COMMERCIAL

## 2022-10-11 DIAGNOSIS — I82.403 RECURRENT ACUTE DEEP VEIN THROMBOSIS (DVT) OF BOTH LOWER EXTREMITIES (HCC): Primary | ICD-10-CM

## 2022-10-11 LAB
INR BLD: 3.9
PROTIME: 46.6 SECONDS

## 2022-10-11 PROCEDURE — 99212 OFFICE O/P EST SF 10 MIN: CPT

## 2022-10-11 PROCEDURE — 85610 PROTHROMBIN TIME: CPT

## 2022-10-11 NOTE — PROGRESS NOTES
Patient seen in person in Medication Management Service. Patient states compliant all of the time with regimen. No bleeding or thromboembolic side effects noted. Patient did not eat any spinach this past 2 weeks. Patient's INR has been fluctuating due to her spinach eating/not. Discussed this with patient and need to stay consistent or avoid altogether. Pt states she is going to the store after appointment and will be eating spinach for sure. No significant recent illness or disease state changes. Patient took 2 doses of diclofenac 75 mg with food on 10/3, but stopped due to upset stomach. Does not want to take any more. PT/INR done via POC meter per protocol. INR was supratherapeutic at 3.9.  (goal 2 - 3)    Warfarin regimen will be held for 1 day and then 5 mg Tues/Thurs/Sat and 2.5 mg all other days. Will retest in 1 week. Patient counseled on signs/symptoms of bleeding and when to seek medical attention. Patient instructed to use extra precautions with elevated INR. Patient understands dosing directions and information discussed. Dosing schedule and follow up appointment given to patient. Progress note routed to referring physicians office. Patient acknowledges working in [address] [place] [address] with Pharmacist as referred by his/her physician/provider. COVID 19 screening completed. At this time patient denies symptoms, recent travel and exposure. Patient educated to screen for temperature and COVID-19 symptoms prior to coming to clinic for next appointment. They are instructed to call the clinic to reschedule if they have any symptoms. Standing order for PT/INR has been placed in preparation to transition to possible remote INR monitoring given efforts to reduce the spread of COVID-19.       For Pharmacy Admin Tracking Only    Intervention Detail: Adherence Monitorin and Dose Adjustment: 1, reason: Therapy Optimization  Total # of Interventions Recommended: 1  Total # of Interventions Accepted: 2  Time Spent (min): 20

## 2022-10-18 ENCOUNTER — HOSPITAL ENCOUNTER (OUTPATIENT)
Dept: PHARMACY | Age: 72
Setting detail: THERAPIES SERIES
Discharge: HOME | End: 2022-10-18
Payer: COMMERCIAL

## 2022-10-18 ENCOUNTER — TELEPHONE (OUTPATIENT)
Dept: PHARMACY | Age: 72
End: 2022-10-18

## 2022-10-18 DIAGNOSIS — I82.403 RECURRENT ACUTE DEEP VEIN THROMBOSIS (DVT) OF BOTH LOWER EXTREMITIES (HCC): Primary | ICD-10-CM

## 2022-10-18 LAB
INR BLD: 2.9
PROTIME: 34.7 SECONDS

## 2022-10-18 PROCEDURE — 99211 OFF/OP EST MAY X REQ PHY/QHP: CPT

## 2022-10-18 PROCEDURE — 85610 PROTHROMBIN TIME: CPT

## 2022-10-18 NOTE — PROGRESS NOTES
Patient seen in person in Medication Management Service. Patient states compliant all of the time with regimen. No thromboembolic side effects noted. - Noticing more bleeding from hemorrhoids on Saturday or     No significant med or dietary changes. - Patient states she has been wanting to start eating her spinach again, but has not had any in a little while now. Education was provided to her about eating in moderations and keeping consistent. No significant recent illness or disease state changes. PT/INR done via POC meter per protocol. INR was therapeutic at 2.9.  (goal 2 - 3)    Warfarin regimen will be continued at current dose 5 mg Tue/Thur/Sat and 2.5 mg all other days. Will retest in 2 weeks. Patient understands dosing directions and information discussed. Dosing schedule and follow up appointment given to patient. Progress note routed to referring physicians office. Patient acknowledges working in [address] [place] [address] with Pharmacist as referred by his/her physician/provider. COVID 19 screening completed. At this time patient denies symptoms, recent travel and exposure. Patient educated to screen for temperature and COVID-19 symptoms prior to coming to clinic for next appointment. They are instructed to call the clinic to reschedule if they have any symptoms. Standing order for PT/INR has been placed in preparation to transition to possible remote INR monitoring given efforts to reduce the spread of COVID-19.       For Pharmacy Admin Tracking Only    Intervention Detail: Adherence Monitorin  Total # of Interventions Recommended: 1  Total # of Interventions Accepted: 1  Time Spent (min): 20     Osmar Galaviz, PharmD, [facility] [id number]  PGY1 Pharmacy Resident  10/18/2022 2:19 PM

## 2022-10-18 NOTE — TELEPHONE ENCOUNTER
Patient called asking to reschedule her appointment originally for tomorrow (10/19) as she will be out of town. Due to lighter schedule today, we were able to see her in the office this afternoon (10/18).     Jonathan Bello PharmD, ROBYN  PGY1 Pharmacy Resident  10/18/2022 2:25 PM

## 2022-11-01 ENCOUNTER — HOSPITAL ENCOUNTER (OUTPATIENT)
Dept: PHARMACY | Age: 72
Setting detail: THERAPIES SERIES
Discharge: HOME | End: 2022-11-01
Payer: COMMERCIAL

## 2022-11-01 ENCOUNTER — HOSPITAL ENCOUNTER (OUTPATIENT)
Age: 72
Discharge: HOME | End: 2022-11-01
Payer: COMMERCIAL

## 2022-11-01 DIAGNOSIS — I82.403 RECURRENT ACUTE DEEP VEIN THROMBOSIS (DVT) OF BOTH LOWER EXTREMITIES (HCC): Primary | ICD-10-CM

## 2022-11-01 LAB
INR BLD: 2.2
PROTIME: 26.1 SECONDS

## 2022-11-01 PROCEDURE — 99211 OFF/OP EST MAY X REQ PHY/QHP: CPT

## 2022-11-01 PROCEDURE — 86003 ALLG SPEC IGE CRUDE XTRC EA: CPT

## 2022-11-01 PROCEDURE — 36415 COLL VENOUS BLD VENIPUNCTURE: CPT

## 2022-11-01 PROCEDURE — 85610 PROTHROMBIN TIME: CPT

## 2022-11-02 LAB — ASPERGILLUS FUMIGATUS: <0.1 KU/L (ref 0–0.34)

## 2022-11-10 ENCOUNTER — OFFICE VISIT (OUTPATIENT)
Dept: ORTHOPEDIC SURGERY | Age: 72
End: 2022-11-10
Payer: COMMERCIAL

## 2022-11-10 VITALS — HEIGHT: 61 IN | BODY MASS INDEX: 40.59 KG/M2 | WEIGHT: 215 LBS | RESPIRATION RATE: 16 BRPM

## 2022-11-10 DIAGNOSIS — M17.0 BILATERAL PRIMARY OSTEOARTHRITIS OF KNEE: ICD-10-CM

## 2022-11-10 DIAGNOSIS — R60.0 BILATERAL LOWER EXTREMITY EDEMA: Primary | ICD-10-CM

## 2022-11-10 PROCEDURE — 1090F PRES/ABSN URINE INCON ASSESS: CPT | Performed by: PHYSICIAN ASSISTANT

## 2022-11-10 PROCEDURE — G8427 DOCREV CUR MEDS BY ELIG CLIN: HCPCS | Performed by: PHYSICIAN ASSISTANT

## 2022-11-10 PROCEDURE — 1123F ACP DISCUSS/DSCN MKR DOCD: CPT | Performed by: PHYSICIAN ASSISTANT

## 2022-11-10 PROCEDURE — 99214 OFFICE O/P EST MOD 30 MIN: CPT | Performed by: PHYSICIAN ASSISTANT

## 2022-11-10 PROCEDURE — 1036F TOBACCO NON-USER: CPT | Performed by: PHYSICIAN ASSISTANT

## 2022-11-10 PROCEDURE — G8484 FLU IMMUNIZE NO ADMIN: HCPCS | Performed by: PHYSICIAN ASSISTANT

## 2022-11-10 PROCEDURE — G8399 PT W/DXA RESULTS DOCUMENT: HCPCS | Performed by: PHYSICIAN ASSISTANT

## 2022-11-10 PROCEDURE — G8417 CALC BMI ABV UP PARAM F/U: HCPCS | Performed by: PHYSICIAN ASSISTANT

## 2022-11-10 PROCEDURE — 3017F COLORECTAL CA SCREEN DOC REV: CPT | Performed by: PHYSICIAN ASSISTANT

## 2022-11-10 RX ORDER — METHYLPREDNISOLONE 4 MG/1
4 TABLET ORAL SEE ADMIN INSTRUCTIONS
Qty: 1 KIT | Refills: 0 | Status: SHIPPED | OUTPATIENT
Start: 2022-11-10 | End: 2022-11-16

## 2022-11-10 NOTE — PROGRESS NOTES
[facility], [facility] [address], [address] [place] [address], [address] [facility]           Dept Phone: [phone]           Dept Fax:  [phone]           Mika Richards          Dept Phone: [phone]           Dept Fax:  [phone]      Chief Compliant:  Chief Complaint   Patient presents with    Knee Pain     B/L leg pain        History of Present Illness:  Iker Santos returns today. This is a 81 y.o. female who presents to the clinic today for follow up of bilateral leg pain. Patient with known osteoarthritis of both knees and has been evaluated this issue in the past however she believes over the last several weeks her pain has been more severe in the lower legs inferior to the knees. She is unsure if this is from her knees or from other issue. She states this is associated with bilateral leg swelling and edema. She has been wearing compression stockings about every other day which does seem to help with swelling and a little bit with pain. She has been utilizing Voltaren gel and lidocaine patches which do seem to help with her pain. She denies any new injury, trauma or fall. Patient reports she has not had a significant history of lower extremity edema she does not believe she is on any diuretics or water pills to help with this. .       Review of Systems   Constitutional: Negative for fever, chills, sweats, recent illness, or recent injury. Neurological: Negative for headaches, numbness, or weakness. Integumentary: Negative for rash, itching, ecchymosis, abrasions, or laceration. Musculoskeletal: Positive for Knee Pain (B/L leg pain)       Physical Exam:  Constitutional: Patient is oriented to person, place, and time. Patient appears well-developed and well nourished.    Musculoskeletal:    knee: Bilateral    Skin: warm and dry, no rash or erythema  Vasculature: 2+ pedal pulses bilaterally  Neuro: Sensation grossly intact to light touch diffusely  Alignment: Normal  Tenderness: No tenderness to the knees themselves. No tenderness to the medial or lateral joint lines. No tenderness to the quad or patellar tendon. Patient with diffuse lower leg tenderness with multiple trigger points that are tender especially over the tibialis anterior area. No calf tenderness. ROM: (Degrees)    Right   A P   Left   A P    Extension  -3 -3   Extension  -3 -3  Flexion   115 120   Flexion   115  120    Crepitation  Yes    Crepitation  Yes      Muscle strength:    Right       Left    Flexion   5    Flexion   5  Extension  5    Extension  5  SLR   5    SLR   5    Extensor lag   y    Extensor lag  y      Special testing:    Right          Left    n    Pain with deep knee flexion   n  y    Patellar grind     y  n    Patellar apprehension    n  n    Patellar glide     n    n    Lachman     n  n    Anterior drawer    n  n    Pivot shift     n  n    Posterior drawer    n  n    Dial test     n  n    Posterolateral drawer    n  n    Posterior Sag     n  n    MCL      n  n    LCL      n    n    Medial joint line tenderness   n  n    Lateral joint line tenderness   n  n    Appley's     N    2+ pitting edema to bilateral lower extremities up to the tibial tubercle level. Indentation from compression stockings is fairly significant similar on both legs. No calf tenderness negative Homans' sign. Neurological: Patient is alert and oriented to person, place, and time. Normal strenght. No sensory deficit. Skin: Skin is warm and dry  Psychiatric: Behavior is normal. Thought content normal.  Nursing note and vitals reviewed. Labs and Imaging:     XR taken today:  No results found. No new x-rays taken today however AP and lateral views of both knees available for review from 9/14/2022.   Patient with mild to moderate tricompartmental degenerative changes greatest in the patellofemoral compartment with tricompartmental spurring. No evidence of acute fracture seen at that time. Assessment and Plan:  1. Bilateral lower extremity edema    2. Bilateral primary osteoarthritis of knee              PLAN:  This is a 81 y.o. female who presents to the clinic today for follow up bilateral lower leg pain. She has been evaluated for bilateral knee pain in the past and has known patellofemoral OA however today she reports her pain feels different mostly in her lower legs from the tibial tubercle level all the way to the ankle. She describes this as an [place] pain\" that seems to be aggravated by initiation of walking and rising from seated position. Based on examination and history I believe that patient's edema is likely vascular in nature likely PVD. She does have evidence of obvious osteoarthritis however she has minimal pain in the knees today. We did discuss the possibility of corticosteroid injections to see if this did help her lower leg pain however patient would like to hold off at this time. She reports she has an appointment with her PCP on 11/23/2022 strongly advise keeping this appointment for further evaluation as a believe her pain is likely more vascular in nature than orthopedic  Continue with compression stockings, Voltaren gel and lidocaine patches. We will initiate a Medrol Dosepak to try to help with pain  Follow-up on as needed basis    Please note that this chart was generated using voice recognition Dragon dictation software. Although every effort was made to ensure the accuracy of this automated transcription, some errors in transcription may have occurred.

## 2022-11-23 ENCOUNTER — HOSPITAL ENCOUNTER (OUTPATIENT)
Dept: PHARMACY | Age: 72
Setting detail: THERAPIES SERIES
Discharge: HOME | End: 2022-11-23
Payer: COMMERCIAL

## 2022-11-23 DIAGNOSIS — I82.403 RECURRENT ACUTE DEEP VEIN THROMBOSIS (DVT) OF BOTH LOWER EXTREMITIES (HCC): Primary | ICD-10-CM

## 2022-11-23 LAB
INR BLD: 2.4
PROTIME: 28.3 SECONDS

## 2022-11-23 PROCEDURE — 85610 PROTHROMBIN TIME: CPT

## 2022-11-23 PROCEDURE — 99212 OFFICE O/P EST SF 10 MIN: CPT

## 2022-11-23 NOTE — PROGRESS NOTES
Patient seen in person in Medication Management Service. Patient states compliant all of the time with regimen. No thromboembolic side effects noted. Patient has had three (3) bruises she cant explain on her arm and thigh. No significant dietary changes. Has been consistent with spinach intake. Has been taking ibuprofen due to increase pain. Usually takes 4 x 200mg (800mg) per dose and indicates she has taken 1-2 doses every day for last 5-6 days. Also has APAP #3 (with codeine) and tramadol for pain. Trying to get ahead of pain rather than let pain take hold then take medication as it takes more to get under control. Does not think tramadol is very effective. States she has tried plain APAP and does not work as well per patient. Plans to discuss with physician (Dr. Erika Chua) today. No significant recent illness or disease state changes other than increased pain. PT/INR done via POC meter per protocol. INR was therapeutic at 2.4.  (goal 2 - 3)    Warfarin regimen will be continued at current dose 5mg on Tues/Thur and Sat and 2.5mg all other days  Will retest in 2 weeks due to increased ibuprofen use. Patient understands dosing directions and information discussed. Dosing schedule and follow up appointment given to patient. Progress note routed to referring physicians office. Patient acknowledges working in [address] [place] [address] with Pharmacist as referred by his/her physician/provider. COVID 19 screening completed. At this time patient denies symptoms, recent travel and exposure. Patient educated to screen for temperature and COVID-19 symptoms prior to coming to clinic for next appointment. They are instructed to call the clinic to reschedule if they have any symptoms. Standing order for PT/INR has been placed in preparation to transition to possible remote INR monitoring given efforts to reduce the spread of COVID-19.       For Pharmacy Admin Tracking Only    Intervention Detail: New Rx: 1, reason: Needs Additional Therapy  Total # of Interventions Recommended: 1  Total # of Interventions Accepted: 1  Time Spent (min): 20    Carlton Nice RPH,Pharm. D,, [facility], [facility]  11/23/2022  9:45 AM

## 2022-11-28 DIAGNOSIS — M70.62 GREATER TROCHANTERIC BURSITIS OF LEFT HIP: ICD-10-CM

## 2022-11-28 DIAGNOSIS — G89.29 CHRONIC PAIN OF LEFT KNEE: ICD-10-CM

## 2022-11-28 DIAGNOSIS — M25.562 CHRONIC PAIN OF LEFT KNEE: ICD-10-CM

## 2022-12-07 ENCOUNTER — HOSPITAL ENCOUNTER (OUTPATIENT)
Dept: PHARMACY | Age: 72
Setting detail: THERAPIES SERIES
Discharge: HOME | End: 2022-12-07
Payer: COMMERCIAL

## 2022-12-07 ENCOUNTER — HOSPITAL ENCOUNTER (OUTPATIENT)
Age: 72
Setting detail: SPECIMEN
Discharge: HOME | End: 2022-12-07
Payer: COMMERCIAL

## 2022-12-07 DIAGNOSIS — I82.403 RECURRENT ACUTE DEEP VEIN THROMBOSIS (DVT) OF BOTH LOWER EXTREMITIES (HCC): Primary | ICD-10-CM

## 2022-12-07 LAB
DIRECT EXAM: NORMAL
INR BLD: 3
PROTIME: 36.5 SECONDS
SPECIMEN DESCRIPTION: NORMAL

## 2022-12-07 PROCEDURE — 85610 PROTHROMBIN TIME: CPT

## 2022-12-07 PROCEDURE — 87205 SMEAR GRAM STAIN: CPT

## 2022-12-07 PROCEDURE — 87070 CULTURE OTHR SPECIMN AEROBIC: CPT

## 2022-12-07 PROCEDURE — 99211 OFF/OP EST MAY X REQ PHY/QHP: CPT

## 2022-12-07 NOTE — PROGRESS NOTES
Patient seen in person in Medication Management Service. Patient states compliant most of the time with regimen. No bleeding or thromboembolic side effects noted. No significant med or dietary changes. No significant recent illness or disease state changes. PT/INR done via POC meter per protocol. INR was therapeutic at 3.  (goal 2 - 3)  The last INR was 2.4 with no change made in the warfarin dosage  The patient continues to take ibuprofen 800 mg 1-2x daily for pain as well as Tylenol #3 and occasionally Tramadol    Warfarin regimen will be continued at current dose 5 mg Tues-Thurs-Sat and 2.5 mg all other days. Will retest in 4 weeks. Patient understands dosing directions and information discussed. Dosing schedule and follow up appointment given to patient. Progress note routed to referring physicians office. Patient acknowledges working in [address] [place] [address] with Pharmacist as referred by his/her physician/provider. COVID 19 screening completed. At this time patient denies symptoms, recent travel and exposure. Patient educated to screen for temperature and COVID-19 symptoms prior to coming to clinic for next appointment. They are instructed to call the clinic to reschedule if they have any symptoms. Standing order for PT/INR has been placed in preparation to transition to possible remote INR monitoring given efforts to reduce the spread of COVID-19.       For Pharmacy Admin Tracking Only    Intervention Detail: Adherence Monitorin  Total # of Interventions Recommended: 0  Total # of Interventions Accepted: 0  Time Spent (min): 20

## 2023-01-05 ENCOUNTER — HOSPITAL ENCOUNTER (OUTPATIENT)
Dept: PHARMACY | Age: 73
Setting detail: THERAPIES SERIES
Discharge: HOME | End: 2023-01-05
Payer: COMMERCIAL

## 2023-01-05 LAB
INR BLD: 2.2
INR BLD: 2.2
PROTIME: 26.9 SECONDS
PROTIME: 26.9 SECONDS

## 2023-01-05 PROCEDURE — 99211 OFF/OP EST MAY X REQ PHY/QHP: CPT

## 2023-01-05 PROCEDURE — 85610 PROTHROMBIN TIME: CPT

## 2023-01-05 NOTE — PROGRESS NOTES
Patient seen in person in Medication Management Service. Patient states compliant most of the time with regimen. No bleeding or thromboembolic side effects noted. No significant med or dietary changes. No significant recent illness or disease state changes. PT/INR done via POC meter per protocol. INR was therapeutic at 2.2.  (goal 2 - 3)    Warfarin regimen will be continued at current dose 5 mg Tues-Thurs-Sat and 2.5 mg all other days. Will retest in 4 weeks. The patient continues to take Ibuprofen 800 mg 1-2 x daily for pain as well as Tylenol #3 and occasionally Tramadol  Patient understands dosing directions and information discussed. Dosing schedule and follow up appointment given to patient. Progress note routed to referring physicians office. Patient acknowledges working in [address] [place] [address] with Pharmacist as referred by his/her physician/provider. COVID 19 screening completed. At this time patient denies symptoms, recent travel and exposure. Patient educated to screen for temperature and COVID-19 symptoms prior to coming to clinic for next appointment. They are instructed to call the clinic to reschedule if they have any symptoms. Standing order for PT/INR has been placed in preparation to transition to possible remote INR monitoring given efforts to reduce the spread of COVID-19.       For Pharmacy Admin Tracking Only    Intervention Detail: Adherence Monitorin  Total # of Interventions Recommended: 0  Total # of Interventions Accepted: 0  Time Spent (min): 20

## 2023-02-02 ENCOUNTER — HOSPITAL ENCOUNTER (OUTPATIENT)
Dept: PHARMACY | Age: 73
Setting detail: THERAPIES SERIES
Discharge: HOME | End: 2023-02-02
Payer: COMMERCIAL

## 2023-02-02 ENCOUNTER — TELEPHONE (OUTPATIENT)
Dept: PHARMACY | Age: 73
End: 2023-02-02

## 2023-02-02 DIAGNOSIS — I82.403 RECURRENT ACUTE DEEP VEIN THROMBOSIS (DVT) OF BOTH LOWER EXTREMITIES (HCC): Primary | ICD-10-CM

## 2023-02-02 LAB
INR BLD: 3.5
PROTIME: 41.5 SECONDS

## 2023-02-02 PROCEDURE — 99212 OFFICE O/P EST SF 10 MIN: CPT

## 2023-02-02 PROCEDURE — 85610 PROTHROMBIN TIME: CPT

## 2023-02-02 NOTE — TELEPHONE ENCOUNTER
Received referral from Dr. Kush Pete for bridging prior to dental procedures. Patient will be having dental work done and will need to be off warfarin for for 7 days prior to procedure and bridged with Lovenox. Patient hasn't scheduled appointment yet. Advised patient to let us know well in advance when appointment is scheduled to ensure timely action regarding bridging and INR checks.     MARISSA Carpenter  2/2/2023  4:57 PM

## 2023-02-02 NOTE — PROGRESS NOTES
Patient seen in person in Medication Management Service. Patient states compliant all of the time with regimen. No bleeding or thromboembolic side effects noted. No significant med or dietary changes. No significant recent illness or disease state changes. PT/INR done via POC meter per protocol. INR was supratherapeutic at 3.5.  (goal 2 - 3)  Warfarin regimen will be held for tonight, then resume 5 mg Tues/Thurs/Sat and 2.5 mg all other days. Will retest in 6 days. Patient understands dosing directions and information discussed. Dosing schedule and follow up appointment given to patient. Progress note routed to referring physicians office. Patient acknowledges working in [address] [place] [address] with Pharmacist as referred by his/her physician/provider. COVID 19 screening completed. At this time patient denies symptoms, recent travel and exposure. Patient educated to screen for temperature and COVID-19 symptoms prior to coming to clinic for next appointment. They are instructed to call the clinic to reschedule if they have any symptoms. Standing order for PT/INR has been placed in preparation to transition to possible remote INR monitoring given efforts to reduce the spread of COVID-19.       For Pharmacy Admin Tracking Only    Intervention Detail: Dose Adjustment: 1, reason: Therapy Optimization  Total # of Interventions Recommended: 1  Total # of Interventions Accepted: 1  Time Spent (min): Juan Bahena 34, [address], [facility]  2/2/2023  12:28 PM

## 2023-02-08 ENCOUNTER — HOSPITAL ENCOUNTER (OUTPATIENT)
Dept: PHARMACY | Age: 73
Setting detail: THERAPIES SERIES
Discharge: HOME | End: 2023-02-08
Payer: COMMERCIAL

## 2023-02-08 DIAGNOSIS — M25.562 CHRONIC PAIN OF LEFT KNEE: ICD-10-CM

## 2023-02-08 DIAGNOSIS — G89.29 CHRONIC PAIN OF LEFT KNEE: ICD-10-CM

## 2023-02-08 DIAGNOSIS — M70.62 GREATER TROCHANTERIC BURSITIS OF LEFT HIP: ICD-10-CM

## 2023-02-08 DIAGNOSIS — I82.403 RECURRENT ACUTE DEEP VEIN THROMBOSIS (DVT) OF BOTH LOWER EXTREMITIES (HCC): Primary | ICD-10-CM

## 2023-02-08 LAB
INR BLD: 2.1
PROTIME: 24.8 SECONDS

## 2023-02-08 PROCEDURE — 99212 OFFICE O/P EST SF 10 MIN: CPT

## 2023-02-08 PROCEDURE — 85610 PROTHROMBIN TIME: CPT

## 2023-02-08 RX ORDER — IBUPROFEN 200 MG
800 TABLET ORAL EVERY 6 HOURS PRN
COMMUNITY

## 2023-02-08 NOTE — PROGRESS NOTES
Patient seen in person in Medication Management Service. Patient states compliant all of the time with regimen. No bleeding or thromboembolic side effects noted. No significant med or dietary changes. Pain has not been worse but not controlled per patient. Patient taking ibuprofen in addition to APAP and tramadol as needed. Has taken at least three day in last week  Takes 800mg once a day if she takes. Discussed risks and drug interactions with ibuprofen. Encouraged patient to take an extra dose of APAP on days she needs more pain control instead of ibuprofen. PT/INR done via POC meter per protocol. INR was therapeutic at 2.1.  (goal 2 - 3)    Warfarin regimen will be continued at current dose of 5mg on Tues/Thur and Sat and 2.5mg all other days. Will retest in 2 weeks. Patient understands dosing directions and information discussed. Dosing schedule and follow up appointment given to patient. Progress note routed to referring physicians office. Patient acknowledges working in [address] [place] [address] with Pharmacist as referred by his/her physician/provider. COVID 19 screening completed. At this time patient denies symptoms, recent travel and exposure. Patient educated to screen for temperature and COVID-19 symptoms prior to coming to clinic for next appointment. They are instructed to call the clinic to reschedule if they have any symptoms. Standing order for PT/INR has been placed in preparation to transition to possible remote INR monitoring given efforts to reduce the spread of COVID-19. For Pharmacy Admin Tracking Only    Intervention Detail: Adherence Monitorin  Total # of Interventions Recommended: 1  Total # of Interventions Accepted: 1  Time Spent (min): 20    Carlton Nice RPH,Pharm. D,, BCPS, CACP  2023  9:49 AM

## 2023-02-08 NOTE — TELEPHONE ENCOUNTER
LRF 11/28/2022  Diagnosis Association: Greater trochanteric bursitis of left hip (M70.62);  Chronic pain of left knee (M25.562 , G89.29)

## 2023-02-09 ENCOUNTER — TELEPHONE (OUTPATIENT)
Dept: PHARMACY | Age: 73
End: 2023-02-09

## 2023-02-16 ENCOUNTER — HOSPITAL ENCOUNTER (OUTPATIENT)
Age: 73
Discharge: HOME | End: 2023-02-16
Payer: COMMERCIAL

## 2023-02-16 LAB
ABSOLUTE EOS #: 0.2 K/UL (ref 0–0.4)
ABSOLUTE LYMPH #: 1.7 K/UL (ref 1–4.8)
ABSOLUTE MONO #: 0.5 K/UL (ref 0.1–1.3)
ALBUMIN SERPL-MCNC: 3.8 G/DL (ref 3.5–5.2)
ALP SERPL-CCNC: 75 U/L (ref 35–104)
ALT SERPL-CCNC: 11 U/L (ref 5–33)
ANION GAP SERPL CALCULATED.3IONS-SCNC: 7 MMOL/L (ref 9–17)
AST SERPL-CCNC: 12 U/L
BASOPHILS # BLD: 1 % (ref 0–2)
BASOPHILS ABSOLUTE: 0 K/UL (ref 0–0.2)
BILIRUB DIRECT SERPL-MCNC: <0.1 MG/DL
BILIRUB INDIRECT SERPL-MCNC: NORMAL MG/DL (ref 0–1)
BILIRUB SERPL-MCNC: 0.3 MG/DL (ref 0.3–1.2)
BUN SERPL-MCNC: 9 MG/DL (ref 8–23)
CALCIUM SERPL-MCNC: 9.1 MG/DL (ref 8.6–10.4)
CHLORIDE SERPL-SCNC: 104 MMOL/L (ref 98–107)
CHOLEST SERPL-MCNC: 188 MG/DL
CHOLESTEROL/HDL RATIO: 3.4
CO2 SERPL-SCNC: 29 MMOL/L (ref 20–31)
CREAT SERPL-MCNC: 0.68 MG/DL (ref 0.5–0.9)
EOSINOPHILS RELATIVE PERCENT: 3 % (ref 0–4)
EST. AVERAGE GLUCOSE BLD GHB EST-MCNC: 108 MG/DL
GFR SERPL CREATININE-BSD FRML MDRD: >60 ML/MIN/1.73M2
GLUCOSE SERPL-MCNC: 91 MG/DL (ref 70–99)
HBA1C MFR BLD: 5.4 % (ref 4–6)
HCT VFR BLD AUTO: 36.4 % (ref 36–46)
HDLC SERPL-MCNC: 56 MG/DL
HGB BLD-MCNC: 11.6 G/DL (ref 12–16)
LDLC SERPL CALC-MCNC: 98 MG/DL (ref 0–130)
LYMPHOCYTES # BLD: 33 % (ref 24–44)
MCH RBC QN AUTO: 27.6 PG (ref 26–34)
MCHC RBC AUTO-ENTMCNC: 31.9 G/DL (ref 31–37)
MCV RBC AUTO: 86.8 FL (ref 80–100)
MONOCYTES # BLD: 10 % (ref 1–7)
PDW BLD-RTO: 13.4 % (ref 11.5–14.9)
PLATELET # BLD AUTO: 234 K/UL (ref 150–450)
PMV BLD AUTO: 7.1 FL (ref 6–12)
POTASSIUM SERPL-SCNC: 3.8 MMOL/L (ref 3.7–5.3)
PROT SERPL-MCNC: 6.8 G/DL (ref 6.4–8.3)
RBC # BLD: 4.2 M/UL (ref 4–5.2)
SEG NEUTROPHILS: 53 % (ref 36–66)
SEGMENTED NEUTROPHILS ABSOLUTE COUNT: 2.7 K/UL (ref 1.3–9.1)
SODIUM SERPL-SCNC: 140 MMOL/L (ref 135–144)
TRIGL SERPL-MCNC: 171 MG/DL
TSH SERPL-ACNC: 3.3 UIU/ML (ref 0.3–5)
WBC # BLD AUTO: 5 K/UL (ref 3.5–11)

## 2023-02-16 PROCEDURE — 80076 HEPATIC FUNCTION PANEL: CPT

## 2023-02-16 PROCEDURE — 36415 COLL VENOUS BLD VENIPUNCTURE: CPT

## 2023-02-16 PROCEDURE — 83036 HEMOGLOBIN GLYCOSYLATED A1C: CPT

## 2023-02-16 PROCEDURE — 85025 COMPLETE CBC W/AUTO DIFF WBC: CPT

## 2023-02-16 PROCEDURE — 84443 ASSAY THYROID STIM HORMONE: CPT

## 2023-02-16 PROCEDURE — 80048 BASIC METABOLIC PNL TOTAL CA: CPT

## 2023-02-16 PROCEDURE — 80061 LIPID PANEL: CPT

## 2023-02-23 ENCOUNTER — HOSPITAL ENCOUNTER (OUTPATIENT)
Dept: PHARMACY | Age: 73
Setting detail: THERAPIES SERIES
Discharge: HOME | End: 2023-02-23
Payer: COMMERCIAL

## 2023-02-23 ENCOUNTER — TELEPHONE (OUTPATIENT)
Dept: PHARMACY | Age: 73
End: 2023-02-23

## 2023-02-23 DIAGNOSIS — I82.403 RECURRENT ACUTE DEEP VEIN THROMBOSIS (DVT) OF BOTH LOWER EXTREMITIES (HCC): Primary | ICD-10-CM

## 2023-02-23 LAB
INR BLD: 4.4
PROTIME: 52.9 SECONDS

## 2023-02-23 PROCEDURE — 99212 OFFICE O/P EST SF 10 MIN: CPT

## 2023-02-23 PROCEDURE — 85610 PROTHROMBIN TIME: CPT

## 2023-02-23 NOTE — TELEPHONE ENCOUNTER
Brynn Donnelly notified that they can  patient at 1:00 for appointment on 3/2. Patient informed and acknowledged understanding.     MARISSA Luis  2/23/2023  12:56 PM

## 2023-02-23 NOTE — PROGRESS NOTES
Patient seen in person in Medication Management Service. Patient states compliant all of the time with regimen. Did forget a dose one evening but took it early the next morning as soon as she remembered. May have missed dose of warfarin last night due to inducing vomiting after taking extra dose of acetaminophen last night. No bleeding or thromboembolic side effects noted. No significant med or dietary changes. Maybe less vegetables than usual due to lower appetite. No significant recent illness or disease state changes. Still using ibuprofen and acetaminophen for pain as needed. Hasn't been taking often. PT/INR done via POC meter per protocol. INR was supratherapeutic at 4.4.  (goal 2 - 3) 4.4 hold tonight then reduce to 2.5 sat     Warfarin regimen will be held for held tonight, then start reduced dose of 2.5 mg on Sat, Sun, Mon, and Wed and 5 mg Tues/Thurs. Will retest in 1 week. Patient understands dosing directions and information discussed. Dosing schedule and follow up appointment given to patient. Progress note routed to referring physicians office. Patient acknowledges working in [address] [place] [address] with Pharmacist as referred by his/her physician/provider. COVID 19 screening completed. At this time patient denies symptoms, recent travel and exposure. Patient educated to screen for temperature and COVID-19 symptoms prior to coming to clinic for next appointment. They are instructed to call the clinic to reschedule if they have any symptoms. Standing order for PT/INR has been placed in preparation to transition to possible remote INR monitoring given efforts to reduce the spread of COVID-19.       For Pharmacy Admin Tracking Only    Intervention Detail: Dose Adjustment: 1, reason: Therapy Optimization  Total # of Interventions Recommended: 1  Total # of Interventions Accepted: 1  Time Spent (min): Juan Bahena , [facility],PharmD, BCACP  2/23/2023  9:59 AM

## 2023-03-02 ENCOUNTER — HOSPITAL ENCOUNTER (OUTPATIENT)
Dept: PHARMACY | Age: 73
Setting detail: THERAPIES SERIES
Discharge: HOME | End: 2023-03-02
Payer: COMMERCIAL

## 2023-03-02 DIAGNOSIS — I82.403 RECURRENT ACUTE DEEP VEIN THROMBOSIS (DVT) OF BOTH LOWER EXTREMITIES (HCC): Primary | ICD-10-CM

## 2023-03-02 LAB
INR BLD: 3.1
PROTIME: 37.5 SECONDS

## 2023-03-02 PROCEDURE — 99212 OFFICE O/P EST SF 10 MIN: CPT

## 2023-03-02 PROCEDURE — 85610 PROTHROMBIN TIME: CPT

## 2023-03-02 NOTE — PROGRESS NOTES
Patient seen in person in Medication Management Service. Patient states compliant all of the time with regimen. No bleeding or thromboembolic side effects noted. Has a small bruise on arm that is resolving. No significant med changes. Diet - finding most food unappealing right now, cannot pinpoint why, states for a few months now has not been eating much other than cereal, noodles, or rice. No significant recent illness or disease state changes. Pain level high today - \"generalized, everywhere except my eyelashes and my hair,\" was concerned this would mean her INR was higher. PT/INR done via POC meter per protocol. INR was supratherapeutic at 3.1.  (goal 2 - 3)    Warfarin regimen will be continued at current dose 5 mg every Tue, Thu; 2.5 mg all other days with the exception of taking 2.5 mg today (Thursday) only. Will retest in 2 weeks. Patient understands dosing directions and information discussed. Dosing schedule and follow up appointment given to patient. Progress note routed to referring physicians office. Patient acknowledges working in [address] [place] [address] with Pharmacist as referred by his/her physician/provider. COVID 19 screening completed. At this time patient denies symptoms, recent travel and exposure. Patient educated to screen for temperature and COVID-19 symptoms prior to coming to clinic for next appointment. They are instructed to call the clinic to reschedule if they have any symptoms. Standing order for PT/INR has been placed in preparation to transition to possible remote INR monitoring given efforts to reduce the spread of COVID-19.       For Pharmacy Admin Tracking Only    Intervention Detail: Adherence Monitorin and Dose Adjustment: 1, reason: Therapy Optimization  Total # of Interventions Recommended: 2  Total # of Interventions Accepted: 2  Time Spent (min): Brooke Esteban 76, PharmD, [address]  PGY-1 Pharmacy Resident  3/2/2023 1:55 PM

## 2023-03-16 ENCOUNTER — HOSPITAL ENCOUNTER (OUTPATIENT)
Dept: PHARMACY | Age: 73
Setting detail: THERAPIES SERIES
Discharge: HOME | End: 2023-03-16
Payer: COMMERCIAL

## 2023-03-16 DIAGNOSIS — I82.403 RECURRENT ACUTE DEEP VEIN THROMBOSIS (DVT) OF BOTH LOWER EXTREMITIES (HCC): Primary | ICD-10-CM

## 2023-03-16 LAB
INR BLD: 2.7
PROTIME: 33 SECONDS

## 2023-03-16 PROCEDURE — 85610 PROTHROMBIN TIME: CPT

## 2023-03-16 PROCEDURE — 99211 OFF/OP EST MAY X REQ PHY/QHP: CPT

## 2023-03-16 NOTE — PROGRESS NOTES
Patient seen in person in Medication Management Service. Patient states compliant all of the time with regimen however, patient could not specifically tell me her dose. States she follows the dosing card and fills her medication box right after her visit. No bleeding or thromboembolic side effects noted. No significant dietary changes. Patient states she continues to take ibuprofen 800 mg twice daily. Has been taking for several months. No significant recent illness or disease state changes. Dental surgery is not scheduled yet. Will let clinic know once scheduled. PT/INR done via POC meter per protocol. INR was therapeutic at 2.8.  (goal 2 - 3)    Warfarin regimen will be continued at current dose 5 mg Tues/Thurs and 2.5 mg all other days. Will retest in 4 weeks. Patient understands dosing directions and information discussed. Dosing schedule and follow up appointment given to patient. Progress note routed to referring physicians office. Patient acknowledges working in [address] [place] [address] with Pharmacist as referred by his/her physician/provider. COVID 19 screening completed. At this time patient denies symptoms, recent travel and exposure. Patient educated to screen for temperature and COVID-19 symptoms prior to coming to clinic for next appointment. They are instructed to call the clinic to reschedule if they have any symptoms. Standing order for PT/INR has been placed in preparation to transition to possible remote INR monitoring given efforts to reduce the spread of COVID-19.       For Pharmacy Admin Tracking Only    Intervention Detail:   Total # of Interventions Recommended: 0  Total # of Interventions Accepted: 0  Time Spent (min): 20    Naima Beaulieu, Pharm D, [facility]  3/16/2023 1:56 PM

## 2023-03-28 ENCOUNTER — OFFICE VISIT (OUTPATIENT)
Dept: ORTHOPEDIC SURGERY | Age: 73
End: 2023-03-28
Payer: COMMERCIAL

## 2023-03-28 VITALS — HEIGHT: 61 IN | RESPIRATION RATE: 16 BRPM | BODY MASS INDEX: 40.78 KG/M2 | WEIGHT: 216 LBS

## 2023-03-28 DIAGNOSIS — M17.0 BILATERAL PRIMARY OSTEOARTHRITIS OF KNEE: ICD-10-CM

## 2023-03-28 DIAGNOSIS — M17.12 PRIMARY OSTEOARTHRITIS OF LEFT KNEE: Primary | ICD-10-CM

## 2023-03-28 PROCEDURE — G8417 CALC BMI ABV UP PARAM F/U: HCPCS | Performed by: PHYSICIAN ASSISTANT

## 2023-03-28 PROCEDURE — 1090F PRES/ABSN URINE INCON ASSESS: CPT | Performed by: PHYSICIAN ASSISTANT

## 2023-03-28 PROCEDURE — 99213 OFFICE O/P EST LOW 20 MIN: CPT | Performed by: PHYSICIAN ASSISTANT

## 2023-03-28 PROCEDURE — G8484 FLU IMMUNIZE NO ADMIN: HCPCS | Performed by: PHYSICIAN ASSISTANT

## 2023-03-28 PROCEDURE — G8428 CUR MEDS NOT DOCUMENT: HCPCS | Performed by: PHYSICIAN ASSISTANT

## 2023-03-28 PROCEDURE — 20610 DRAIN/INJ JOINT/BURSA W/O US: CPT | Performed by: PHYSICIAN ASSISTANT

## 2023-03-28 PROCEDURE — 1036F TOBACCO NON-USER: CPT | Performed by: PHYSICIAN ASSISTANT

## 2023-03-28 PROCEDURE — G8399 PT W/DXA RESULTS DOCUMENT: HCPCS | Performed by: PHYSICIAN ASSISTANT

## 2023-03-28 PROCEDURE — 1123F ACP DISCUSS/DSCN MKR DOCD: CPT | Performed by: PHYSICIAN ASSISTANT

## 2023-03-28 PROCEDURE — 3017F COLORECTAL CA SCREEN DOC REV: CPT | Performed by: PHYSICIAN ASSISTANT

## 2023-03-28 RX ORDER — BETAMETHASONE SODIUM PHOSPHATE AND BETAMETHASONE ACETATE 3; 3 MG/ML; MG/ML
12 INJECTION, SUSPENSION INTRA-ARTICULAR; INTRALESIONAL; INTRAMUSCULAR; SOFT TISSUE ONCE
Status: COMPLETED | OUTPATIENT
Start: 2023-03-28 | End: 2023-03-28

## 2023-03-28 RX ORDER — BUPIVACAINE HYDROCHLORIDE 2.5 MG/ML
2 INJECTION, SOLUTION EPIDURAL; INFILTRATION; INTRACAUDAL ONCE
Status: COMPLETED | OUTPATIENT
Start: 2023-03-28 | End: 2023-03-28

## 2023-03-28 RX ORDER — LIDOCAINE HYDROCHLORIDE 10 MG/ML
2 INJECTION, SOLUTION INFILTRATION; PERINEURAL ONCE
Status: COMPLETED | OUTPATIENT
Start: 2023-03-28 | End: 2023-03-28

## 2023-03-28 RX ADMIN — LIDOCAINE HYDROCHLORIDE 2 ML: 10 INJECTION, SOLUTION INFILTRATION; PERINEURAL at 11:46

## 2023-03-28 RX ADMIN — BETAMETHASONE SODIUM PHOSPHATE AND BETAMETHASONE ACETATE 12 MG: 3; 3 INJECTION, SUSPENSION INTRA-ARTICULAR; INTRALESIONAL; INTRAMUSCULAR; SOFT TISSUE at 11:45

## 2023-03-28 RX ADMIN — BUPIVACAINE HYDROCHLORIDE 5 MG: 2.5 INJECTION, SOLUTION EPIDURAL; INFILTRATION; INTRACAUDAL at 11:45

## 2023-03-28 NOTE — PROGRESS NOTES
Flexion                         5  Extension                    5                                              Extension                    5  SLR                             5                                              SLR                             5     Extensor lag                y                                              Extensor lag                y        Special testing:     Right                                                                                                               Left     n                                              Pain with deep knee flexion                            n  y                                              Patellar grind                                                   y  n                                              Patellar apprehension                                     n  n                                              Patellar glide                                                   n     n                                              Lachman                                                         n  n                                              Anterior drawer                                               n  n                                              Pivot shift                                                        n  n                                              Posterior drawer                                             n  n                                              Dial test                                                           n  n                                              Posterolateral drawer                                      n  n                                              Posterior Sag                                                  n  n                                              MCL                                                                 n  n

## 2023-04-03 DIAGNOSIS — M70.62 GREATER TROCHANTERIC BURSITIS OF LEFT HIP: ICD-10-CM

## 2023-04-03 DIAGNOSIS — M25.562 CHRONIC PAIN OF LEFT KNEE: ICD-10-CM

## 2023-04-03 DIAGNOSIS — G89.29 CHRONIC PAIN OF LEFT KNEE: ICD-10-CM

## 2023-04-04 NOTE — TELEPHONE ENCOUNTER
LRF 2/8/23   Diagnosis Association: Greater trochanteric bursitis of left hip (M70.62);  Chronic pain of left knee (M25.562 , G89.29)

## 2023-04-20 ENCOUNTER — TELEPHONE (OUTPATIENT)
Dept: PHARMACY | Age: 73
End: 2023-04-20

## 2023-04-20 NOTE — TELEPHONE ENCOUNTER
Called patient to see if she has a date set yet for her dental appointment. Patient said she is still waiting to hear back from the surgeon. She has been calling them and they told her ' we will call you to set the appointment up'    Allen Mcclain said as soon as she hears back from the surgeon she is going to let us and Dr. Edwina Cid know. Allen Mcclain is aware she will need to hold warfarin for 7 days prior so she will be prompt with getting back to us.      Wagner Diaz, PharmD, ROBYN  PGY1 Pharmacy Resident  4/20/2023 11:56 AM

## 2023-05-09 ENCOUNTER — OFFICE VISIT (OUTPATIENT)
Dept: ORTHOPEDIC SURGERY | Age: 73
End: 2023-05-09

## 2023-05-09 VITALS — WEIGHT: 214 LBS | BODY MASS INDEX: 36.54 KG/M2 | RESPIRATION RATE: 14 BRPM | HEIGHT: 64 IN

## 2023-05-09 DIAGNOSIS — M70.62 GREATER TROCHANTERIC BURSITIS OF LEFT HIP: Primary | ICD-10-CM

## 2023-05-09 DIAGNOSIS — R60.0 BILATERAL LOWER EXTREMITY EDEMA: ICD-10-CM

## 2023-05-09 RX ORDER — BETAMETHASONE SODIUM PHOSPHATE AND BETAMETHASONE ACETATE 3; 3 MG/ML; MG/ML
12 INJECTION, SUSPENSION INTRA-ARTICULAR; INTRALESIONAL; INTRAMUSCULAR; SOFT TISSUE ONCE
Status: SHIPPED | OUTPATIENT
Start: 2023-05-09

## 2023-05-09 RX ORDER — BUPIVACAINE HYDROCHLORIDE 2.5 MG/ML
2 INJECTION, SOLUTION INFILTRATION; PERINEURAL ONCE
Status: SHIPPED | OUTPATIENT
Start: 2023-05-09

## 2023-05-09 RX ORDER — LIDOCAINE HYDROCHLORIDE 10 MG/ML
4 INJECTION, SOLUTION INFILTRATION; PERINEURAL ONCE
Status: COMPLETED | OUTPATIENT
Start: 2023-05-09 | End: 2023-05-09

## 2023-05-09 RX ORDER — LIDOCAINE HYDROCHLORIDE 10 MG/ML
2 INJECTION, SOLUTION INFILTRATION; PERINEURAL ONCE
Status: SHIPPED | OUTPATIENT
Start: 2023-05-09

## 2023-05-09 RX ORDER — BETAMETHASONE SODIUM PHOSPHATE AND BETAMETHASONE ACETATE 3; 3 MG/ML; MG/ML
12 INJECTION, SUSPENSION INTRA-ARTICULAR; INTRALESIONAL; INTRAMUSCULAR; SOFT TISSUE ONCE
Status: COMPLETED | OUTPATIENT
Start: 2023-05-09 | End: 2023-05-09

## 2023-05-09 RX ORDER — BUPIVACAINE HYDROCHLORIDE 2.5 MG/ML
2 INJECTION, SOLUTION INFILTRATION; PERINEURAL ONCE
Status: COMPLETED | OUTPATIENT
Start: 2023-05-09 | End: 2023-05-09

## 2023-05-09 RX ADMIN — BETAMETHASONE SODIUM PHOSPHATE AND BETAMETHASONE ACETATE 12 MG: 3; 3 INJECTION, SUSPENSION INTRA-ARTICULAR; INTRALESIONAL; INTRAMUSCULAR; SOFT TISSUE at 11:58

## 2023-05-09 RX ADMIN — BUPIVACAINE HYDROCHLORIDE 5 MG: 2.5 INJECTION, SOLUTION INFILTRATION; PERINEURAL at 11:59

## 2023-05-09 RX ADMIN — LIDOCAINE HYDROCHLORIDE 4 ML: 10 INJECTION, SOLUTION INFILTRATION; PERINEURAL at 12:00

## 2023-05-09 NOTE — PROGRESS NOTES
bursal celestone injection  An informed verbal consent for the procedure was obtained and risks including, but not limited to: allergy to medications, injection, bleeding, stiffness of joint, recurrence of symptoms, loss of function, swelling, drainage, irrigation, need for surgery and pseudo-septic inflammation, were explained to the patient. Also, discussed was the potential for further injections, irrigation and debridement and surgery. Alternate means of treatment have also been discussed with the patient. Following an appropriate discussion with the patient regarding the risks and benefits of the procedure she consented to proceed. her left trochanteric bursa was prepped using betadine solution. Using aseptic technique and through a lateral approach, her left trochanteric bursa was injected superficially with 4 cc of 1% lidocaine without epinephrine and subsequently with 2 cc of 6 mg/mL Celestone into the bursal space. A band aid was applied to the injection site. she tolerated the injection with no immediate adverse reactions. Electronically signed by Quincy Powers on 5/9/23 at 11:01 AM EDT        Please note that this chart was generated using voice recognition Dragon dictation software. Although every effort was made to ensure the accuracy of this automated transcription, some errors in transcription may have occurred.

## 2023-05-11 ENCOUNTER — HOSPITAL ENCOUNTER (OUTPATIENT)
Dept: PHARMACY | Age: 73
Setting detail: THERAPIES SERIES
Discharge: HOME | End: 2023-05-11
Payer: COMMERCIAL

## 2023-05-11 ENCOUNTER — TELEPHONE (OUTPATIENT)
Dept: PHARMACY | Age: 73
End: 2023-05-11

## 2023-05-11 DIAGNOSIS — I82.403 RECURRENT ACUTE DEEP VEIN THROMBOSIS (DVT) OF BOTH LOWER EXTREMITIES (HCC): Primary | Chronic | ICD-10-CM

## 2023-05-11 LAB
INR BLD: 2.5
PROTIME: 30.2 SECONDS

## 2023-05-11 PROCEDURE — 99212 OFFICE O/P EST SF 10 MIN: CPT

## 2023-05-11 PROCEDURE — 85610 PROTHROMBIN TIME: CPT

## 2023-05-11 RX ORDER — WARFARIN SODIUM 2.5 MG/1
TABLET ORAL
Qty: 30 TABLET | Refills: 3 | Status: SHIPPED | OUTPATIENT
Start: 2023-05-11

## 2023-05-11 NOTE — PROGRESS NOTES
Patient seen in person in Medication Management Service. Patient states compliant all of the time with regimen. No bleeding or thromboembolic side effects noted. No significant med or dietary changes. Patient states she hasn't been taking aspirin for some time but has forgotten to tell us previously. No significant recent illness or disease state changes. Patient intending to eat 1 cup spinach twice weekly coming up and to start a probiotic,    PT/INR done via POC meter per protocol. INR was therapeutic at 2.5.  (goal 2 - 3)    Warfarin regimen will be continued at current dose 5 mg Tues/Thurs and 2.5 mg all other days. .  Will retest in 2 weeks. Patient requests to be seen in 2 weeks because of starting probiotic and her intention to eat spinach. Sent new RX for warfarin 2.5 mg tablets to [facility] on [facility].    Patient understands dosing directions and information discussed. Dosing schedule and follow up appointment given to patient. Progress note routed to referring physicians office. Patient acknowledges working in [address] [place] [address] with Pharmacist as referred by his/her physician/provider. COVID 19 screening completed. At this time patient denies symptoms, recent travel and exposure. Patient educated to screen for temperature and COVID-19 symptoms prior to coming to clinic for next appointment. They are instructed to call the clinic to reschedule if they have any symptoms. Standing order for PT/INR has been placed in preparation to transition to possible remote INR monitoring given efforts to reduce the spread of COVID-19.       For Pharmacy Admin Tracking Only    Intervention Detail: Refill(s) Provided, adherence monitoring  Total # of Interventions Recommended: 2  Total # of Interventions Accepted: 2  Time Spent (min): 20

## 2023-05-11 NOTE — TELEPHONE ENCOUNTER
Sent new RX for warfarin 2.5 mg tablets to [facility] on [address], PharmD, [address]  PGY-1 Pharmacy Resident  5/11/2023 1:57 PM

## 2023-05-19 ENCOUNTER — OFFICE VISIT (OUTPATIENT)
Dept: NEUROLOGY | Age: 73
End: 2023-05-19

## 2023-05-19 VITALS
HEIGHT: 65 IN | BODY MASS INDEX: 37.02 KG/M2 | HEART RATE: 70 BPM | SYSTOLIC BLOOD PRESSURE: 138 MMHG | DIASTOLIC BLOOD PRESSURE: 77 MMHG | WEIGHT: 222.2 LBS

## 2023-05-19 DIAGNOSIS — H93.13 TINNITUS OF BOTH EARS: ICD-10-CM

## 2023-05-19 DIAGNOSIS — G43.109 MIGRAINE WITH AURA AND WITHOUT STATUS MIGRAINOSUS, NOT INTRACTABLE: Primary | ICD-10-CM

## 2023-05-19 ASSESSMENT — ENCOUNTER SYMPTOMS
RESPIRATORY NEGATIVE: 1
GASTROINTESTINAL NEGATIVE: 1
EYES NEGATIVE: 1
ALLERGIC/IMMUNOLOGIC NEGATIVE: 1

## 2023-05-19 NOTE — PROGRESS NOTES
72 to lady with headaches and tinnitus . She reports to be having ringing in her ears since last May  There is also history of headaches having now sudden headaches . She has ed having headaches since childhood . She found in her 30's that these were precipitated by food such as MSG and food coloring ,  tuna and peanut butter . Once she modified her diet headaches got better averaging 2 to 3 times per month . These will be frontal head pressure of grade 4 over 10 using flonase inhaler attenuating headache intensity . She reports that for the past several months she is having sudden headaches that come on abruptly . These will be mostly over frontal and vertex head of stabbing quality grade intensity grade 6 over 10 lasting 2 to 3 minutes  . These episodes will occur 3 to 4 times per radha of brief duration . She does reports that she may have flashing lights on right visual field of few seconds about once per moth not associated with headache . There is no weakness , numbness or bulbar complaint. . There has been ringing of bilateral ears for the past year . This will fluctuate in intensity sounding like high pitch noise . There is no hearing loss . She has arthritis with pain in knees hips and low back and fibromyalgia.  She will take tylenol first with tramadol as back up averaging 2 per day getting through PMD .      Past Medical History:   Diagnosis Date    Affective disorder ([address]     seasonal    Arthritis     shoulders and lower back    DVT (deep venous thrombosis) (HCC)     has history in R leg X 4    Fibromyalgia     Hyperlipidemia     Hypertension     Pneumonia     Sinusitis     Thyroid disease     hypothyroidsm       Past Surgical History:   Procedure Laterality Date    COLONOSCOPY  8/31/2011    internal hemorrhoides    DILATION AND CURETTAGE OF UTERUS      ECTOPIC PREGNANCY SURGERY Left     HYSTERECTOMY (CERVIX STATUS UNKNOWN)         Family History   Problem Relation Age of Onset    Cervical Cancer

## 2023-05-26 ENCOUNTER — HOSPITAL ENCOUNTER (OUTPATIENT)
Dept: PHARMACY | Age: 73
Setting detail: THERAPIES SERIES
Discharge: HOME | End: 2023-05-26
Payer: COMMERCIAL

## 2023-05-26 DIAGNOSIS — I82.403 RECURRENT ACUTE DEEP VEIN THROMBOSIS (DVT) OF BOTH LOWER EXTREMITIES (HCC): Primary | Chronic | ICD-10-CM

## 2023-05-26 LAB
INR BLD: 4.7
PROTIME: 56.5 SECONDS

## 2023-05-26 PROCEDURE — 99213 OFFICE O/P EST LOW 20 MIN: CPT

## 2023-05-26 PROCEDURE — 85610 PROTHROMBIN TIME: CPT

## 2023-05-26 NOTE — PROGRESS NOTES
Patient seen in person in Medication Management Service. Patient states compliant all of the time with regimen. Noncompliant some of the time. Changed warfarin to 5 mg on Wed/Fri last week and 5 mg on Mon/Wed this week due to wanting more spinach. Asked patient to contact the clinic prior to adjusting dose. No bleeding or thromboembolic side effects noted. No significant med or dietary changes. Cranberry, grapefruit, black licorice or alcohol. No significant recent illness or disease state changes. Reports she is in a lot of pain this week. Has been taking Aleve 2 tablets twice daily for the last week. Patient was able to  tramadol from the pharmacy and will cut out Aleve. PT/INR done via POC meter per protocol. INR was supratherapeutic at 4.7.  (goal 2 - 3)    Pt plans to have spinach tonight and tomorrow. Warfarin regimen will be held for 2 days and then 5 mg Tues/Thurs and 2.5 mg all other days. Will retest in 5 days. Patient counseled on signs/symptoms of bleeding and when to seek medical attention. Patient instructed to use extra precautions with elevated INR. Patient understands dosing directions and information discussed. Dosing schedule and follow up appointment given to patient. Progress note routed to referring physicians office. Patient acknowledges working in [address] [place] [address] with Pharmacist as referred by his/her physician/provider. COVID 19 screening completed. At this time patient denies symptoms, recent travel and exposure. Patient educated to screen for temperature and COVID-19 symptoms prior to coming to clinic for next appointment. They are instructed to call the clinic to reschedule if they have any symptoms. Standing order for PT/INR has been placed in preparation to transition to possible remote INR monitoring given efforts to reduce the spread of COVID-19.       For Pharmacy Admin Tracking Only    Intervention Detail: Dose Adjustment: 1,

## 2023-06-02 ENCOUNTER — HOSPITAL ENCOUNTER (OUTPATIENT)
Dept: PHARMACY | Age: 73
Setting detail: THERAPIES SERIES
Discharge: HOME | End: 2023-06-02
Payer: COMMERCIAL

## 2023-06-02 DIAGNOSIS — I82.403 RECURRENT ACUTE DEEP VEIN THROMBOSIS (DVT) OF BOTH LOWER EXTREMITIES (HCC): Primary | Chronic | ICD-10-CM

## 2023-06-02 DIAGNOSIS — M25.562 CHRONIC PAIN OF LEFT KNEE: ICD-10-CM

## 2023-06-02 DIAGNOSIS — G89.29 CHRONIC PAIN OF LEFT KNEE: ICD-10-CM

## 2023-06-02 DIAGNOSIS — M70.62 GREATER TROCHANTERIC BURSITIS OF LEFT HIP: ICD-10-CM

## 2023-06-02 LAB
INR BLD: 2.7
PROTIME: 32.7 SECONDS

## 2023-06-02 PROCEDURE — 99211 OFF/OP EST MAY X REQ PHY/QHP: CPT

## 2023-06-02 PROCEDURE — 85610 PROTHROMBIN TIME: CPT

## 2023-06-02 NOTE — PROGRESS NOTES
Patient seen in person in Medication Management Service. Patient states compliant most of the time with regimen. No bleeding or thromboembolic side effects noted. No significant med or dietary changes. No significant recent illness or disease state changes. PT/INR done via POC meter per protocol. INR was therapeutic at 2.7.  (goal 2 - 3)    Warfarin regimen will be continued at current dose 5 mg Mon/Thurs and 2.5 mg all other days. Will retest in 2 weeks. Patient understands dosing directions and information discussed. Dosing schedule and follow up appointment given to patient. Progress note routed to referring physicians office. Patient acknowledges working in [address] [place] [address] with Pharmacist as referred by his/her physician/provider. COVID 19 screening completed. At this time patient denies symptoms, recent travel and exposure. Patient educated to screen for temperature and COVID-19 symptoms prior to coming to clinic for next appointment. They are instructed to call the clinic to reschedule if they have any symptoms. Standing order for PT/INR has been placed in preparation to transition to possible remote INR monitoring given efforts to reduce the spread of COVID-19.       For Pharmacy Admin Tracking Only    Intervention Detail: Adherence Monitorin  Total # of Interventions Recommended: 0  Total # of Interventions Accepted: 0  Time Spent (min): 20

## 2023-06-19 ENCOUNTER — INITIAL CONSULT (OUTPATIENT)
Dept: VASCULAR SURGERY | Age: 73
End: 2023-06-19
Payer: COMMERCIAL

## 2023-06-19 VITALS
BODY MASS INDEX: 36.99 KG/M2 | RESPIRATION RATE: 20 BRPM | HEIGHT: 65 IN | WEIGHT: 222 LBS | TEMPERATURE: 98.2 F | HEART RATE: 86 BPM | DIASTOLIC BLOOD PRESSURE: 70 MMHG | SYSTOLIC BLOOD PRESSURE: 130 MMHG | OXYGEN SATURATION: 98 %

## 2023-06-19 DIAGNOSIS — G62.9 PERIPHERAL POLYNEUROPATHY: Primary | ICD-10-CM

## 2023-06-19 DIAGNOSIS — Z86.718 HISTORY OF DVT OF LOWER EXTREMITY: ICD-10-CM

## 2023-06-19 PROCEDURE — G8427 DOCREV CUR MEDS BY ELIG CLIN: HCPCS | Performed by: SURGERY

## 2023-06-19 PROCEDURE — G8399 PT W/DXA RESULTS DOCUMENT: HCPCS | Performed by: SURGERY

## 2023-06-19 PROCEDURE — 99203 OFFICE O/P NEW LOW 30 MIN: CPT | Performed by: SURGERY

## 2023-06-19 PROCEDURE — G8417 CALC BMI ABV UP PARAM F/U: HCPCS | Performed by: SURGERY

## 2023-06-19 PROCEDURE — 1123F ACP DISCUSS/DSCN MKR DOCD: CPT | Performed by: SURGERY

## 2023-06-19 PROCEDURE — 1090F PRES/ABSN URINE INCON ASSESS: CPT | Performed by: SURGERY

## 2023-06-19 PROCEDURE — 3075F SYST BP GE 130 - 139MM HG: CPT | Performed by: SURGERY

## 2023-06-19 PROCEDURE — 3017F COLORECTAL CA SCREEN DOC REV: CPT | Performed by: SURGERY

## 2023-06-19 PROCEDURE — 1036F TOBACCO NON-USER: CPT | Performed by: SURGERY

## 2023-06-19 PROCEDURE — 3078F DIAST BP <80 MM HG: CPT | Performed by: SURGERY

## 2023-06-19 ASSESSMENT — ENCOUNTER SYMPTOMS
ABDOMINAL DISTENTION: 0
COUGH: 0
EYE PAIN: 0
COLOR CHANGE: 0
CHEST TIGHTNESS: 0
SHORTNESS OF BREATH: 0
ABDOMINAL PAIN: 0
VOICE CHANGE: 0
VOMITING: 0
TROUBLE SWALLOWING: 0

## 2023-06-19 NOTE — PROGRESS NOTES
jaundiced. Findings: No rash. Neurological:      General: No focal deficit present. Mental Status: She is alert and oriented to person, place, and time. Cranial Nerves: No cranial nerve deficit. Psychiatric:         Mood and Affect: Mood normal.       Assessment:  1. Peripheral polyneuropathy    2. History of DVT of lower extremity          Plan: At this time I see no evidence of significant chronic venous insufficiency and certainly no evidence of arterial insufficiency. She has palpable distal pulses which are strong bilaterally. The distribution of disease lends itself well to a peripheral neuropathy or some other neurologic issue. I am certainly not the specialist that can elucidate these problems but I can say with good confidence that she has no significant arterial or venous issues. We can see her back on a as needed basis.     Electronically signed by:  Jeremy Hoyt MD

## 2023-06-30 ENCOUNTER — HOSPITAL ENCOUNTER (OUTPATIENT)
Dept: PHARMACY | Age: 73
Setting detail: THERAPIES SERIES
Discharge: HOME | End: 2023-06-30
Payer: COMMERCIAL

## 2023-06-30 DIAGNOSIS — I82.403 RECURRENT ACUTE DEEP VEIN THROMBOSIS (DVT) OF BOTH LOWER EXTREMITIES (HCC): Primary | Chronic | ICD-10-CM

## 2023-06-30 LAB
INR BLD: 3.3
PROTIME: 39.4 SECONDS

## 2023-06-30 PROCEDURE — 85610 PROTHROMBIN TIME: CPT

## 2023-06-30 PROCEDURE — 99212 OFFICE O/P EST SF 10 MIN: CPT

## 2023-07-12 ENCOUNTER — HOSPITAL ENCOUNTER (OUTPATIENT)
Dept: PHARMACY | Age: 73
Setting detail: THERAPIES SERIES
Discharge: HOME | End: 2023-07-12
Payer: COMMERCIAL

## 2023-07-12 ENCOUNTER — HOSPITAL ENCOUNTER (OUTPATIENT)
Age: 73
Discharge: HOME | End: 2023-07-12
Payer: COMMERCIAL

## 2023-07-12 DIAGNOSIS — G43.109 MIGRAINE WITH AURA AND WITHOUT STATUS MIGRAINOSUS, NOT INTRACTABLE: ICD-10-CM

## 2023-07-12 DIAGNOSIS — I82.403 RECURRENT ACUTE DEEP VEIN THROMBOSIS (DVT) OF BOTH LOWER EXTREMITIES (HCC): Primary | Chronic | ICD-10-CM

## 2023-07-12 LAB
CRP SERPL HS-MCNC: 3.7 MG/L (ref 0–5)
ERYTHROCYTE [SEDIMENTATION RATE] IN BLOOD BY WESTERGREN METHOD: 30 MM/HR (ref 0–30)
INR BLD: 3.3
PROTIME: 39.2 SECONDS

## 2023-07-12 PROCEDURE — 85610 PROTHROMBIN TIME: CPT

## 2023-07-12 PROCEDURE — 99211 OFF/OP EST MAY X REQ PHY/QHP: CPT

## 2023-07-12 PROCEDURE — 86140 C-REACTIVE PROTEIN: CPT

## 2023-07-12 PROCEDURE — 36415 COLL VENOUS BLD VENIPUNCTURE: CPT

## 2023-07-12 PROCEDURE — 85652 RBC SED RATE AUTOMATED: CPT

## 2023-07-12 NOTE — PROGRESS NOTES
Patient seen in person in Medication Management Service. Patient states compliant most of the time with regimen. No bleeding or thromboembolic side effects noted. No significant med or dietary changes. No significant recent illness or disease state changes. PT/INR done via POC meter per protocol. INR was supratherapeutic at 3.3.  (goal 2 - 3)  Will not decrease warfarin dose as patient plans to begin eating spinach twice weekly  Continue warfarin 5 mg Mon/Thurs and 2.5 mg all other days  Recheck INR in two weeks    Patient understands dosing directions and information discussed. Dosing schedule and follow up appointment given to patient. Progress note routed to referring physicians office. Patient acknowledges working in [address] [place] [address] with Pharmacist as referred by his/her physician/provider. COVID 19 screening completed. At this time patient denies symptoms, recent travel and exposure. Patient educated to screen for temperature and COVID-19 symptoms prior to coming to clinic for next appointment. They are instructed to call the clinic to reschedule if they have any symptoms. Standing order for PT/INR has been placed in preparation to transition to possible remote INR monitoring given efforts to reduce the spread of COVID-19.       For Pharmacy Admin Tracking Only    Intervention Detail: Adherence Monitorin  Total # of Interventions Recommended: 0  Total # of Interventions Accepted: 0  Time Spent (min): 20

## 2023-07-14 ENCOUNTER — OFFICE VISIT (OUTPATIENT)
Dept: NEUROLOGY | Age: 73
End: 2023-07-14
Payer: COMMERCIAL

## 2023-07-14 VITALS
SYSTOLIC BLOOD PRESSURE: 168 MMHG | DIASTOLIC BLOOD PRESSURE: 97 MMHG | BODY MASS INDEX: 41.54 KG/M2 | WEIGHT: 220 LBS | HEART RATE: 68 BPM | OXYGEN SATURATION: 98 % | HEIGHT: 61 IN

## 2023-07-14 DIAGNOSIS — M48.061 SPINAL STENOSIS OF LUMBAR REGION, UNSPECIFIED WHETHER NEUROGENIC CLAUDICATION PRESENT: Primary | ICD-10-CM

## 2023-07-14 DIAGNOSIS — G43.109 MIGRAINE WITH AURA AND WITHOUT STATUS MIGRAINOSUS, NOT INTRACTABLE: ICD-10-CM

## 2023-07-14 DIAGNOSIS — H93.13 TINNITUS OF BOTH EARS: ICD-10-CM

## 2023-07-14 PROCEDURE — 1123F ACP DISCUSS/DSCN MKR DOCD: CPT | Performed by: PSYCHIATRY & NEUROLOGY

## 2023-07-14 PROCEDURE — G8417 CALC BMI ABV UP PARAM F/U: HCPCS | Performed by: PSYCHIATRY & NEUROLOGY

## 2023-07-14 PROCEDURE — 1036F TOBACCO NON-USER: CPT | Performed by: PSYCHIATRY & NEUROLOGY

## 2023-07-14 PROCEDURE — G8427 DOCREV CUR MEDS BY ELIG CLIN: HCPCS | Performed by: PSYCHIATRY & NEUROLOGY

## 2023-07-14 PROCEDURE — 3078F DIAST BP <80 MM HG: CPT | Performed by: PSYCHIATRY & NEUROLOGY

## 2023-07-14 PROCEDURE — 3074F SYST BP LT 130 MM HG: CPT | Performed by: PSYCHIATRY & NEUROLOGY

## 2023-07-14 PROCEDURE — G8399 PT W/DXA RESULTS DOCUMENT: HCPCS | Performed by: PSYCHIATRY & NEUROLOGY

## 2023-07-14 PROCEDURE — 99214 OFFICE O/P EST MOD 30 MIN: CPT | Performed by: PSYCHIATRY & NEUROLOGY

## 2023-07-14 PROCEDURE — 1090F PRES/ABSN URINE INCON ASSESS: CPT | Performed by: PSYCHIATRY & NEUROLOGY

## 2023-07-14 PROCEDURE — 3017F COLORECTAL CA SCREEN DOC REV: CPT | Performed by: PSYCHIATRY & NEUROLOGY

## 2023-07-14 RX ORDER — AMITRIPTYLINE HYDROCHLORIDE 10 MG/1
TABLET, FILM COATED ORAL
Qty: 30 TABLET | Refills: 2 | Status: SHIPPED | OUTPATIENT
Start: 2023-07-14

## 2023-07-14 ASSESSMENT — ENCOUNTER SYMPTOMS
ALLERGIC/IMMUNOLOGIC NEGATIVE: 1
EYES NEGATIVE: 1
GASTROINTESTINAL NEGATIVE: 1
RESPIRATORY NEGATIVE: 1

## 2023-07-14 NOTE — PROGRESS NOTES
Active problem Transient head pains are felt to be migraine variant with history of migraines that are nor that disruptive to use tylenol as abortive to undergo Head CT along with ESR and CRP . 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 The condition is she reports that she is having transient head pains lasting about one minute of grade 2 over 10  Head CT normal . CRP 3.7. She reports hat he has legs pains from knees down more after activity . This is lower leg posterior in calves but aso shins of grade 10 over 10 . This is brought upon with activity . Pain will be of aching up to grade 10 over 10 . Siting down will improve leg pain. She has seen vascular and orthopedics not seeing clear cause . There is no low back pain . There maybe left knee giveway . She reports to be having ringing in her ears for one year. There is also history of headaches having now sudden headaches . She has been having headaches since childhood . She found in her 30's that these were precipitated by food such as MSG and food coloring ,  tuna and peanut butter . Once she modified her diet headaches got better averaging 2 to 3 times per month . These will be frontal head pressure of grade 4 over 10 using flonase inhaler attenuating headache intensity . She reports that for the past several months she is having sudden headaches that come on abruptly . These will be mostly over frontal and vertex head of stabbing quality grade . These episodes will occur 3 to 4 times per radha of brief duration . She does reports that she may have flashing lights on right visual field of few seconds about once per moth not associated with headache . There is no weakness , numbness or bulbar complaint. . There has been ringing of bilateral ears for the past year . This will fluctuate in intensity sounding like high pitch noise . There is no hearing loss . She has arthritis with pain in knees hips and low back and fibromyalgia.  She will take tylenol

## 2023-07-26 ENCOUNTER — HOSPITAL ENCOUNTER (OUTPATIENT)
Dept: PHARMACY | Age: 73
Setting detail: THERAPIES SERIES
Discharge: HOME | End: 2023-07-26
Payer: COMMERCIAL

## 2023-07-26 DIAGNOSIS — I82.403 RECURRENT ACUTE DEEP VEIN THROMBOSIS (DVT) OF BOTH LOWER EXTREMITIES (HCC): Primary | Chronic | ICD-10-CM

## 2023-07-26 LAB
INR BLD: 3.8
PROTIME: 45.3 SECONDS

## 2023-07-26 PROCEDURE — 99212 OFFICE O/P EST SF 10 MIN: CPT

## 2023-07-26 PROCEDURE — 85610 PROTHROMBIN TIME: CPT

## 2023-07-26 NOTE — PROGRESS NOTES
Patient seen in person in Medication Management Service. Patient states noncompliant some of the time with regimen. Pt thought she was instructed to take 5 mg MWF and 2.5 mg all other days. Usually looks at her card when filling her medication box, but filled it this past 2 weeks without it. No bleeding or thromboembolic side effects noted. No significant med changes. Patient has not had any spinach since last visit, 2 weeks ago. Did not make it to the store to get more. Has also had a lot more pain lately. Takes Tylenol for the pain. No significant recent illness or disease state changes. Patient is going with her neighbor to [employer]. Will start back with the spinach  PT/INR done via POC meter per protocol. INR was supratherapeutic at 3.8.  (goal 2 - 3)    Warfarin regimen will be held for 1 day and decreased from what patient was taking to 5 mg Mon/Thurs and 2.5 mg all other days. Will retest in 1 week. Patient counseled on signs/symptoms of bleeding and when to seek medical attention. Patient instructed to use extra precautions with elevated INR. Patient understands dosing directions and information discussed. Dosing schedule and follow up appointment given to patient. Progress note routed to referring physicians office. Patient acknowledges working in [address] [place] [address] with Pharmacist as referred by his/her physician/provider. COVID 19 screening completed. At this time patient denies symptoms, recent travel and exposure. Patient educated to screen for temperature and COVID-19 symptoms prior to coming to clinic for next appointment. They are instructed to call the clinic to reschedule if they have any symptoms. Standing order for PT/INR has been placed in preparation to transition to possible remote INR monitoring given efforts to reduce the spread of COVID-19.       For Pharmacy Admin Tracking Only    Intervention Detail: Adherence Monitorin and Dose

## 2023-07-27 DIAGNOSIS — G89.29 CHRONIC PAIN OF LEFT KNEE: ICD-10-CM

## 2023-07-27 DIAGNOSIS — M25.562 CHRONIC PAIN OF LEFT KNEE: ICD-10-CM

## 2023-07-27 DIAGNOSIS — M70.62 GREATER TROCHANTERIC BURSITIS OF LEFT HIP: ICD-10-CM

## 2023-07-27 NOTE — TELEPHONE ENCOUNTER
LRF 6/5/23     Diagnosis Association: Greater trochanteric bursitis of left hip (M70.62);  Chronic pain of left knee (M25.562 , G89.29)

## 2023-08-03 ENCOUNTER — HOSPITAL ENCOUNTER (OUTPATIENT)
Dept: PHARMACY | Age: 73
Setting detail: THERAPIES SERIES
Discharge: HOME | End: 2023-08-03
Payer: COMMERCIAL

## 2023-08-03 DIAGNOSIS — I82.403 RECURRENT ACUTE DEEP VEIN THROMBOSIS (DVT) OF BOTH LOWER EXTREMITIES (HCC): Primary | Chronic | ICD-10-CM

## 2023-08-03 LAB
INR BLD: 3.4
PROTIME: 40.5 SECONDS

## 2023-08-03 PROCEDURE — 99212 OFFICE O/P EST SF 10 MIN: CPT

## 2023-08-03 PROCEDURE — 85610 PROTHROMBIN TIME: CPT

## 2023-08-03 NOTE — PROGRESS NOTES
Patient seen in person in Medication Management Service. Patient states compliant most of the time with regimen. No bleeding or thromboembolic side effects noted. No significant med or dietary changes. No significant recent illness or disease state changes. PT/INR done via POC meter per protocol. INR was supratherapeutic at 3.4.  (goal 2 - 3)  The patient did not eat any spinach this week but still wants to eat spinach 2-3 x per week  Warfarin regimen will be continued at current dose 5 mg Mon/Th and 2.5 mg all other days. Will retest in 2 weeks. Patient understands dosing directions and information discussed. Dosing schedule and follow up appointment given to patient. Progress note routed to referring physicians office. Patient acknowledges working in [address] [place] [address] with Pharmacist as referred by his/her physician/provider. COVID 19 screening completed. At this time patient denies symptoms, recent travel and exposure. Patient educated to screen for temperature and COVID-19 symptoms prior to coming to clinic for next appointment. They are instructed to call the clinic to reschedule if they have any symptoms. Standing order for PT/INR has been placed in preparation to transition to possible remote INR monitoring given efforts to reduce the spread of COVID-19.       For Pharmacy Admin Tracking Only    Intervention Detail: Adherence Monitorin  Total # of Interventions Recommended: 0  Total # of Interventions Accepted: 0  Time Spent (min): 20

## 2023-08-04 ENCOUNTER — TELEPHONE (OUTPATIENT)
Dept: NEUROLOGY | Age: 73
End: 2023-08-04

## 2023-08-04 NOTE — TELEPHONE ENCOUNTER
Deysi from the [address] [place] [address] Drive called to inform Dr. Stephanie Nair that the insurance has not yet approved the MRI that was ordered. [facility] will hold off on testing until an insurance approval is obtained.

## 2023-08-15 ENCOUNTER — APPOINTMENT (OUTPATIENT)
Dept: GENERAL RADIOLOGY | Age: 73
End: 2023-08-15
Payer: COMMERCIAL

## 2023-08-15 ENCOUNTER — HOSPITAL ENCOUNTER (INPATIENT)
Age: 73
LOS: 2 days | Discharge: HOME | End: 2023-08-17
Attending: EMERGENCY MEDICINE | Admitting: FAMILY MEDICINE
Payer: COMMERCIAL

## 2023-08-15 DIAGNOSIS — R07.9 CHEST PAIN, UNSPECIFIED TYPE: Primary | ICD-10-CM

## 2023-08-15 DIAGNOSIS — R06.09 DYSPNEA ON EXERTION: ICD-10-CM

## 2023-08-15 LAB
ALBUMIN SERPL-MCNC: 4.1 G/DL (ref 3.5–5.2)
ALP SERPL-CCNC: 81 U/L (ref 35–104)
ALT SERPL-CCNC: 10 U/L (ref 5–33)
ANION GAP SERPL CALCULATED.3IONS-SCNC: 11 MMOL/L (ref 9–17)
AST SERPL-CCNC: 12 U/L
BASOPHILS # BLD: 0 K/UL (ref 0–0.2)
BASOPHILS NFR BLD: 0 % (ref 0–2)
BILIRUB SERPL-MCNC: 0.3 MG/DL (ref 0.3–1.2)
BNP SERPL-MCNC: 42 PG/ML
BUN SERPL-MCNC: 12 MG/DL (ref 8–23)
CALCIUM SERPL-MCNC: 9.7 MG/DL (ref 8.6–10.4)
CHLORIDE SERPL-SCNC: 104 MMOL/L (ref 98–107)
CO2 SERPL-SCNC: 26 MMOL/L (ref 20–31)
CREAT SERPL-MCNC: 0.7 MG/DL (ref 0.5–0.9)
EKG ATRIAL RATE: 71 BPM
EKG P AXIS: 42 DEGREES
EKG P-R INTERVAL: 166 MS
EKG Q-T INTERVAL: 430 MS
EKG QRS DURATION: 156 MS
EKG QTC CALCULATION (BAZETT): 467 MS
EKG R AXIS: -10 DEGREES
EKG T AXIS: 0 DEGREES
EKG VENTRICULAR RATE: 71 BPM
EOSINOPHIL # BLD: 0.2 K/UL (ref 0–0.4)
EOSINOPHILS RELATIVE PERCENT: 3 % (ref 0–4)
ERYTHROCYTE [DISTWIDTH] IN BLOOD BY AUTOMATED COUNT: 17.4 % (ref 11.5–14.9)
GFR SERPL CREATININE-BSD FRML MDRD: >60 ML/MIN/1.73M2
GLUCOSE SERPL-MCNC: 104 MG/DL (ref 70–99)
HCT VFR BLD AUTO: 37.5 % (ref 36–46)
HGB BLD-MCNC: 11.7 G/DL (ref 12–16)
INR PPP: 2
LIPASE SERPL-CCNC: 21 U/L (ref 13–60)
LYMPHOCYTES NFR BLD: 1.7 K/UL (ref 1–4.8)
LYMPHOCYTES RELATIVE PERCENT: 32 % (ref 24–44)
MAGNESIUM SERPL-MCNC: 1.8 MG/DL (ref 1.6–2.6)
MCH RBC QN AUTO: 25.4 PG (ref 26–34)
MCHC RBC AUTO-ENTMCNC: 31.3 G/DL (ref 31–37)
MCV RBC AUTO: 81.4 FL (ref 80–100)
MONOCYTES NFR BLD: 0.5 K/UL (ref 0.1–1.3)
MONOCYTES NFR BLD: 10 % (ref 1–7)
NEUTROPHILS NFR BLD: 55 % (ref 36–66)
NEUTS SEG NFR BLD: 2.9 K/UL (ref 1.3–9.1)
PLATELET # BLD AUTO: 240 K/UL (ref 150–450)
PMV BLD AUTO: 7.3 FL (ref 6–12)
POTASSIUM SERPL-SCNC: 3.3 MMOL/L (ref 3.7–5.3)
PROT SERPL-MCNC: 7.4 G/DL (ref 6.4–8.3)
PROTHROMBIN TIME: 22.7 SEC (ref 11.8–14.6)
RBC # BLD AUTO: 4.61 M/UL (ref 4–5.2)
SODIUM SERPL-SCNC: 141 MMOL/L (ref 135–144)
TROPONIN I SERPL HS-MCNC: 6 NG/L (ref 0–14)
TROPONIN I SERPL HS-MCNC: 7 NG/L (ref 0–14)
WBC OTHER # BLD: 5.2 K/UL (ref 3.5–11)

## 2023-08-15 PROCEDURE — 83690 ASSAY OF LIPASE: CPT

## 2023-08-15 PROCEDURE — 71045 X-RAY EXAM CHEST 1 VIEW: CPT

## 2023-08-15 PROCEDURE — 93005 ELECTROCARDIOGRAM TRACING: CPT | Performed by: EMERGENCY MEDICINE

## 2023-08-15 PROCEDURE — 99285 EMERGENCY DEPT VISIT HI MDM: CPT

## 2023-08-15 PROCEDURE — 6370000000 HC RX 637 (ALT 250 FOR IP): Performed by: FAMILY MEDICINE

## 2023-08-15 PROCEDURE — 2580000003 HC RX 258: Performed by: FAMILY MEDICINE

## 2023-08-15 PROCEDURE — 80053 COMPREHEN METABOLIC PANEL: CPT

## 2023-08-15 PROCEDURE — 1200000000 HC SEMI PRIVATE

## 2023-08-15 PROCEDURE — 36415 COLL VENOUS BLD VENIPUNCTURE: CPT

## 2023-08-15 PROCEDURE — 85610 PROTHROMBIN TIME: CPT

## 2023-08-15 PROCEDURE — 83880 ASSAY OF NATRIURETIC PEPTIDE: CPT

## 2023-08-15 PROCEDURE — 83735 ASSAY OF MAGNESIUM: CPT

## 2023-08-15 PROCEDURE — 6370000000 HC RX 637 (ALT 250 FOR IP): Performed by: EMERGENCY MEDICINE

## 2023-08-15 PROCEDURE — 84484 ASSAY OF TROPONIN QUANT: CPT

## 2023-08-15 PROCEDURE — 85025 COMPLETE CBC W/AUTO DIFF WBC: CPT

## 2023-08-15 PROCEDURE — 94640 AIRWAY INHALATION TREATMENT: CPT

## 2023-08-15 PROCEDURE — 93005 ELECTROCARDIOGRAM TRACING: CPT | Performed by: FAMILY MEDICINE

## 2023-08-15 RX ORDER — WARFARIN SODIUM 5 MG/1
5 TABLET ORAL SEE ADMIN INSTRUCTIONS
Status: DISCONTINUED | OUTPATIENT
Start: 2023-08-15 | End: 2023-08-15

## 2023-08-15 RX ORDER — LIDOCAINE 4 G/G
1 PATCH TOPICAL DAILY
Status: DISCONTINUED | OUTPATIENT
Start: 2023-08-15 | End: 2023-08-17 | Stop reason: HOSPADM

## 2023-08-15 RX ORDER — SODIUM CHLORIDE 0.9 % (FLUSH) 0.9 %
5-40 SYRINGE (ML) INJECTION EVERY 12 HOURS SCHEDULED
Status: DISCONTINUED | OUTPATIENT
Start: 2023-08-15 | End: 2023-08-17 | Stop reason: HOSPADM

## 2023-08-15 RX ORDER — MONTELUKAST SODIUM 10 MG/1
10 TABLET ORAL NIGHTLY
Status: DISCONTINUED | OUTPATIENT
Start: 2023-08-15 | End: 2023-08-17 | Stop reason: HOSPADM

## 2023-08-15 RX ORDER — POTASSIUM CHLORIDE 7.45 MG/ML
10 INJECTION INTRAVENOUS PRN
Status: DISCONTINUED | OUTPATIENT
Start: 2023-08-15 | End: 2023-08-17 | Stop reason: HOSPADM

## 2023-08-15 RX ORDER — ACETAMINOPHEN 325 MG/1
650 TABLET ORAL EVERY 6 HOURS PRN
Status: DISCONTINUED | OUTPATIENT
Start: 2023-08-15 | End: 2023-08-17 | Stop reason: HOSPADM

## 2023-08-15 RX ORDER — ACETAMINOPHEN 650 MG/1
650 SUPPOSITORY RECTAL EVERY 6 HOURS PRN
Status: DISCONTINUED | OUTPATIENT
Start: 2023-08-15 | End: 2023-08-17 | Stop reason: HOSPADM

## 2023-08-15 RX ORDER — AMITRIPTYLINE HYDROCHLORIDE 10 MG/1
10 TABLET, FILM COATED ORAL NIGHTLY
Status: DISCONTINUED | OUTPATIENT
Start: 2023-08-15 | End: 2023-08-17 | Stop reason: HOSPADM

## 2023-08-15 RX ORDER — ALBUTEROL SULFATE 90 UG/1
1 AEROSOL, METERED RESPIRATORY (INHALATION) EVERY 6 HOURS PRN
Status: DISCONTINUED | OUTPATIENT
Start: 2023-08-15 | End: 2023-08-17 | Stop reason: HOSPADM

## 2023-08-15 RX ORDER — DIAPER,BRIEF,INFANT-TODD,DISP
EACH MISCELLANEOUS 2 TIMES DAILY
Status: DISCONTINUED | OUTPATIENT
Start: 2023-08-15 | End: 2023-08-17 | Stop reason: HOSPADM

## 2023-08-15 RX ORDER — AMMONIUM LACTATE 12 G/100G
LOTION TOPICAL PRN
Status: DISCONTINUED | OUTPATIENT
Start: 2023-08-15 | End: 2023-08-17 | Stop reason: HOSPADM

## 2023-08-15 RX ORDER — BUPROPION HYDROCHLORIDE 150 MG/1
150 TABLET ORAL EVERY MORNING
Status: DISCONTINUED | OUTPATIENT
Start: 2023-08-16 | End: 2023-08-17 | Stop reason: HOSPADM

## 2023-08-15 RX ORDER — WARFARIN SODIUM 2.5 MG/1
2.5 TABLET ORAL
Status: COMPLETED | OUTPATIENT
Start: 2023-08-15 | End: 2023-08-15

## 2023-08-15 RX ORDER — FLUTICASONE PROPIONATE 50 MCG
2 SPRAY, SUSPENSION (ML) NASAL DAILY PRN
Status: DISCONTINUED | OUTPATIENT
Start: 2023-08-15 | End: 2023-08-17 | Stop reason: HOSPADM

## 2023-08-15 RX ORDER — BUPROPION HYDROCHLORIDE 300 MG/1
300 TABLET ORAL EVERY MORNING
Status: DISCONTINUED | OUTPATIENT
Start: 2023-08-16 | End: 2023-08-15 | Stop reason: SDUPTHER

## 2023-08-15 RX ORDER — NITROGLYCERIN 0.4 MG/1
0.4 TABLET SUBLINGUAL EVERY 5 MIN PRN
Status: DISCONTINUED | OUTPATIENT
Start: 2023-08-15 | End: 2023-08-17 | Stop reason: HOSPADM

## 2023-08-15 RX ORDER — ATORVASTATIN CALCIUM 20 MG/1
20 TABLET, FILM COATED ORAL DAILY
Status: DISCONTINUED | OUTPATIENT
Start: 2023-08-15 | End: 2023-08-15 | Stop reason: SDUPTHER

## 2023-08-15 RX ORDER — SODIUM CHLORIDE 9 MG/ML
INJECTION, SOLUTION INTRAVENOUS PRN
Status: DISCONTINUED | OUTPATIENT
Start: 2023-08-15 | End: 2023-08-17 | Stop reason: HOSPADM

## 2023-08-15 RX ORDER — AMLODIPINE BESYLATE 10 MG/1
10 TABLET ORAL DAILY
Status: DISCONTINUED | OUTPATIENT
Start: 2023-08-15 | End: 2023-08-17 | Stop reason: HOSPADM

## 2023-08-15 RX ORDER — POTASSIUM CHLORIDE 20 MEQ/1
40 TABLET, EXTENDED RELEASE ORAL PRN
Status: DISCONTINUED | OUTPATIENT
Start: 2023-08-15 | End: 2023-08-17 | Stop reason: HOSPADM

## 2023-08-15 RX ORDER — POTASSIUM CHLORIDE 20 MEQ/1
40 TABLET, EXTENDED RELEASE ORAL ONCE
Status: COMPLETED | OUTPATIENT
Start: 2023-08-15 | End: 2023-08-15

## 2023-08-15 RX ORDER — NITROGLYCERIN 0.4 MG/1
0.4 TABLET SUBLINGUAL EVERY 5 MIN PRN
Status: DISCONTINUED | OUTPATIENT
Start: 2023-08-15 | End: 2023-08-15 | Stop reason: SDUPTHER

## 2023-08-15 RX ORDER — ERGOCALCIFEROL 1.25 MG/1
50000 CAPSULE ORAL WEEKLY
Status: DISCONTINUED | OUTPATIENT
Start: 2023-08-20 | End: 2023-08-17 | Stop reason: HOSPADM

## 2023-08-15 RX ORDER — SODIUM CHLORIDE 0.9 % (FLUSH) 0.9 %
10 SYRINGE (ML) INJECTION PRN
Status: DISCONTINUED | OUTPATIENT
Start: 2023-08-15 | End: 2023-08-17 | Stop reason: HOSPADM

## 2023-08-15 RX ORDER — GABAPENTIN 400 MG/1
800 CAPSULE ORAL 2 TIMES DAILY PRN
Status: DISCONTINUED | OUTPATIENT
Start: 2023-08-15 | End: 2023-08-17 | Stop reason: HOSPADM

## 2023-08-15 RX ORDER — MAGNESIUM SULFATE 1 G/100ML
1000 INJECTION INTRAVENOUS PRN
Status: DISCONTINUED | OUTPATIENT
Start: 2023-08-15 | End: 2023-08-17 | Stop reason: HOSPADM

## 2023-08-15 RX ORDER — ATORVASTATIN CALCIUM 40 MG/1
40 TABLET, FILM COATED ORAL NIGHTLY
Status: DISCONTINUED | OUTPATIENT
Start: 2023-08-15 | End: 2023-08-17 | Stop reason: HOSPADM

## 2023-08-15 RX ORDER — TRAMADOL HYDROCHLORIDE 50 MG/1
50 TABLET ORAL EVERY 6 HOURS PRN
Status: DISCONTINUED | OUTPATIENT
Start: 2023-08-15 | End: 2023-08-17 | Stop reason: HOSPADM

## 2023-08-15 RX ORDER — ONDANSETRON 4 MG/1
4 TABLET, ORALLY DISINTEGRATING ORAL EVERY 8 HOURS PRN
Status: DISCONTINUED | OUTPATIENT
Start: 2023-08-15 | End: 2023-08-17 | Stop reason: HOSPADM

## 2023-08-15 RX ORDER — ONDANSETRON 2 MG/ML
4 INJECTION INTRAMUSCULAR; INTRAVENOUS EVERY 6 HOURS PRN
Status: DISCONTINUED | OUTPATIENT
Start: 2023-08-15 | End: 2023-08-17 | Stop reason: HOSPADM

## 2023-08-15 RX ORDER — WARFARIN SODIUM 2.5 MG/1
2.5 TABLET ORAL SEE ADMIN INSTRUCTIONS
Status: DISCONTINUED | OUTPATIENT
Start: 2023-08-15 | End: 2023-08-15

## 2023-08-15 RX ORDER — BUDESONIDE AND FORMOTEROL FUMARATE DIHYDRATE 160; 4.5 UG/1; UG/1
2 AEROSOL RESPIRATORY (INHALATION)
Status: DISCONTINUED | OUTPATIENT
Start: 2023-08-15 | End: 2023-08-17 | Stop reason: HOSPADM

## 2023-08-15 RX ADMIN — TRAMADOL HYDROCHLORIDE 50 MG: 50 TABLET, COATED ORAL at 17:42

## 2023-08-15 RX ADMIN — WARFARIN SODIUM 2.5 MG: 2.5 TABLET ORAL at 17:39

## 2023-08-15 RX ADMIN — AMLODIPINE BESYLATE 10 MG: 10 TABLET ORAL at 16:37

## 2023-08-15 RX ADMIN — POTASSIUM CHLORIDE 40 MEQ: 1500 TABLET, EXTENDED RELEASE ORAL at 14:03

## 2023-08-15 RX ADMIN — SODIUM CHLORIDE, PRESERVATIVE FREE 10 ML: 5 INJECTION INTRAVENOUS at 20:29

## 2023-08-15 RX ADMIN — ACETAMINOPHEN 650 MG: 325 TABLET ORAL at 22:52

## 2023-08-15 RX ADMIN — MONTELUKAST 10 MG: 10 TABLET, FILM COATED ORAL at 20:48

## 2023-08-15 RX ADMIN — METOPROLOL TARTRATE 25 MG: 25 TABLET, FILM COATED ORAL at 20:48

## 2023-08-15 RX ADMIN — LEVOTHYROXINE SODIUM 137 MCG: 0.03 TABLET ORAL at 16:37

## 2023-08-15 RX ADMIN — ATORVASTATIN CALCIUM 40 MG: 40 TABLET, FILM COATED ORAL at 20:48

## 2023-08-15 RX ADMIN — BUDESONIDE AND FORMOTEROL FUMARATE DIHYDRATE 2 PUFF: 160; 4.5 AEROSOL RESPIRATORY (INHALATION) at 19:11

## 2023-08-15 ASSESSMENT — ENCOUNTER SYMPTOMS
BACK PAIN: 0
EYE PAIN: 0
SHORTNESS OF BREATH: 1
ABDOMINAL PAIN: 0
COLOR CHANGE: 0

## 2023-08-15 ASSESSMENT — PAIN - FUNCTIONAL ASSESSMENT
PAIN_FUNCTIONAL_ASSESSMENT: 0-10
PAIN_FUNCTIONAL_ASSESSMENT: PREVENTS OR INTERFERES SOME ACTIVE ACTIVITIES AND ADLS

## 2023-08-15 ASSESSMENT — PAIN SCALES - GENERAL
PAINLEVEL_OUTOF10: 2
PAINLEVEL_OUTOF10: 6
PAINLEVEL_OUTOF10: 9

## 2023-08-15 ASSESSMENT — LIFESTYLE VARIABLES
HOW MANY STANDARD DRINKS CONTAINING ALCOHOL DO YOU HAVE ON A TYPICAL DAY: PATIENT DOES NOT DRINK
HOW OFTEN DO YOU HAVE A DRINK CONTAINING ALCOHOL: NEVER
HOW OFTEN DO YOU HAVE A DRINK CONTAINING ALCOHOL: NEVER
HOW MANY STANDARD DRINKS CONTAINING ALCOHOL DO YOU HAVE ON A TYPICAL DAY: PATIENT DOES NOT DRINK

## 2023-08-15 ASSESSMENT — PAIN DESCRIPTION - ORIENTATION: ORIENTATION: RIGHT

## 2023-08-15 ASSESSMENT — PAIN DESCRIPTION - DESCRIPTORS: DESCRIPTORS: HEAVINESS;PENETRATING

## 2023-08-15 ASSESSMENT — HEART SCORE: ECG: 0

## 2023-08-15 ASSESSMENT — PAIN DESCRIPTION - LOCATION: LOCATION: SHOULDER

## 2023-08-15 NOTE — ED TRIAGE NOTES
Mode of arrival (squad #, walk in, police, etc) : walk-in         Chief complaint(s): SOB and heartburn        Arrival Note (brief scenario, treatment PTA, etc).: Dr. Yris Oilvares sent patient over for SOB with activity. Patient denies chest pain, says its heart burn mid chest, left under breast.        C= \"Have you ever felt that you should Cut down on your drinking? \"  No  A= \"Have people Annoyed you by criticizing your drinking? \"  No  G= \"Have you ever felt bad or Guilty about your drinking? \"  No  E= \"Have you ever had a drink as an Eye-opener first thing in the morning to steady your nerves or to help a hangover? \"  No      Deferred []      Reason for deferring: N/A    *If yes to two or more: probable alcohol abuse. *

## 2023-08-15 NOTE — ED PROVIDER NOTES
EMERGENCY DEPARTMENT ENCOUNTER    Pt Name: Bere Calderon  MRN: [id number]  [address] [place] 1950  Date of evaluation: 8/15/23  CHIEF COMPLAINT       Chief Complaint   Patient presents with    Shortness of Breath    Heartburn     HISTORY OF PRESENT ILLNESS   68-year-old female presents for complaints of shortness of breath and chest discomfort. Patient reports she been having the symptoms for the last few weeks. She states that she has noted increased shortness of breath when she tries to do any sort of activity. She also reports that she has been having worsening what she is describing as chest discomfort also worse with activities. She states that she is also noted some increased swelling in in her lower extremities over the last few months. She reports a history of hypertension and hyperlipidemia. She denies any known cardiac history denies any smoking history she does report a family history of cardiac disease. The history is provided by the patient. REVIEW OF SYSTEMS     Review of Systems   Constitutional:  Negative for fever. HENT:  Negative for congestion and ear pain. Eyes:  Negative for pain. Respiratory:  Positive for shortness of breath. Cardiovascular:  Positive for chest pain and leg swelling. Negative for palpitations. Gastrointestinal:  Negative for abdominal pain. Genitourinary:  Negative for dysuria and flank pain. Musculoskeletal:  Negative for back pain. Skin:  Negative for color change. Neurological:  Negative for numbness and headaches. Psychiatric/Behavioral:  Negative for confusion. All other systems reviewed and are negative.   PASTMEDICAL HISTORY     Past Medical History:   Diagnosis Date    Affective disorder ([address])     seasonal    Arthritis     shoulders and lower back    DVT (deep venous thrombosis) (HCC)     has history in R leg X 4    Fibromyalgia     Hyperlipidemia     Hypertension     Pneumonia     Sinusitis     Thyroid disease     hypothyroidsm DVT/PE (indefinite)     Order Specific Question:   What is the patient's goal INR? Answer:   2.0 - 3.0     DISCHARGE PRESCRIPTIONS:  Current Discharge Medication List        PHYSICIAN CONSULTS ORDERED THIS ENCOUNTER:  IP CONSULT TO FAMILY MEDICINE  IP CONSULT TO CARDIOLOGY  PHARMACY TO DOSE WARFARIN  FINAL IMPRESSION      1. Chest pain, unspecified type    2. Dyspnea on exertion          DISPOSITION/PLAN   DISPOSITION Admitted 08/15/2023 02:28:00 PM      OUTPATIENT FOLLOW UP THE PATIENT:  No follow-up provider specified.     [address], DO       [address],   08/15/23 1702

## 2023-08-15 NOTE — CONSULTS
Date:   8/15/2023  Patient name: Robert Carroll  Date of admission:  8/15/2023 12:00 PM  MRN:   [id number]  YOB: 1950  PCP: Troy Rowland MD    Reason for Admission: Chest pain [R07.9]    Cardiology consult       Referring physician:Dr Troy Rowland    Impression    Hypertension  Hypothyroidism  Morbid obesity BMI 40  Obstructive sleep apnea  History of recurrent DVT both lower extremities  History of chronic pulmonary embolism      ECG 8/15/2023 at 1305  Sinus tachycardia, heart rate 116, left axis, right bundle branch block, poor R wave progression    ECG 8/15/2023 at 1608  Sinus rhythm heart rate 71, right bundle branch block    Chest x-ray 8/15/2023  No acute airspace disease identified    2D echo 4/19/2021  Normal LV size, wall motion, mild LVH, ejection fraction 55%  Normal RV size and function  Mild mitral and tricuspid regurgitation  IVC increased diameter, normal respiratory variation  No significant pericardial effusion seen    Lexiscan Myoview stress test 1/27/2020  Normal study ejection fraction 62%    CTA chest 3/30/2021  Unchanged nonocclusive chronic embolism in the right lower lobe no acute pulmonary embolism      History of present illness  21-year-old female who lives alone with past medical history of morbid obesity, bilateral lower extremity DVT hypothyroidism, obstructive sleep apnea got admitted on 8/15/2023 with increasing shortness of breath and tiredness and almost constant chest discomfort like an ache. She has been increasingly getting more short of breath over the last few weeks which has gotten worse over the last couple of days. She says she can hardly perform her activities of daily living she gets exhausted and she has to sit down. She has had almost constant ache in the chest it does not aggravate with activities. No palpitation no syncope. She has chronic mild edema over lower extremities it has not increased.   ECG on admission showed sinus tachycardia with

## 2023-08-15 NOTE — PROGRESS NOTES
Pharmacy Note  Warfarin Consult    Saul Berry is a 72 y.o. female for whom pharmacy has been consulted to manage warfarin therapy. Consulting Physician: Maxx Krishnamurthy  Reason for Admission: Chest pain    Warfarin dose prior to admission: 5 mg Mon/Thurs and 2.5 mg all other days. Warfarin indication: Recurrent DVT  Target INR range: 2-3     Past Medical History:   Diagnosis Date    Affective disorder ([address])     seasonal    Arthritis     shoulders and lower back    DVT (deep venous thrombosis) (HCC)     has history in R leg X 4    Fibromyalgia     Hyperlipidemia     Hypertension     Pneumonia     Sinusitis     Thyroid disease     hypothyroidsm                Recent Labs     08/15/23  1248   INR 2.0     Recent Labs     08/15/23  1248   HGB 11.7*   HCT 37.5          Current warfarin drug-drug interactions: Tramadol, levothyroxine      Date             INR        Dose   8/15/2023            2       2.5 mg    Daily PT/INR while inpatient. Patient didn't receive dose at home today. Thank you for the consult. Will continue to follow.   [facility]    8/15/2023   4:04 PM

## 2023-08-15 NOTE — PROGRESS NOTES
Pharmacy Medication History Note      List of current medications patient is taking is complete. Source of information: patient, [facility] pharmacy dispense report,[facility]    Changes made to medication list:  Medications flagged for removal (include reason, ex. noncompliance):  Abulterol 0.083% nebulizer solution last dispense 12/10/2022 for a 30 days supply  Aspirin low dose 81 mg: last filled 02/08/2023 for 30 days supply  Benzonatate 100 mg; last filled 04/11/2023 for 30 days supply  Ibuprofen 200 mg last filled 11/07/2022 for 30 days   Loratadine 10 mg last filled 11/07/2022 for 30 days  Bupivacaine 0.25 %injection 5 mg single dose given on 05/09/2023  Metoprolol succinate 25 mg ; last filled on 12/01/2022f or 3 days      Medications removed (include reason, ex. therapy complete or physician discontinued):      Medications added/doses adjusted:  Fluticasone propionate 50 mcg/actuation nasal spray, suspension. Other notes (ex. Recent course of antibiotics, Coumadin dosing):  Patient reports applying  ammonium lactate the face, leg and face for dryness  Patient reports alternating use of hydrocortisone 1 % cream with Voltaren 1% for itching and pain respectively  Patient warfarin is been managed in [facility]  Patient INR  was last measured on 08/15/2023 with an INR value of 2.0  OARRS report indicate tylenol #3 last filled on 08/09/2023 for a quantity of 120 for 30 days  Gabapentin 800 mg last filled on 08/08/2023 for a quantity of 60 for 30  Tramadol 50 mg last filled on 07/28/2023 for a quantity of 60 for 30 days supply    Denies use of other OTC or herbal medications.       Allergies clarified    Medication list provided to the patient:No  Medication education provided to the patient:No      Electronically signed by Christopher Eagle on 8/15/2023 at 1:35 PM

## 2023-08-16 ENCOUNTER — APPOINTMENT (OUTPATIENT)
Dept: NUCLEAR MEDICINE | Age: 73
End: 2023-08-16
Payer: COMMERCIAL

## 2023-08-16 ENCOUNTER — APPOINTMENT (OUTPATIENT)
Age: 73
End: 2023-08-16
Attending: INTERNAL MEDICINE
Payer: COMMERCIAL

## 2023-08-16 ENCOUNTER — HOSPITAL ENCOUNTER (INPATIENT)
Age: 73
Discharge: HOME | End: 2023-08-18
Payer: COMMERCIAL

## 2023-08-16 LAB
ANION GAP SERPL CALCULATED.3IONS-SCNC: 11 MMOL/L (ref 9–17)
BACTERIA URNS QL MICRO: NORMAL
BILIRUB UR QL STRIP: NEGATIVE
BUN SERPL-MCNC: 8 MG/DL (ref 8–23)
CALCIUM SERPL-MCNC: 8.7 MG/DL (ref 8.6–10.4)
CASTS #/AREA URNS LPF: NORMAL /LPF
CHLORIDE SERPL-SCNC: 104 MMOL/L (ref 98–107)
CHOLEST SERPL-MCNC: 139 MG/DL
CHOLESTEROL/HDL RATIO: 2.9
CLARITY UR: ABNORMAL
CO2 SERPL-SCNC: 24 MMOL/L (ref 20–31)
COLOR UR: YELLOW
CREAT SERPL-MCNC: 0.6 MG/DL (ref 0.5–0.9)
ECHO AO ROOT DIAM: 2.9 CM
ECHO AO ROOT INDEX: 1.49 CM/M2
ECHO AV AREA PEAK VELOCITY: 3.3 CM2
ECHO AV AREA VTI: 3.4 CM2
ECHO AV AREA/BSA PEAK VELOCITY: 1.7 CM2/M2
ECHO AV AREA/BSA VTI: 1.7 CM2/M2
ECHO AV MEAN GRADIENT: 4 MMHG
ECHO AV MEAN VELOCITY: 0.9 M/S
ECHO AV PEAK GRADIENT: 7 MMHG
ECHO AV PEAK VELOCITY: 1.4 M/S
ECHO AV VELOCITY RATIO: 0.86
ECHO AV VTI: 32.7 CM
ECHO BSA: 2.05 M2
ECHO BSA: 2.05 M2
ECHO EST RA PRESSURE: 3 MMHG
ECHO LA AREA 2C: 21.8 CM2
ECHO LA AREA 4C: 21.5 CM2
ECHO LA DIAMETER INDEX: 1.69 CM/M2
ECHO LA DIAMETER: 3.3 CM
ECHO LA MAJOR AXIS: 5.7 CM
ECHO LA MINOR AXIS: 5.6 CM
ECHO LA TO AORTIC ROOT RATIO: 1.14
ECHO LA VOL 2C: 69 ML (ref 22–52)
ECHO LA VOL 4C: 66 ML (ref 22–52)
ECHO LA VOL BP: 68 ML (ref 22–52)
ECHO LA VOL/BSA BIPLANE: 35 ML/M2 (ref 16–34)
ECHO LA VOLUME INDEX A2C: 35 ML/M2 (ref 16–34)
ECHO LA VOLUME INDEX A4C: 34 ML/M2 (ref 16–34)
ECHO LV E' LATERAL VELOCITY: 10 CM/S
ECHO LV E' SEPTAL VELOCITY: 5 CM/S
ECHO LV FRACTIONAL SHORTENING: 32 % (ref 28–44)
ECHO LV INTERNAL DIMENSION DIASTOLE INDEX: 2.1 CM/M2
ECHO LV INTERNAL DIMENSION DIASTOLIC: 4.1 CM (ref 3.9–5.3)
ECHO LV INTERNAL DIMENSION SYSTOLIC INDEX: 1.44 CM/M2
ECHO LV INTERNAL DIMENSION SYSTOLIC: 2.8 CM
ECHO LV IVSD: 1.2 CM (ref 0.6–0.9)
ECHO LV MASS 2D: 171.7 G (ref 67–162)
ECHO LV MASS INDEX 2D: 88.1 G/M2 (ref 43–95)
ECHO LV POSTERIOR WALL DIASTOLIC: 1.2 CM (ref 0.6–0.9)
ECHO LV RELATIVE WALL THICKNESS RATIO: 0.59
ECHO LVOT AREA: 3.8 CM2
ECHO LVOT AV VTI INDEX: 0.88
ECHO LVOT DIAM: 2.2 CM
ECHO LVOT MEAN GRADIENT: 3 MMHG
ECHO LVOT PEAK GRADIENT: 6 MMHG
ECHO LVOT PEAK VELOCITY: 1.2 M/S
ECHO LVOT STROKE VOLUME INDEX: 56.3 ML/M2
ECHO LVOT SV: 109.8 ML
ECHO LVOT VTI: 28.9 CM
ECHO MV A VELOCITY: 0.94 M/S
ECHO MV AREA VTI: 2.6 CM2
ECHO MV E DECELERATION TIME (DT): 232 MS
ECHO MV E VELOCITY: 0.76 M/S
ECHO MV E/A RATIO: 0.81
ECHO MV E/E' LATERAL: 7.6
ECHO MV E/E' RATIO (AVERAGED): 11.4
ECHO MV E/E' SEPTAL: 15.2
ECHO MV LVOT VTI INDEX: 1.48
ECHO MV MAX VELOCITY: 1.1 M/S
ECHO MV MEAN GRADIENT: 2 MMHG
ECHO MV MEAN VELOCITY: 0.6 M/S
ECHO MV PEAK GRADIENT: 4 MMHG
ECHO MV VTI: 42.9 CM
ECHO RA AREA 4C: 15.5 CM2
ECHO RA END SYSTOLIC VOLUME APICAL 4 CHAMBER INDEX BSA: 22 ML/M2
ECHO RA VOLUME: 43 ML
ECHO RIGHT VENTRICULAR SYSTOLIC PRESSURE (RVSP): 16 MMHG
ECHO RV TAPSE: 1.6 CM (ref 1.7–?)
ECHO TV REGURGITANT MAX VELOCITY: 1.78 M/S
ECHO TV REGURGITANT PEAK GRADIENT: 13 MMHG
EKG ATRIAL RATE: 116 BPM
EKG P AXIS: 77 DEGREES
EKG P-R INTERVAL: 182 MS
EKG Q-T INTERVAL: 344 MS
EKG QRS DURATION: 114 MS
EKG QTC CALCULATION (BAZETT): 478 MS
EKG R AXIS: -84 DEGREES
EKG T AXIS: 72 DEGREES
EKG VENTRICULAR RATE: 116 BPM
EPI CELLS #/AREA URNS HPF: NORMAL /HPF
ERYTHROCYTE [DISTWIDTH] IN BLOOD BY AUTOMATED COUNT: 17.6 % (ref 11.5–14.9)
GFR SERPL CREATININE-BSD FRML MDRD: >60 ML/MIN/1.73M2
GLUCOSE SERPL-MCNC: 89 MG/DL (ref 70–99)
GLUCOSE UR STRIP-MCNC: NEGATIVE MG/DL
HCT VFR BLD AUTO: 31.6 % (ref 36–46)
HDLC SERPL-MCNC: 48 MG/DL
HGB BLD-MCNC: 10.2 G/DL (ref 12–16)
HGB UR QL STRIP.AUTO: NEGATIVE
KETONES UR STRIP-MCNC: NEGATIVE MG/DL
LDLC SERPL CALC-MCNC: 56 MG/DL (ref 0–130)
LEUKOCYTE ESTERASE UR QL STRIP: ABNORMAL
MAGNESIUM SERPL-MCNC: 1.8 MG/DL (ref 1.6–2.6)
MCH RBC QN AUTO: 26.3 PG (ref 26–34)
MCHC RBC AUTO-ENTMCNC: 32.3 G/DL (ref 31–37)
MCV RBC AUTO: 81.5 FL (ref 80–100)
NITRITE UR QL STRIP: NEGATIVE
PH UR STRIP: 7 [PH] (ref 5–8)
PLATELET # BLD AUTO: 191 K/UL (ref 150–450)
PMV BLD AUTO: 7 FL (ref 6–12)
POTASSIUM SERPL-SCNC: 3.5 MMOL/L (ref 3.7–5.3)
PROT UR STRIP-MCNC: NEGATIVE MG/DL
RBC # BLD AUTO: 3.88 M/UL (ref 4–5.2)
RBC #/AREA URNS HPF: NORMAL /HPF
SODIUM SERPL-SCNC: 139 MMOL/L (ref 135–144)
SP GR UR STRIP: 1.01 (ref 1–1.03)
STRESS BASELINE DIAS BP: 50 MMHG
STRESS BASELINE HR: 61 BPM
STRESS BASELINE SYS BP: 144 MMHG
STRESS ST DEPRESSION: 0 MM
STRESS STAGE RECOVERY 1 BP: NORMAL MMHG
STRESS STAGE RECOVERY 1 DURATION: 1 MIN:SEC
STRESS STAGE RECOVERY 1 HR: 82 BPM
STRESS STAGE RECOVERY 2 BP: NORMAL MMHG
STRESS STAGE RECOVERY 2 DURATION: 6 MIN:SEC
STRESS STAGE RECOVERY 2 HR: 78 BPM
STRESS TARGET HR: 149 BPM
TRIGL SERPL-MCNC: 174 MG/DL
UROBILINOGEN UR STRIP-ACNC: NORMAL EU/DL (ref 0–1)
WBC #/AREA URNS HPF: NORMAL /HPF
WBC OTHER # BLD: 5.1 K/UL (ref 3.5–11)

## 2023-08-16 PROCEDURE — 2580000003 HC RX 258: Performed by: FAMILY MEDICINE

## 2023-08-16 PROCEDURE — 80061 LIPID PANEL: CPT

## 2023-08-16 PROCEDURE — 1200000000 HC SEMI PRIVATE

## 2023-08-16 PROCEDURE — 83735 ASSAY OF MAGNESIUM: CPT

## 2023-08-16 PROCEDURE — 2700000000 HC OXYGEN THERAPY PER DAY

## 2023-08-16 PROCEDURE — 78452 HT MUSCLE IMAGE SPECT MULT: CPT

## 2023-08-16 PROCEDURE — 3430000000 HC RX DIAGNOSTIC RADIOPHARMACEUTICAL: Performed by: INTERNAL MEDICINE

## 2023-08-16 PROCEDURE — 2580000003 HC RX 258: Performed by: INTERNAL MEDICINE

## 2023-08-16 PROCEDURE — 93010 ELECTROCARDIOGRAM REPORT: CPT | Performed by: INTERNAL MEDICINE

## 2023-08-16 PROCEDURE — 93017 CV STRESS TEST TRACING ONLY: CPT

## 2023-08-16 PROCEDURE — 6370000000 HC RX 637 (ALT 250 FOR IP): Performed by: FAMILY MEDICINE

## 2023-08-16 PROCEDURE — 94640 AIRWAY INHALATION TREATMENT: CPT

## 2023-08-16 PROCEDURE — 94761 N-INVAS EAR/PLS OXIMETRY MLT: CPT

## 2023-08-16 PROCEDURE — 81001 URINALYSIS AUTO W/SCOPE: CPT

## 2023-08-16 PROCEDURE — A9500 TC99M SESTAMIBI: HCPCS | Performed by: INTERNAL MEDICINE

## 2023-08-16 PROCEDURE — 93306 TTE W/DOPPLER COMPLETE: CPT

## 2023-08-16 PROCEDURE — 6360000002 HC RX W HCPCS: Performed by: INTERNAL MEDICINE

## 2023-08-16 PROCEDURE — 87086 URINE CULTURE/COLONY COUNT: CPT

## 2023-08-16 PROCEDURE — 85027 COMPLETE CBC AUTOMATED: CPT

## 2023-08-16 PROCEDURE — 80048 BASIC METABOLIC PNL TOTAL CA: CPT

## 2023-08-16 PROCEDURE — 36415 COLL VENOUS BLD VENIPUNCTURE: CPT

## 2023-08-16 PROCEDURE — 99223 1ST HOSP IP/OBS HIGH 75: CPT | Performed by: FAMILY MEDICINE

## 2023-08-16 RX ORDER — METOPROLOL TARTRATE 5 MG/5ML
5 INJECTION INTRAVENOUS EVERY 5 MIN PRN
Status: ACTIVE | OUTPATIENT
Start: 2023-08-16 | End: 2023-08-16

## 2023-08-16 RX ORDER — SODIUM CHLORIDE 9 MG/ML
500 INJECTION, SOLUTION INTRAVENOUS CONTINUOUS PRN
Status: ACTIVE | OUTPATIENT
Start: 2023-08-16 | End: 2023-08-16

## 2023-08-16 RX ORDER — NITROGLYCERIN 0.4 MG/1
0.4 TABLET SUBLINGUAL EVERY 5 MIN PRN
Status: ACTIVE | OUTPATIENT
Start: 2023-08-16 | End: 2023-08-16

## 2023-08-16 RX ORDER — ATROPINE SULFATE 0.1 MG/ML
0.5 INJECTION INTRAVENOUS EVERY 5 MIN PRN
Status: ACTIVE | OUTPATIENT
Start: 2023-08-16 | End: 2023-08-16

## 2023-08-16 RX ORDER — WARFARIN SODIUM 2.5 MG/1
2.5 TABLET ORAL
Status: COMPLETED | OUTPATIENT
Start: 2023-08-16 | End: 2023-08-16

## 2023-08-16 RX ORDER — TETRAKIS(2-METHOXYISOBUTYLISOCYANIDE)COPPER(I) TETRAFLUOROBORATE 1 MG/ML
35 INJECTION, POWDER, LYOPHILIZED, FOR SOLUTION INTRAVENOUS
Status: COMPLETED | OUTPATIENT
Start: 2023-08-16 | End: 2023-08-16

## 2023-08-16 RX ORDER — AMINOPHYLLINE DIHYDRATE 25 MG/ML
50 INJECTION, SOLUTION INTRAVENOUS PRN
Status: ACTIVE | OUTPATIENT
Start: 2023-08-16 | End: 2023-08-16

## 2023-08-16 RX ORDER — SODIUM CHLORIDE 0.9 % (FLUSH) 0.9 %
5-40 SYRINGE (ML) INJECTION PRN
Status: ACTIVE | OUTPATIENT
Start: 2023-08-16 | End: 2023-08-16

## 2023-08-16 RX ORDER — SODIUM CHLORIDE 0.9 % (FLUSH) 0.9 %
10 SYRINGE (ML) INJECTION PRN
Status: DISCONTINUED | OUTPATIENT
Start: 2023-08-16 | End: 2023-08-16

## 2023-08-16 RX ORDER — ALBUTEROL SULFATE 90 UG/1
2 AEROSOL, METERED RESPIRATORY (INHALATION) PRN
Status: ACTIVE | OUTPATIENT
Start: 2023-08-16 | End: 2023-08-16

## 2023-08-16 RX ORDER — TETRAKIS(2-METHOXYISOBUTYLISOCYANIDE)COPPER(I) TETRAFLUOROBORATE 1 MG/ML
15 INJECTION, POWDER, LYOPHILIZED, FOR SOLUTION INTRAVENOUS
Status: COMPLETED | OUTPATIENT
Start: 2023-08-16 | End: 2023-08-16

## 2023-08-16 RX ORDER — REGADENOSON 0.08 MG/ML
0.4 INJECTION, SOLUTION INTRAVENOUS
Status: COMPLETED | OUTPATIENT
Start: 2023-08-16 | End: 2023-08-16

## 2023-08-16 RX ADMIN — Medication 16 MILLICURIE: at 09:34

## 2023-08-16 RX ADMIN — GABAPENTIN 800 MG: 400 CAPSULE ORAL at 21:39

## 2023-08-16 RX ADMIN — POTASSIUM CHLORIDE 40 MEQ: 1500 TABLET, EXTENDED RELEASE ORAL at 17:22

## 2023-08-16 RX ADMIN — DICLOFENAC SODIUM 4 G: 10 GEL TOPICAL at 21:42

## 2023-08-16 RX ADMIN — SODIUM CHLORIDE, PRESERVATIVE FREE 10 ML: 5 INJECTION INTRAVENOUS at 12:30

## 2023-08-16 RX ADMIN — BUDESONIDE AND FORMOTEROL FUMARATE DIHYDRATE 2 PUFF: 160; 4.5 AEROSOL RESPIRATORY (INHALATION) at 07:26

## 2023-08-16 RX ADMIN — SODIUM CHLORIDE, PRESERVATIVE FREE 10 ML: 5 INJECTION INTRAVENOUS at 21:39

## 2023-08-16 RX ADMIN — Medication 33.2 MILLICURIE: at 12:30

## 2023-08-16 RX ADMIN — METOPROLOL TARTRATE 25 MG: 25 TABLET, FILM COATED ORAL at 21:38

## 2023-08-16 RX ADMIN — SODIUM CHLORIDE, PRESERVATIVE FREE 10 ML: 5 INJECTION INTRAVENOUS at 11:04

## 2023-08-16 RX ADMIN — ACETAMINOPHEN 650 MG: 325 TABLET ORAL at 12:36

## 2023-08-16 RX ADMIN — MONTELUKAST 10 MG: 10 TABLET, FILM COATED ORAL at 21:39

## 2023-08-16 RX ADMIN — BUPROPION HYDROCHLORIDE 150 MG: 150 TABLET, EXTENDED RELEASE ORAL at 12:36

## 2023-08-16 RX ADMIN — LEVOTHYROXINE SODIUM 137 MCG: 0.03 TABLET ORAL at 12:36

## 2023-08-16 RX ADMIN — ATORVASTATIN CALCIUM 40 MG: 40 TABLET, FILM COATED ORAL at 21:39

## 2023-08-16 RX ADMIN — WARFARIN SODIUM 2.5 MG: 2.5 TABLET ORAL at 17:05

## 2023-08-16 RX ADMIN — BUDESONIDE AND FORMOTEROL FUMARATE DIHYDRATE 2 PUFF: 160; 4.5 AEROSOL RESPIRATORY (INHALATION) at 20:18

## 2023-08-16 RX ADMIN — GABAPENTIN 800 MG: 400 CAPSULE ORAL at 05:10

## 2023-08-16 RX ADMIN — REGADENOSON 0.4 MG: 0.08 INJECTION, SOLUTION INTRAVENOUS at 09:31

## 2023-08-16 ASSESSMENT — PAIN SCALES - GENERAL
PAINLEVEL_OUTOF10: 10
PAINLEVEL_OUTOF10: 2
PAINLEVEL_OUTOF10: 2

## 2023-08-16 ASSESSMENT — PAIN DESCRIPTION - LOCATION
LOCATION: SHOULDER
LOCATION: SHOULDER

## 2023-08-16 ASSESSMENT — PAIN DESCRIPTION - DESCRIPTORS
DESCRIPTORS: DISCOMFORT;BURNING
DESCRIPTORS: ACHING

## 2023-08-16 ASSESSMENT — PAIN DESCRIPTION - ORIENTATION
ORIENTATION: RIGHT
ORIENTATION: RIGHT

## 2023-08-16 ASSESSMENT — PAIN - FUNCTIONAL ASSESSMENT: PAIN_FUNCTIONAL_ASSESSMENT: PREVENTS OR INTERFERES SOME ACTIVE ACTIVITIES AND ADLS

## 2023-08-16 ASSESSMENT — PAIN SCALES - WONG BAKER: WONGBAKER_NUMERICALRESPONSE: 0

## 2023-08-16 NOTE — PROGRESS NOTES
Date:   8/16/2023  Patient name: Rebecca Gonzalez  Date of admission:  8/15/2023 12:00 PM  MRN:   [id number]  YOB: 1950  PCP: Gerardo Langston MD    Reason for Admission: Chest pain [R07.9]    Cardiology follow-up: Chest pain, exertional shortness of breath and fatigue       Referring physician:Dr Gerardo Langston     Impression  Chest pain, negative cardiac markers no obvious ischemic ECG changes  Conduction disorder, right bundle branch block  Hypertension  Hypothyroidism  Morbid obesity BMI 40  Obstructive sleep apnea  History of recurrent DVT both lower extremities  History of chronic pulmonary embolism        ECG 8/15/2023 at 1305  Sinus tachycardia, heart rate 116, left axis, right bundle branch block, poor R wave progression     ECG 8/15/2023 at 1608  Sinus rhythm heart rate 71, right bundle branch block     Chest x-ray 8/15/2023  No acute airspace disease identified     2D echo 4/19/2021  Normal LV size, wall motion, mild LVH, ejection fraction 55%  Normal RV size and function  Mild mitral and tricuspid regurgitation  IVC increased diameter, normal respiratory variation  No significant pericardial effusion seen     Lexiscan Myoview stress test 1/27/2020  Normal study ejection fraction 62%    CTA chest 3/30/2021  Unchanged nonocclusive chronic embolism in the right lower lobe no acute pulmonary embolism        History of present illness  28-year-old female who lives alone with past medical history of morbid obesity, bilateral lower extremity DVT hypothyroidism, obstructive sleep apnea got admitted on 8/15/2023 with increasing shortness of breath and tiredness and almost constant chest discomfort like an ache. She has been increasingly getting more short of breath over the last few weeks which has gotten worse over the last couple of days. She says she can hardly perform her activities of daily living she gets exhausted and she has to sit down.   She has had almost constant ache in the chest it

## 2023-08-16 NOTE — PLAN OF CARE
Problem: Discharge Planning  Goal: Discharge to home or other facility with appropriate resources  Outcome: Progressing  Flowsheets (Taken 8/16/2023 0712)  Discharge to home or other facility with appropriate resources:   Identify barriers to discharge with patient and caregiver   Refer to discharge planning if patient needs post-hospital services based on physician order or complex needs related to functional status, cognitive ability or social support system     Problem: Pain  Goal: Verbalizes/displays adequate comfort level or baseline comfort level  Outcome: Progressing     Problem: Safety - Adult  Goal: Free from fall injury  Outcome: Progressing     Problem: ABCDS Injury Assessment  Goal: Absence of physical injury  Outcome: Progressing

## 2023-08-16 NOTE — ACP (ADVANCE CARE PLANNING)
Advance Care Planning     Advance Care Planning Activator (Inpatient)  Conversation Note      Date of ACP Conversation: 8/16/2023     Conversation Conducted with: Patient with Decision Making Capacity    ACP Activator: Concepcion De La Rosa RN      Health Care Decision Maker:     Current Designated Health Care Decision Maker:     Primary Decision Maker: Hanh Valente Child - [phone]    Primary Decision Maker: Rigoberto Caldwell - Child - [phone]    Supplemental (Other) Decision Maker: Governor Sharon - Brother/Sister - [phone]  Click here to complete Healthcare Decision Makers including section of the Healthcare Decision Maker Relationship (ie \"Primary\")  Today we documented Decision Maker(s) consistent with Legal Next of Kin hierarchy. Care Preferences    Ventilation: \"If you were in your present state of health and suddenly became very ill and were unable to breathe on your own, what would your preference be about the use of a ventilator (breathing machine) if it were available to you? \"      Would the patient desire the use of ventilator (breathing machine)?: yes    \"If your health worsens and it becomes clear that your chance of recovery is unlikely, what would your preference be about the use of a ventilator (breathing machine) if it were available to you? \"     Would the patient desire the use of ventilator (breathing machine)?: No      Resuscitation  \"CPR works best to restart the heart when there is a sudden event, like a heart attack, in someone who is otherwise healthy. Unfortunately, CPR does not typically restart the heart for people who have serious health conditions or who are very sick. \"    \"In the event your heart stopped as a result of an underlying serious health condition, would you want attempts to be made to restart your heart (answer \"yes\" for attempt to resuscitate) or would you prefer a natural death (answer \"no\" for do not attempt to resuscitate)? \" yes       [] Yes   [] No

## 2023-08-16 NOTE — H&P
Family Medicine Admit Note    PCP: Joi Collins MD    Date of Admission: 8/15/2023    Date of Service: Pt seen/examined on 8/16/23 and Admitted to Inpatient. Chief Complaint:  Chest Pain                                 Shortness of Breath      History Of Present Illness: The patient is a 72 y.o. female who presents to [facility] with complaints of chest discomfort and shortness of breath intermittently for the past few weeks. She reports that she gets short of breath with any activity or exertion. She also states that the chest discomfort is worse with activity. She also has noticed bilateral leg swelling for the past several weeks. She denies any fevers, chills, cough, congestion, abdominal pain, N/V/D, dysuria, hematuria, headaches, numbness or confusion. Past Medical History:        Diagnosis Date    Affective disorder ([address])     seasonal    Arthritis     shoulders and lower back    DVT (deep venous thrombosis) (HCC)     has history in R leg X 4    Fibromyalgia     Hyperlipidemia     Hypertension     Pneumonia     Sinusitis     Thyroid disease     hypothyroidsm       Past Surgical History:        Procedure Laterality Date    COLONOSCOPY  8/31/2011    internal hemorrhoides    DILATION AND CURETTAGE OF UTERUS      ECTOPIC PREGNANCY SURGERY Left     HYSTERECTOMY (CERVIX STATUS UNKNOWN)         Medications Prior to Admission:    Prior to Admission medications    Medication Sig Start Date End Date Taking?  Authorizing Provider   acetaminophen-codeine (TYLENOL #3) 300-30 MG per tablet TAKE 1 TABLET BY MOUTH EVERY 6 HOURS AS NEEDED FOR PAIN **MAX 4 PER DAY** 8/10/23 9/9/23  Joi Collins MD   buPROPion (WELLBUTRIN XL) 150 MG extended release tablet Take 1 tablet by mouth every morning 8/1/23   Joi Collins MD   traMADol (ULTRAM) 50 MG tablet TAKE 1 TABLET BY MOUTH TWICE DAILY AS NEEDED FOR PAIN ** REDUCE DOSES AS PAIN BECOMES MANAGEABLE ** 7/28/23 8/27/23  VIKA Kramer - ROBERTO   fluticasone reviewed and reconciled.  -Complete orders per chart.     DVT Prophylaxis:   Diet: Diet NPO  Code Status: Full Code      Dispo - admitted to Med/Surg       @[facility]@

## 2023-08-16 NOTE — PROGRESS NOTES
Pharmacy Note  Warfarin Consult follow-up      Recent Labs     08/15/23  1248   INR 2.0     Recent Labs     08/15/23  1248 08/16/23  0706   HGB 11.7* 10.2*   HCT 37.5 31.6*    191       Significant Drug-Drug Interactions:    Current warfarin drug-drug interactions: tramadol, levothyroxine      Date             INR        Dose given previous day  Dose scheduled for today  8/16/2023            2       2.5 mg           2.5 mg        Notes:                   INR therapeutic today at 2.0. Will continue home regimen with 2.5 mg dose due today. Daily PT/INR while inpatient.    Sanchez KincaidD, [facility] 8/16/2023 12:03 PM

## 2023-08-16 NOTE — CARE COORDINATION
Case Management Assessment  Initial Evaluation    Date/Time of Evaluation: 8/16/2023 2:16 PM  Assessment Completed by: Danay Post RN    If patient is discharged prior to next notation, then this note serves as note for discharge by case management. Patient Name: Antonino Matthews                   YOB: 1950  Diagnosis: Chest pain [R07.9]                   Date / Time: 8/15/2023 12:00 PM    Patient Admission Status: Inpatient   Readmission Risk (Low < 19, Mod (19-27), High > 27): Readmission Risk Score: 10    Current PCP: Carlos Larson MD  PCP verified by CM? Yes    Chart Reviewed: Yes      History Provided by: Patient  Patient Orientation: Alert and Oriented    Patient Cognition: Alert    Hospitalization in the last 30 days (Readmission):  No    If yes, Readmission Assessment in CM Navigator will be completed. Advance Directives:      Code Status: Full Code   Patient's Primary Decision Maker is: Legal Next of Kin    Primary Decision MakerMarychaim Elliott Child - [phone]    Primary Decision Maker: Maryann Mendozaelena - Child - [phone]    Supplemental (Other) Decision Maker: Tin Bradley - Brother/Sister - [phone]    Discharge Planning:    Patient lives with: Alone Type of Home: House  Primary Care Giver: Self  Patient Support Systems include: Friends/Neighbors, Children   Current Financial resources: Medicaid  Current community resources: None  Current services prior to admission: Oxygen Therapy, Durable Medical Equipment (O2 @ 2L [place] HS concentrator and portable tank)            Current DME: Walker            Type of Home Care services:  None    ADLS  Prior functional level: Independent in ADLs/IADLs  Current functional level: Independent in ADLs/IADLs    PT AM-PAC:   /24  OT AM-PAC:   /24    Family can provide assistance at DC: Yes  Would you like Case Management to discuss the discharge plan with any other family members/significant others, and if so, who?  No  Plans to Return to Present Housing: Yes  Other Identified Issues/Barriers to RETURNING to current housing: no  Potential Assistance needed at discharge: N/A            Potential DME:  n/a  Patient expects to discharge to: [address] [place] [address] Road for transportation at discharge: Self    Financial    Payor: Adeola Oh / Plan: Neha Rosas / Product Type: *No Product type* /     Does insurance require precert for SNF: Yes    Potential assistance Purchasing Medications: No  Meds-to-Beds request:        [facility], [address] [place] [address] [phone]  F [phone]  [address] [place] [address], [place] [address]. [place] [address]  Phone: [phone] Fax: [address] [facility], [address] [facility] [phone] [address] [facility] [address]  Phone: [phone] Fax: [phone]      Notes:    Factors facilitating achievement of predicted outcomes: Motivated, Cooperative, and Pleasant    Barriers to discharge: n/a    Additional Case Management Notes: From home alone in Senior Living apt, independent and drives. DME: walker, O2 2L HS from [facility] (concentrator and tank). Declined VNS. Denies needs. The Plan for Transition of Care is related to the following treatment goals of Chest pain [Q54.1]    IF APPLICABLE: The Patient and/or patient representative Jennifer Jackson and her family were provided with a choice of provider and agrees with the discharge plan. Freedom of choice list with basic dialogue that supports the patient's individualized plan of care/goals and shares the quality data associated with the providers was provided to: Patient   Patient Representative Name:       The Patient and/or Patient Representative Agree with the Discharge Plan?  Yes    Brody Montoya RN  Case Management Department  Ph: [phone] Fax: [phone]

## 2023-08-17 ENCOUNTER — APPOINTMENT (OUTPATIENT)
Dept: PHARMACY | Age: 73
End: 2023-08-17
Payer: COMMERCIAL

## 2023-08-17 VITALS
OXYGEN SATURATION: 96 % | DIASTOLIC BLOOD PRESSURE: 62 MMHG | HEART RATE: 71 BPM | SYSTOLIC BLOOD PRESSURE: 126 MMHG | BODY MASS INDEX: 40.78 KG/M2 | RESPIRATION RATE: 16 BRPM | TEMPERATURE: 98.4 F | WEIGHT: 216 LBS | HEIGHT: 61 IN

## 2023-08-17 LAB
ANION GAP SERPL CALCULATED.3IONS-SCNC: 9 MMOL/L (ref 9–17)
BUN SERPL-MCNC: 6 MG/DL (ref 8–23)
CALCIUM SERPL-MCNC: 9 MG/DL (ref 8.6–10.4)
CHLORIDE SERPL-SCNC: 106 MMOL/L (ref 98–107)
CO2 SERPL-SCNC: 27 MMOL/L (ref 20–31)
CREAT SERPL-MCNC: 0.6 MG/DL (ref 0.5–0.9)
EKG ATRIAL RATE: 68 BPM
EKG P AXIS: 41 DEGREES
EKG P-R INTERVAL: 154 MS
EKG Q-T INTERVAL: 480 MS
EKG QRS DURATION: 154 MS
EKG QTC CALCULATION (BAZETT): 510 MS
EKG R AXIS: -7 DEGREES
EKG T AXIS: 4 DEGREES
EKG VENTRICULAR RATE: 68 BPM
GFR SERPL CREATININE-BSD FRML MDRD: >60 ML/MIN/1.73M2
GLUCOSE SERPL-MCNC: 110 MG/DL (ref 70–99)
INR PPP: 2.8
MICROORGANISM SPEC CULT: NORMAL
POTASSIUM SERPL-SCNC: 3.5 MMOL/L (ref 3.7–5.3)
PROTHROMBIN TIME: 30.1 SEC (ref 11.8–14.6)
SODIUM SERPL-SCNC: 142 MMOL/L (ref 135–144)
SPECIMEN DESCRIPTION: NORMAL

## 2023-08-17 PROCEDURE — 85610 PROTHROMBIN TIME: CPT

## 2023-08-17 PROCEDURE — 2580000003 HC RX 258: Performed by: FAMILY MEDICINE

## 2023-08-17 PROCEDURE — 6370000000 HC RX 637 (ALT 250 FOR IP): Performed by: FAMILY MEDICINE

## 2023-08-17 PROCEDURE — 99239 HOSP IP/OBS DSCHRG MGMT >30: CPT | Performed by: FAMILY MEDICINE

## 2023-08-17 PROCEDURE — 36415 COLL VENOUS BLD VENIPUNCTURE: CPT

## 2023-08-17 PROCEDURE — 80048 BASIC METABOLIC PNL TOTAL CA: CPT

## 2023-08-17 PROCEDURE — 93010 ELECTROCARDIOGRAM REPORT: CPT | Performed by: INTERNAL MEDICINE

## 2023-08-17 RX ORDER — WARFARIN SODIUM 2.5 MG/1
2.5 TABLET ORAL
Status: DISCONTINUED | OUTPATIENT
Start: 2023-08-17 | End: 2023-08-17 | Stop reason: HOSPADM

## 2023-08-17 RX ORDER — CEPHALEXIN 500 MG/1
500 CAPSULE ORAL 2 TIMES DAILY
Qty: 14 CAPSULE | Refills: 0 | Status: SHIPPED | OUTPATIENT
Start: 2023-08-17 | End: 2023-08-24

## 2023-08-17 RX ADMIN — SODIUM CHLORIDE, PRESERVATIVE FREE 10 ML: 5 INJECTION INTRAVENOUS at 07:50

## 2023-08-17 RX ADMIN — METOPROLOL TARTRATE 25 MG: 25 TABLET, FILM COATED ORAL at 07:49

## 2023-08-17 RX ADMIN — BUPROPION HYDROCHLORIDE 150 MG: 150 TABLET, EXTENDED RELEASE ORAL at 07:49

## 2023-08-17 RX ADMIN — ACETAMINOPHEN 650 MG: 325 TABLET ORAL at 06:37

## 2023-08-17 RX ADMIN — AMLODIPINE BESYLATE 10 MG: 10 TABLET ORAL at 07:49

## 2023-08-17 RX ADMIN — LEVOTHYROXINE SODIUM 137 MCG: 0.03 TABLET ORAL at 06:37

## 2023-08-17 ASSESSMENT — PAIN DESCRIPTION - DESCRIPTORS: DESCRIPTORS: ACHING;BURNING

## 2023-08-17 ASSESSMENT — PAIN SCALES - GENERAL: PAINLEVEL_OUTOF10: 10

## 2023-08-17 ASSESSMENT — PAIN DESCRIPTION - LOCATION: LOCATION: GENERALIZED

## 2023-08-17 NOTE — CARE COORDINATION
ONGOING DISCHARGE PLAN:    Patient is alert and oriented x4. Spoke with patient regarding discharge plan and patient confirms that plan is still home without needs. Declined VNS. Denies needs. Stress test - low risk stratification. D/C when okay with cardiology. Will continue to follow for additional discharge needs. If patient is discharged prior to next notation, then this note serves as note for discharge by case management.     Electronically signed by Chau Angel RN on 8/17/2023 at 11:21 AM

## 2023-08-17 NOTE — PROGRESS NOTES
Writer went through discharge paperwork with patient, she states a full understanding with no further questions. She understands to go  her keflex from her pharmacy. She is calling her ride and asked to walk down to the lobby not ride in a wheelchair. IV was removed.  Pt confirms she has all her belongings

## 2023-08-17 NOTE — PROGRESS NOTES
Pharmacy Note  Warfarin Consult follow-up      Recent Labs     08/15/23  1248 08/17/23  0639   INR 2.0 2.8     Recent Labs     08/15/23  1248 08/16/23  0706   HGB 11.7* 10.2*   HCT 37.5 31.6*    191       Significant Drug-Drug Interactions:  New warfarin drug-drug interactions: none  Discontinued drug-drug interactions: none  Current warfarin drug-drug interactions: amitriptyline, atorvastatin, levothyroxine, tramadol    Date             INR        Dose given previous day  Dose scheduled for today  8/17/2023            2.8       2.5 mg           2.5 mg    Notes:                   Repeat 2.5 mg dose today. Daily PT/INR while inpatient.      Jose Torrez PharmD, [facility]  8/17/2023   8:01 AM

## 2023-08-19 ENCOUNTER — TELEPHONE (OUTPATIENT)
Dept: PHARMACY | Age: 73
End: 2023-08-19

## 2023-08-23 NOTE — DISCHARGE SUMMARY
[facility] Discharge Summary      Patient ID: Rolando Mcknight    MRN: [id number]     Acct:  [de-identified]       Patient's PCP: Destin Elder MD    Admit Date: 8/15/2023     Discharge Date: 8/17/2023      Admitting Physician: Destin Elder MD    Discharge Physician: Destin Elder MD     Discharge Diagnoses:    Primary Problem  Chest pain    Active Hospital Problems    Diagnosis Date Noted    Adult BMI 40.0-44.9 kg/sq m [facility]) [Z68.41] 02/02/2023     Priority: Medium    Chest pain [R07.9] 07/19/2016    IGT (impaired glucose tolerance) [R73.02] 06/08/2015    Recurrent acute deep vein thrombosis (DVT) of both lower extremities ([address]) [I82.403] 03/28/2014    RENEA (obstructive sleep apnea) [G47.33] 11/01/2011    Essential hypertension [I10] 11/01/2011    Fibromyalgia [M79.7] 11/01/2011    Hyperlipidemia [E78.5] 11/01/2011    Hypothyroidism [E03.9] 11/01/2011    Depression [F32.A] 11/01/2011    Anxiety [F41.9] 11/01/2011     Past Medical History:   Diagnosis Date    Affective disorder ([address])     seasonal    Arthritis     shoulders and lower back    DVT (deep venous thrombosis) (HCC)     has history in R leg X 4    Fibromyalgia     Hyperlipidemia     Hypertension     Pneumonia     Sinusitis     Thyroid disease     hypothyroidsm     The patient was seen and examined on day of discharge and this discharge summary is in conjunction with any daily progress note from day of discharge. Code Status:  Prior    Hospital Course: Uncomplicated, she was admitted with chest pain. She had a 2D ECHO and stress test which were negative. Her chest pain resolved. Consults:  cardiology    Significant Diagnostic Studies: as above, and as follows: see chart    Treatments: as above    Disposition: home    Discharged Condition: Stable    Follow Up:   Destin Elder MD in one week    Discharge Medications:      Medication List        START taking these medications      cephALEXin 500 MG capsule  Commonly known as:

## 2023-08-24 NOTE — PROGRESS NOTES
Physician Progress Note      PATIENT:               Tsering Villalobos  CSN #:                  [id number]  :                       1950  ADMIT DATE:       8/15/2023 12:00 PM  [address] [place] [address] DATE:        2023 2:37 PM  RESPONDING  PROVIDER #:        Michele Garcia MD          QUERY TEXT:    Pt admitted with atypical chest pain described as a constant ache. Pt noted   to have negative troponin, no ischemic changes on EKG, and no obvious signs of   cardiopulmonary decompensation. Pt noted to have fibromyalgia, GERD, OA. If   possible, please document in the discharge summary if you are evaluating   and/or treating any of the following: The medical record reflects the following:  Risk Factors: fibromyalgia, GERD, OA, morbid obesity, advanced age  Clinical Indicators: admitted with atypical chest pain described as a constant   ache. Pt noted to have negative troponin, no ischemic changes on EKG, and no   obvious signs of cardiopulmonary decompensation  Treatment: hydrocortisone cream, lidocaine patch, Tylenol, Voltaren,   gabapentin, tramadol, labs, imaging, Cardiology consult, EKG, ECHO, stress   test    Thank you! Sandeep Wilkerson RN, BSN, RHIT, CCDS  Clinical   Options provided:  -- Chest pain due to fibromyalgia  -- Chest pain due to GERD  -- Chest pain due to osteoarthritis  -- Chest pain due to costochondritis  -- Chest pain due to pleurisy  -- Chest pain due to, please specify. -- Other - I will add my own diagnosis  -- Disagree - Not applicable / Not valid  -- Disagree - Clinically unable to determine / Unknown  -- Refer to Clinical Documentation Reviewer    PROVIDER RESPONSE TEXT:    This patient has chest pain due to fibromyalgia. Query created by: Sandeep Wilkerson on 2023 2:51 PM      Electronically signed by:   Michele Garcia MD 2023 9:49 PM

## 2023-08-28 ENCOUNTER — HOSPITAL ENCOUNTER (OUTPATIENT)
Dept: WOMENS IMAGING | Age: 73
Discharge: HOME | End: 2023-08-30
Payer: COMMERCIAL

## 2023-08-28 ENCOUNTER — OFFICE VISIT (OUTPATIENT)
Dept: ORTHOPEDIC SURGERY | Age: 73
End: 2023-08-28
Payer: COMMERCIAL

## 2023-08-28 VITALS — BODY MASS INDEX: 40.02 KG/M2 | HEIGHT: 61 IN | WEIGHT: 212 LBS | RESPIRATION RATE: 14 BRPM

## 2023-08-28 DIAGNOSIS — Z12.31 BREAST CANCER SCREENING BY MAMMOGRAM: ICD-10-CM

## 2023-08-28 DIAGNOSIS — M75.101 TEAR OF RIGHT ROTATOR CUFF, UNSPECIFIED TEAR EXTENT, UNSPECIFIED WHETHER TRAUMATIC: Primary | ICD-10-CM

## 2023-08-28 PROCEDURE — 99999 PR OFFICE/OUTPT VISIT,PROCEDURE ONLY: CPT | Performed by: ORTHOPAEDIC SURGERY

## 2023-08-28 PROCEDURE — 20610 DRAIN/INJ JOINT/BURSA W/O US: CPT | Performed by: ORTHOPAEDIC SURGERY

## 2023-08-28 PROCEDURE — 77063 BREAST TOMOSYNTHESIS BI: CPT

## 2023-08-28 RX ORDER — TRIAMCINOLONE ACETONIDE 40 MG/ML
40 INJECTION, SUSPENSION INTRA-ARTICULAR; INTRAMUSCULAR ONCE
Status: COMPLETED | OUTPATIENT
Start: 2023-08-28 | End: 2023-08-28

## 2023-08-28 RX ORDER — LIDOCAINE HYDROCHLORIDE 10 MG/ML
3 INJECTION, SOLUTION INFILTRATION; PERINEURAL ONCE
Status: COMPLETED | OUTPATIENT
Start: 2023-08-28 | End: 2023-08-28

## 2023-08-28 RX ADMIN — LIDOCAINE HYDROCHLORIDE 3 ML: 10 INJECTION, SOLUTION INFILTRATION; PERINEURAL at 11:35

## 2023-08-28 RX ADMIN — TRIAMCINOLONE ACETONIDE 40 MG: 40 INJECTION, SUSPENSION INTRA-ARTICULAR; INTRAMUSCULAR at 11:36

## 2023-08-28 NOTE — PROGRESS NOTES
HPI: Ms. Kashif Godoy is a 72-year-old lady with right shoulder pain and dysfunction likely due to a rotator cuff tear. When she was last seen she was sent to physical therapy but states that she only attended 1 visit. She was also placed on a prescription anti-inflammatory. Her pain has gradually gotten worse. On exam today she continues to demonstrate 3/5 muscle strength with resisted supraspinatus and external rotation testing. She has a positive Neer's and Summers impingement sign. No stiffness. We discussed treatment options moving forward and I did recommend attending physical therapy. A prescription was provided and we also administered a cortisone injection as outlined below. I will see her back in my clinic as needed but she may return or call at anytime with persistent or worsening symptoms and with any questions or concerns. Procedure: right shoulder subacromial space injection  Following an appropriate discussion with the patient regarding the risks and benefits of the procedure she consented to proceed. her right shoulder was prepped using chlorhexadine solution. Using aseptic technique and through a posterior approach, her right shoulder subacromial space was injected with a 4 cc mixture of 1cc 40mg/ml kenalog and 3 cc of 1% lidocaine without epinephrine. A band aid was applied to the injection site. she tolerated the injection with no immediate adverse reactions.

## 2023-08-30 ENCOUNTER — TELEPHONE (OUTPATIENT)
Dept: NEUROLOGY | Age: 73
End: 2023-08-30

## 2023-08-30 DIAGNOSIS — M48.061 SPINAL STENOSIS OF LUMBAR REGION, UNSPECIFIED WHETHER NEUROGENIC CLAUDICATION PRESENT: Primary | ICD-10-CM

## 2023-08-30 NOTE — TELEPHONE ENCOUNTER
Heber Sosa called and stated that her insurance will not cover the MRI. She would like to know how Dr. Jigar Fuchs would like to proceed. Please advise.

## 2023-09-05 NOTE — THERAPY EVALUATION
ADLs/community activities with minimal dysfunction  Pt will improve AROM in R shoulder to symmetrical values (elevation to 170 deg or greater) in order to demonstrate ability to move/reach in all planes unrestricted at PLOF  LTG: (to be met in 12 treatments)  Pt will demonstrate improved R UE strength to 4-/5 or greater in order to demonstrate improved stability/strength necessary for unrestricted ADLs/community activities  Pt will self report ability to use her R dominant UE to perform all multiplanar reaching and light household lifting activities (cookware, kitchen items from cabinets) without assistance from her L UE to show improved functional tolerances through all ADLs with less dysfunction  Pt will decrease SPADI to 20% impaired or less in order to demonstrate improved functional tolerances at PLOF with minimal restriction/dysfunction  Pt will demonstrate independence with a long term HEP for continued progress/maintenance after completion of PT      Functional Assessment Used: SPADI  Current Status Score: 78% impaired  Goal Status Score: 20% impaired    Evaluation Complexity:  History (Personal factors, comorbidities) [] 0 [] 1-2 [x] 3+   Exam (limitations, restrictions) [x] 1-2 [] 3 [] 4+   Clinical presentation (progression) [x] Stable [] Evolving  [] Unstable   Decision Making [x] Low [] Moderate [] High    [x] Low Complexity [] Moderate Complexity [] High Complexity     Rehab Potential:  [x] Good  [] Fair  [] Poor   Suggested Professional Referral:  [] No  [] Yes:  Barriers to Goal Achievement[de-identified]  [x] No  [] Yes:  Domestic Concerns:  [x] No  [] Yes:    Pt. Education:  [x] Plans/Goals, Risks/Benefits discussed  [] Home exercise program  Method of Education: [x] Verbal  [] Demo  [] Written  Comprehension of Education:  [x] Verbalizes understanding. [] Demonstrates understanding. [] Needs Review. [] Demonstrates/verbalizes understanding of HEP/Ed previously given.     Treatment Plan:  [x] Therapeutic

## 2023-09-06 ENCOUNTER — HOSPITAL ENCOUNTER (OUTPATIENT)
Dept: PHYSICAL THERAPY | Age: 73
Setting detail: THERAPIES SERIES
Discharge: HOME | End: 2023-09-06
Attending: ORTHOPAEDIC SURGERY
Payer: COMMERCIAL

## 2023-09-06 PROCEDURE — 97161 PT EVAL LOW COMPLEX 20 MIN: CPT

## 2023-09-11 ENCOUNTER — HOSPITAL ENCOUNTER (OUTPATIENT)
Dept: PHARMACY | Age: 73
Discharge: HOME | End: 2023-09-11

## 2023-09-11 DIAGNOSIS — I82.403 RECURRENT ACUTE DEEP VEIN THROMBOSIS (DVT) OF BOTH LOWER EXTREMITIES (HCC): Primary | Chronic | ICD-10-CM

## 2023-09-11 LAB
INR BLD: 2.2
PROTIME: 26.8 SECONDS

## 2023-09-11 PROCEDURE — 85610 PROTHROMBIN TIME: CPT

## 2023-09-11 PROCEDURE — 99211 OFF/OP EST MAY X REQ PHY/QHP: CPT

## 2023-09-11 NOTE — PROGRESS NOTES
Patient seen in person in Medication Management Service. Patient states compliant all of the time with regimen. No bleeding or thromboembolic side effects noted. No significant dietary changes. Patient states she has not had much spinach lately. Discussed again with patient consistency. Plans to continue with current amount  No significant recent disease state changes. Patient was in [address] [place] [address] on 8/15/23 - 8/17/23. \"She had a 2D ECHO and stress test which were negative. Her chest pain resolved. \"  Patient did have a UTI while admitted and given Keflex 500 mg twice daily for 7 days. Patient has finished this several weeks ago. INR on discharge was 2.8 on 8/17/23    PT/INR done via POC meter per protocol. INR was therapeutic at 2.2.  (goal 2 - 3)    Warfarin regimen will be continued at current dose 5 mg Mon/Thurs and 2.5 mg all other days. Will retest in 2 & 1/2 weeks. Patient prefers closer follow up    Patient understands dosing directions and information discussed. Dosing schedule and follow up appointment given to patient. Progress note routed to referring physicians office. Patient acknowledges working in [address] [place] [address] with Pharmacist as referred by his/her physician/provider. COVID 19 screening completed. At this time patient denies symptoms, recent travel and exposure. Patient educated to screen for temperature and COVID-19 symptoms prior to coming to clinic for next appointment. They are instructed to call the clinic to reschedule if they have any symptoms. Standing order for PT/INR has been placed in preparation to transition to possible remote INR monitoring given efforts to reduce the spread of COVID-19.       For Pharmacy Admin Tracking Only    Intervention Detail:   Total # of Interventions Recommended: 0  Total # of Interventions Accepted: 0  Time Spent (min): 20    Maite Vasquez, Pharm D, [address] [facility]  9/11/2023

## 2023-09-12 NOTE — TELEPHONE ENCOUNTER
Lets do lumbar xray along with PT course to low back . Assess and treat and treat .  If she still has pin krissy can order another MRI which they will cover usually recurring PT first

## 2023-09-18 ENCOUNTER — HOSPITAL ENCOUNTER (OUTPATIENT)
Dept: PHYSICAL THERAPY | Age: 73
Setting detail: THERAPIES SERIES
Discharge: HOME | End: 2023-09-18
Attending: ORTHOPAEDIC SURGERY
Payer: COMMERCIAL

## 2023-09-18 NOTE — FLOWSHEET NOTE
[x] [facility]) [facility] & Therapy  [address]  [place]: [phone]   F: [phone]     Physical Therapy Cancel/No Show note    Date: 2023  Patient: Cindy Bennett  : 1950  MRN: [id number]    Visit Count:   Cancels/No Shows to date:     For today's appointment patient:    [x]  Cancelled    [] Rescheduled appointment    [] No-show     Reason given by patient:    []  Patient ill    []  Conflicting appointment    [x] No transportation      [] Conflict with work    [] No reason given    [] Weather related    [] COVID-19    [x] Other:      Comments:  Forgot to arrange transportation.  Confirmed appt for this       [x] Next appointment was confirmed    Electronically signed by: Oumar Richardson, PT

## 2023-09-21 ENCOUNTER — HOSPITAL ENCOUNTER (OUTPATIENT)
Dept: PHYSICAL THERAPY | Age: 73
Setting detail: THERAPIES SERIES
Discharge: HOME | End: 2023-09-21
Attending: ORTHOPAEDIC SURGERY
Payer: COMMERCIAL

## 2023-09-21 PROCEDURE — 97110 THERAPEUTIC EXERCISES: CPT

## 2023-09-21 RX ORDER — AMITRIPTYLINE HYDROCHLORIDE 10 MG/1
TABLET, FILM COATED ORAL
Qty: 30 TABLET | Refills: 2 | Status: SHIPPED | OUTPATIENT
Start: 2023-09-21

## 2023-09-21 NOTE — TELEPHONE ENCOUNTER
Pharmacy requesting refill of Elavil 10mg.       Medication active on med list yes      Date of last Rx: 7/14/2023 with 2 refills          verified by Vitaliy Blankenship LPN      Date of last appointment 7/14/2023    Next Visit Date:  Visit date not found

## 2023-09-21 NOTE — FLOWSHEET NOTE
[] [facility] & Therapy  [address]  [place]: [phone]   F: [phone]    Physical Therapy Daily Treatment Note      Date:  2023  Patient Name:  Harriett Gandhi    :  1950  MRN: [id number]  Physician: Jhonatan Otero MD           Insurance: [employer]. 36 units ea [id number], [id number], [id number], [id number]   Medical Diagnosis: M75.101 (ICD-10-CM) - Tear of right rotator cuff, unspecified tear extent, unspecified whether traumatic             Rehab Codes: M25.511, M25.60, R53.1  Onset Date: Chronic/ongoing             Next 's appt: PRN with ortho  Visit# / total visits:   Cancels/No Shows: 0    Subjective:    : Patient reports today that only pain experienced is with eccentric (lowering) component of overhead motions. Noted consistent \"clunking\" in R shoulder with overhead motions, but not typically painful. Pain:  [] Yes  [x] No Location:  N/A Pain Rating: (0-10 scale) 0/10  Pain altered Tx:  [x] No  [] Yes  Action:  Comments:    Objective:  Modalities:   Precautions:  Exercises:  Exercise Reps/ Time Weight/ Level Completed  Today Comments   PROM: Flexion/Abduction 3 min total  x           AAROM with Weighted Bar: Flexion/Abduction 10x ea 2# bar x    Supine Rhythmic Stabilization 1' x 2 sets  x 90 degrees felxion   Supine Protraction 10 reps  x           Side-lying ER 10 reps x 2  x Partial, tolerable range   Side-lying Abduction 10 reps x 2  x           Wall Towel Slides 10 reps x 2  x 1 set flexion, 1 set scaption   Wall Surrenders 15x reps 3\" hold  x    Ball on Wall for Shoulder Stabilization 20x ea Pink toy ball x CW, CCW, Up/Down, Side/Side   Banded Rows 15 reps x 2 Green x    Banded ER and Neutral 10 reps x 2 Yellow x    Other:    Specific Instructions for next treatment: Early emphasis on AAROM/AROM of R shoulder in tolerable ranges with gentle progressive strengthening of R RTC and scapular stabilizers.  Manual/vaso as

## 2023-09-26 ENCOUNTER — HOSPITAL ENCOUNTER (OUTPATIENT)
Dept: PHYSICAL THERAPY | Age: 73
Setting detail: THERAPIES SERIES
Discharge: HOME | End: 2023-09-26
Attending: ORTHOPAEDIC SURGERY
Payer: COMMERCIAL

## 2023-09-26 PROCEDURE — 97110 THERAPEUTIC EXERCISES: CPT

## 2023-09-26 NOTE — FLOWSHEET NOTE
[x] [facility] & Therapy  [address]  [place]: [phone]   F: [phone]    Physical Therapy Daily Treatment Note      Date:  2023  Patient Name:  Saul Berry    :  1950  MRN: [id number]  Physician: Lizbeth Carrillo MD            [facility]: [facility]. 36 units ea [id number], [id number], [id number], [id number], [id number]   Medical Diagnosis: M75.101 (ICD-10-CM) - Tear of right rotator cuff, unspecified tear extent, unspecified whether traumatic             Rehab Codes: M25.511, M25.60, R53.1  Onset Date: Chronic/ongoing              Next 's appt: PRN with ortho  Visit# / total visits: 3/12   Cancels/No Shows:     Subjective:    : Pt states that she felt quite sore after her last visit in B shoulders and is wondering if she should keep doing therapy as she feels like for the most part her R shoulder is doing ok through most of the things that she has to do for herself at home. Struggled especially with weighted bar abduction, which she felt like contributed to increased pain last visit. Otherwise, fairly low pain levels currently at 3/10 in her R shoulder.     Pain:  [] Yes  [x] No Location: R shoulder Pain Rating: (0-10 scale) 3/10  Pain altered Tx:  [x] No  [] Yes  Action:  Comments:    Objective:  Modalities:   Precautions:  Exercises:  Exercise Reps/ Time Weight/ Level Completed  Today Comments   PROM: Flexion/Abduction 3 min total             AAROM with Weighted Bar: Flexion 10x ea 2# bar x Flexion only this date   Supine Rhythmic Stabilization and alternating isometrics 1' x 2 sets  x AI at 90 degrees flexion- light perturbations all planes    RS at 0 deg- light perturbations challenging ER/IR   Supine Protraction 10 reps x 2  x Heavy tactile cues for sequencing/technique          Side-lying ER 10 reps x 2  x Partial, tolerable range   Side-lying Abduction 10 reps x 2  x           Wall Towel Slides 10 reps x 2  x 1 set flexion, 1 set scaption   Wall Surrenders

## 2023-09-28 ENCOUNTER — HOSPITAL ENCOUNTER (OUTPATIENT)
Dept: PHYSICAL THERAPY | Age: 73
Setting detail: THERAPIES SERIES
Discharge: HOME | End: 2023-09-28
Attending: ORTHOPAEDIC SURGERY
Payer: COMMERCIAL

## 2023-09-28 ENCOUNTER — HOSPITAL ENCOUNTER (OUTPATIENT)
Dept: PHARMACY | Age: 73
Setting detail: THERAPIES SERIES
Discharge: HOME | End: 2023-09-28
Payer: COMMERCIAL

## 2023-09-28 ENCOUNTER — HOSPITAL ENCOUNTER (OUTPATIENT)
Age: 73
Discharge: HOME | End: 2023-09-28
Payer: COMMERCIAL

## 2023-09-28 DIAGNOSIS — I82.403 RECURRENT ACUTE DEEP VEIN THROMBOSIS (DVT) OF BOTH LOWER EXTREMITIES (HCC): Primary | Chronic | ICD-10-CM

## 2023-09-28 DIAGNOSIS — E87.6 HYPOKALEMIA: ICD-10-CM

## 2023-09-28 LAB
ANION GAP SERPL CALCULATED.3IONS-SCNC: 8 MMOL/L (ref 9–17)
BUN SERPL-MCNC: 15 MG/DL (ref 8–23)
CALCIUM SERPL-MCNC: 9.6 MG/DL (ref 8.6–10.4)
CHLORIDE SERPL-SCNC: 104 MMOL/L (ref 98–107)
CO2 SERPL-SCNC: 30 MMOL/L (ref 20–31)
CREAT SERPL-MCNC: 0.8 MG/DL (ref 0.5–0.9)
GFR SERPL CREATININE-BSD FRML MDRD: >60 ML/MIN/1.73M2
GLUCOSE SERPL-MCNC: 94 MG/DL (ref 70–99)
INR BLD: 1.7
POTASSIUM SERPL-SCNC: 4.3 MMOL/L (ref 3.7–5.3)
PROTIME: 20.7 SECONDS
SODIUM SERPL-SCNC: 142 MMOL/L (ref 135–144)

## 2023-09-28 PROCEDURE — 80048 BASIC METABOLIC PNL TOTAL CA: CPT

## 2023-09-28 PROCEDURE — 36415 COLL VENOUS BLD VENIPUNCTURE: CPT

## 2023-09-28 PROCEDURE — 99212 OFFICE O/P EST SF 10 MIN: CPT

## 2023-09-28 PROCEDURE — 97110 THERAPEUTIC EXERCISES: CPT

## 2023-09-28 PROCEDURE — 85610 PROTHROMBIN TIME: CPT

## 2023-09-28 NOTE — PROGRESS NOTES
Patient seen in person in Medication Management Service. Patient states compliant all of the time with regimen. No bleeding or thromboembolic side effects noted. No significant med changes. Pt states she actually had no spinach the past several weeks. Was eating several times per week. In general not eating as much lately. Does not plan to restart eating spinach  No significant recent illness or disease state changes. PT/INR done via POC meter per protocol. INR was subtherapeutic at 1.7.  (goal 2 - 3)    Warfarin regimen will be increased to 5 mg Mon/Thurs/Sat and 2.5 mg all other days. Will retest in 1 week. Patient understands dosing directions and information discussed. Dosing schedule and follow up appointment given to patient. Progress note routed to referring physicians office. 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 COVID 19 screening completed. At this time patient denies symptoms, recent travel and exposure. Patient educated to screen for temperature and COVID-19 symptoms prior to coming to clinic for next appointment. They are instructed to call the clinic to reschedule if they have any symptoms. Standing order for PT/INR has been placed in preparation to transition to possible remote INR monitoring given efforts to reduce the spread of COVID-19.       For Pharmacy Admin Tracking Only    Intervention Detail: Dose Adjustment: 1, reason: Therapy Optimization  Total # of Interventions Recommended: 0  Total # of Interventions Accepted: 0  Time Spent (min): 20    Baron Finnegan, Pharm D, [address] [facility]  9/28/2023 10:39 AM

## 2023-09-28 NOTE — FLOWSHEET NOTE
[x] [facility]  [address]  [place]: [phone]   F: [phone]    Physical Therapy Daily Treatment Note      Date:  2023  Patient Name:  Kristine Magaña    :  1950  MRN: [id number]  Physician: Bernardo Baltazar MD            Insurance: [address] [place] [address]. 36 units ea [id number], [id number]   Medical Diagnosis: M75.101 (ICD-10-CM) - Tear of right rotator cuff, unspecified tear extent, unspecified whether traumatic             Rehab Codes: M25.511, M25.60, R53.1  Onset Date: Chronic/ongoing              Next 's appt: PRN with ortho  Visit# / total visits: 3/12   Cancels/No Shows:     Subjective:    : Pt states that she felt quite sore after her last visit in B shoulders and is wondering if she should keep doing therapy as she feels like for the most part her R shoulder is doing ok through most of the things that she has to do for herself at home. Struggled especially with weighted bar abduction, which she felt like contributed to increased pain last visit. Otherwise, fairly low pain levels currently at 3/10 in her R shoulder.     Pain:  [] Yes  [x] No Location: R shoulder Pain Rating: (0-10 scale) 3/10  Pain altered Tx:  [x] No  [] Yes  Action:  Comments:    Objective:  Modalities:   Precautions:  Exercises:  Exercise Reps/ Time Weight/ Level Completed  Today Comments   PROM: Flexion/Abduction 3 min total             AAROM with Weighted Bar: Flexion 10x ea 2# bar x Flexion only this date   Supine Rhythmic Stabilization and alternating isometrics 1' x 2 sets  x AI at 90 degrees flexion- light perturbations all planes    RS at 0 deg- light perturbations challenging ER/IR   Supine Protraction 10 reps x 2  x Heavy tactile cues for sequencing/technique          Side-lying ER 10 reps x 2  x Partial, tolerable range   Side-lying Abduction 10 reps x 2  x           Wall Towel Slides 10 reps x 2  x 1 set flexion, 1 set scaption   Wall Surrenders

## 2023-09-28 NOTE — FLOWSHEET NOTE
[x] [facility]  [address]  [place]: [phone]   F: [phone]    Physical Therapy Daily Treatment Note      Date:  2023  Patient Name:  Clyde Alvarado    :  1950  MRN: [id number]  Physician: Eulalia Trinh MD            Insurance: [address] [place] [address]. 36 units ea [id number], [id number]   Medical Diagnosis: M75.101 (ICD-10-CM) - Tear of right rotator cuff, unspecified tear extent, unspecified whether traumatic             Rehab Codes: M25.511, M25.60, R53.1  Onset Date: Chronic/ongoing              Next 's appt: PRN with ortho  Visit# / total visits:    Cancels/No Shows:     Subjective:    : Patient reports today 9 min late for appt. Reported that she got caught up at another appt prior to therapy. Reported that R shoulder feels pretty good today with no new issues. Noted that she feels pain is improving, although does have periods of increased pain.        Pain:  [] Yes  [x] No Location: R shoulder Pain Rating: (0-10 scale) 2/10  Pain altered Tx:  [x] No  [] Yes  Action:  Comments:    Objective:  Modalities:   Precautions:  Exercises:  Exercise Reps/ Time Weight/ Level Completed  Today Comments   PROM: Flexion/Abduction 3 min total             AAROM with Weighted Bar: Flexion 10x ea 2# bar x Flexion only this date   Supine Rhythmic Stabilization and alternating isometrics 1' x 2 sets  x AI at 90 degrees flexion- light perturbations all planes    RS at 0 deg- light perturbations challenging ER/IR   Supine Protraction 10 reps x 2  x Heavy tactile cues for sequencing/technique          Side-lying ER 10 reps x 2  x Partial, tolerable range   Side-lying Abduction 10 reps x 2  x           Wall Towel Slides 10 reps x 2  x 1 set flexion, 1 set scaption   Wall Surrenders 15x reps 3\" hold  x    Ball on Wall for Shoulder Stabilization 20x ea Pink toy ball x CW, CCW, Up/Down, Side/Side   Banded Rows 15 reps x 2 Green x    Banded ER and

## 2023-10-03 ENCOUNTER — OFFICE VISIT (OUTPATIENT)
Dept: ORTHOPEDIC SURGERY | Age: 73
End: 2023-10-03
Payer: COMMERCIAL

## 2023-10-03 ENCOUNTER — HOSPITAL ENCOUNTER (OUTPATIENT)
Dept: PHYSICAL THERAPY | Age: 73
Setting detail: THERAPIES SERIES
Discharge: HOME | End: 2023-10-03
Attending: ORTHOPAEDIC SURGERY
Payer: COMMERCIAL

## 2023-10-03 VITALS — BODY MASS INDEX: 39.08 KG/M2 | WEIGHT: 207 LBS | HEIGHT: 61 IN | RESPIRATION RATE: 14 BRPM

## 2023-10-03 DIAGNOSIS — M17.12 PRIMARY OSTEOARTHRITIS OF LEFT KNEE: Primary | ICD-10-CM

## 2023-10-03 DIAGNOSIS — G60.9 IDIOPATHIC PERIPHERAL NEUROPATHY: ICD-10-CM

## 2023-10-03 PROCEDURE — 99213 OFFICE O/P EST LOW 20 MIN: CPT | Performed by: PHYSICIAN ASSISTANT

## 2023-10-03 PROCEDURE — 97110 THERAPEUTIC EXERCISES: CPT

## 2023-10-03 PROCEDURE — 3017F COLORECTAL CA SCREEN DOC REV: CPT | Performed by: PHYSICIAN ASSISTANT

## 2023-10-03 PROCEDURE — G8484 FLU IMMUNIZE NO ADMIN: HCPCS | Performed by: PHYSICIAN ASSISTANT

## 2023-10-03 PROCEDURE — G8427 DOCREV CUR MEDS BY ELIG CLIN: HCPCS | Performed by: PHYSICIAN ASSISTANT

## 2023-10-03 PROCEDURE — G8399 PT W/DXA RESULTS DOCUMENT: HCPCS | Performed by: PHYSICIAN ASSISTANT

## 2023-10-03 PROCEDURE — 1123F ACP DISCUSS/DSCN MKR DOCD: CPT | Performed by: PHYSICIAN ASSISTANT

## 2023-10-03 PROCEDURE — 1036F TOBACCO NON-USER: CPT | Performed by: PHYSICIAN ASSISTANT

## 2023-10-03 PROCEDURE — 1090F PRES/ABSN URINE INCON ASSESS: CPT | Performed by: PHYSICIAN ASSISTANT

## 2023-10-03 PROCEDURE — G8417 CALC BMI ABV UP PARAM F/U: HCPCS | Performed by: PHYSICIAN ASSISTANT

## 2023-10-03 NOTE — DISCHARGE SUMMARY
unrestricted at [facility] MET 10/3  LTG: (to be met in 12 treatments)  Pt will demonstrate improved R UE strength to 4-/5 or greater in order to demonstrate improved stability/strength necessary for unrestricted ADLs/community activities GOAL NEARLY FULLY MET 10/3; PROGRESSING WITH HEP   Pt will self report ability to use her R dominant UE to perform all multiplanar reaching and light household lifting activities (cookware, kitchen items from cabinets) without assistance from her L UE to show improved functional tolerances through all ADLs with less dysfunction GOAL MET 10/3  Pt will decrease SPADI to 20% impaired or less in order to demonstrate improved functional tolerances at PLOF with minimal restriction/dysfunction GOAL MET 10/3  Pt will demonstrate independence with a long term HEP for continued progress/maintenance after completion of PT GOAL MET 10/3    Pt. Education:  [x] Yes  [] No  [x] Reviewed Prior HEP/Ed  Method of Education: [x] Verbal  [x] Demo  [x] Written- updated 10/3  Comprehension of Education:  [x] Verbalizes understanding. [] Demonstrates understanding. [x] Needs review. [] Demonstrates/verbalizes HEP/Ed previously given. Access Code: [email or web address]  URL: [email or web address]  Date: 10/03/2023  Prepared by: Ken De Souza    Exercises  - Supine Shoulder Flexion Extension AAROM with Dowel  - 1 x daily - 7 x weekly - 2 sets - 10 reps  - Supine Shoulder Abduction AAROM with Dowel  - 1 x daily - 7 x weekly - 2 sets - 10 reps  - Supine Single Arm Shoulder Protraction  - 1 x daily - 7 x weekly - 2 sets - 10 reps  - Sidelying Shoulder External Rotation  - 1 x daily - 7 x weekly - 2 sets - 10 reps  - Sidelying Shoulder Abduction Palm Forward  - 1 x daily - 7 x weekly - 2 sets - 10 reps  - Shoulder Flexion Wall Slide with Towel  - 1 x daily - 7 x weekly - 1 sets - 15 reps  - Scaption Wall Slide with Towel  - 1 x daily - 7 x weekly - 1 sets - 15 reps  - Standing Row with Anchored Resistance  -

## 2023-10-03 NOTE — TELEPHONE ENCOUNTER
LRF 7/27/23     Associated Diagnoses: Greater trochanteric bursitis of left hip [M70.62];  Chronic pain of left knee [M25.562, G89.29]

## 2023-10-03 NOTE — PROGRESS NOTES
Standing Expiration Date:   10/3/2024     Order Specific Question:   Which body part? Answer:   bilateral lower leg       Assessment and Plan:  1. Primary osteoarthritis of left knee    2. Idiopathic peripheral neuropathy          PLAN:  Cassandra Vázquez is a 72 y.o. old female with left knee osteoarthritis returns today for reevaluation of bilateral lower leg pain and chronic left knee pain. Patient reports her main complaint is actually the lower leg pain left greater than right. She has seen both vascular and neurology for this issue. Her vascular work-up was essentially unremarkable per vascular note neurology did order an MRI of bilateral lower extremities but unfortunate this was not approved by insurance at this point. 1.  I do question given that her vascular work-up was negative if she has some peripheral neuropathy contributing to bilateral lower extremity pain and would be happy to order an EMG for further evaluation. I question possible lumbar radiculopathy as well but patient continues to deny any upper leg pain or low back pain. 2.  In regards to her left knee she feels like this left knee pain has gradually worsened she did really well with a corticosteroid injection back in March 2023 and does display interest in repeat injection but would like to hold off on this today and schedule follow-up appointment to get this injection. 3.  Follow-up at her earliest convenience for left knee injection. Electronically signed by Laneta Opitz, PA on 3/28/23 at 12:18 PM EDT        Please note that this chart was generated using voice recognition Dragon dictation software. Although every effort was made to ensure the accuracy of this automated transcription, some errors in transcription may have occurred.

## 2023-10-05 ENCOUNTER — APPOINTMENT (OUTPATIENT)
Dept: PHYSICAL THERAPY | Age: 73
End: 2023-10-05
Attending: ORTHOPAEDIC SURGERY
Payer: COMMERCIAL

## 2023-10-05 ENCOUNTER — HOSPITAL ENCOUNTER (OUTPATIENT)
Dept: PHARMACY | Age: 73
Discharge: HOME | End: 2023-10-05

## 2023-10-05 DIAGNOSIS — I82.403 RECURRENT ACUTE DEEP VEIN THROMBOSIS (DVT) OF BOTH LOWER EXTREMITIES (HCC): Primary | Chronic | ICD-10-CM

## 2023-10-05 LAB
INR BLD: 1.9
PROTIME: 23.1 SECONDS

## 2023-10-05 PROCEDURE — 85610 PROTHROMBIN TIME: CPT

## 2023-10-05 PROCEDURE — 99211 OFF/OP EST MAY X REQ PHY/QHP: CPT

## 2023-10-05 NOTE — PROGRESS NOTES
Patient seen in person in Medication Management Service. Patient states compliant all of the time with regimen. No bleeding or thromboembolic side effects noted. No significant med or dietary changes. Patient has been avoiding spinach and plans to continue to avoid. No significant recent illness or disease state changes. PT/INR done via POC meter per protocol. INR was subtherapeutic at 1.9.  (goal 2 - 3)    INR is trending up and was previously supratherapeutic on this dose. Will adjust next visit if needed. Warfarin regimen will be continued at current dose 5 mg Mon/Thurs/Sat and 2.5 mg all other days. Will retest in 2 weeks. Patient understands dosing directions and information discussed. Dosing schedule and follow up appointment given to patient. Progress note routed to referring physicians office. Patient acknowledges working in [address] [place] [address] with Pharmacist as referred by his/her physician/provider. COVID 19 screening completed. At this time patient denies symptoms, recent travel and exposure. Patient educated to screen for temperature and COVID-19 symptoms prior to coming to clinic for next appointment. They are instructed to call the clinic to reschedule if they have any symptoms. Standing order for PT/INR has been placed in preparation to transition to possible remote INR monitoring given efforts to reduce the spread of COVID-19.       For Pharmacy Admin Tracking Only    Intervention Detail: Dose Adjustment: 1, reason: Therapy Optimization  Total # of Interventions Recommended: 1  Total # of Interventions Accepted: 1  Time Spent (min): 20    Troy Kan, Pharm D, [address] [facility]  10/5/2023 11:26 AM

## 2023-10-19 ENCOUNTER — HOSPITAL ENCOUNTER (OUTPATIENT)
Dept: PHARMACY | Age: 73
Setting detail: THERAPIES SERIES
Discharge: HOME | End: 2023-10-19
Payer: COMMERCIAL

## 2023-10-19 DIAGNOSIS — I82.403 RECURRENT ACUTE DEEP VEIN THROMBOSIS (DVT) OF BOTH LOWER EXTREMITIES (HCC): Primary | Chronic | ICD-10-CM

## 2023-10-19 LAB
INR BLD: 2.7
PROTIME: 31.9 SECONDS

## 2023-10-19 PROCEDURE — 99211 OFF/OP EST MAY X REQ PHY/QHP: CPT

## 2023-10-19 PROCEDURE — 85610 PROTHROMBIN TIME: CPT

## 2023-10-19 NOTE — PROGRESS NOTES
Patient seen in person in Medication Management Service. Patient states compliant all of the time with regimen. No bleeding or thromboembolic side effects noted. No significant med or dietary changes other than one small (1/2 cup) of cooked greens. .    No significant recent illness or disease state changes. PT/INR done via POC meter per protocol. INR was therapeutic at 2.7.  (goal 2 - 3)    Warfarin regimen will be continued at current dose of .5 mg Mon/Thurs/Sat and 2.5 mg all other days. Will retest in 4 weeks. Patient may start eating some cooked greens each week but plans to be consistent and start prior to next visit so we can see any effect. Patient understands dosing directions and information discussed. Dosing schedule and follow up appointment given to patient. Progress note routed to referring physicians office. Patient acknowledges working in [address] [place] [address] with Pharmacist as referred by his/her physician/provider. COVID 19 screening completed. At this time patient denies symptoms, recent travel and exposure. Patient educated to screen for temperature and COVID-19 symptoms prior to coming to clinic for next appointment. They are instructed to call the clinic to reschedule if they have any symptoms. Standing order for PT/INR has been placed in preparation to transition to possible remote INR monitoring given efforts to reduce the spread of COVID-19. For Pharmacy Admin Tracking Only    Total # of Interventions Recommended: 0  Total # of Interventions Accepted: 0  Time Spent (min): 20  Carlton Nice RPH,Pharm. D,, BCPS, CACP  10/19/2023  10:34 AM

## 2023-10-30 ENCOUNTER — HOSPITAL ENCOUNTER (OUTPATIENT)
Dept: PHYSICAL THERAPY | Age: 73
Setting detail: THERAPIES SERIES
Discharge: HOME | End: 2023-10-30
Attending: ORTHOPAEDIC SURGERY
Payer: COMMERCIAL

## 2023-10-30 NOTE — CARE COORDINATION
[] [facility]  [address]  [place]: [phone]   F: [phone]     Physical Therapy Cancel/No Show note    Date: 10/30/2023  Patient: Maryann Swenson  : 1950  MRN: [id number]    Visit Count:   Cancels/No Shows to date:     For today's appointment patient:    [x]  Cancelled    [] Rescheduled appointment    [] No-show     Reason given by patient:    []  Patient ill    []  Conflicting appointment    [] No transportation      [] Conflict with work    [] No reason given    [] Weather related    [] COVID-19    [x] Other:      Comments: Patient called and cx scheduled appt today. Upon chart review, patient had been discharged secondary to improvement in symptoms 10/3. Patient would need new referral and eval to continue therapy.          [] Next appointment was confirmed    Electronically signed by: Tin Oliva PTA

## 2023-11-16 ENCOUNTER — HOSPITAL ENCOUNTER (OUTPATIENT)
Dept: PHARMACY | Age: 73
Setting detail: THERAPIES SERIES
Discharge: HOME | End: 2023-11-16
Payer: COMMERCIAL

## 2023-11-16 DIAGNOSIS — I82.403 RECURRENT ACUTE DEEP VEIN THROMBOSIS (DVT) OF BOTH LOWER EXTREMITIES (HCC): Primary | Chronic | ICD-10-CM

## 2023-11-16 LAB
INR BLD: 1.8
PROTIME: 21.2 SECONDS

## 2023-11-16 PROCEDURE — 99212 OFFICE O/P EST SF 10 MIN: CPT

## 2023-11-16 PROCEDURE — 85610 PROTHROMBIN TIME: CPT

## 2023-11-16 NOTE — PROGRESS NOTES
Patient seen in person in Medication Management Service. Patient states compliant most of the time with regimen. No bleeding or thromboembolic side effects noted. No significant med or dietary changes. No significant recent illness or disease state changes. PT/INR done via POC meter per protocol. INR was subtherapeutic at 1.8.  (goal 2 - 3)  The last INR was 2.7 with no change made in the warfarin dose  The patient did consume one cup of spinach two days ago    Warfarin regimen will be continued at current dose 5 mg Mon-Thurs-Sat and 2.5 mg all other days. Will retest in 2 weeks. Patient understands dosing directions and information discussed. Dosing schedule and follow up appointment given to patient. Progress note routed to referring physicians office. Patient acknowledges working in [address] [place] [address] with Pharmacist as referred by his/her physician/provider. COVID 19 screening completed. At this time patient denies symptoms, recent travel and exposure. Patient educated to screen for temperature and COVID-19 symptoms prior to coming to clinic for next appointment. They are instructed to call the clinic to reschedule if they have any symptoms. Standing order for PT/INR has been placed in preparation to transition to possible remote INR monitoring given efforts to reduce the spread of COVID-19.       For Pharmacy Admin Tracking Only    Intervention Detail: Adherence Monitorin  Total # of Interventions Recommended: 0  Total # of Interventions Accepted: 0  Time Spent (min): 20

## 2023-11-30 ENCOUNTER — APPOINTMENT (OUTPATIENT)
Dept: PHARMACY | Age: 73
End: 2023-11-30
Payer: COMMERCIAL

## 2023-11-30 DIAGNOSIS — I82.403 RECURRENT ACUTE DEEP VEIN THROMBOSIS (DVT) OF BOTH LOWER EXTREMITIES (HCC): Primary | Chronic | ICD-10-CM

## 2023-11-30 LAB
INR BLD: 1.8
PROTIME: 22 SECONDS

## 2023-11-30 PROCEDURE — 99212 OFFICE O/P EST SF 10 MIN: CPT

## 2023-11-30 PROCEDURE — 85610 PROTHROMBIN TIME: CPT

## 2023-11-30 NOTE — PROGRESS NOTES
Patient seen in person in Medication Management Service. Patient states compliant most of the time with regimen. No bleeding or thromboembolic side effects noted. No significant med or dietary changes. No significant recent illness or disease state changes. PT/INR done via POC meter per protocol. INR was subtherapeutic at 1.8.  (goal 2 - 3)    Warfarin regimen will be increased to 2.5 mg Tues-Thurs-Sat and 5 mg all other days. Will retest in 2 weeks. Patient understands dosing directions and information discussed. Dosing schedule and follow up appointment given to patient. Progress note routed to referring physicians office. Patient acknowledges working in [address] [place] [address] with Pharmacist as referred by his/her physician/provider. COVID 19 screening completed. At this time patient denies symptoms, recent travel and exposure. Patient educated to screen for temperature and COVID-19 symptoms prior to coming to clinic for next appointment. They are instructed to call the clinic to reschedule if they have any symptoms. Standing order for PT/INR has been placed in preparation to transition to possible remote INR monitoring given efforts to reduce the spread of COVID-19.       For Pharmacy Admin Tracking Only    Intervention Detail: Dose Adjustment: 1, reason: Therapy Optimization  Total # of Interventions Recommended: 1  Total # of Interventions Accepted: 1  Time Spent (min): 20

## 2023-12-14 ENCOUNTER — HOSPITAL ENCOUNTER (OUTPATIENT)
Dept: PHARMACY | Age: 73
Setting detail: THERAPIES SERIES
Discharge: HOME | End: 2023-12-14
Payer: COMMERCIAL

## 2023-12-14 DIAGNOSIS — I82.403 RECURRENT ACUTE DEEP VEIN THROMBOSIS (DVT) OF BOTH LOWER EXTREMITIES (HCC): Primary | Chronic | ICD-10-CM

## 2023-12-14 LAB
INR BLD: 2.1
PROTIME: 25.1 SECONDS

## 2023-12-14 PROCEDURE — 85610 PROTHROMBIN TIME: CPT

## 2023-12-14 PROCEDURE — 99211 OFF/OP EST MAY X REQ PHY/QHP: CPT

## 2023-12-14 NOTE — PROGRESS NOTES
Patient seen in person in Medication Management Service. Patient states compliant most of the time with regimen. No bleeding or thromboembolic side effects noted. No significant med or dietary changes. No significant recent illness or disease state changes. Last night missed dose but took 2.5 mg today early in the morning  Spinach one time in last 2 weeks  Discussed how she is trying to be more consistent with her leafy greens intake    PT/INR done via POC meter per protocol. INR was therapeutic at 2.1.  (goal 2 - 3)    Warfarin regimen will be continued at current dose 2.5 mg tues/thur/Sat and 5 mg all other days. Will retest in 2 weeks. Patient understands dosing directions and information discussed. Dosing schedule and follow up appointment given to patient. Progress note routed to referring physicians office. Patient acknowledges working in [address] [place] [address] with Pharmacist as referred by his/her physician/provider. COVID 19 screening completed. At this time patient denies symptoms, recent travel and exposure. Patient educated to screen for temperature and COVID-19 symptoms prior to coming to clinic for next appointment. They are instructed to call the clinic to reschedule if they have any symptoms. Standing order for PT/INR has been placed in preparation to transition to possible remote INR monitoring given efforts to reduce the spread of COVID-19.       For Pharmacy Admin Tracking Only    Intervention Detail:   Total # of Interventions Recommended: 0  Total # of Interventions Accepted: 0  Time Spent (min): Codi ArguetaD  PGY-1 Pharmacy Resident    12/14/2023   1:49 PM

## 2023-12-22 NOTE — TELEPHONE ENCOUNTER
Pharmacy requesting refill of Elavil.       Medication active on med list yes      Date of last fill: 9/21/23  verified on 12/22/2023   verified by [facility] LPN      Date of last appointment 7/14/23    Next Visit Date:  1/8/24

## 2023-12-26 ENCOUNTER — HOSPITAL ENCOUNTER (OUTPATIENT)
Age: 73
Setting detail: SPECIMEN
Discharge: HOME | End: 2023-12-26
Payer: COMMERCIAL

## 2023-12-26 LAB
EOSINOPHIL # BLD: 236 /UL (ref 0–450)
EOSINOPHILS RELATIVE PERCENT: 4 %
WBC # BLD: 5.9 K/UL (ref 3.5–11)

## 2023-12-26 PROCEDURE — 82785 ASSAY OF IGE: CPT

## 2023-12-26 PROCEDURE — 85048 AUTOMATED LEUKOCYTE COUNT: CPT

## 2023-12-26 PROCEDURE — 36415 COLL VENOUS BLD VENIPUNCTURE: CPT

## 2023-12-26 PROCEDURE — 86003 ALLG SPEC IGE CRUDE XTRC EA: CPT

## 2023-12-26 RX ORDER — AMITRIPTYLINE HYDROCHLORIDE 10 MG/1
TABLET, FILM COATED ORAL
Qty: 30 TABLET | Refills: 2 | Status: SHIPPED | OUTPATIENT
Start: 2023-12-26 | End: 2024-03-21

## 2023-12-26 NOTE — TELEPHONE ENCOUNTER
Patient is requesting a refill of diclofenac sodium gel.      Last prescribed 10/3/23  Last visit date: 10/3/23    Primary dx: osteoarthritis of left knee

## 2023-12-28 ENCOUNTER — HOSPITAL ENCOUNTER (OUTPATIENT)
Dept: PHARMACY | Age: 73
Setting detail: THERAPIES SERIES
Discharge: HOME | End: 2023-12-28
Payer: COMMERCIAL

## 2023-12-28 DIAGNOSIS — I82.403 RECURRENT ACUTE DEEP VEIN THROMBOSIS (DVT) OF BOTH LOWER EXTREMITIES (HCC): Primary | Chronic | ICD-10-CM

## 2023-12-28 LAB
INR BLD: 3.9
PROTIME: 46.8 SECONDS

## 2023-12-28 PROCEDURE — 99212 OFFICE O/P EST SF 10 MIN: CPT

## 2023-12-28 PROCEDURE — 85610 PROTHROMBIN TIME: CPT

## 2023-12-28 NOTE — PROGRESS NOTES
Patient seen in person in Medication Management Service. Patient states compliant most of the time with regimen. No bleeding or thromboembolic side effects noted. No significant med or dietary changes. No significant recent illness or disease state changes. PT/INR done via POC meter per protocol. INR was supratherapeutic at 3.9.  (goal 2 - 3)    The last INR was 2.1 with no change made in the warfarin dosage  The patient does complain of body pain of 9.9/10  She took two CBD Gummies yesterday with little relief  Warfarin regimen will be held for one day, then resume 2.5 mg Tues-Thurs-Sat and 5 mg all other days. Will retest in 6 days. Patient understands dosing directions and information discussed. Dosing schedule and follow up appointment given to patient. Progress note routed to referring physicians office. Patient acknowledges working in [address] [place] [address] with Pharmacist as referred by his/her physician/provider. COVID 19 screening completed. At this time patient denies symptoms, recent travel and exposure. Patient educated to screen for temperature and COVID-19 symptoms prior to coming to clinic for next appointment. They are instructed to call the clinic to reschedule if they have any symptoms. Standing order for PT/INR has been placed in preparation to transition to possible remote INR monitoring given efforts to reduce the spread of COVID-19.       For Pharmacy Admin Tracking Only    Intervention Detail: Dose Adjustment: 1, reason: Therapy De-escalation  Total # of Interventions Recommended: 1  Total # of Interventions Accepted: 1  Time Spent (min): 20

## 2023-12-29 LAB
A ALTERNATA IGE QN: 2 KU/L (ref 0–0.34)
A FUMIGATUS IGE QN: <0.1 KU/L (ref 0–0.34)
ALLERGEN BIRCH IGE: <0.1 KU/L (ref 0–0.34)
BERMUDA GRASS IGE QN: 0.47 KU/L (ref 0–0.34)
BOXELDER IGE QN: 0.12 KU/L (ref 0–0.34)
C HERBARUM IGE QN: <0.1 KUL/L (ref 0–0.34)
CALIF WALNUT POLN IGE QN: <0.1 KU/L (ref 0–0.34)
CAT DANDER IGE QN: 0.42 KU/L (ref 0–0.34)
CMN PIGWEED IGE QN: <0.1 KU/L (ref 0–0.34)
COMMON RAGWEED IGE QN: 0.19 KU/L (ref 0–0.34)
COTTONWOOD IGE QN: <0.1 KU/L (ref 0–0.34)
D FARINAE IGE QN: <0.1 KU/L (ref 0–0.34)
D PTERONYSS IGE QN: <0.1 KU/L (ref 0–0.34)
DOG DANDER IGE QN: 0.14 KU/L (ref 0–0.34)
IGE SERPL-ACNC: 270 IU/ML
LONDON PLANE IGE QN: <0.1 KU/L (ref 0–0.34)
M RACEMOSUS IGE QN: <0.1 KU/L (ref 0–0.34)
MOUSE EPITH IGE QN: <0.1 KU/L (ref 0–0.34)
MT JUNIPER IGE QN: <0.1 KU/L (ref 0–0.34)
P NOTATUM IGE QN: <0.1 KU/L (ref 0–0.34)
PECAN/HICK TREE IGE QN: <0.1 KU/L (ref 0–0.34)
ROACH IGE QN: <0.1 KU/L (ref 0–0.34)
SALTWORT IGE QN: <0.1 KU/L (ref 0–0.34)
SHEEP SORREL IGE QN: <0.1 KU/L (ref 0–0.34)
TIMOTHY IGE QN: 5.04 KU/L (ref 0–0.34)
WHITE ASH IGE QN: <0.1 KU/L (ref 0–0.34)
WHITE ELM IGE QN: <0.1 KU/L (ref 0–0.34)
WHITE MULBERRY IGE QN: <0.1 KU/L (ref 0–0.34)
WHITE OAK IGE QN: <0.1 KU/L (ref 0–0.34)

## 2024-01-03 ENCOUNTER — HOSPITAL ENCOUNTER (OUTPATIENT)
Dept: PHARMACY | Age: 74
Setting detail: THERAPIES SERIES
Discharge: HOME | End: 2024-01-03
Payer: COMMERCIAL

## 2024-01-03 ENCOUNTER — APPOINTMENT (OUTPATIENT)
Dept: PHARMACY | Age: 74
End: 2024-01-03
Payer: COMMERCIAL

## 2024-01-03 ENCOUNTER — TELEPHONE (OUTPATIENT)
Dept: PHARMACY | Age: 74
End: 2024-01-03

## 2024-01-03 DIAGNOSIS — I82.403 RECURRENT ACUTE DEEP VEIN THROMBOSIS (DVT) OF BOTH LOWER EXTREMITIES (HCC): Primary | Chronic | ICD-10-CM

## 2024-01-03 LAB
INR BLD: 2.2
PROTIME: 26 SECONDS

## 2024-01-03 PROCEDURE — 99211 OFF/OP EST MAY X REQ PHY/QHP: CPT

## 2024-01-03 PROCEDURE — 85610 PROTHROMBIN TIME: CPT

## 2024-01-03 NOTE — PROGRESS NOTES
Patient seen in person in Medication Management Service.    Patient states compliant all of the time with regimen.    No bleeding or thromboembolic side effects noted.    No significant dietary changes.  Patient has been experiencing more pain, has been taking Tylenol 3 2 tabs twice a day and tramadol for breakthrough pain. Also admitted to taking Ibuprofen.   No significant recent illness or disease state changes.      PT/INR done via POC meter per protocol.  INR was therapeutic at 2.4.  (goal 2 - 3)    Warfarin regimen will be continued at current dose 2.5 mg Tues-Thurs-Sat and 5 mg all other days.   Will retest in 2 week.  Educated patient on being careful with using Ibuprofen and th increased risk of bleeding with that medication class.       Patient understands dosing directions and information discussed.  Dosing schedule and follow up appointment given to patient. Progress note routed to referring physicians office.  Patient acknowledges working in [employer] with Pharmacist as referred by his/her physician/provider.      COVID 19 screening completed.  At this time patient denies symptoms, recent travel and exposure.  Patient educated to screen for temperature and COVID-19 symptoms prior to coming to clinic for next appointment.  They are instructed to call the clinic to reschedule if they have any symptoms.      Standing order for PT/INR has been placed in preparation to transition to possible remote INR monitoring given efforts to reduce the spread of COVID-19.      For Pharmacy Admin Tracking Only    For Pharmacy Admin Tracking Only    Intervention Detail: Adherence Monitorin  Total # of Interventions Recommended: 1  Total # of Interventions Accepted: 1  Time Spent (min): 20    Joann Montana PharmD, [facility] 1/3/2024 2:05 PM

## 2024-01-08 ENCOUNTER — OFFICE VISIT (OUTPATIENT)
Dept: NEUROLOGY | Age: 74
End: 2024-01-08
Payer: COMMERCIAL

## 2024-01-08 VITALS
BODY MASS INDEX: 34.39 KG/M2 | WEIGHT: 214 LBS | SYSTOLIC BLOOD PRESSURE: 131 MMHG | DIASTOLIC BLOOD PRESSURE: 78 MMHG | HEART RATE: 89 BPM | HEIGHT: 66 IN

## 2024-01-08 DIAGNOSIS — G43.109 MIGRAINE WITH AURA AND WITHOUT STATUS MIGRAINOSUS, NOT INTRACTABLE: Primary | ICD-10-CM

## 2024-01-08 DIAGNOSIS — M79.7 FIBROMYALGIA: ICD-10-CM

## 2024-01-08 PROCEDURE — 1036F TOBACCO NON-USER: CPT | Performed by: PSYCHIATRY & NEUROLOGY

## 2024-01-08 PROCEDURE — 1090F PRES/ABSN URINE INCON ASSESS: CPT | Performed by: PSYCHIATRY & NEUROLOGY

## 2024-01-08 PROCEDURE — 99214 OFFICE O/P EST MOD 30 MIN: CPT | Performed by: PSYCHIATRY & NEUROLOGY

## 2024-01-08 PROCEDURE — 3017F COLORECTAL CA SCREEN DOC REV: CPT | Performed by: PSYCHIATRY & NEUROLOGY

## 2024-01-08 PROCEDURE — 3075F SYST BP GE 130 - 139MM HG: CPT | Performed by: PSYCHIATRY & NEUROLOGY

## 2024-01-08 PROCEDURE — G8427 DOCREV CUR MEDS BY ELIG CLIN: HCPCS | Performed by: PSYCHIATRY & NEUROLOGY

## 2024-01-08 PROCEDURE — G8417 CALC BMI ABV UP PARAM F/U: HCPCS | Performed by: PSYCHIATRY & NEUROLOGY

## 2024-01-08 PROCEDURE — G8484 FLU IMMUNIZE NO ADMIN: HCPCS | Performed by: PSYCHIATRY & NEUROLOGY

## 2024-01-08 PROCEDURE — G8399 PT W/DXA RESULTS DOCUMENT: HCPCS | Performed by: PSYCHIATRY & NEUROLOGY

## 2024-01-08 PROCEDURE — 1123F ACP DISCUSS/DSCN MKR DOCD: CPT | Performed by: PSYCHIATRY & NEUROLOGY

## 2024-01-08 PROCEDURE — 3078F DIAST BP <80 MM HG: CPT | Performed by: PSYCHIATRY & NEUROLOGY

## 2024-01-08 ASSESSMENT — ENCOUNTER SYMPTOMS
GASTROINTESTINAL NEGATIVE: 1
EYES NEGATIVE: 1
ALLERGIC/IMMUNOLOGIC NEGATIVE: 1
RESPIRATORY NEGATIVE: 1

## 2024-01-08 NOTE — PROGRESS NOTES
Active problem Transient head pains are felt to be migraine variant with history of migraines . 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 Leg pain is suggestive for lumbar stenosis to undergo MRI lumbar spine . The condition is she reports three transient head pains per week lasting about one minute of grade 3 over 10 . She stopped elavil 10 mg po qhs with daytime hungover  Head CT normal . CRP 3.7. She reports hat he has leg pains when laying in bed at night with pain being in posterior in calves of grade 10 over 10 of less than minute . She has fibromyalgia with diffuse body pain in neck , shoulders , legs and low back on neurontin 800 mg po tid with tylenol wit codeine PRN through PMD  . She did not undergo MRI of lumbar spine . She has been having headaches since childhood . She found in her 30's that these were precipitated by food such as MSG and food coloring ,  tuna and peanut butter . Once she modified her diet headaches got better averaging 2 to 3 times per month . These will be frontal head pressure of grade 4 over 10 using flonase inhaler attenuating headache intensity . She reports that for the past several months she developed sudden headaches that come on abruptly . These will be mostly over frontal and vertex head of stabbing quality grade . These episodes will occur 3 to 4 times per radha of brief duration . She does reports that she may have flashing lights on right visual field of few seconds about once per moth not associated with headache . There is no weakness , numbness or bulbar complaint. . There has been ringing of bilateral ears for the past year . This will fluctuate in intensity sounding like high pitch noise .  There is no hearing loss . She has arthritis with pain in knees hips and low back and fibromyalgia. Testing neuontin 800 mg po tid , tylenol # 3 PRN Head CT normal . CRP 3.7      Past Medical History:   Diagnosis Date    Affective disorder (HCC)     seasonal    Arthritis

## 2024-01-16 ENCOUNTER — APPOINTMENT (OUTPATIENT)
Dept: CT IMAGING | Age: 74
End: 2024-01-16
Payer: COMMERCIAL

## 2024-01-16 ENCOUNTER — HOSPITAL ENCOUNTER (OUTPATIENT)
Age: 74
Setting detail: OBSERVATION
Discharge: HOME | End: 2024-01-18
Attending: EMERGENCY MEDICINE | Admitting: FAMILY MEDICINE
Payer: COMMERCIAL

## 2024-01-16 ENCOUNTER — APPOINTMENT (OUTPATIENT)
Dept: VASCULAR LAB | Age: 74
End: 2024-01-16
Payer: COMMERCIAL

## 2024-01-16 ENCOUNTER — APPOINTMENT (OUTPATIENT)
Dept: GENERAL RADIOLOGY | Age: 74
End: 2024-01-16
Payer: COMMERCIAL

## 2024-01-16 DIAGNOSIS — R42 DIZZINESS: ICD-10-CM

## 2024-01-16 DIAGNOSIS — H81.12 BENIGN PAROXYSMAL POSITIONAL VERTIGO OF LEFT EAR: ICD-10-CM

## 2024-01-16 DIAGNOSIS — U07.1 COVID: Primary | ICD-10-CM

## 2024-01-16 LAB
AMORPH SED URNS QL MICRO: ABNORMAL
ANION GAP SERPL CALCULATED.3IONS-SCNC: 9 MMOL/L (ref 9–17)
BACTERIA URNS QL MICRO: ABNORMAL
BASOPHILS # BLD: 0.05 K/UL (ref 0–0.2)
BASOPHILS NFR BLD: 1 % (ref 0–2)
BILIRUB UR QL STRIP: NEGATIVE
BUN SERPL-MCNC: 12 MG/DL (ref 8–23)
CALCIUM SERPL-MCNC: 9 MG/DL (ref 8.6–10.4)
CHLORIDE SERPL-SCNC: 105 MMOL/L (ref 98–107)
CLARITY UR: CLEAR
CO2 SERPL-SCNC: 26 MMOL/L (ref 20–31)
COLOR UR: YELLOW
CREAT SERPL-MCNC: 0.6 MG/DL (ref 0.5–0.9)
ECHO BSA: 2.07 M2
EOSINOPHIL # BLD: 0.15 K/UL (ref 0–0.4)
EOSINOPHILS RELATIVE PERCENT: 3 % (ref 0–4)
EPI CELLS #/AREA URNS HPF: ABNORMAL /HPF
ERYTHROCYTE [DISTWIDTH] IN BLOOD BY AUTOMATED COUNT: 16.2 % (ref 11.5–14.9)
FLUAV RNA RESP QL NAA+PROBE: NOT DETECTED
FLUBV RNA RESP QL NAA+PROBE: NOT DETECTED
GFR SERPL CREATININE-BSD FRML MDRD: >60 ML/MIN/1.73M2
GLUCOSE SERPL-MCNC: 106 MG/DL (ref 70–99)
GLUCOSE UR STRIP-MCNC: NEGATIVE MG/DL
HCT VFR BLD AUTO: 36.3 % (ref 36–46)
HGB BLD-MCNC: 11.4 G/DL (ref 12–16)
HGB UR QL STRIP.AUTO: ABNORMAL
INR PPP: 4
KETONES UR STRIP-MCNC: NEGATIVE MG/DL
LEUKOCYTE ESTERASE UR QL STRIP: ABNORMAL
LYMPHOCYTES NFR BLD: 1.58 K/UL (ref 1–4.8)
LYMPHOCYTES RELATIVE PERCENT: 31 % (ref 24–44)
MAGNESIUM SERPL-MCNC: 1.8 MG/DL (ref 1.6–2.6)
MCH RBC QN AUTO: 25.1 PG (ref 26–34)
MCHC RBC AUTO-ENTMCNC: 31.4 G/DL (ref 31–37)
MCV RBC AUTO: 79.9 FL (ref 80–100)
MONOCYTES NFR BLD: 0.46 K/UL (ref 0.1–1.3)
MONOCYTES NFR BLD: 9 % (ref 1–7)
MORPHOLOGY: ABNORMAL
NEUTROPHILS NFR BLD: 56 % (ref 36–66)
NEUTS SEG NFR BLD: 2.86 K/UL (ref 1.3–9.1)
NITRITE UR QL STRIP: NEGATIVE
PH UR STRIP: 7 [PH] (ref 5–8)
PLATELET # BLD AUTO: 257 K/UL (ref 150–450)
PMV BLD AUTO: 6.9 FL (ref 6–12)
POTASSIUM SERPL-SCNC: 3.9 MMOL/L (ref 3.7–5.3)
PROT UR STRIP-MCNC: NEGATIVE MG/DL
PROTHROMBIN TIME: 39.8 SEC (ref 11.8–14.6)
RBC # BLD AUTO: 4.54 M/UL (ref 4–5.2)
RBC #/AREA URNS HPF: ABNORMAL /HPF
SARS-COV-2 RNA RESP QL NAA+PROBE: DETECTED
SODIUM SERPL-SCNC: 140 MMOL/L (ref 135–144)
SOURCE: ABNORMAL
SP GR UR STRIP: 1.01 (ref 1–1.03)
SPECIMEN DESCRIPTION: ABNORMAL
TROPONIN I SERPL HS-MCNC: <6 NG/L (ref 0–14)
TSH SERPL DL<=0.05 MIU/L-ACNC: 3.52 UIU/ML (ref 0.3–5)
UROBILINOGEN UR STRIP-ACNC: NORMAL EU/DL (ref 0–1)
WBC #/AREA URNS HPF: ABNORMAL /HPF
WBC OTHER # BLD: 5.1 K/UL (ref 3.5–11)

## 2024-01-16 PROCEDURE — 71046 X-RAY EXAM CHEST 2 VIEWS: CPT

## 2024-01-16 PROCEDURE — G0378 HOSPITAL OBSERVATION PER HR: HCPCS

## 2024-01-16 PROCEDURE — 36415 COLL VENOUS BLD VENIPUNCTURE: CPT

## 2024-01-16 PROCEDURE — 84484 ASSAY OF TROPONIN QUANT: CPT

## 2024-01-16 PROCEDURE — 81001 URINALYSIS AUTO W/SCOPE: CPT

## 2024-01-16 PROCEDURE — 6360000004 HC RX CONTRAST MEDICATION

## 2024-01-16 PROCEDURE — 70450 CT HEAD/BRAIN W/O DYE: CPT

## 2024-01-16 PROCEDURE — 93970 EXTREMITY STUDY: CPT | Performed by: SURGERY

## 2024-01-16 PROCEDURE — 93005 ELECTROCARDIOGRAM TRACING: CPT | Performed by: EMERGENCY MEDICINE

## 2024-01-16 PROCEDURE — 6370000000 HC RX 637 (ALT 250 FOR IP): Performed by: FAMILY MEDICINE

## 2024-01-16 PROCEDURE — 70498 CT ANGIOGRAPHY NECK: CPT

## 2024-01-16 PROCEDURE — 85025 COMPLETE CBC W/AUTO DIFF WBC: CPT

## 2024-01-16 PROCEDURE — 84443 ASSAY THYROID STIM HORMONE: CPT

## 2024-01-16 PROCEDURE — 83735 ASSAY OF MAGNESIUM: CPT

## 2024-01-16 PROCEDURE — 6370000000 HC RX 637 (ALT 250 FOR IP)

## 2024-01-16 PROCEDURE — 80048 BASIC METABOLIC PNL TOTAL CA: CPT

## 2024-01-16 PROCEDURE — 96375 TX/PRO/DX INJ NEW DRUG ADDON: CPT

## 2024-01-16 PROCEDURE — 6360000002 HC RX W HCPCS: Performed by: FAMILY MEDICINE

## 2024-01-16 PROCEDURE — 2580000003 HC RX 258: Performed by: FAMILY MEDICINE

## 2024-01-16 PROCEDURE — 85610 PROTHROMBIN TIME: CPT

## 2024-01-16 PROCEDURE — 96374 THER/PROPH/DIAG INJ IV PUSH: CPT

## 2024-01-16 PROCEDURE — 87636 SARSCOV2 & INF A&B AMP PRB: CPT

## 2024-01-16 PROCEDURE — 93970 EXTREMITY STUDY: CPT

## 2024-01-16 PROCEDURE — 6360000002 HC RX W HCPCS

## 2024-01-16 PROCEDURE — 2580000003 HC RX 258

## 2024-01-16 PROCEDURE — 99285 EMERGENCY DEPT VISIT HI MDM: CPT

## 2024-01-16 RX ORDER — POTASSIUM CHLORIDE 20 MEQ/1
40 TABLET, EXTENDED RELEASE ORAL PRN
Status: DISCONTINUED | OUTPATIENT
Start: 2024-01-16 | End: 2024-01-18 | Stop reason: HOSPADM

## 2024-01-16 RX ORDER — FLUTICASONE PROPIONATE 50 MCG
2 SPRAY, SUSPENSION (ML) NASAL DAILY
Status: DISCONTINUED | OUTPATIENT
Start: 2024-01-17 | End: 2024-01-18 | Stop reason: HOSPADM

## 2024-01-16 RX ORDER — GABAPENTIN 400 MG/1
800 CAPSULE ORAL 3 TIMES DAILY
Status: DISCONTINUED | OUTPATIENT
Start: 2024-01-16 | End: 2024-01-18 | Stop reason: HOSPADM

## 2024-01-16 RX ORDER — SODIUM CHLORIDE 9 MG/ML
INJECTION, SOLUTION INTRAVENOUS ONCE
Status: COMPLETED | OUTPATIENT
Start: 2024-01-16 | End: 2024-01-16

## 2024-01-16 RX ORDER — ACYCLOVIR 200 MG/1
200 CAPSULE ORAL 2 TIMES DAILY
Status: DISCONTINUED | OUTPATIENT
Start: 2024-01-16 | End: 2024-01-18 | Stop reason: HOSPADM

## 2024-01-16 RX ORDER — MONTELUKAST SODIUM 10 MG/1
10 TABLET ORAL NIGHTLY
Status: DISCONTINUED | OUTPATIENT
Start: 2024-01-16 | End: 2024-01-18 | Stop reason: HOSPADM

## 2024-01-16 RX ORDER — ALBUTEROL SULFATE 2.5 MG/3ML
2.5 SOLUTION RESPIRATORY (INHALATION) EVERY 6 HOURS PRN
Status: DISCONTINUED | OUTPATIENT
Start: 2024-01-16 | End: 2024-01-18 | Stop reason: HOSPADM

## 2024-01-16 RX ORDER — SODIUM CHLORIDE 0.9 % (FLUSH) 0.9 %
10 SYRINGE (ML) INJECTION PRN
Status: COMPLETED | OUTPATIENT
Start: 2024-01-16 | End: 2024-01-16

## 2024-01-16 RX ORDER — POLYETHYLENE GLYCOL 3350 17 G/17G
17 POWDER, FOR SOLUTION ORAL DAILY PRN
Status: DISCONTINUED | OUTPATIENT
Start: 2024-01-16 | End: 2024-01-18 | Stop reason: HOSPADM

## 2024-01-16 RX ORDER — ACETAMINOPHEN 325 MG/1
650 TABLET ORAL EVERY 6 HOURS PRN
Status: DISCONTINUED | OUTPATIENT
Start: 2024-01-16 | End: 2024-01-18 | Stop reason: HOSPADM

## 2024-01-16 RX ORDER — BENZOCAINE/MENTHOL 6 MG-10 MG
LOZENGE MUCOUS MEMBRANE 2 TIMES DAILY
Status: DISCONTINUED | OUTPATIENT
Start: 2024-01-16 | End: 2024-01-18 | Stop reason: HOSPADM

## 2024-01-16 RX ORDER — KETOROLAC TROMETHAMINE 30 MG/ML
15 INJECTION, SOLUTION INTRAMUSCULAR; INTRAVENOUS ONCE
Status: COMPLETED | OUTPATIENT
Start: 2024-01-16 | End: 2024-01-16

## 2024-01-16 RX ORDER — AMLODIPINE BESYLATE 10 MG/1
10 TABLET ORAL DAILY
Status: DISCONTINUED | OUTPATIENT
Start: 2024-01-17 | End: 2024-01-18 | Stop reason: HOSPADM

## 2024-01-16 RX ORDER — CETIRIZINE HYDROCHLORIDE 10 MG/1
10 TABLET ORAL DAILY
Status: DISCONTINUED | OUTPATIENT
Start: 2024-01-17 | End: 2024-01-18 | Stop reason: HOSPADM

## 2024-01-16 RX ORDER — IBUPROFEN 200 MG
600 TABLET ORAL EVERY 8 HOURS PRN
Qty: 30 TABLET | Refills: 0 | Status: SHIPPED | OUTPATIENT
Start: 2024-01-16 | End: 2024-01-30 | Stop reason: DRUGHIGH

## 2024-01-16 RX ORDER — BUPROPION HYDROCHLORIDE 150 MG/1
150 TABLET ORAL EVERY MORNING
Status: DISCONTINUED | OUTPATIENT
Start: 2024-01-17 | End: 2024-01-18 | Stop reason: HOSPADM

## 2024-01-16 RX ORDER — NITROGLYCERIN 0.4 MG/1
0.4 TABLET SUBLINGUAL EVERY 5 MIN PRN
Status: DISCONTINUED | OUTPATIENT
Start: 2024-01-16 | End: 2024-01-18 | Stop reason: HOSPADM

## 2024-01-16 RX ORDER — 0.9 % SODIUM CHLORIDE 0.9 %
1000 INTRAVENOUS SOLUTION INTRAVENOUS ONCE
Status: COMPLETED | OUTPATIENT
Start: 2024-01-16 | End: 2024-01-16

## 2024-01-16 RX ORDER — ONDANSETRON 2 MG/ML
4 INJECTION INTRAMUSCULAR; INTRAVENOUS ONCE
Status: COMPLETED | OUTPATIENT
Start: 2024-01-16 | End: 2024-01-16

## 2024-01-16 RX ORDER — ACETAMINOPHEN 650 MG/1
650 SUPPOSITORY RECTAL EVERY 6 HOURS PRN
Status: DISCONTINUED | OUTPATIENT
Start: 2024-01-16 | End: 2024-01-18 | Stop reason: HOSPADM

## 2024-01-16 RX ORDER — ONDANSETRON 4 MG/1
4 TABLET, ORALLY DISINTEGRATING ORAL EVERY 8 HOURS PRN
Status: DISCONTINUED | OUTPATIENT
Start: 2024-01-16 | End: 2024-01-18 | Stop reason: HOSPADM

## 2024-01-16 RX ORDER — SODIUM CHLORIDE 0.9 % (FLUSH) 0.9 %
5-40 SYRINGE (ML) INJECTION EVERY 12 HOURS SCHEDULED
Status: DISCONTINUED | OUTPATIENT
Start: 2024-01-16 | End: 2024-01-18 | Stop reason: HOSPADM

## 2024-01-16 RX ORDER — SODIUM CHLORIDE 9 MG/ML
INJECTION, SOLUTION INTRAVENOUS PRN
Status: DISCONTINUED | OUTPATIENT
Start: 2024-01-16 | End: 2024-01-18 | Stop reason: HOSPADM

## 2024-01-16 RX ORDER — ACETAMINOPHEN 500 MG
1000 TABLET ORAL 3 TIMES DAILY
Qty: 42 TABLET | Refills: 0 | Status: SHIPPED | OUTPATIENT
Start: 2024-01-16 | End: 2024-01-23

## 2024-01-16 RX ORDER — TRAMADOL HYDROCHLORIDE 50 MG/1
50 TABLET ORAL EVERY 12 HOURS PRN
Status: DISCONTINUED | OUTPATIENT
Start: 2024-01-16 | End: 2024-01-18 | Stop reason: HOSPADM

## 2024-01-16 RX ORDER — ONDANSETRON 2 MG/ML
4 INJECTION INTRAMUSCULAR; INTRAVENOUS EVERY 6 HOURS PRN
Status: DISCONTINUED | OUTPATIENT
Start: 2024-01-16 | End: 2024-01-18 | Stop reason: HOSPADM

## 2024-01-16 RX ORDER — BUPROPION HYDROCHLORIDE 300 MG/1
300 TABLET ORAL EVERY MORNING
Status: DISCONTINUED | OUTPATIENT
Start: 2024-01-17 | End: 2024-01-18 | Stop reason: HOSPADM

## 2024-01-16 RX ORDER — ATORVASTATIN CALCIUM 20 MG/1
20 TABLET, FILM COATED ORAL DAILY
Status: DISCONTINUED | OUTPATIENT
Start: 2024-01-17 | End: 2024-01-18 | Stop reason: HOSPADM

## 2024-01-16 RX ORDER — LIDOCAINE 4 G/G
1 PATCH TOPICAL DAILY
Status: DISCONTINUED | OUTPATIENT
Start: 2024-01-17 | End: 2024-01-18 | Stop reason: HOSPADM

## 2024-01-16 RX ORDER — SODIUM CHLORIDE 0.9 % (FLUSH) 0.9 %
5-40 SYRINGE (ML) INJECTION PRN
Status: DISCONTINUED | OUTPATIENT
Start: 2024-01-16 | End: 2024-01-18 | Stop reason: HOSPADM

## 2024-01-16 RX ORDER — BUDESONIDE AND FORMOTEROL FUMARATE DIHYDRATE 160; 4.5 UG/1; UG/1
2 AEROSOL RESPIRATORY (INHALATION)
Status: DISCONTINUED | OUTPATIENT
Start: 2024-01-16 | End: 2024-01-18 | Stop reason: HOSPADM

## 2024-01-16 RX ORDER — AMITRIPTYLINE HYDROCHLORIDE 10 MG/1
10 TABLET, FILM COATED ORAL NIGHTLY
Status: DISCONTINUED | OUTPATIENT
Start: 2024-01-16 | End: 2024-01-18 | Stop reason: HOSPADM

## 2024-01-16 RX ORDER — POTASSIUM CHLORIDE 7.45 MG/ML
10 INJECTION INTRAVENOUS PRN
Status: DISCONTINUED | OUTPATIENT
Start: 2024-01-16 | End: 2024-01-18 | Stop reason: HOSPADM

## 2024-01-16 RX ORDER — HYDROCODONE BITARTRATE AND ACETAMINOPHEN 5; 325 MG/1; MG/1
1 TABLET ORAL EVERY 6 HOURS PRN
Status: DISCONTINUED | OUTPATIENT
Start: 2024-01-16 | End: 2024-01-18 | Stop reason: HOSPADM

## 2024-01-16 RX ORDER — BENZONATATE 100 MG/1
100 CAPSULE ORAL 3 TIMES DAILY PRN
Status: DISCONTINUED | OUTPATIENT
Start: 2024-01-16 | End: 2024-01-18 | Stop reason: HOSPADM

## 2024-01-16 RX ORDER — MECLIZINE HYDROCHLORIDE 25 MG/1
12.5 TABLET ORAL ONCE
Status: COMPLETED | OUTPATIENT
Start: 2024-01-16 | End: 2024-01-16

## 2024-01-16 RX ORDER — AMMONIUM LACTATE 12 G/100G
LOTION TOPICAL PRN
Status: DISCONTINUED | OUTPATIENT
Start: 2024-01-16 | End: 2024-01-18 | Stop reason: HOSPADM

## 2024-01-16 RX ORDER — MAGNESIUM SULFATE HEPTAHYDRATE 40 MG/ML
2000 INJECTION, SOLUTION INTRAVENOUS PRN
Status: DISCONTINUED | OUTPATIENT
Start: 2024-01-16 | End: 2024-01-18 | Stop reason: HOSPADM

## 2024-01-16 RX ORDER — IBUPROFEN 800 MG/1
800 TABLET ORAL EVERY 6 HOURS PRN
Status: DISCONTINUED | OUTPATIENT
Start: 2024-01-16 | End: 2024-01-18 | Stop reason: HOSPADM

## 2024-01-16 RX ORDER — ENOXAPARIN SODIUM 100 MG/ML
40 INJECTION SUBCUTANEOUS DAILY
Status: DISCONTINUED | OUTPATIENT
Start: 2024-01-17 | End: 2024-01-17

## 2024-01-16 RX ADMIN — SODIUM CHLORIDE, PRESERVATIVE FREE 10 ML: 5 INJECTION INTRAVENOUS at 19:32

## 2024-01-16 RX ADMIN — ACYCLOVIR 200 MG: 200 CAPSULE ORAL at 22:14

## 2024-01-16 RX ADMIN — ONDANSETRON 4 MG: 2 INJECTION INTRAMUSCULAR; INTRAVENOUS at 18:53

## 2024-01-16 RX ADMIN — SODIUM CHLORIDE 1000 ML: 9 INJECTION, SOLUTION INTRAVENOUS at 15:46

## 2024-01-16 RX ADMIN — SODIUM CHLORIDE, PRESERVATIVE FREE 10 ML: 5 INJECTION INTRAVENOUS at 22:33

## 2024-01-16 RX ADMIN — MONTELUKAST 10 MG: 10 TABLET, FILM COATED ORAL at 22:13

## 2024-01-16 RX ADMIN — GABAPENTIN 800 MG: 400 CAPSULE ORAL at 22:12

## 2024-01-16 RX ADMIN — SODIUM CHLORIDE: 9 INJECTION, SOLUTION INTRAVENOUS at 19:32

## 2024-01-16 RX ADMIN — KETOROLAC TROMETHAMINE 15 MG: 30 INJECTION INTRAMUSCULAR; INTRAVENOUS at 16:33

## 2024-01-16 RX ADMIN — IOPAMIDOL 75 ML: 755 INJECTION, SOLUTION INTRAVENOUS at 19:32

## 2024-01-16 RX ADMIN — METOPROLOL TARTRATE 25 MG: 25 TABLET, FILM COATED ORAL at 22:13

## 2024-01-16 RX ADMIN — AMITRIPTYLINE HYDROCHLORIDE 10 MG: 10 TABLET, FILM COATED ORAL at 22:14

## 2024-01-16 RX ADMIN — MECLIZINE HYDROCHLORIDE 12.5 MG: 25 TABLET ORAL at 15:46

## 2024-01-16 RX ADMIN — METHYLPREDNISOLONE SODIUM SUCCINATE 40 MG: 40 INJECTION, POWDER, LYOPHILIZED, FOR SOLUTION INTRAMUSCULAR; INTRAVENOUS at 22:26

## 2024-01-16 RX ADMIN — ONDANSETRON 4 MG: 2 INJECTION INTRAMUSCULAR; INTRAVENOUS at 22:20

## 2024-01-16 RX ADMIN — ACETAMINOPHEN 650 MG: 325 TABLET ORAL at 22:12

## 2024-01-16 ASSESSMENT — PAIN - FUNCTIONAL ASSESSMENT: PAIN_FUNCTIONAL_ASSESSMENT: PREVENTS OR INTERFERES SOME ACTIVE ACTIVITIES AND ADLS

## 2024-01-16 ASSESSMENT — LIFESTYLE VARIABLES
HOW MANY STANDARD DRINKS CONTAINING ALCOHOL DO YOU HAVE ON A TYPICAL DAY: PATIENT DOES NOT DRINK
HOW OFTEN DO YOU HAVE A DRINK CONTAINING ALCOHOL: NEVER

## 2024-01-16 ASSESSMENT — PAIN DESCRIPTION - LOCATION: LOCATION: HEAD

## 2024-01-16 ASSESSMENT — PAIN DESCRIPTION - DESCRIPTORS: DESCRIPTORS: DISCOMFORT

## 2024-01-16 ASSESSMENT — PAIN DESCRIPTION - ORIENTATION: ORIENTATION: INNER

## 2024-01-16 ASSESSMENT — PAIN SCALES - GENERAL
PAINLEVEL_OUTOF10: 9
PAINLEVEL_OUTOF10: 0

## 2024-01-16 ASSESSMENT — ENCOUNTER SYMPTOMS: SHORTNESS OF BREATH: 0

## 2024-01-16 NOTE — ED TRIAGE NOTES
Mode of arrival (squad #, walk in, police, etc) : walk-in        Chief complaint(s): dizziness/chronic pain         Arrival Note (brief scenario, treatment PTA, etc).: Pt has experienced dizziness since this morning. Pt reports chronic pain that isn't getting better. Pt reports some depression with this chronic pain.        C= \"Have you ever felt that you should Cut down on your drinking?\"  No  A= \"Have people Annoyed you by criticizing your drinking?\"  No  G= \"Have you ever felt bad or Guilty about your drinking?\"  No  E= \"Have you ever had a drink as an Eye-opener first thing in the morning to steady your nerves or to help a hangover?\"  No      Deferred []      Reason for deferring: N/A    *If yes to two or more: probable alcohol abuse.*   
7

## 2024-01-16 NOTE — DISCHARGE INSTRUCTIONS
You were seen due to concerns of dizziness and bodyaches.  Your test in the emergency department showed that you are positive for COVID.  You will be discharged home with prescriptions for Paxlovid which is an antiviral medication that you should take to help lessen the disease course.  You also be given ibuprofen and Tylenol that you can alternate taking at home every 4 hours for symptoms.  Return to the emergency department for any worsening chest pain, shortness of breath, dizziness or loss of consciousness, other new or concerning symptoms.

## 2024-01-16 NOTE — ED PROVIDER NOTES
[facility] ED  Emergency Department Encounter  Emergency Medicine Resident     Pt Name:Kaylynn Gates  MRN: [id number]  Birthdate 1950  Date of evaluation: 1/16/24  PCP:  Germaine Hdz MD  Note Started: 3:25 PM EST      CHIEF COMPLAINT       Chief Complaint   Patient presents with    Dizziness    Generalized Body Aches    Depression       HISTORY OF PRESENT ILLNESS  (Location/Symptom, Timing/Onset, Context/Setting, Quality, Duration, Modifying Factors, Severity.)      Kaylynn Gates is a 73 y.o. female with a history of hypertension, hyperlipidemia, fibromyalgia, hypothyroidism, chronic pain, arthritis who presents with dizziness and generalized bodyaches.  Patient states this morning she was lying in bed when she turned her head in a certain way and she began to feel very dizzy.  Patient denies room spinning sensation or lightheadedness.  States she feels like she might pass out.  Notes remote history of vertigo, possibly feel similar however she is unsure.  Denies recent head trauma. Denies HA. Also notes diffuse bodyaches.  States she has chronic pain and the pain is worsening today.  Has taken Tylenol at home for this.  No new injury.  Patient does note significant bilateral calf pain and does have a history of DVT.  Patient is taking warfarin.  Patient denies chest pain, shortness of breath, fevers, chills, cough, recent illness.    Nursing report, patient does complain of depression and sadness.  Clarified with patient states she is not suicidal or homicidal or hallucinating.  States she does feel as increased depression because of the increase pain she is on her body recently.    CT head in June of this year    PAST MEDICAL / SURGICAL / SOCIAL / FAMILY HISTORY      has a past medical history of Affective disorder (HCC), Arthritis, DVT (deep venous thrombosis) (HCC), Fibromyalgia, Hyperlipidemia, Hypertension, Pneumonia, Sinusitis, and Thyroid disease.       has a past surgical history that  includes Colonoscopy (8/31/2011); Hysterectomy; Ectopic pregnancy surgery (Left); and Dilation and curettage of uterus.      Social History     Socioeconomic History    Marital status: Single     Spouse name: Not on file    Number of children: 2    Years of education: 15    Highest education level: Not on file   Occupational History    Occupation: disabled      Comment: SSI    Tobacco Use    Smoking status: Never    Smokeless tobacco: Never   Vaping Use    Vaping Use: Never used   Substance and Sexual Activity    Alcohol use: No     Alcohol/week: 0.0 standard drinks of alcohol     Comment: never used     Drug use: No    Sexual activity: Never   Other Topics Concern    Not on file   Social History Narrative    Not on file     Social Determinants of Health     Financial Resource Strain: Low Risk  (2/2/2023)    Overall Financial Resource Strain (CARDIA)     Difficulty of Paying Living Expenses: Not hard at all   Food Insecurity: Not on file (2/2/2023)   Transportation Needs: Unknown (2/2/2023)    PRAPARE - Transportation     Lack of Transportation (Medical): Not on file     Lack of Transportation (Non-Medical): No   Physical Activity: Not on file   Stress: Not on file   Social Connections: Not on file   Intimate Partner Violence: Not on file   Housing Stability: Unknown (2/2/2023)    Housing Stability Vital Sign     Unable to Pay for Housing in the Last Year: Not on file     Number of Places Lived in the Last Year: Not on file     Unstable Housing in the Last Year: No       Family History   Problem Relation Age of Onset    Cervical Cancer Mother     Heart Disease Mother     Heart Disease Father     Breast Cancer Maternal Grandmother 70       Allergies:  Cymbalta [duloxetine hcl], Duloxetine, Lisinopril, and Robaxin [methocarbamol]    Home Medications:  Prior to Admission medications    Medication Sig Start Date End Date Taking? Authorizing Provider   nirmatrelvir/ritonavir 300/100 (PAXLOVID) 20 x 150 MG & 10 x 100MG  without relief of symptoms [TD]   2004 CT head without acute intracranial abnormality [TD]   2005 CTA head and neck without acute abnormality [TD]   2012 Patient reevaluated at bedside, states she still feels severely dizzy and cannot ambulate.  Will plan for admission for likely MRI of her brain and further evaluation [TD]   2026 With Dr. Fernandez who is admitting for Dr. Hdz.  She will admit the patient.  Does note concern that the patient likely cannot have an MRI performed because she has COVID.  This was communicated to the patient [TD]      ED Course User Index  [TD] Mona Sharp DO         CONSULTS:  None    CRITICAL CARE:  There was significant risk of life threatening deterioration of patient's condition requiring my direct management. Critical care time 5 minutes, excluding any documented procedures.    FINAL IMPRESSION      1. COVID    2. Dizziness          DISPOSITION / PLAN     DISPOSITION        PATIENT REFERRED TO:  Germaine Hdz MD  [address] [facility] [address]  [address]  [facility] [address]  [phone]    In 1 week        DISCHARGE MEDICATIONS:  New Prescriptions    ACETAMINOPHEN (TYLENOL) 500 MG TABLET    Take 2 tablets by mouth 3 times daily for 7 days    IBUPROFEN (ADVIL;MOTRIN) 200 MG TABLET    Take 3 tablets by mouth every 8 hours as needed for Pain or Fever    NIRMATRELVIR/RITONAVIR 300/100 (PAXLOVID) 20 X 150 MG & 10 X 100MG TBPK    Take 3 tablets (two 150 mg nirmatrelvir and one 100 mg ritonavir tablets) by mouth every 12 hours for 5 days.       Mona Sharp DO  Emergency Medicine Resident    (Please note that portions of this note were completed with a voice recognition program.  Efforts were made to edit the dictations but occasionally words are mis-transcribed.)

## 2024-01-16 NOTE — ED PROVIDER NOTES
EMERGENCY DEPARTMENT ENCOUNTER   ATTENDING ATTESTATION     Pt Name: Kaylynn Gates  MRN: [id number]  Birthdate 1950  Date of evaluation: 1/16/24       Kaylynn Gates is a 73 y.o. female who presents with Dizziness, Generalized Body Aches, and Depression      MDM: 73-year-old female presents for complaints of bodyaches and dizziness.  On exam patient in no acute distress, vital stable, no neurodeficits on exam, will check labs    Labs reviewed, found to be COVID-positive remaining labs unremarkable, imaging negative, CT CTA negative    Patient reevaluated still reporting significant dizziness and reporting that she is not able to stand due to the dizziness, decision made to admit for further symptom control and MRI    Spoke with Dr. Hdz who accepts admission. Patient demonstrates understanding and agreement with the plan, was given the opportunity to ask questions, and these questions were answered to the best of the provided information at this time. VS stable for transfer to floor.       This dictation was prepared using Dragon Medical voice recognition software.       Vitals:   Vitals:    01/16/24 1845 01/16/24 1949 01/16/24 2117 01/16/24 2212   BP: 124/72 (!) 145/72 (!) 147/95 (!) 147/95   Pulse: 73 82 92 92   Resp: 20 19 20    Temp:   98.1 °F (36.7 °C)    TempSrc:       SpO2: 96% 97% 97%    Weight:       Height:             I personally saw and examined the patient. I have reviewed and agree with the resident's findings, including all diagnostic interpretations and treatment plan as written. I was present for the key portions of any procedures performed and the inclusive time noted for any critical care statement.    Jeanmarie Garcia DO  Attending Emergency Physician           Jeanmarie Garcia DO  01/16/24 7209

## 2024-01-17 PROBLEM — U07.1 COVID-19: Status: ACTIVE | Noted: 2024-01-17

## 2024-01-17 LAB
ANION GAP SERPL CALCULATED.3IONS-SCNC: 12 MMOL/L (ref 9–17)
BASOPHILS # BLD: 0 K/UL (ref 0–0.2)
BASOPHILS NFR BLD: 0 % (ref 0–2)
BUN SERPL-MCNC: 9 MG/DL (ref 8–23)
CALCIUM SERPL-MCNC: 9.2 MG/DL (ref 8.6–10.4)
CHLORIDE SERPL-SCNC: 100 MMOL/L (ref 98–107)
CO2 SERPL-SCNC: 23 MMOL/L (ref 20–31)
CREAT SERPL-MCNC: 0.7 MG/DL (ref 0.5–0.9)
EKG ATRIAL RATE: 75 BPM
EKG P AXIS: 42 DEGREES
EKG P-R INTERVAL: 138 MS
EKG Q-T INTERVAL: 446 MS
EKG QRS DURATION: 148 MS
EKG QTC CALCULATION (BAZETT): 498 MS
EKG R AXIS: 13 DEGREES
EKG T AXIS: 15 DEGREES
EKG VENTRICULAR RATE: 75 BPM
EOSINOPHIL # BLD: 0 K/UL (ref 0–0.4)
EOSINOPHILS RELATIVE PERCENT: 0 % (ref 0–4)
ERYTHROCYTE [DISTWIDTH] IN BLOOD BY AUTOMATED COUNT: 16.4 % (ref 11.5–14.9)
GFR SERPL CREATININE-BSD FRML MDRD: >60 ML/MIN/1.73M2
GLUCOSE SERPL-MCNC: 213 MG/DL (ref 70–99)
HCT VFR BLD AUTO: 37.8 % (ref 36–46)
HGB BLD-MCNC: 11.6 G/DL (ref 12–16)
INR PPP: 3.6
LYMPHOCYTES NFR BLD: 0.69 K/UL (ref 1–4.8)
LYMPHOCYTES RELATIVE PERCENT: 13 % (ref 24–44)
MCH RBC QN AUTO: 25.2 PG (ref 26–34)
MCHC RBC AUTO-ENTMCNC: 30.7 G/DL (ref 31–37)
MCV RBC AUTO: 82.2 FL (ref 80–100)
MONOCYTES NFR BLD: 0.05 K/UL (ref 0.1–1.3)
MONOCYTES NFR BLD: 1 % (ref 1–7)
MORPHOLOGY: ABNORMAL
NEUTROPHILS NFR BLD: 86 % (ref 36–66)
NEUTS SEG NFR BLD: 4.56 K/UL (ref 1.3–9.1)
PLATELET # BLD AUTO: 267 K/UL (ref 150–450)
PMV BLD AUTO: 7.1 FL (ref 6–12)
POTASSIUM SERPL-SCNC: 4.1 MMOL/L (ref 3.7–5.3)
PROTHROMBIN TIME: 36.8 SEC (ref 11.8–14.6)
RBC # BLD AUTO: 4.6 M/UL (ref 4–5.2)
SODIUM SERPL-SCNC: 135 MMOL/L (ref 135–144)
WBC OTHER # BLD: 5.3 K/UL (ref 3.5–11)

## 2024-01-17 PROCEDURE — 99244 OFF/OP CNSLTJ NEW/EST MOD 40: CPT | Performed by: STUDENT IN AN ORGANIZED HEALTH CARE EDUCATION/TRAINING PROGRAM

## 2024-01-17 PROCEDURE — 2580000003 HC RX 258: Performed by: FAMILY MEDICINE

## 2024-01-17 PROCEDURE — 6370000000 HC RX 637 (ALT 250 FOR IP): Performed by: NURSE PRACTITIONER

## 2024-01-17 PROCEDURE — 6360000002 HC RX W HCPCS: Performed by: FAMILY MEDICINE

## 2024-01-17 PROCEDURE — G0378 HOSPITAL OBSERVATION PER HR: HCPCS

## 2024-01-17 PROCEDURE — 6370000000 HC RX 637 (ALT 250 FOR IP): Performed by: FAMILY MEDICINE

## 2024-01-17 PROCEDURE — 94640 AIRWAY INHALATION TREATMENT: CPT

## 2024-01-17 PROCEDURE — 96376 TX/PRO/DX INJ SAME DRUG ADON: CPT

## 2024-01-17 PROCEDURE — 85610 PROTHROMBIN TIME: CPT

## 2024-01-17 PROCEDURE — 85025 COMPLETE CBC W/AUTO DIFF WBC: CPT

## 2024-01-17 PROCEDURE — 99223 1ST HOSP IP/OBS HIGH 75: CPT | Performed by: FAMILY MEDICINE

## 2024-01-17 PROCEDURE — 94761 N-INVAS EAR/PLS OXIMETRY MLT: CPT

## 2024-01-17 PROCEDURE — 80048 BASIC METABOLIC PNL TOTAL CA: CPT

## 2024-01-17 PROCEDURE — 36415 COLL VENOUS BLD VENIPUNCTURE: CPT

## 2024-01-17 PROCEDURE — 93010 ELECTROCARDIOGRAM REPORT: CPT | Performed by: INTERNAL MEDICINE

## 2024-01-17 RX ORDER — DEXTROMETHORPHAN POLISTIREX 30 MG/5ML
30 SUSPENSION ORAL EVERY 12 HOURS SCHEDULED
Status: DISCONTINUED | OUTPATIENT
Start: 2024-01-17 | End: 2024-01-18 | Stop reason: HOSPADM

## 2024-01-17 RX ORDER — GUAIFENESIN 600 MG/1
600 TABLET, EXTENDED RELEASE ORAL 2 TIMES DAILY
Status: DISCONTINUED | OUTPATIENT
Start: 2024-01-17 | End: 2024-01-18 | Stop reason: HOSPADM

## 2024-01-17 RX ADMIN — CETIRIZINE HYDROCHLORIDE 10 MG: 10 TABLET, FILM COATED ORAL at 08:40

## 2024-01-17 RX ADMIN — AMLODIPINE BESYLATE 10 MG: 10 TABLET ORAL at 08:41

## 2024-01-17 RX ADMIN — HYDROCODONE BITARTRATE AND ACETAMINOPHEN 1 TABLET: 5; 325 TABLET ORAL at 11:00

## 2024-01-17 RX ADMIN — METHYLPREDNISOLONE SODIUM SUCCINATE 40 MG: 40 INJECTION, POWDER, LYOPHILIZED, FOR SOLUTION INTRAMUSCULAR; INTRAVENOUS at 20:08

## 2024-01-17 RX ADMIN — ACYCLOVIR 200 MG: 200 CAPSULE ORAL at 20:06

## 2024-01-17 RX ADMIN — GABAPENTIN 800 MG: 400 CAPSULE ORAL at 13:38

## 2024-01-17 RX ADMIN — METHYLPREDNISOLONE SODIUM SUCCINATE 40 MG: 40 INJECTION, POWDER, LYOPHILIZED, FOR SOLUTION INTRAMUSCULAR; INTRAVENOUS at 04:40

## 2024-01-17 RX ADMIN — GUAIFENESIN 600 MG: 600 TABLET, EXTENDED RELEASE ORAL at 13:38

## 2024-01-17 RX ADMIN — ONDANSETRON 4 MG: 2 INJECTION INTRAMUSCULAR; INTRAVENOUS at 04:40

## 2024-01-17 RX ADMIN — METOPROLOL TARTRATE 25 MG: 25 TABLET, FILM COATED ORAL at 08:40

## 2024-01-17 RX ADMIN — GABAPENTIN 800 MG: 400 CAPSULE ORAL at 20:07

## 2024-01-17 RX ADMIN — GABAPENTIN 800 MG: 400 CAPSULE ORAL at 08:40

## 2024-01-17 RX ADMIN — METHYLPREDNISOLONE SODIUM SUCCINATE 40 MG: 40 INJECTION, POWDER, LYOPHILIZED, FOR SOLUTION INTRAMUSCULAR; INTRAVENOUS at 13:37

## 2024-01-17 RX ADMIN — LEVOTHYROXINE SODIUM 137 MCG: 0.03 TABLET ORAL at 06:17

## 2024-01-17 RX ADMIN — BUPROPION HYDROCHLORIDE 150 MG: 150 TABLET, EXTENDED RELEASE ORAL at 08:41

## 2024-01-17 RX ADMIN — GUAIFENESIN 600 MG: 600 TABLET, EXTENDED RELEASE ORAL at 20:07

## 2024-01-17 RX ADMIN — METOPROLOL TARTRATE 25 MG: 25 TABLET, FILM COATED ORAL at 20:14

## 2024-01-17 RX ADMIN — HYDROCODONE BITARTRATE AND ACETAMINOPHEN 1 TABLET: 5; 325 TABLET ORAL at 04:38

## 2024-01-17 RX ADMIN — SODIUM CHLORIDE, PRESERVATIVE FREE 10 ML: 5 INJECTION INTRAVENOUS at 20:08

## 2024-01-17 RX ADMIN — TRAMADOL HYDROCHLORIDE 50 MG: 50 TABLET, COATED ORAL at 08:53

## 2024-01-17 RX ADMIN — BUDESONIDE AND FORMOTEROL FUMARATE DIHYDRATE 2 PUFF: 160; 4.5 AEROSOL RESPIRATORY (INHALATION) at 09:00

## 2024-01-17 RX ADMIN — ATORVASTATIN CALCIUM 20 MG: 20 TABLET, FILM COATED ORAL at 08:41

## 2024-01-17 RX ADMIN — ONDANSETRON 4 MG: 2 INJECTION INTRAMUSCULAR; INTRAVENOUS at 11:00

## 2024-01-17 RX ADMIN — AMITRIPTYLINE HYDROCHLORIDE 10 MG: 10 TABLET, FILM COATED ORAL at 20:06

## 2024-01-17 RX ADMIN — SODIUM CHLORIDE, PRESERVATIVE FREE 10 ML: 5 INJECTION INTRAVENOUS at 08:41

## 2024-01-17 RX ADMIN — ACYCLOVIR 200 MG: 200 CAPSULE ORAL at 08:40

## 2024-01-17 RX ADMIN — MONTELUKAST 10 MG: 10 TABLET, FILM COATED ORAL at 20:08

## 2024-01-17 RX ADMIN — FLUTICASONE PROPIONATE 2 SPRAY: 50 SPRAY, METERED NASAL at 08:40

## 2024-01-17 RX ADMIN — BUDESONIDE AND FORMOTEROL FUMARATE DIHYDRATE 2 PUFF: 160; 4.5 AEROSOL RESPIRATORY (INHALATION) at 20:17

## 2024-01-17 RX ADMIN — BUPROPION HYDROCHLORIDE 300 MG: 300 TABLET, FILM COATED, EXTENDED RELEASE ORAL at 08:40

## 2024-01-17 RX ADMIN — Medication 30 MG: at 20:07

## 2024-01-17 RX ADMIN — HYDROCORTISONE: 0.01 CREAM TOPICAL at 08:40

## 2024-01-17 ASSESSMENT — PAIN SCALES - GENERAL
PAINLEVEL_OUTOF10: 9
PAINLEVEL_OUTOF10: 8
PAINLEVEL_OUTOF10: 8
PAINLEVEL_OUTOF10: 4
PAINLEVEL_OUTOF10: 6

## 2024-01-17 ASSESSMENT — PAIN DESCRIPTION - DESCRIPTORS
DESCRIPTORS: ACHING;DISCOMFORT
DESCRIPTORS: ACHING
DESCRIPTORS: ACHING

## 2024-01-17 ASSESSMENT — PAIN DESCRIPTION - ORIENTATION: ORIENTATION: RIGHT;LEFT

## 2024-01-17 ASSESSMENT — PAIN DESCRIPTION - LOCATION
LOCATION: GENERALIZED
LOCATION: GENERALIZED
LOCATION: HEAD

## 2024-01-17 ASSESSMENT — PAIN - FUNCTIONAL ASSESSMENT: PAIN_FUNCTIONAL_ASSESSMENT: PREVENTS OR INTERFERES SOME ACTIVE ACTIVITIES AND ADLS

## 2024-01-17 NOTE — PLAN OF CARE
Problem: Discharge Planning  Goal: Discharge to home or other facility with appropriate resources  1/17/2024 1449 by Maddie Schneider RN  Outcome: Progressing  1/17/2024 0753 by Oppenheim, Evan, RN  Outcome: Progressing  1/17/2024 0229 by Oppenheim, Evan, RN  Outcome: Progressing  Flowsheets (Taken 1/16/2024 2119)  Discharge to home or other facility with appropriate resources: Identify barriers to discharge with patient and caregiver     Problem: ABCDS Injury Assessment  Goal: Absence of physical injury  1/17/2024 1449 by Maddie Schneider RN  Outcome: Progressing  1/17/2024 0753 by Oppenheim, Evan, RN  Outcome: Progressing  1/17/2024 0229 by Oppenheim, Evan, RN  Outcome: Progressing     Problem: Pain  Goal: Verbalizes/displays adequate comfort level or baseline comfort level  1/17/2024 1449 by Maddie Schneider RN  Outcome: Progressing  1/17/2024 0753 by Oppenheim, Evan, RN  Outcome: Progressing     Problem: Safety - Adult  Goal: Free from fall injury  Outcome: Progressing

## 2024-01-17 NOTE — CARE COORDINATION
Case Management Assessment  Initial Evaluation    Date/Time of Evaluation: 1/17/2024 10:59 AM  Assessment Completed by: Tasha Wills RN    If patient is discharged prior to next notation, then this note serves as note for discharge by case management.    Patient Name: Kaylynn Gates                   YOB: 1950  Diagnosis: Dizziness [R42]  Vertigo [R42]  COVID [U07.1]                   Date / Time: 1/16/2024  3:13 PM    Patient Admission Status: Observation   Readmission Risk (Low < 19, Mod (19-27), High > 27): Readmission Risk Score: 10.1    Current PCP: Germaine Hdz MD  PCP verified by CM? Yes    Chart Reviewed: Yes      History Provided by: Patient  Patient Orientation: Alert and Oriented    Patient Cognition: Alert    Hospitalization in the last 30 days (Readmission):  No    If yes, Readmission Assessment in CM Navigator will be completed.    Advance Directives:      Code Status: Full Code   Patient's Primary Decision Maker is: Legal Next of Kin    Primary Decision Maker: Caren Gates - Child - [phone]    Primary Decision Maker: Carlos Miramontes - Child - [phone]    Supplemental (Other) Decision Maker: Rupert Uriarte - Brother/Sister - [phone]    Discharge Planning:    Patient lives with: Alone Type of Home: House  Primary Care Giver: Self  Patient Support Systems include: Family Members   Current Financial resources: Medicaid  Current community resources: None  Current services prior to admission: Durable Medical Equipment, Oxygen Therapy            Current DME: Walker, Shower Chair, Oxygen Therapy (Comment)            Type of Home Care services:  None    ADLS  Prior functional level: Independent in ADLs/IADLs  Current functional level: Independent in ADLs/IADLs    PT AM-PAC:   /24  OT AM-PAC:   /24    Family can provide assistance at DC: Yes  Would you like Case Management to discuss the discharge plan with any other family members/significant others, and if so, who?  No  Plans to Return to Present Housing: Yes  Other Identified Issues/Barriers to RETURNING to current housing: NA  Potential Assistance needed at discharge: N/A            Potential DME:    Patient expects to discharge to: Apartment  Plan for transportation at discharge: Family    Financial    Payor: [employer] / Plan: [employer] [place] MEDICAID / Product Type: *No Product type* /     Does insurance require precert for SNF: Yes    Potential assistance Purchasing Medications: No  Meds-to-Beds request:        [facility] [place], [place] - [address] - P [phone] - F [phone]  [address] [place] [address] A  [facility] [address]  Phone: [phone] Fax: [phone]    [facility] - [facility] [address] [phone] - F [phone]  [address] [facility] [address]  Phone: [phone] Fax: [phone]      Notes:    Factors facilitating achievement of predicted outcomes: Family support, Friend support, Motivated, Cooperative, and Pleasant    Barriers to discharge: Dizziness and pain.    Additional Case Management Notes: The patient is from a senior living apartment alone, independent, family transports.    DME: Walker, GB, SC, O2 [facility] ([facility]).    VNS: Denies.    Covid+ on room air, afebrile. Pulmonology and neurology consulted. IV solu-medrol 40 Q8H.     The Plan for Transition of Care is related to the following treatment goals of Dizziness [R42]  Vertigo [R42]  COVID [U07.1]    IF APPLICABLE: The Patient and/or patient representative Kaylynn and her family were provided with a choice of provider and agrees with the discharge plan. Freedom of choice list with basic dialogue that supports the patient's individualized plan of care/goals and shares the quality data associated with the providers was provided to: Patient   Patient Representative Name:       The Patient and/or Patient Representative Agree with the Discharge Plan? Yes    Tasha Wills, RN  Case Management  Department  Ph: [phone] Fax: [phone]

## 2024-01-17 NOTE — PROGRESS NOTES
Pharmacy Note  Warfarin Consult    Kaylynn Gates is a 73 y.o. female for whom pharmacy has been consulted to manage warfarin therapy.     Physician: Jim  Warfarin dose PTA: 2.5 mg Tues-Thurs-Sat and 5 mg all other days   Indication: DVT  Goal INR: 2-3     Past Medical History:   Diagnosis Date    Affective disorder (HCC)     seasonal    Arthritis     shoulders and lower back    DVT (deep venous thrombosis) (HCC)     has history in R leg X 4    Fibromyalgia     Hyperlipidemia     Hypertension     Pneumonia     Sinusitis     Thyroid disease     hypothyroidsm                No results for input(s): \"INR\" in the last 72 hours.  Recent Labs     01/16/24  1540   HGB 11.4*   HCT 36.3          Current warfarin drug-drug interactions: amitriptyline, atorvastatin, solumedrol, levothyroxine      Date             INR        Dose   1/16/2024            4.0     will be held.       Daily PT/INR while inpatient.     INR supra-therapeutic and dose will be held on 1/16/2024.     Thank you for the consult.  Will continue to follow.       Emmanuel Alberto, Pharm.D.   [facility] in-patient pharmacy

## 2024-01-17 NOTE — PROGRESS NOTES
Pt settled into room 2036. Report received from ED, home medications reconciled, and head-to-toe complete.

## 2024-01-17 NOTE — PROGRESS NOTES
Pharmacy Note  Warfarin Consult follow-up      Recent Labs     01/16/24  2235 01/17/24  0600   INR 4.0 3.6     Recent Labs     01/16/24  1540 01/17/24  0600   HGB 11.4* 11.6*   HCT 36.3 37.8    267       Significant Drug-Drug Interactions:  New warfarin drug-drug interactions: medrol  Discontinued drug-drug interactions: none  Current warfarin drug-drug interactions: wellbutrin      Date             INR        Dose given previous day  Dose scheduled for today  1/17/2024            3.6 (goal 2-3)       unknown           Hold     Notes:                     Daily PT/INR while inpatient.     Endy Samuel PharmD, BCPS  1/17/2024 8:27 AM

## 2024-01-17 NOTE — PLAN OF CARE
Problem: Discharge Planning  Goal: Discharge to home or other facility with appropriate resources  Outcome: Progressing  Flowsheets (Taken 1/16/2024 2119)  Discharge to home or other facility with appropriate resources: Identify barriers to discharge with patient and caregiver     Problem: ABCDS Injury Assessment  Goal: Absence of physical injury  Outcome: Progressing

## 2024-01-17 NOTE — PLAN OF CARE
Problem: Discharge Planning  Goal: Discharge to home or other facility with appropriate resources  1/17/2024 0753 by Oppenheim, Evan, RN  Outcome: Progressing  1/17/2024 0229 by Oppenheim, Evan, RN  Outcome: Progressing  Flowsheets (Taken 1/16/2024 2119)  Discharge to home or other facility with appropriate resources: Identify barriers to discharge with patient and caregiver     Problem: ABCDS Injury Assessment  Goal: Absence of physical injury  1/17/2024 0753 by Oppenheim, Evan, RN  Outcome: Progressing  1/17/2024 0229 by Oppenheim, Evan, RN  Outcome: Progressing     Problem: Pain  Goal: Verbalizes/displays adequate comfort level or baseline comfort level  Outcome: Progressing

## 2024-01-17 NOTE — CONSULTS
[facility] PULMONARY, CRITICAL CARE & SLEEP   Dario Mckinley MD/ Darnell Corey MD/ Flo Niño MD/ Dr Sage SANDY AGACNP-BC NP-C  Karen Alaniz APRN NP-C  Aimee SANDY NP-C   CONSULT NOTE      Date of Admission: 1/16/2024  3:13 PM    Chief complaint: Dizziness    Referring Physician: * No referring provider recorded for this case *  PCP: Germaine Hdz MD     History of Present Illness: Kaylynn Gates is a 73 y.o. female with prior history of asthma and recurrent PE/DVT.  She is a patient of Dr. Bedolla and is normally maintained on Advair 500/50, Singulair and albuterol as needed.  In the past she has had elevated eosinophil count and IgE level.  Asthma has been relatively well-controlled recently.  She has a history of chronic pain and fibromyalgia.  She is a never smoker.  In terms of recurrent PEs she is maintained on warfarin.    She tells me she did have some nasal congestion starting around Sunday.  She reports a mild increase in shortness of breath with exertion.  She tells me she felt the congestion was secondary to the way her heat works in her apartment.  She did not do any home testing.  She states yesterday morning she started with some dizziness.  She states the dizziness increased with head movement and improved with laying still.  She reported some nausea with the dizziness.  She denied any headache or focal weakness.  She was brought to the emergency room for evaluation.  CT head with no acute findings, CTA head and neck without acute findings, chest x-ray with no acute findings.  She was given a dose of steroids.  COVID testing was completed and positive.  She has been vaccinated including booster.  She has not required any oxygen and is currently 94% on room air.  Lab work showed normal white blood cell count of 5.3, INR initially elevated at 4.0 with repeat 3.6 today.  Renal labs were normal.  She was complaining of some left calf pain and  MSN  [facility]    Electronically signed by VIKA Nice - ROBERTO on 01/17/24

## 2024-01-17 NOTE — H&P
Family Medicine Admit Note    PCP: Germaine Hdz MD    Date of Admission: 1/16/2024    Date of Service: Pt seen/examined on 1/1/2024 and Admitted to observation     Chief Complaint:  vertigo      History Of Present Illness:   The patient is a 73 y.o. female who presents to [facility] with dizziness that started on the day before presentation when she turned over in bed and felt everything spin. Since then, she has been constantly dizzy. She is apprehensive to move her head b/c of the dizziness.     Past Medical History:        Diagnosis Date    Affective disorder (HCC)     seasonal    Arthritis     shoulders and lower back    DVT (deep venous thrombosis) ([facility])     has history in R leg X 4    Fibromyalgia     Hyperlipidemia     Hypertension     Pneumonia     Sinusitis     Thyroid disease     hypothyroidsm       Past Surgical History:        Procedure Laterality Date    COLONOSCOPY  8/31/2011    internal hemorrhoides    DILATION AND CURETTAGE OF UTERUS      ECTOPIC PREGNANCY SURGERY Left     HYSTERECTOMY (CERVIX STATUS UNKNOWN)         Medications Prior to Admission:    Prior to Admission medications    Medication Sig Start Date End Date Taking? Authorizing Provider   nirmatrelvir/ritonavir 300/100 (PAXLOVID) 20 x 150 MG & 10 x 100MG TBPK Take 3 tablets (two 150 mg nirmatrelvir and one 100 mg ritonavir tablets) by mouth every 12 hours for 5 days. 1/16/24 1/21/24 Yes Mona Sharp,    acetaminophen (TYLENOL) 500 MG tablet Take 2 tablets by mouth 3 times daily for 7 days 1/16/24 1/23/24 Yes Mona Sharp, DO   ibuprofen (ADVIL;MOTRIN) 200 MG tablet Take 3 tablets by mouth every 8 hours as needed for Pain or Fever 1/16/24 1/23/24 Yes Mona Sharp, DO   diclofenac sodium (VOLTAREN) 1 % GEL APPLY 4 GRAMS FOUR TIMES DAILY 12/27/23   Chin [place] PA   amitriptyline (ELAVIL) 10 MG tablet TAKE 1 TABLET BY MOUTH AT BEDTIME 12/26/23 3/21/24  Giovany Masters MD   albuterol sulfate HFA

## 2024-01-18 VITALS
TEMPERATURE: 97.7 F | RESPIRATION RATE: 18 BRPM | SYSTOLIC BLOOD PRESSURE: 112 MMHG | HEIGHT: 65 IN | DIASTOLIC BLOOD PRESSURE: 54 MMHG | OXYGEN SATURATION: 97 % | HEART RATE: 83 BPM | BODY MASS INDEX: 34.32 KG/M2 | WEIGHT: 206 LBS

## 2024-01-18 PROBLEM — H81.12 BENIGN PAROXYSMAL POSITIONAL VERTIGO OF LEFT EAR: Status: ACTIVE | Noted: 2024-01-18

## 2024-01-18 LAB
INR PPP: 2.9
PROTHROMBIN TIME: 30.7 SEC (ref 11.8–14.6)

## 2024-01-18 PROCEDURE — 36415 COLL VENOUS BLD VENIPUNCTURE: CPT

## 2024-01-18 PROCEDURE — 96376 TX/PRO/DX INJ SAME DRUG ADON: CPT

## 2024-01-18 PROCEDURE — G0378 HOSPITAL OBSERVATION PER HR: HCPCS

## 2024-01-18 PROCEDURE — 85610 PROTHROMBIN TIME: CPT

## 2024-01-18 PROCEDURE — 99239 HOSP IP/OBS DSCHRG MGMT >30: CPT | Performed by: FAMILY MEDICINE

## 2024-01-18 PROCEDURE — 2580000003 HC RX 258: Performed by: FAMILY MEDICINE

## 2024-01-18 PROCEDURE — 94640 AIRWAY INHALATION TREATMENT: CPT

## 2024-01-18 PROCEDURE — 6370000000 HC RX 637 (ALT 250 FOR IP): Performed by: FAMILY MEDICINE

## 2024-01-18 PROCEDURE — 6360000002 HC RX W HCPCS: Performed by: FAMILY MEDICINE

## 2024-01-18 PROCEDURE — 6370000000 HC RX 637 (ALT 250 FOR IP): Performed by: NURSE PRACTITIONER

## 2024-01-18 PROCEDURE — 94761 N-INVAS EAR/PLS OXIMETRY MLT: CPT

## 2024-01-18 RX ORDER — GUAIFENESIN 600 MG/1
600 TABLET, EXTENDED RELEASE ORAL 2 TIMES DAILY
Qty: 20 TABLET | Refills: 0 | Status: SHIPPED | OUTPATIENT
Start: 2024-01-18

## 2024-01-18 RX ORDER — WARFARIN SODIUM 2.5 MG/1
2.5 TABLET ORAL SEE ADMIN INSTRUCTIONS
Qty: 1 TABLET | Refills: 0
Start: 2024-01-18 | End: 2024-01-30 | Stop reason: DRUGHIGH

## 2024-01-18 RX ORDER — DEXTROMETHORPHAN POLISTIREX 30 MG/5ML
30 SUSPENSION ORAL EVERY 12 HOURS SCHEDULED
Qty: 100 ML | Refills: 0 | Status: SHIPPED | OUTPATIENT
Start: 2024-01-18 | End: 2024-01-28

## 2024-01-18 RX ORDER — WARFARIN SODIUM 2.5 MG/1
1.25 TABLET ORAL
Status: DISCONTINUED | OUTPATIENT
Start: 2024-01-18 | End: 2024-01-18 | Stop reason: HOSPADM

## 2024-01-18 RX ADMIN — METHYLPREDNISOLONE SODIUM SUCCINATE 40 MG: 40 INJECTION, POWDER, LYOPHILIZED, FOR SOLUTION INTRAMUSCULAR; INTRAVENOUS at 06:19

## 2024-01-18 RX ADMIN — Medication 30 MG: at 10:41

## 2024-01-18 RX ADMIN — ATORVASTATIN CALCIUM 20 MG: 20 TABLET, FILM COATED ORAL at 10:39

## 2024-01-18 RX ADMIN — HYDROCORTISONE: 0.01 CREAM TOPICAL at 10:28

## 2024-01-18 RX ADMIN — HYDROCODONE BITARTRATE AND ACETAMINOPHEN 1 TABLET: 5; 325 TABLET ORAL at 10:26

## 2024-01-18 RX ADMIN — AMLODIPINE BESYLATE 10 MG: 10 TABLET ORAL at 10:38

## 2024-01-18 RX ADMIN — GABAPENTIN 800 MG: 400 CAPSULE ORAL at 14:46

## 2024-01-18 RX ADMIN — METOPROLOL TARTRATE 25 MG: 25 TABLET, FILM COATED ORAL at 10:42

## 2024-01-18 RX ADMIN — METHYLPREDNISOLONE SODIUM SUCCINATE 40 MG: 40 INJECTION, POWDER, LYOPHILIZED, FOR SOLUTION INTRAMUSCULAR; INTRAVENOUS at 14:45

## 2024-01-18 RX ADMIN — CETIRIZINE HYDROCHLORIDE 10 MG: 10 TABLET, FILM COATED ORAL at 10:40

## 2024-01-18 RX ADMIN — ACYCLOVIR 200 MG: 200 CAPSULE ORAL at 10:38

## 2024-01-18 RX ADMIN — GABAPENTIN 800 MG: 400 CAPSULE ORAL at 10:42

## 2024-01-18 RX ADMIN — BUDESONIDE AND FORMOTEROL FUMARATE DIHYDRATE 2 PUFF: 160; 4.5 AEROSOL RESPIRATORY (INHALATION) at 07:36

## 2024-01-18 RX ADMIN — BUPROPION HYDROCHLORIDE 300 MG: 300 TABLET, FILM COATED, EXTENDED RELEASE ORAL at 10:40

## 2024-01-18 RX ADMIN — BUPROPION HYDROCHLORIDE 150 MG: 150 TABLET, EXTENDED RELEASE ORAL at 10:40

## 2024-01-18 RX ADMIN — LEVOTHYROXINE SODIUM 137 MCG: 0.03 TABLET ORAL at 06:19

## 2024-01-18 RX ADMIN — ONDANSETRON 4 MG: 2 INJECTION INTRAMUSCULAR; INTRAVENOUS at 04:04

## 2024-01-18 RX ADMIN — SODIUM CHLORIDE, PRESERVATIVE FREE 10 ML: 5 INJECTION INTRAVENOUS at 10:43

## 2024-01-18 RX ADMIN — GUAIFENESIN 600 MG: 600 TABLET, EXTENDED RELEASE ORAL at 10:42

## 2024-01-18 RX ADMIN — FLUTICASONE PROPIONATE 2 SPRAY: 50 SPRAY, METERED NASAL at 10:41

## 2024-01-18 RX ADMIN — HYDROCODONE BITARTRATE AND ACETAMINOPHEN 1 TABLET: 5; 325 TABLET ORAL at 04:03

## 2024-01-18 ASSESSMENT — PAIN SCALES - GENERAL
PAINLEVEL_OUTOF10: 9
PAINLEVEL_OUTOF10: 9

## 2024-01-18 ASSESSMENT — PAIN DESCRIPTION - LOCATION
LOCATION: GENERALIZED
LOCATION: HEAD

## 2024-01-18 ASSESSMENT — PAIN DESCRIPTION - DESCRIPTORS: DESCRIPTORS: DULL

## 2024-01-18 NOTE — PROGRESS NOTES
Patient refusing bed alarm. Patient was educated that she is a high fall risk with all of her contributing factors, especially her admitting diagnosis of vertigo. Patient still refusing alarm to be turned on but reports she will call out when needing assistance before getting out of bed. Patient can be observed at edge of bed watching TV and eating lunch.

## 2024-01-18 NOTE — PROGRESS NOTES
[facility] PULMONARY,CRITICAL CARE & SLEEP   Dario Mckinley MD/Darnell Corey MD/ Flo Niño MD/Dr Jose Naranjo APRN AGAP-BC, NP-C      Karen Alaniz APRN NP-C     Aimee Carrillo APRN NP-C                                           Pulmonary Progress Note    Patient - Kaylynn Gates   Age - 73 y.o.   - 1950  MRN - [id number]  [id number]  Date of Admission - 2024  3:13 PM    Consulting Service/Physician:       Primary Care Physician: Germaine Hdz MD    SUBJECTIVE:     Chief Complaint:   Chief Complaint   Patient presents with    Dizziness    Generalized Body Aches    Depression     Subjective:    Kaylynn is seen sitting at the edge of the bed.  She remains on room air.  She remains afebrile.  She denies any current cough.  She does continue with some vertigo.  She continues on steroids per primary.  She has been being evaluated by neurology.    VITALS  BP (!) 112/54   Pulse 83   Temp 97.7 °F (36.5 °C)   Resp 18   Ht 1.651 m (5' 5\")   Wt 93.4 kg (206 lb)   SpO2 97%   BMI 34.28 kg/m²   Wt Readings from Last 3 Encounters:   24 93.4 kg (206 lb)   24 97.1 kg (214 lb)   23 95.6 kg (210 lb 12.8 oz)     I/O (24 Hours)  No intake or output data in the 24 hours ending 24 1119  Ventilator:      Exam:   Physical Exam   Constitutional: Elderly female sitting up at the edge of the bed on room air in no distress  HENT: Unremarkable  Head: Normocephalic and atraumatic.   Eyes: EOM are normal. Pupils are equal, round, and reactive to light.   Neck: Neck supple.   Cardiovascular:  Regular rate and rhythm.  Normal heart tones.  No JVD.    Pulmonary/Chest: Respirations even and unlabored, lungs clear posteriorly, no wheezing, on room air with pulse ox 97%    Abdominal: Soft. Bowel sounds are normal.   Musculoskeletal: Normal range of motion.   Neurological: Patient is alert and oriented to person, place, and time.   Skin: Skin is warm and dry. No  rash noted.   Extremities: No edema or discoloration  Infusions:      sodium chloride       Meds:     Current Facility-Administered Medications:     warfarin (COUMADIN) tablet 1.25 mg, 1.25 mg, Oral, Once, Abbey Fernandez MD    guaiFENesin (MUCINEX) extended release tablet 600 mg, 600 mg, Oral, BID, Aimee Carrillo APRN - CNP, 600 mg at 01/18/24 1042    dextromethorphan (DELSYM) 30 MG/5ML extended release liquid 30 mg, 30 mg, Oral, 2 times per day, Aimee Carrillo APRN - CNP, 30 mg at 01/18/24 1041    HYDROcodone-acetaminophen (NORCO) 5-325 MG per tablet 1 tablet, 1 tablet, Oral, Q6H PRN, Abbey Fernandez MD, 1 tablet at 01/18/24 1026    acyclovir (ZOVIRAX) capsule 200 mg, 200 mg, Oral, BID, Abbey Fernandez MD, 200 mg at 01/18/24 1038    budesonide-formoterol (SYMBICORT) 160-4.5 MCG/ACT inhaler 2 puff, 2 puff, Inhalation, BID RT, Germaine Hdz MD, 2 puff at 01/18/24 0736    albuterol (PROVENTIL) (2.5 MG/3ML) 0.083% nebulizer solution 2.5 mg, 2.5 mg, Nebulization, Q6H PRN, Abbey Fernandez MD    amitriptyline (ELAVIL) tablet 10 mg, 10 mg, Oral, Nightly, Abbey Fernandez MD, 10 mg at 01/17/24 2006    amLODIPine (NORVASC) tablet 10 mg, 10 mg, Oral, Daily, Abbey Fernandez MD, 10 mg at 01/18/24 1038    ammonium lactate (LAC-HYDRIN) 12 % lotion, , Topical, PRN, Abbey Fernandez MD    benzonatate (TESSALON) capsule 100 mg, 100 mg, Oral, TID PRN, Abbey Fernandez MD    buPROPion (WELLBUTRIN XL) extended release tablet 150 mg, 150 mg, Oral, QAM, Abbey Fernandez MD, 150 mg at 01/18/24 1040    buPROPion (WELLBUTRIN XL) extended release tablet 300 mg, 300 mg, Oral, QAM, Abbey Fernandez MD, 300 mg at 01/18/24 1040    diclofenac sodium (VOLTAREN) 1 % gel 4 g, 4 g, Topical, 4x Daily PRN, Abbey Fernandez MD    fluticasone (FLONASE) 50 MCG/ACT nasal spray 2 spray, 2 spray, Each Nostril, Daily, Abbey Fernandez MD, 2 spray at 01/18/24 1041    gabapentin  However, inadvertent computerized transcription errors may be present.    Aimee Carrillo, NP-C, MSN  [facility]

## 2024-01-18 NOTE — PLAN OF CARE
Problem: Discharge Planning  Goal: Discharge to home or other facility with appropriate resources  1/18/2024 1737 by Diann Evans RN  Outcome: Adequate for Discharge  1/18/2024 0511 by Jess Young RN  Outcome: Progressing  Flowsheets (Taken 1/18/2024 0511)  Discharge to home or other facility with appropriate resources: Identify barriers to discharge with patient and caregiver     Problem: ABCDS Injury Assessment  Goal: Absence of physical injury  1/18/2024 1737 by Diann Evans RN  Outcome: Adequate for Discharge  1/18/2024 0511 by Jess Young RN  Outcome: Progressing  Flowsheets (Taken 1/18/2024 0511)  Absence of Physical Injury: Implement safety measures based on patient assessment     Problem: Pain  Goal: Verbalizes/displays adequate comfort level or baseline comfort level  1/18/2024 1737 by Diann Evans RN  Outcome: Adequate for Discharge  1/18/2024 0511 by Jess Young RN  Outcome: Progressing  Flowsheets (Taken 1/18/2024 0511)  Verbalizes/displays adequate comfort level or baseline comfort level:   Encourage patient to monitor pain and request assistance   Assess pain using appropriate pain scale   Administer analgesics based on type and severity of pain and evaluate response   Implement non-pharmacological measures as appropriate and evaluate response   Consider cultural and social influences on pain and pain management   Notify Licensed Independent Practitioner if interventions unsuccessful or patient reports new pain     Problem: Safety - Adult  Goal: Free from fall injury  1/18/2024 1737 by Diann Evans RN  Outcome: Adequate for Discharge  1/18/2024 0511 by Jess Young RN  Outcome: Progressing  Flowsheets (Taken 1/18/2024 0511)  Free From Fall Injury: Instruct family/caregiver on patient safety

## 2024-01-18 NOTE — CARE COORDINATION
ONGOING DISCHARGE PLAN:    Patient is alert and oriented x4.    Spoke with patient regarding discharge plan and patient confirms that plan is still to return home to senior living apartment. Awaiting neurology consult/clearance for dizziness.    DME: Walker, [place], [place], O2 at [facility] ([facility]).    VNS: Denies.    Pulmonology on board-Room air, Covid+, solu-medrol 40 Q8H.    INR 2.9, coumadin PTA.     Will continue to follow for additional discharge needs.    If patient is discharged prior to next notation, then this note serves as note for discharge by case management.    Electronically signed by Tasha Wills RN on 1/18/2024 at 9:37 AM

## 2024-01-18 NOTE — PROGRESS NOTES
Per attending Dr. Fernandez, patient does not need taper dose of steroids sent home with discharge today.

## 2024-01-18 NOTE — DISCHARGE SUMMARY
Family Medicine Discharge Summary    Kaylynn Gates  :  1950  MRN:  [id number]    Admit date:  2024  Discharge date:  2024    Admitting Physician:  Abbey Fernandez MD    Discharge Diagnoses:    Patient Active Problem List   Diagnosis    Essential hypertension    Hyperlipidemia    Fibromyalgia    Sinusitis     Hypothyroidism    Acid reflux    RENEA (obstructive sleep apnea)    Depression    Anxiety    PTSD (post-traumatic stress disorder)    Recurrent acute deep vein thrombosis (DVT) of both lower extremities (HCC)    Chronic pain    IGT (impaired glucose tolerance)    Fatigue    Chronic bilateral low back pain without sciatica    Chest pain    Obesity (BMI 30-39.9)    Fall as cause of accidental injury at home as place of occurrence    Chronic pain of both shoulders    Primary osteoarthritis involving multiple joints    Obesity, Class II, BMI 35-39.9    Chronic pulmonary embolism (HCC)    Depression, major, recurrent, moderate (HCC)    Mild persistent asthma without complication    Adult BMI 40.0-44.9 kg/sq m (HCC)    Vertigo    COVID-19    Benign paroxysmal positional vertigo of left ear       Admission Condition:  fair  Discharged Condition:  fair    Hospital Course:   74 yo admitted with vertigo, tested positive for COVID-19 in the EC. She was evaluated by neurology and advised to proceed with vestibular rehab that she was referred to in the past. COVID symptoms were mild, and she did not require oxygen therapy.     Discharge Medications:         Medication List        START taking these medications      acetaminophen 500 MG tablet  Commonly known as: TYLENOL  Take 2 tablets by mouth 3 times daily for 7 days     dextromethorphan 30 MG/5ML extended release liquid  Commonly known as: DELSYM  Take 5 mLs by mouth every 12 hours for 10 days     guaiFENesin 600 MG extended release tablet  Commonly known as: MUCINEX  Take 1 tablet by mouth 2 times daily     nirmatrelvir/ritonavir 300/100 20 x 150 MG &  lactate 12 % lotion  Commonly known as: LAC-HYDRIN  APPLY TO THE AFFECTED AREA(S) TOPICALLY TWICE DAILY     BP Monitor-Stethoscope Kit  1 each by Does not apply route daily as needed (high blood pressure)     * buPROPion 300 MG extended release tablet  Commonly known as: WELLBUTRIN XL     * buPROPion 150 MG extended release tablet  Commonly known as: WELLBUTRIN XL  TAKE 1 TABLET BY MOUTH EVERY MORNING     diclofenac sodium 1 % Gel  Commonly known as: VOLTAREN  APPLY 4 GRAMS FOUR TIMES DAILY     fluticasone 50 MCG/ACT nasal spray  Commonly known as: FLONASE  ADMINISTER 2 SPRAYS INTO EACH NOSTRIL DAILY AS NEEDED FOR ALLERGIES     gabapentin 800 MG tablet  Commonly known as: NEURONTIN  Take 1 tablet by mouth 3 times daily for 90 days.     levothyroxine 137 MCG tablet  Commonly known as: SYNTHROID  Take 1 tablet by mouth Daily     lidocaine 5 %  Commonly known as: LIDODERM  PLACE ONE PATCH ONTO THE SKIN DAILY **12 HOURS ON AND 12 HOURS OFF**     metoprolol tartrate 25 MG tablet  Commonly known as: LOPRESSOR  TAKE 1 TABLET BY MOUTH TWICE DAILY     montelukast 10 MG tablet  Commonly known as: SINGULAIR  TAKE 1 TABLET BY MOUTH NIGHTLY     nitroGLYCERIN 0.4 MG SL tablet  Commonly known as: NITROSTAT  up to max of 3 total doses. If no relief after 1 dose, call 911.     simvastatin 40 MG tablet  Commonly known as: ZOCOR  TAKE 1 TABLET BY MOUTH NIGHTLY     traMADol 50 MG tablet  Commonly known as: ULTRAM  Take 1 tablet by mouth every 12 hours as needed for Pain for up to 30 days. Max Daily Amount: 100 mg     vitamin D 1.25 MG (49932 UT) Caps capsule  Commonly known as: ERGOCALCIFEROL  TAKE 1 CAPSULE BY MOUTH ONCE WEEKLY ON SUNDAY     * warfarin 5 MG tablet  Commonly known as: COUMADIN  Take as directed. If you are unsure how to take this medication, talk to your nurse or doctor.  Original instructions: Take 1 tablet by mouth See Admin Instructions Dosed by [facility] Anticoagulation Service: 2.5 mg Franco-Sat and  5 mg all other days     * warfarin 2.5 MG tablet  Commonly known as: COUMADIN  Take as directed. If you are unsure how to take this medication, talk to your nurse or doctor.  Original instructions: Take 1 tablet by mouth See Admin Instructions Dosed by [facility] Anticoagulation Service: 2.5 mg Tues-Thurs-Sat and 5 mg all other days           * This list has 6 medication(s) that are the same as other medications prescribed for you. Read the directions carefully, and ask your doctor or other care provider to review them with you.                STOP taking these medications      benzonatate 100 MG capsule  Commonly known as: TESSALON     loratadine 10 MG tablet  Commonly known as: CLARITIN               Where to Get Your Medications        These medications were sent to [address] [phone] - F [phone]  [address] [place] [id number]      Phone: [phone]   acetaminophen 500 MG tablet  dextromethorphan 30 MG/5ML extended release liquid  guaiFENesin 600 MG extended release tablet  ibuprofen 200 MG tablet  nirmatrelvir/ritonavir 300/100 20 x 150 MG & 10 x 100MG Tbpk       Information about where to get these medications is not yet available    Ask your nurse or doctor about these medications  warfarin 2.5 MG tablet         Consults:  neurology, pulmonology    Significant Diagnostic Studies:  labs, radiology    Treatments:   supportive therapies    Disposition:   home  Follow up with Germaine Hdz MD in 1 week.    Signed:  Abbey Fernandez MD, FAAFP  1/18/2024, 2:09 PM

## 2024-01-18 NOTE — PROGRESS NOTES
Pharmacy Note  Warfarin Consult follow-up      Recent Labs     01/16/24  2235 01/17/24  0600 01/18/24  0649   INR 4.0 3.6 2.9     Recent Labs     01/16/24  1540 01/17/24  0600   HGB 11.4* 11.6*   HCT 36.3 37.8    267       Significant Drug-Drug Interactions:  New warfarin drug-drug interactions: none  Discontinued drug-drug interactions: none  Current warfarin drug-drug interactions: medrol, wellbutrin      Date             INR        Dose given previous day  Dose scheduled for today  1/18/2024            2.9 (goal 2-3)       Held            1.25 mg      Notes:                     Daily PT/INR while inpatient.     Endy Samuel PharmD, BCPS  1/18/2024 7:45 AM

## 2024-01-18 NOTE — CONSULTS
Medical History:   Diagnosis Date    Affective disorder (HCC)     seasonal    Arthritis     shoulders and lower back    DVT (deep venous thrombosis) (HCC)     has history in R leg X 4    Fibromyalgia     Hyperlipidemia     Hypertension     Pneumonia     Sinusitis     Thyroid disease     hypothyroidsm        Past Surgical History:     Past Surgical History:   Procedure Laterality Date    COLONOSCOPY  8/31/2011    internal hemorrhoides    DILATION AND CURETTAGE OF UTERUS      ECTOPIC PREGNANCY SURGERY Left     HYSTERECTOMY (CERVIX STATUS UNKNOWN)          Medications Prior to Admission:     Prior to Admission medications    Medication Sig Start Date End Date Taking? Authorizing Provider   nirmatrelvir/ritonavir 300/100 (PAXLOVID) 20 x 150 MG & 10 x 100MG TBPK Take 3 tablets (two 150 mg nirmatrelvir and one 100 mg ritonavir tablets) by mouth every 12 hours for 5 days. 1/16/24 1/21/24 Yes Mona Sharp,    acetaminophen (TYLENOL) 500 MG tablet Take 2 tablets by mouth 3 times daily for 7 days 1/16/24 1/23/24 Yes Mona Sharp, DO   ibuprofen (ADVIL;MOTRIN) 200 MG tablet Take 3 tablets by mouth every 8 hours as needed for Pain or Fever 1/16/24 1/23/24 Yes Mona Sharp, DO   diclofenac sodium (VOLTAREN) 1 % GEL APPLY 4 GRAMS FOUR TIMES DAILY 12/27/23   [place] PA   amitriptyline (ELAVIL) 10 MG tablet TAKE 1 TABLET BY MOUTH AT BEDTIME 12/26/23 3/21/24  Giovany Masters MD   albuterol sulfate HFA (PROVENTIL;VENTOLIN;PROAIR) 108 (90 Base) MCG/ACT inhaler INHALE 1 PUFF INTO THE LUNGS EVERY 6 HOURS AS NEEDED FOR WHEEZING 12/26/23   Lynn Schaffer APRN - CNP   acetaminophen-codeine (TYLENOL #3) 300-30 MG per tablet Take 1 tablet by mouth every 6 hours as needed for Pain for up to 30 days. Max Daily Amount: 4 tablets 12/26/23 1/25/24  Lynn Schaffer APRN - CNP   traMADol (ULTRAM) 50 MG tablet Take 1 tablet by mouth every 12 hours as needed for Pain for up to 30 days. Max Daily Amount: 100 mg 12/26/23 1/25/24   for DC home from Neurology POV     Consultations:   IP CONSULT TO INTERNAL MEDICINE  PHARMACY TO DOSE WARFARIN  IP CONSULT TO NEUROLOGY  IP CONSULT TO PULMONOLOGY      Follow-up further recommendations after discussing the case with attending  The plan was discussed with the patient, patient's family and the medical staff.      Patient is admitted as inpatient status because of co-morbidities listed above, severity of signs and symptoms as outlined, requirement for current medical therapies and most importantly because of direct risk to patient if care not provided in a hospital setting.    Sumit Ortiz MD  Attending Physician   1/18/2024  9:45 AM    Copy sent to Germaine Barkley MD

## 2024-01-18 NOTE — PLAN OF CARE
Problem: Discharge Planning  Goal: Discharge to home or other facility with appropriate resources  Outcome: Progressing  Flowsheets (Taken 1/18/2024 0511)  Discharge to home or other facility with appropriate resources: Identify barriers to discharge with patient and caregiver     Problem: ABCDS Injury Assessment  Goal: Absence of physical injury  Outcome: Progressing  Flowsheets (Taken 1/18/2024 0511)  Absence of Physical Injury: Implement safety measures based on patient assessment     Problem: Pain  Goal: Verbalizes/displays adequate comfort level or baseline comfort level  Outcome: Progressing  Flowsheets (Taken 1/18/2024 0511)  Verbalizes/displays adequate comfort level or baseline comfort level:   Encourage patient to monitor pain and request assistance   Assess pain using appropriate pain scale   Administer analgesics based on type and severity of pain and evaluate response   Implement non-pharmacological measures as appropriate and evaluate response   Consider cultural and social influences on pain and pain management   Notify Licensed Independent Practitioner if interventions unsuccessful or patient reports new pain     Problem: Safety - Adult  Goal: Free from fall injury  Outcome: Progressing  Flowsheets (Taken 1/18/2024 0511)  Free From Fall Injury: Instruct family/caregiver on patient safety

## 2024-01-19 ENCOUNTER — TELEPHONE (OUTPATIENT)
Dept: PHARMACY | Age: 74
End: 2024-01-19

## 2024-01-19 NOTE — TELEPHONE ENCOUNTER
Patient discharged from hospital with INR of 2.9 on home dose of warfarin\"  2.5 mg Tues-Thurs-Sat and 5 mg all other days  New Prescriptions:  Refugio Fox  Called to set up next clinic appt  No answer x 3  Unable to leave VM

## 2024-01-22 ENCOUNTER — TELEPHONE (OUTPATIENT)
Dept: PHARMACY | Age: 74
End: 2024-01-22

## 2024-01-22 NOTE — TELEPHONE ENCOUNTER
Attempted to reach patient to set up next INR check with medication management clinic. Phone was busy, could not leave message.    Danielle Smith RPH, PharmD, BCACP  1/22/2024  9:50 AM

## 2024-01-23 ENCOUNTER — TELEPHONE (OUTPATIENT)
Dept: PHARMACY | Age: 74
End: 2024-01-23

## 2024-01-23 NOTE — TELEPHONE ENCOUNTER
Called and spoke with patient - rescheduled appointment for 1/30/24 at 9:10 AM.     Lois Guerrier, PharmD  PGY1 Pharmacy Resident   [facility]

## 2024-01-30 ENCOUNTER — TELEPHONE (OUTPATIENT)
Dept: PHARMACY | Age: 74
End: 2024-01-30

## 2024-01-30 ENCOUNTER — HOSPITAL ENCOUNTER (OUTPATIENT)
Age: 74
Setting detail: OBSERVATION
Discharge: HOME | End: 2024-01-31
Attending: EMERGENCY MEDICINE | Admitting: STUDENT IN AN ORGANIZED HEALTH CARE EDUCATION/TRAINING PROGRAM
Payer: COMMERCIAL

## 2024-01-30 ENCOUNTER — HOSPITAL ENCOUNTER (OUTPATIENT)
Age: 74
Setting detail: SPECIMEN
Discharge: HOME | End: 2024-01-30
Payer: COMMERCIAL

## 2024-01-30 ENCOUNTER — HOSPITAL ENCOUNTER (OUTPATIENT)
Dept: PHARMACY | Age: 74
Setting detail: THERAPIES SERIES
Discharge: HOME | End: 2024-01-30
Payer: COMMERCIAL

## 2024-01-30 ENCOUNTER — APPOINTMENT (OUTPATIENT)
Dept: GENERAL RADIOLOGY | Age: 74
End: 2024-01-30
Attending: EMERGENCY MEDICINE
Payer: COMMERCIAL

## 2024-01-30 DIAGNOSIS — I82.403 RECURRENT ACUTE DEEP VEIN THROMBOSIS (DVT) OF BOTH LOWER EXTREMITIES (HCC): Primary | Chronic | ICD-10-CM

## 2024-01-30 DIAGNOSIS — R07.9 CHEST PAIN, UNSPECIFIED TYPE: Primary | ICD-10-CM

## 2024-01-30 LAB
ALBUMIN SERPL-MCNC: 3.8 G/DL (ref 3.5–5.2)
ALP SERPL-CCNC: 82 U/L (ref 35–104)
ALT SERPL-CCNC: 9 U/L (ref 5–33)
ANION GAP SERPL CALCULATED.3IONS-SCNC: 11 MMOL/L (ref 9–17)
AST SERPL-CCNC: 13 U/L
BASOPHILS # BLD: 0.08 K/UL (ref 0–0.2)
BASOPHILS NFR BLD: 1 % (ref 0–2)
BILIRUB SERPL-MCNC: 0.3 MG/DL (ref 0.3–1.2)
BUN SERPL-MCNC: 13 MG/DL (ref 8–23)
CALCIUM SERPL-MCNC: 9.1 MG/DL (ref 8.6–10.4)
CHLORIDE SERPL-SCNC: 105 MMOL/L (ref 98–107)
CO2 SERPL-SCNC: 25 MMOL/L (ref 20–31)
CREAT SERPL-MCNC: 0.9 MG/DL (ref 0.5–0.9)
EOSINOPHIL # BLD: 0.16 K/UL (ref 0–0.4)
EOSINOPHILS RELATIVE PERCENT: 2 % (ref 0–4)
ERYTHROCYTE [DISTWIDTH] IN BLOOD BY AUTOMATED COUNT: 18.1 % (ref 11.5–14.9)
GFR SERPL CREATININE-BSD FRML MDRD: >60 ML/MIN/1.73M2
GLUCOSE SERPL-MCNC: 127 MG/DL (ref 70–99)
HCT VFR BLD AUTO: 36.9 % (ref 36–46)
HGB BLD-MCNC: 11.5 G/DL (ref 12–16)
INR PPP: 6.5
INR PPP: 6.6
LYMPHOCYTES NFR BLD: 1.64 K/UL (ref 1–4.8)
LYMPHOCYTES RELATIVE PERCENT: 20 % (ref 24–44)
MAGNESIUM SERPL-MCNC: 1.9 MG/DL (ref 1.6–2.6)
MCH RBC QN AUTO: 25 PG (ref 26–34)
MCHC RBC AUTO-ENTMCNC: 31.1 G/DL (ref 31–37)
MCV RBC AUTO: 80.3 FL (ref 80–100)
MONOCYTES NFR BLD: 0.49 K/UL (ref 0.1–1.3)
MONOCYTES NFR BLD: 6 % (ref 1–7)
MORPHOLOGY: ABNORMAL
NEUTROPHILS NFR BLD: 71 % (ref 36–66)
NEUTS SEG NFR BLD: 5.83 K/UL (ref 1.3–9.1)
PLATELET # BLD AUTO: 230 K/UL (ref 150–450)
PMV BLD AUTO: 7.1 FL (ref 6–12)
POTASSIUM SERPL-SCNC: 3.4 MMOL/L (ref 3.7–5.3)
PROT SERPL-MCNC: 6.9 G/DL (ref 6.4–8.3)
PROTHROMBIN TIME: 58.5 SEC (ref 11.8–14.6)
PROTHROMBIN TIME: 59.3 SEC (ref 11.8–14.6)
RBC # BLD AUTO: 4.6 M/UL (ref 4–5.2)
SODIUM SERPL-SCNC: 141 MMOL/L (ref 135–144)
TROPONIN I SERPL HS-MCNC: 7 NG/L (ref 0–14)
WBC OTHER # BLD: 8.2 K/UL (ref 3.5–11)

## 2024-01-30 PROCEDURE — 6370000000 HC RX 637 (ALT 250 FOR IP): Performed by: STUDENT IN AN ORGANIZED HEALTH CARE EDUCATION/TRAINING PROGRAM

## 2024-01-30 PROCEDURE — 85610 PROTHROMBIN TIME: CPT

## 2024-01-30 PROCEDURE — G0378 HOSPITAL OBSERVATION PER HR: HCPCS

## 2024-01-30 PROCEDURE — 2580000003 HC RX 258: Performed by: STUDENT IN AN ORGANIZED HEALTH CARE EDUCATION/TRAINING PROGRAM

## 2024-01-30 PROCEDURE — 6370000000 HC RX 637 (ALT 250 FOR IP): Performed by: EMERGENCY MEDICINE

## 2024-01-30 PROCEDURE — 36415 COLL VENOUS BLD VENIPUNCTURE: CPT

## 2024-01-30 PROCEDURE — 94761 N-INVAS EAR/PLS OXIMETRY MLT: CPT

## 2024-01-30 PROCEDURE — 93005 ELECTROCARDIOGRAM TRACING: CPT | Performed by: EMERGENCY MEDICINE

## 2024-01-30 PROCEDURE — 83735 ASSAY OF MAGNESIUM: CPT

## 2024-01-30 PROCEDURE — 84484 ASSAY OF TROPONIN QUANT: CPT

## 2024-01-30 PROCEDURE — 85025 COMPLETE CBC W/AUTO DIFF WBC: CPT

## 2024-01-30 PROCEDURE — 71045 X-RAY EXAM CHEST 1 VIEW: CPT

## 2024-01-30 PROCEDURE — 94640 AIRWAY INHALATION TREATMENT: CPT

## 2024-01-30 PROCEDURE — 99285 EMERGENCY DEPT VISIT HI MDM: CPT

## 2024-01-30 PROCEDURE — 80053 COMPREHEN METABOLIC PANEL: CPT

## 2024-01-30 PROCEDURE — 99212 OFFICE O/P EST SF 10 MIN: CPT

## 2024-01-30 RX ORDER — ALBUTEROL SULFATE 90 UG/1
2 AEROSOL, METERED RESPIRATORY (INHALATION) PRN
Status: CANCELLED | OUTPATIENT
Start: 2024-01-30 | End: 2024-01-30

## 2024-01-30 RX ORDER — ONDANSETRON 4 MG/1
4 TABLET, ORALLY DISINTEGRATING ORAL EVERY 8 HOURS PRN
Status: DISCONTINUED | OUTPATIENT
Start: 2024-01-30 | End: 2024-01-31 | Stop reason: HOSPADM

## 2024-01-30 RX ORDER — NITROGLYCERIN 0.4 MG/1
0.4 TABLET SUBLINGUAL EVERY 5 MIN PRN
Status: CANCELLED | OUTPATIENT
Start: 2024-01-30 | End: 2024-01-30

## 2024-01-30 RX ORDER — ACYCLOVIR 200 MG/1
200 CAPSULE ORAL 2 TIMES DAILY
Status: DISCONTINUED | OUTPATIENT
Start: 2024-01-30 | End: 2024-01-31 | Stop reason: HOSPADM

## 2024-01-30 RX ORDER — AMLODIPINE BESYLATE 5 MG/1
10 TABLET ORAL DAILY
Status: DISCONTINUED | OUTPATIENT
Start: 2024-01-30 | End: 2024-01-31 | Stop reason: HOSPADM

## 2024-01-30 RX ORDER — BUDESONIDE AND FORMOTEROL FUMARATE DIHYDRATE 160; 4.5 UG/1; UG/1
2 AEROSOL RESPIRATORY (INHALATION)
Status: DISCONTINUED | OUTPATIENT
Start: 2024-01-30 | End: 2024-01-30

## 2024-01-30 RX ORDER — POTASSIUM CHLORIDE 7.45 MG/ML
10 INJECTION INTRAVENOUS PRN
Status: DISCONTINUED | OUTPATIENT
Start: 2024-01-30 | End: 2024-01-31 | Stop reason: HOSPADM

## 2024-01-30 RX ORDER — GABAPENTIN 400 MG/1
800 CAPSULE ORAL 3 TIMES DAILY
Status: DISCONTINUED | OUTPATIENT
Start: 2024-01-30 | End: 2024-01-31 | Stop reason: HOSPADM

## 2024-01-30 RX ORDER — NITROGLYCERIN 0.4 MG/1
0.4 TABLET SUBLINGUAL EVERY 5 MIN PRN
Status: DISCONTINUED | OUTPATIENT
Start: 2024-01-30 | End: 2024-01-31 | Stop reason: HOSPADM

## 2024-01-30 RX ORDER — SODIUM CHLORIDE 0.9 % (FLUSH) 0.9 %
5-40 SYRINGE (ML) INJECTION PRN
Status: CANCELLED | OUTPATIENT
Start: 2024-01-30 | End: 2024-01-30

## 2024-01-30 RX ORDER — ALBUTEROL SULFATE 90 UG/1
1 AEROSOL, METERED RESPIRATORY (INHALATION) EVERY 6 HOURS PRN
Status: DISCONTINUED | OUTPATIENT
Start: 2024-01-30 | End: 2024-01-31 | Stop reason: HOSPADM

## 2024-01-30 RX ORDER — SODIUM CHLORIDE 0.9 % (FLUSH) 0.9 %
5-40 SYRINGE (ML) INJECTION EVERY 12 HOURS SCHEDULED
Status: DISCONTINUED | OUTPATIENT
Start: 2024-01-30 | End: 2024-01-31 | Stop reason: HOSPADM

## 2024-01-30 RX ORDER — SODIUM CHLORIDE 9 MG/ML
500 INJECTION, SOLUTION INTRAVENOUS CONTINUOUS PRN
Status: CANCELLED | OUTPATIENT
Start: 2024-01-30 | End: 2024-01-30

## 2024-01-30 RX ORDER — SODIUM CHLORIDE 0.9 % (FLUSH) 0.9 %
5-40 SYRINGE (ML) INJECTION PRN
Status: DISCONTINUED | OUTPATIENT
Start: 2024-01-30 | End: 2024-01-31 | Stop reason: HOSPADM

## 2024-01-30 RX ORDER — LEVOTHYROXINE SODIUM 137 UG/1
137 TABLET ORAL DAILY
Status: DISCONTINUED | OUTPATIENT
Start: 2024-01-30 | End: 2024-01-31 | Stop reason: HOSPADM

## 2024-01-30 RX ORDER — POLYETHYLENE GLYCOL 3350 17 G/17G
17 POWDER, FOR SOLUTION ORAL DAILY PRN
Status: DISCONTINUED | OUTPATIENT
Start: 2024-01-30 | End: 2024-01-31 | Stop reason: HOSPADM

## 2024-01-30 RX ORDER — MONTELUKAST SODIUM 10 MG/1
10 TABLET ORAL NIGHTLY
Status: DISCONTINUED | OUTPATIENT
Start: 2024-01-30 | End: 2024-01-31 | Stop reason: HOSPADM

## 2024-01-30 RX ORDER — BUPROPION HYDROCHLORIDE 150 MG/1
150 TABLET ORAL EVERY MORNING
Status: DISCONTINUED | OUTPATIENT
Start: 2024-01-31 | End: 2024-01-31 | Stop reason: HOSPADM

## 2024-01-30 RX ORDER — FLUTICASONE PROPIONATE AND SALMETEROL 500; 50 UG/1; UG/1
1 POWDER RESPIRATORY (INHALATION) EVERY 12 HOURS
COMMUNITY

## 2024-01-30 RX ORDER — METOPROLOL TARTRATE 1 MG/ML
5 INJECTION, SOLUTION INTRAVENOUS EVERY 5 MIN PRN
Status: CANCELLED | OUTPATIENT
Start: 2024-01-30 | End: 2024-01-30

## 2024-01-30 RX ORDER — POTASSIUM CHLORIDE 20 MEQ/1
40 TABLET, EXTENDED RELEASE ORAL ONCE
Status: COMPLETED | OUTPATIENT
Start: 2024-01-30 | End: 2024-01-30

## 2024-01-30 RX ORDER — BUPROPION HYDROCHLORIDE 300 MG/1
300 TABLET ORAL EVERY MORNING
Status: DISCONTINUED | OUTPATIENT
Start: 2024-01-31 | End: 2024-01-31 | Stop reason: HOSPADM

## 2024-01-30 RX ORDER — BUDESONIDE AND FORMOTEROL FUMARATE DIHYDRATE 160; 4.5 UG/1; UG/1
2 AEROSOL RESPIRATORY (INHALATION)
Status: DISCONTINUED | OUTPATIENT
Start: 2024-01-30 | End: 2024-01-31 | Stop reason: HOSPADM

## 2024-01-30 RX ORDER — ATORVASTATIN CALCIUM 20 MG/1
20 TABLET, FILM COATED ORAL NIGHTLY
Status: DISCONTINUED | OUTPATIENT
Start: 2024-01-30 | End: 2024-01-31 | Stop reason: HOSPADM

## 2024-01-30 RX ORDER — SODIUM CHLORIDE 9 MG/ML
INJECTION, SOLUTION INTRAVENOUS PRN
Status: DISCONTINUED | OUTPATIENT
Start: 2024-01-30 | End: 2024-01-31 | Stop reason: HOSPADM

## 2024-01-30 RX ORDER — ONDANSETRON 2 MG/ML
4 INJECTION INTRAMUSCULAR; INTRAVENOUS EVERY 6 HOURS PRN
Status: DISCONTINUED | OUTPATIENT
Start: 2024-01-30 | End: 2024-01-31 | Stop reason: HOSPADM

## 2024-01-30 RX ORDER — ACETAMINOPHEN 325 MG/1
650 TABLET ORAL EVERY 6 HOURS PRN
Status: DISCONTINUED | OUTPATIENT
Start: 2024-01-30 | End: 2024-01-31 | Stop reason: HOSPADM

## 2024-01-30 RX ORDER — REGADENOSON 0.08 MG/ML
0.4 INJECTION, SOLUTION INTRAVENOUS
Status: CANCELLED | OUTPATIENT
Start: 2024-01-30

## 2024-01-30 RX ORDER — MAGNESIUM SULFATE HEPTAHYDRATE 40 MG/ML
2000 INJECTION, SOLUTION INTRAVENOUS PRN
Status: DISCONTINUED | OUTPATIENT
Start: 2024-01-30 | End: 2024-01-31 | Stop reason: HOSPADM

## 2024-01-30 RX ORDER — AMINOPHYLLINE 25 MG/ML
50 INJECTION, SOLUTION INTRAVENOUS PRN
Status: CANCELLED | OUTPATIENT
Start: 2024-01-30 | End: 2024-01-30

## 2024-01-30 RX ORDER — GUAIFENESIN 600 MG/1
600 TABLET, EXTENDED RELEASE ORAL 2 TIMES DAILY
Status: DISCONTINUED | OUTPATIENT
Start: 2024-01-30 | End: 2024-01-31 | Stop reason: HOSPADM

## 2024-01-30 RX ORDER — ACETAMINOPHEN 650 MG/1
650 SUPPOSITORY RECTAL EVERY 6 HOURS PRN
Status: DISCONTINUED | OUTPATIENT
Start: 2024-01-30 | End: 2024-01-31 | Stop reason: HOSPADM

## 2024-01-30 RX ORDER — ATROPINE SULFATE 0.1 MG/ML
0.5 INJECTION INTRAVENOUS EVERY 5 MIN PRN
Status: CANCELLED | OUTPATIENT
Start: 2024-01-30 | End: 2024-01-30

## 2024-01-30 RX ORDER — HYDROCODONE BITARTRATE AND ACETAMINOPHEN 5; 325 MG/1; MG/1
1 TABLET ORAL EVERY 6 HOURS PRN
Status: DISCONTINUED | OUTPATIENT
Start: 2024-01-30 | End: 2024-01-31 | Stop reason: HOSPADM

## 2024-01-30 RX ORDER — AMITRIPTYLINE HYDROCHLORIDE 10 MG/1
10 TABLET, FILM COATED ORAL NIGHTLY
Status: DISCONTINUED | OUTPATIENT
Start: 2024-01-30 | End: 2024-01-31 | Stop reason: HOSPADM

## 2024-01-30 RX ADMIN — Medication 10 ML: at 19:04

## 2024-01-30 RX ADMIN — LEVOTHYROXINE SODIUM 137 MCG: 137 TABLET ORAL at 17:43

## 2024-01-30 RX ADMIN — METOPROLOL TARTRATE 25 MG: 25 TABLET, FILM COATED ORAL at 19:11

## 2024-01-30 RX ADMIN — GUAIFENESIN 600 MG: 600 TABLET, EXTENDED RELEASE ORAL at 21:04

## 2024-01-30 RX ADMIN — BUDESONIDE AND FORMOTEROL FUMARATE DIHYDRATE 2 PUFF: 160; 4.5 AEROSOL RESPIRATORY (INHALATION) at 20:43

## 2024-01-30 RX ADMIN — MONTELUKAST 10 MG: 10 TABLET, FILM COATED ORAL at 19:11

## 2024-01-30 RX ADMIN — AMITRIPTYLINE HYDROCHLORIDE 10 MG: 10 TABLET, FILM COATED ORAL at 19:11

## 2024-01-30 RX ADMIN — POTASSIUM CHLORIDE 40 MEQ: 1500 TABLET, EXTENDED RELEASE ORAL at 17:06

## 2024-01-30 RX ADMIN — HYDROCODONE BITARTRATE AND ACETAMINOPHEN 1 TABLET: 5; 325 TABLET ORAL at 21:04

## 2024-01-30 RX ADMIN — ACYCLOVIR 200 MG: 200 CAPSULE ORAL at 21:33

## 2024-01-30 RX ADMIN — GABAPENTIN 800 MG: 400 CAPSULE ORAL at 17:06

## 2024-01-30 RX ADMIN — AMLODIPINE BESYLATE 10 MG: 5 TABLET ORAL at 17:06

## 2024-01-30 RX ADMIN — ATORVASTATIN CALCIUM 20 MG: 20 TABLET, FILM COATED ORAL at 21:04

## 2024-01-30 RX ADMIN — ACETAMINOPHEN 650 MG: 325 TABLET, FILM COATED ORAL at 19:11

## 2024-01-30 ASSESSMENT — LIFESTYLE VARIABLES
HOW OFTEN DO YOU HAVE A DRINK CONTAINING ALCOHOL: NEVER
HOW MANY STANDARD DRINKS CONTAINING ALCOHOL DO YOU HAVE ON A TYPICAL DAY: PATIENT DOES NOT DRINK

## 2024-01-30 NOTE — TELEPHONE ENCOUNTER
Attempted to call patient and let her know that [facility] will pick her up for her appointment with Medication Management on 2/6 at 10:30 AM. Voicemail box full and unable to leave a message at this time.     Lois Guerrier, PharmD  PGY1 Pharmacy Resident   [facility]

## 2024-01-30 NOTE — PROGRESS NOTES
Pharmacy Medication History Note      List of current medications patient is taking is complete.     Source of information: dispense report, [facility] Medication Management Notes    Changes made to medication list:  Medications flagged for removal (include reason, ex. noncompliance):  None     Medications removed (include reason, ex. therapy complete or physician discontinued):  Duplicate warfarin prescription  Duplicate ibuprofen    Medications added/doses adjusted:  Advair changed to 500-50 mcg/act take 1 puff twice daily   Most recent warfarin regimen was 2.5 mg Tues, Thurs, Sat and 5 mg all other days.     Other notes (ex. Recent course of antibiotics, Coumadin dosing):  Per medication management notes, patient was instructed to hold her warfarin for 3 days starting today due to elevated INR. Previous regimen is listed above. Patient recently completed a course of Paxlovid which may have contributed to current elevated INR.   Per [facility], the patient last filled acetaminophen-codeine, gabapentin, and tramadol on 24. Quantity dispensed for acetaminophen-codeine was 120 for 30 days. Quantity dispensed for gabapentin was 90 for 30 days. Quantity dispensed for tramadol was 60 for 30 days.     Denies use of other OTC or herbal medications.      Allergies clarified    Medication list provided to the patient: no  Medication education provided to the patient: none    Prior to Admission Medications   Prescriptions Last Dose Informant Patient Reported? Taking?   Blood Pressure Monitoring (BP MONITOR-STETHOSCOPE) KIT   No No   Si each by Does not apply route daily as needed (high blood pressure)   acetaminophen (TYLENOL) 500 MG tablet   No No   Sig: Take 2 tablets by mouth 3 times daily for 7 days   acetaminophen-codeine (TYLENOL #3) 300-30 MG per tablet   No No   Sig: Take 1 tablet by mouth every 6 hours as needed for Pain for up to 30 days. Max Daily Amount: 4 tablets   acyclovir (ZOVIRAX) 200 MG capsule

## 2024-01-30 NOTE — ED PROVIDER NOTES
(TYLENOL) suppository 650 mg     DISCHARGE PRESCRIPTIONS:  New Prescriptions    No medications on file     PHYSICIAN CONSULTS ORDERED THIS ENCOUNTER:  IP CONSULT TO CARDIOLOGY  IP CONSULT TO FAMILY MEDICINE  IP CONSULT TO CARDIOLOGY  FINAL IMPRESSION      1. Chest pain, unspecified type          DISPOSITION/PLAN   DISPOSITION Admitted 01/30/2024 03:55:27 PM      OUTPATIENT FOLLOW UP THE PATIENT:  No follow-up provider specified.    MD Gali Gonzalez Wesley D, MD  01/30/24 [id number]

## 2024-01-30 NOTE — PROGRESS NOTES
Patient seen in person in Medication Management Service.    Patient states compliant most of the time with regimen.    No bleeding or thromboembolic side effects noted.    No significant med or dietary changes.    No significant recent illness or disease state changes.      PT/INR done via POC meter per protocol.  INR was subtherapeutic at Greater than 9.  (goal 2 - 3)  INR of 6.6 by vein draw at [facility]  The patient has had increased body pain of 9/10 and has been taking Ibuprofen for this    Warfarin regimen will be held for 3 days.  Will retest in 3 days.  The patient was advised of precautions   For an elevated INR    Patient understands dosing directions and information discussed.  Dosing schedule and follow up appointment given to patient. Progress note routed to referring physicians office.  Patient acknowledges working in Consult Agreement with Pharmacist as referred by his/her physician/provider.      COVID 19 screening completed.  At this time patient denies symptoms, recent travel and exposure.  Patient educated to screen for temperature and COVID-19 symptoms prior to coming to clinic for next appointment.  They are instructed to call the clinic to reschedule if they have any symptoms.      Standing order for PT/INR has been placed in preparation to transition to possible remote INR monitoring given efforts to reduce the spread of COVID-19.      For Pharmacy Admin Tracking Only    Intervention Detail: Dose Adjustment: 1, reason: Therapy De-escalation  Total # of Interventions Recommended: 1  Total # of Interventions Accepted: 1  Time Spent (min): 20

## 2024-01-30 NOTE — ED TRIAGE NOTES
Mode of arrival (squad #, walk in, police, etc) : walk in         Chief complaint(s): chest pain         Arrival Note (brief scenario, treatment PTA, etc).: pt was sent by dr contreras. Pt states chest pressure on left side.         C= \"Have you ever felt that you should Cut down on your drinking?\"  No  A= \"Have people Annoyed you by criticizing your drinking?\"  No  G= \"Have you ever felt bad or Guilty about your drinking?\"  No  E= \"Have you ever had a drink as an Eye-opener first thing in the morning to steady your nerves or to help a hangover?\"  No      Deferred []      Reason for deferring: N/A    *If yes to two or more: probable alcohol abuse.*

## 2024-01-31 ENCOUNTER — HOSPITAL ENCOUNTER (OUTPATIENT)
Age: 74
Setting detail: OBSERVATION
Discharge: HOME | End: 2024-02-02
Payer: COMMERCIAL

## 2024-01-31 ENCOUNTER — APPOINTMENT (OUTPATIENT)
Dept: NUCLEAR MEDICINE | Age: 74
End: 2024-01-31
Payer: COMMERCIAL

## 2024-01-31 VITALS
OXYGEN SATURATION: 98 % | WEIGHT: 207 LBS | SYSTOLIC BLOOD PRESSURE: 131 MMHG | TEMPERATURE: 98.4 F | HEART RATE: 80 BPM | DIASTOLIC BLOOD PRESSURE: 66 MMHG | BODY MASS INDEX: 34.49 KG/M2 | RESPIRATION RATE: 18 BRPM | HEIGHT: 65 IN

## 2024-01-31 PROBLEM — R79.1 SUPRATHERAPEUTIC INR: Status: ACTIVE | Noted: 2024-01-31

## 2024-01-31 LAB
ANION GAP SERPL CALCULATED.3IONS-SCNC: 10 MMOL/L (ref 9–17)
BASOPHILS # BLD: 0 K/UL (ref 0–0.2)
BASOPHILS NFR BLD: 0 % (ref 0–2)
BUN SERPL-MCNC: 12 MG/DL (ref 8–23)
CALCIUM SERPL-MCNC: 9.2 MG/DL (ref 8.6–10.4)
CHLORIDE SERPL-SCNC: 106 MMOL/L (ref 98–107)
CO2 SERPL-SCNC: 26 MMOL/L (ref 20–31)
CREAT SERPL-MCNC: 0.7 MG/DL (ref 0.5–0.9)
ECHO BSA: 2.07 M2
EKG ATRIAL RATE: 81 BPM
EKG P AXIS: 13 DEGREES
EKG P-R INTERVAL: 118 MS
EKG Q-T INTERVAL: 432 MS
EKG QRS DURATION: 144 MS
EKG QTC CALCULATION (BAZETT): 501 MS
EKG R AXIS: -6 DEGREES
EKG T AXIS: 16 DEGREES
EKG VENTRICULAR RATE: 81 BPM
EOSINOPHIL # BLD: 0.15 K/UL (ref 0–0.4)
EOSINOPHILS RELATIVE PERCENT: 2 % (ref 0–4)
ERYTHROCYTE [DISTWIDTH] IN BLOOD BY AUTOMATED COUNT: 18.5 % (ref 11.5–14.9)
GFR SERPL CREATININE-BSD FRML MDRD: >60 ML/MIN/1.73M2
GLUCOSE SERPL-MCNC: 90 MG/DL (ref 70–99)
HCT VFR BLD AUTO: 33.7 % (ref 36–46)
HGB BLD-MCNC: 10.7 G/DL (ref 12–16)
INR PPP: 5.4
LYMPHOCYTES NFR BLD: 2.2 K/UL (ref 1–4.8)
LYMPHOCYTES RELATIVE PERCENT: 29 % (ref 24–44)
MCH RBC QN AUTO: 25.5 PG (ref 26–34)
MCHC RBC AUTO-ENTMCNC: 31.6 G/DL (ref 31–37)
MCV RBC AUTO: 80.6 FL (ref 80–100)
MONOCYTES NFR BLD: 0.99 K/UL (ref 0.1–1.3)
MONOCYTES NFR BLD: 13 % (ref 1–7)
MORPHOLOGY: ABNORMAL
MORPHOLOGY: ABNORMAL
NEUTROPHILS NFR BLD: 56 % (ref 36–66)
NEUTS SEG NFR BLD: 4.26 K/UL (ref 1.3–9.1)
NUC STRESS EJECTION FRACTION: 65 %
PLATELET # BLD AUTO: 210 K/UL (ref 150–450)
PMV BLD AUTO: 7 FL (ref 6–12)
POTASSIUM SERPL-SCNC: 3.6 MMOL/L (ref 3.7–5.3)
PROTHROMBIN TIME: 50.5 SEC (ref 11.8–14.6)
RBC # BLD AUTO: 4.18 M/UL (ref 4–5.2)
SODIUM SERPL-SCNC: 142 MMOL/L (ref 135–144)
STRESS BASELINE HR: 68 BPM
STRESS ESTIMATED WORKLOAD: 1 METS
STRESS PEAK DIAS BP: 67 MMHG
STRESS PEAK SYS BP: 150 MMHG
STRESS PERCENT HR ACHIEVED: 60 %
STRESS POST PEAK HR: 90 BPM
STRESS RATE PRESSURE PRODUCT: NORMAL BPM*MMHG
STRESS STAGE RECOVERY 1 BP: NORMAL MMHG
STRESS STAGE RECOVERY 1 DURATION: 1 MIN:SEC
STRESS STAGE RECOVERY 1 HR: 81 BPM
STRESS STAGE RECOVERY 2 BP: NORMAL MMHG
STRESS STAGE RECOVERY 2 DURATION: 6 MIN:SEC
STRESS STAGE RECOVERY 2 HR: 85 BPM
STRESS TARGET HR: 149 BPM
TID: 1.08
WBC OTHER # BLD: 7.6 K/UL (ref 3.5–11)

## 2024-01-31 PROCEDURE — 2580000003 HC RX 258: Performed by: INTERNAL MEDICINE

## 2024-01-31 PROCEDURE — 93010 ELECTROCARDIOGRAM REPORT: CPT | Performed by: INTERNAL MEDICINE

## 2024-01-31 PROCEDURE — 93018 CV STRESS TEST I&R ONLY: CPT | Performed by: RADIOLOGY

## 2024-01-31 PROCEDURE — 94761 N-INVAS EAR/PLS OXIMETRY MLT: CPT

## 2024-01-31 PROCEDURE — 93016 CV STRESS TEST SUPVJ ONLY: CPT | Performed by: RADIOLOGY

## 2024-01-31 PROCEDURE — 93017 CV STRESS TEST TRACING ONLY: CPT

## 2024-01-31 PROCEDURE — 6360000002 HC RX W HCPCS: Performed by: STUDENT IN AN ORGANIZED HEALTH CARE EDUCATION/TRAINING PROGRAM

## 2024-01-31 PROCEDURE — A9500 TC99M SESTAMIBI: HCPCS | Performed by: INTERNAL MEDICINE

## 2024-01-31 PROCEDURE — 85610 PROTHROMBIN TIME: CPT

## 2024-01-31 PROCEDURE — 3430000000 HC RX DIAGNOSTIC RADIOPHARMACEUTICAL: Performed by: INTERNAL MEDICINE

## 2024-01-31 PROCEDURE — G0378 HOSPITAL OBSERVATION PER HR: HCPCS

## 2024-01-31 PROCEDURE — 80048 BASIC METABOLIC PNL TOTAL CA: CPT

## 2024-01-31 PROCEDURE — 78452 HT MUSCLE IMAGE SPECT MULT: CPT

## 2024-01-31 PROCEDURE — 6360000002 HC RX W HCPCS: Performed by: INTERNAL MEDICINE

## 2024-01-31 PROCEDURE — 94640 AIRWAY INHALATION TREATMENT: CPT

## 2024-01-31 PROCEDURE — 96374 THER/PROPH/DIAG INJ IV PUSH: CPT

## 2024-01-31 PROCEDURE — 85025 COMPLETE CBC W/AUTO DIFF WBC: CPT

## 2024-01-31 PROCEDURE — 6370000000 HC RX 637 (ALT 250 FOR IP): Performed by: STUDENT IN AN ORGANIZED HEALTH CARE EDUCATION/TRAINING PROGRAM

## 2024-01-31 PROCEDURE — 99223 1ST HOSP IP/OBS HIGH 75: CPT | Performed by: STUDENT IN AN ORGANIZED HEALTH CARE EDUCATION/TRAINING PROGRAM

## 2024-01-31 PROCEDURE — 36415 COLL VENOUS BLD VENIPUNCTURE: CPT

## 2024-01-31 PROCEDURE — 2580000003 HC RX 258: Performed by: STUDENT IN AN ORGANIZED HEALTH CARE EDUCATION/TRAINING PROGRAM

## 2024-01-31 RX ORDER — SODIUM CHLORIDE 0.9 % (FLUSH) 0.9 %
5-40 SYRINGE (ML) INJECTION PRN
Status: ACTIVE | OUTPATIENT
Start: 2024-01-31 | End: 2024-01-31

## 2024-01-31 RX ORDER — ALBUTEROL SULFATE 90 UG/1
2 AEROSOL, METERED RESPIRATORY (INHALATION) PRN
Status: ACTIVE | OUTPATIENT
Start: 2024-01-31 | End: 2024-01-31

## 2024-01-31 RX ORDER — TETRAKIS(2-METHOXYISOBUTYLISOCYANIDE)COPPER(I) TETRAFLUOROBORATE 1 MG/ML
35 INJECTION, POWDER, LYOPHILIZED, FOR SOLUTION INTRAVENOUS
Status: COMPLETED | OUTPATIENT
Start: 2024-01-31 | End: 2024-01-31

## 2024-01-31 RX ORDER — MORPHINE SULFATE 2 MG/ML
2 INJECTION, SOLUTION INTRAMUSCULAR; INTRAVENOUS ONCE
Status: COMPLETED | OUTPATIENT
Start: 2024-01-31 | End: 2024-01-31

## 2024-01-31 RX ORDER — TETRAKIS(2-METHOXYISOBUTYLISOCYANIDE)COPPER(I) TETRAFLUOROBORATE 1 MG/ML
15 INJECTION, POWDER, LYOPHILIZED, FOR SOLUTION INTRAVENOUS
Status: COMPLETED | OUTPATIENT
Start: 2024-01-31 | End: 2024-01-31

## 2024-01-31 RX ORDER — REGADENOSON 0.08 MG/ML
0.4 INJECTION, SOLUTION INTRAVENOUS
Status: COMPLETED | OUTPATIENT
Start: 2024-01-31 | End: 2024-01-31

## 2024-01-31 RX ORDER — AMINOPHYLLINE 25 MG/ML
50 INJECTION, SOLUTION INTRAVENOUS PRN
Status: ACTIVE | OUTPATIENT
Start: 2024-01-31 | End: 2024-01-31

## 2024-01-31 RX ORDER — SODIUM CHLORIDE 0.9 % (FLUSH) 0.9 %
10 SYRINGE (ML) INJECTION PRN
Status: DISCONTINUED | OUTPATIENT
Start: 2024-01-31 | End: 2024-01-31 | Stop reason: HOSPADM

## 2024-01-31 RX ORDER — SODIUM CHLORIDE 9 MG/ML
500 INJECTION, SOLUTION INTRAVENOUS CONTINUOUS PRN
Status: ACTIVE | OUTPATIENT
Start: 2024-01-31 | End: 2024-01-31

## 2024-01-31 RX ORDER — NITROGLYCERIN 0.4 MG/1
0.4 TABLET SUBLINGUAL EVERY 5 MIN PRN
Status: ACTIVE | OUTPATIENT
Start: 2024-01-31 | End: 2024-01-31

## 2024-01-31 RX ORDER — LIDOCAINE 4 G/G
1 PATCH TOPICAL ONCE
Status: DISCONTINUED | OUTPATIENT
Start: 2024-01-31 | End: 2024-01-31 | Stop reason: HOSPADM

## 2024-01-31 RX ORDER — METOPROLOL TARTRATE 1 MG/ML
5 INJECTION, SOLUTION INTRAVENOUS EVERY 5 MIN PRN
Status: ACTIVE | OUTPATIENT
Start: 2024-01-31 | End: 2024-01-31

## 2024-01-31 RX ORDER — ATROPINE SULFATE 0.1 MG/ML
0.5 INJECTION INTRAVENOUS EVERY 5 MIN PRN
Status: ACTIVE | OUTPATIENT
Start: 2024-01-31 | End: 2024-01-31

## 2024-01-31 RX ADMIN — SODIUM CHLORIDE, PRESERVATIVE FREE 10 ML: 5 INJECTION INTRAVENOUS at 13:08

## 2024-01-31 RX ADMIN — REGADENOSON 0.4 MG: 0.08 INJECTION, SOLUTION INTRAVENOUS at 10:31

## 2024-01-31 RX ADMIN — Medication 10 ML: at 08:23

## 2024-01-31 RX ADMIN — MORPHINE SULFATE 2 MG: 2 INJECTION, SOLUTION INTRAMUSCULAR; INTRAVENOUS at 09:47

## 2024-01-31 RX ADMIN — LEVOTHYROXINE SODIUM 137 MCG: 137 TABLET ORAL at 06:33

## 2024-01-31 RX ADMIN — BUDESONIDE AND FORMOTEROL FUMARATE DIHYDRATE 2 PUFF: 160; 4.5 AEROSOL RESPIRATORY (INHALATION) at 08:23

## 2024-01-31 RX ADMIN — Medication 37.3 MILLICURIE: at 13:07

## 2024-01-31 RX ADMIN — Medication 15.6 MILLICURIE: at 10:33

## 2024-01-31 RX ADMIN — HYDROCODONE BITARTRATE AND ACETAMINOPHEN 1 TABLET: 5; 325 TABLET ORAL at 14:13

## 2024-01-31 ASSESSMENT — PAIN DESCRIPTION - ORIENTATION: ORIENTATION: LEFT

## 2024-01-31 ASSESSMENT — PAIN SCALES - GENERAL
PAINLEVEL_OUTOF10: 8
PAINLEVEL_OUTOF10: 5
PAINLEVEL_OUTOF10: 6

## 2024-01-31 ASSESSMENT — ENCOUNTER SYMPTOMS
ABDOMINAL PAIN: 0
SHORTNESS OF BREATH: 0

## 2024-01-31 ASSESSMENT — PAIN DESCRIPTION - LOCATION
LOCATION: SHOULDER
LOCATION: SHOULDER
LOCATION: GENERALIZED;HEAD

## 2024-01-31 ASSESSMENT — PAIN DESCRIPTION - DESCRIPTORS
DESCRIPTORS: ACHING
DESCRIPTORS: ACHING

## 2024-01-31 NOTE — PROGRESS NOTES
Pt given discharge instructions and verbalized understanding. PIV removed and all questions answered.

## 2024-01-31 NOTE — PROGRESS NOTES
Writer spoke with Dr. Castorena in regards to morning labs since potassium was low yesterday. MD placing orders for BMP.

## 2024-01-31 NOTE — PROGRESS NOTES
Writer messaged Dr. Castorena to update about INR of 5.4. MD states he is still okay with d/c as long as chest pain has stopped. MD stated to instruct pt to not take coumadin until she gets repeat labs at the coumadin clinic and to come back to ER if there are any signs of bleeding.

## 2024-01-31 NOTE — PROGRESS NOTES
Pharmacy Note  Warfarin Consult    Kaylynn Gates is a 73 y.o. female for whom pharmacy has been consulted to manage warfarin therapy.     Consulting Physician: Dr. Castorena  Reason for Admission: chest pain    Warfarin dose prior to admission: 2.5 mg on Tues/Thur/Sat and 5 mg on remaining days   Warfarin indication: afb  Target INR range: 2-3     Past Medical History:   Diagnosis Date    Affective disorder (HCC)     seasonal    Arthritis     shoulders and lower back    DVT (deep venous thrombosis) (HCC)     has history in R leg X 4    Fibromyalgia     Hyperlipidemia     Hypertension     Pneumonia     Sinusitis     Thyroid disease     hypothyroidsm                Recent Labs     01/31/24  1504   INR 5.4*     Recent Labs     01/30/24  1454 01/31/24  0546   HGB 11.5* 10.7*   HCT 36.9 33.7*    210       Current warfarin drug-drug interactions: none      Date             INR        Dose   1/31/2024            5.4       Hold     Daily PT/INR while inpatient.     Thank you for the consult.  Will continue to follow.    Endy Samuel, PharmD, BCPS  1/31/2024 4:36 PM

## 2024-01-31 NOTE — PLAN OF CARE
Problem: Discharge Planning  Goal: Discharge to home or other facility with appropriate resources  1/31/2024 1635 by Nilsa Whyte RN  Outcome: Completed     Problem: Pain  Goal: Verbalizes/displays adequate comfort level or baseline comfort level  1/31/2024 1635 by Nilsa Whyte RN  Outcome: Completed     Problem: Safety - Adult  Goal: Free from fall injury  1/31/2024 1635 by Nilsa Whyte RN  Outcome: Completed      [Patient Optimized for Surgery] : Patient optimized for surgery [No Further Testing Recommended] : no further testing recommended [As per surgery] : as per surgery [FreeTextEntry4] : Ms. SOSA is a 74 year yo female with no h/o CAD and good exercise tolerance. She denies CP, palpitation or SOB and her EKG today is normal. She does not take blood thinners and denies sleep apnea or urinary symptoms. She does not have a h/o DVT. She will continue the prescription medications perioperatively as instructed. The morning of the procedure she will only take the BP pill.\par \par Ms. SOSA has a low risk for perioperative cardiovascular complications and will undergo the procedure as planned.\par \par  ***More than 50% of the face to face time was devoted to counseling and/or coordination of care. The discussion and/or coordination of care included: perioperative medication management.\par \par

## 2024-01-31 NOTE — PROGRESS NOTES
Writer spoke with Dr. Hernandez and updated him on the negative stress test. MD says he is okay with discharge from his stand point.

## 2024-01-31 NOTE — ACP (ADVANCE CARE PLANNING)
portable DNR orders.    Length of ACP Conversation in minutes:      Conversation Outcomes:  ACP discussion completed    Follow-up plan:    [] Schedule follow-up conversation to continue planning  [] Referred individual to Provider for additional questions/concerns   [] Advised patient/agent/surrogate to review completed ACP document and update if needed with changes in condition, patient preferences or care setting    [] This note routed to one or more involved healthcare providers

## 2024-01-31 NOTE — PROGRESS NOTES
Dr. Castorena notified that patient returned from stress test. MD ok with patient resuming normal diet.

## 2024-01-31 NOTE — CONSULTS
Date:   1/31/2024  Patient name: Kaylynn Gates  Date of admission:  1/30/2024  2:23 PM  MRN:   [id number]  YOB: 1950  PCP: Germaine Hdz MD    Reason for Admission: Chest pain [R07.9]  Chest pain, unspecified type [R07.9]    Cardiology consult: Chest pain       Referring physician: Dr Pippa Castorena    Impression    Episodes of chest pain at rest and also on exertion left parasternal region radiating to the back lasting for few minutes not associated with the shortness of breath or palpitation no diaphoresis  No obvious ECG ischemic changes, normal high-sensitivity troponin  No previous history of coronary intervention,, normal Lexiscan Myoview stress test 8/16/2023  Normal LV systolic function ejection fraction 55 to 60% normal right ventricle systolic pressure 2D echo 8/16/2023  History of pulmonary embolism first episode 1988, last episode 2012 on warfarin  Hypertension  Hyperlipidemia  Hypothyroidism  Fibromyalgia  Obesity BMI 35  Obstructive sleep apnea  Chronic back pain  CT head without contrast for severe dizziness 1/16/2024 no acute intracranial abnormality    Past surgical history: Hysterectomy    Drug allergy  Lisinopril, Cymbalta/duloxetine, Robaxin    Investigation workup    ECG 1/30/2024  Sinus rhythm heart rate 81, right bundle branch block, no change from January 16, 2024 and 8/15/2023    2D echo 8/16/2023  Normal LV size, ejection fraction 55 to 60%, normal wall motion, mild LVH  Normal RV size and function normal RVSP  No significant valvular abnormality  Normal IVC diameter and respiratory variation    Lexiscan Myoview stress test 8/16/2023  No stress-induced ischemia no infarction normal ejection fraction    History of present illness  73-year-old female who lives alone and normally independent in her activities of daily living with a past medical history of hypertension, obesity, hypothyroidism, history of pulmonary embolism on warfarin got hospitalized on 1/30/2024 with

## 2024-01-31 NOTE — H&P
All relevant notes, labs & imaging were reviewed.  The case was discussed with nursing staff.     PAST MEDICAL HISTORY:        has a past medical history of Affective disorder (HCC), Arthritis, DVT (deep venous thrombosis) (HCC), Fibromyalgia, Hyperlipidemia, Hypertension, Pneumonia, Sinusitis, and Thyroid disease.    PAST SURGICAL HISTORY:      has a past surgical history that includes Colonoscopy (8/31/2011); Hysterectomy; Ectopic pregnancy surgery (Left); and Dilation and curettage of uterus.     FAMILY HISTORY:     family history includes Breast Cancer (age of onset: 70) in her maternal grandmother; Cervical Cancer in her mother; Heart Disease in her father and mother.    HOME MEDICATIONS:     Prior to Admission medications    Medication Sig Start Date End Date Taking? Authorizing Provider   fluticasone-salmeterol (ADVAIR) 500-50 MCG/ACT AEPB diskus inhaler Inhale 1 puff into the lungs in the morning and 1 puff in the evening.   Yes Provider, MD Sarah   acetaminophen-codeine (TYLENOL #3) 300-30 MG per tablet Take 1 tablet by mouth every 6 hours as needed for Pain for up to 30 days. Max Daily Amount: 4 tablets 1/26/24 2/25/24  Germaine Hdz MD   traMADol (ULTRAM) 50 MG tablet Take 1 tablet by mouth 2 times daily as needed for Pain for up to 30 days. Max Daily Amount: 100 mg 1/26/24 2/25/24  Germaine Hdz MD   guaiFENesin (MUCINEX) 600 MG extended release tablet Take 1 tablet by mouth 2 times daily 1/18/24   Abbey Fernandez MD   acetaminophen (TYLENOL) 500 MG tablet Take 2 tablets by mouth 3 times daily for 7 days 1/16/24 1/23/24  Mona Sharp DO   diclofenac sodium (VOLTAREN) 1 % GEL APPLY 4 GRAMS FOUR TIMES DAILY 12/27/23   Chin River PA   amitriptyline (ELAVIL) 10 MG tablet TAKE 1 TABLET BY MOUTH AT BEDTIME 12/26/23 3/21/24  Giovany Masters MD   albuterol sulfate HFA (PROVENTIL;VENTOLIN;PROAIR) 108 (90 Base) MCG/ACT inhaler INHALE 1 PUFF INTO THE LUNGS EVERY 6 HOURS AS NEEDED

## 2024-01-31 NOTE — PROGRESS NOTES
Dr. Castorena calls writer. MD stated stress test is negative and MD ok with discharge as long as INR is not increasing and as long as cardiology is ok with it. Writer will reach out to cardiology.

## 2024-01-31 NOTE — PLAN OF CARE
Problem: Pain  Goal: Verbalizes/displays adequate comfort level or baseline comfort level  Outcome: Progressing  Patient expresses relief following administration of prn pain medication.     Problem: Discharge Planning  Goal: Discharge to home or other facility with appropriate resources  Outcome: Progressing

## 2024-01-31 NOTE — PROGRESS NOTES
Writer notified Dr. Castorena that patient stated she still has chest pain, which is the same as when she came in. Notified that patient would like to go home as chest pain is not continuous and does subside when she takes pain medication. Dr. Castorena called writer and stated he is ok with discharge as long as patient comes to ER if chest pain is worse. MD stated that if chest pain continues and persists, he will order CT chest outpatient. Patient verbalizes understanding and states she will come back to ER if symptoms worsen.

## 2024-01-31 NOTE — PROGRESS NOTES
Pharmacy Note  Warfarin Consult    Kaylynn Gates is a 73 y.o. female for whom pharmacy has been consulted to manage warfarin therapy.     Consulting Physician: Doctor Castorena.  Reason for Admission: Chest pain.    Warfarin dose prior to admission: 2.5 mg Tues, Thurs, Sat and 5 mg all other days. (Per medication management notes, patient was instructed to hold her warfarin for 3 days starting today 01/30/24 due to elevated INR )  Warfarin indication: Hx of DVT.  Target INR range: 2-3     Past Medical History:   Diagnosis Date    Affective disorder (HCC)     seasonal    Arthritis     shoulders and lower back    DVT (deep venous thrombosis) (HCC)     has history in R leg X 4    Fibromyalgia     Hyperlipidemia     Hypertension     Pneumonia     Sinusitis     Thyroid disease     hypothyroidsm                Recent Labs     01/30/24  1454   INR 6.5*     Recent Labs     01/30/24  1454   HGB 11.5*   HCT 36.9          Current warfarin drug-drug interactions: Levothyroxine.       Date             INR        Dose   1/30/2024            6.5       Hold dose    Daily PT/INR while inpatient.     Thank you for the consult.  Will continue to follow.  Manuel Gar [facility]    1/30/2024   9:40 PM

## 2024-01-31 NOTE — PROGRESS NOTES
Dr. Castorena notified of patient's request for lidocaine patch. Also, MD notified that patient is requesting PO Norco but currently NPO due to stress test. Morphine 2mg ordered by MD.

## 2024-01-31 NOTE — PROGRESS NOTES
01/31/24 1510   Encounter Summary   Encounter Overview/Reason  Spiritual/Emotional Needs   Service Provided For: Patient   Referral/Consult From: Dwight   Last Encounter  01/31/24   Complexity of Encounter Low   Spiritual/Emotional needs   Type Spiritual Support   Assessment/Intervention/Outcome   Assessment Calm;Coping;Hopeful   Intervention Explored/Affirmed feelings, thoughts, concerns;Prayer (assurance of)/Norris;Sustaining Presence/Ministry of presence   Outcome Engaged in conversation;Expressed feelings, needs, and concerns;Expressed Gratitude;Receptive;Coping

## 2024-01-31 NOTE — CARE COORDINATION
Case Management Assessment  Initial Evaluation    Date/Time of Evaluation: 1/31/2024 9:45 AM  Assessment Completed by: Yudy Stein RN    If patient is discharged prior to next notation, then this note serves as note for discharge by case management.    Patient Name: Kaylynn Gates                   YOB: 1950  Diagnosis: Chest pain [R07.9]  Chest pain, unspecified type [R07.9]                   Date / Time: 1/30/2024  2:23 PM    Patient Admission Status: Observation   Readmission Risk (Low < 19, Mod (19-27), High > 27): Readmission Risk Score: 10.1    Current PCP: Germaine Hdz MD  PCP verified by CM? Yes    Chart Reviewed: Yes      History Provided by: Patient  Patient Orientation: Alert and Oriented    Patient Cognition: Alert    Hospitalization in the last 30 days (Readmission):  Yes    If yes, Readmission Assessment in CM Navigator will be completed.    Advance Directives:      Code Status: Full Code   Patient's Primary Decision Maker is: Legal Next of Kin    Primary Decision Maker: Caren Gates - Child - [phone]    Primary Decision Maker: Carlos Miramontes - Child - [phone]    Supplemental (Other) Decision Maker: Rupert Uriarte - Brother/Sister - [phone]    Discharge Planning:    Patient lives with: Alone Type of Home: Independent Living  Primary Care Giver: Self  Patient Support Systems include: Family Members   Current Financial resources: Medicaid  Current community resources:    Current services prior to admission: Durable Medical Equipment            Current DME: Oxygen Therapy (Comment), Shower Chair, Walker (2lpm NC at  thru [facility])            Type of Home Care services:  None    ADLS  Prior functional level: Independent in ADLs/IADLs  Current functional level: Independent in ADLs/IADLs    PT AM-PAC:   /24  OT AM-PAC:   /24    Family can provide assistance at DC: Yes  Would you like Case Management to discuss the discharge plan with any other family members/significant

## 2024-01-31 NOTE — PLAN OF CARE
Problem: Discharge Planning  Goal: Discharge to home or other facility with appropriate resources  1/31/2024 1552 by Nilsa Whyte, RN  Outcome: Progressing  Flowsheets (Taken 1/31/2024 0800)  Discharge to home or other facility with appropriate resources:   Identify barriers to discharge with patient and caregiver   Arrange for needed discharge resources and transportation as appropriate   Identify discharge learning needs (meds, wound care, etc)   Refer to discharge planning if patient needs post-hospital services based on physician order or complex needs related to functional status, cognitive ability or social support system     Problem: Pain  Goal: Verbalizes/displays adequate comfort level or baseline comfort level  1/31/2024 1552 by Nilsa Whyte, RN  Outcome: Progressing     Problem: Safety - Adult  Goal: Free from fall injury  Outcome: Progressing  Flowsheets (Taken 1/31/2024 0902)  Free From Fall Injury: Instruct family/caregiver on patient safety

## 2024-02-01 ENCOUNTER — CARE COORDINATION (OUTPATIENT)
Dept: CASE MANAGEMENT | Age: 74
End: 2024-02-01

## 2024-02-01 DIAGNOSIS — R79.1 SUPRATHERAPEUTIC INR: Primary | ICD-10-CM

## 2024-02-01 NOTE — DISCHARGE SUMMARY
known as: LOPRESSOR  TAKE 1 TABLET BY MOUTH TWICE DAILY     montelukast 10 MG tablet  Commonly known as: SINGULAIR  TAKE 1 TABLET BY MOUTH NIGHTLY     nitroGLYCERIN 0.4 MG SL tablet  Commonly known as: NITROSTAT  up to max of 3 total doses. If no relief after 1 dose, call 911.     simvastatin 40 MG tablet  Commonly known as: ZOCOR  TAKE 1 TABLET BY MOUTH NIGHTLY     traMADol 50 MG tablet  Commonly known as: ULTRAM  Take 1 tablet by mouth 2 times daily as needed for Pain for up to 30 days. Max Daily Amount: 100 mg     vitamin D 1.25 MG (02537 UT) Caps capsule  Commonly known as: ERGOCALCIFEROL  TAKE 1 CAPSULE BY MOUTH ONCE WEEKLY ON SUNDAY     warfarin 5 MG tablet  Commonly known as: COUMADIN  Take as directed. If you are unsure how to take this medication, talk to your nurse or doctor.  Original instructions: Take 1 tablet by mouth See Admin Instructions Dosed by [facility]: 2.5 mg Tues-Thurs-Sat and 5 mg all other days           * This list has 4 medication(s) that are the same as other medications prescribed for you. Read the directions carefully, and ask your doctor or other care provider to review them with you.                  Send Copies to: Germaine Hdz MD      Note that over 30 minutes was spent in preparing discharge papers, discussing discharge with patient and family, medication review, etc.    Signed:  Pippa Castorena MD   2/1/2024, 2:48 PM

## 2024-02-01 NOTE — CARE COORDINATION
Care Transitions Initial Follow Up Call    Call within 2 business days of discharge: Yes    Patient Current Location:  Home: [address] [place] [id number]    Care Transition Nurse contacted the patient by telephone to perform post hospital discharge assessment. Verified name and  with patient as identifiers. Provided introduction to self, and explanation of the Care Transition Nurse role.     Patient: Kaylynn Gates Patient : 1950   MRN: [id number]  Reason for Admission: Chest pain, Supratherapeutic INR  Discharge Date: 24 RARS: Readmission Risk Score: 10.1      Last Discharge Facility       Date Complaint Diagnosis Description Type Department Provider    24 Chest Pain Chest pain, unspecified type ED to Hosp-Admission (Discharged) (ADMITTED) [facility] Pippa Gillette MD; Quirino Talbert...            Was this an external facility discharge? No Discharge Facility: [facility]    Challenges to be reviewed by the provider   Additional needs identified to be addressed with provider: No  none               Method of communication with provider: none.    Spoke with Kaylynn this am for initial 24 hour call. Patient had went to Coumadin clinic on Wednesday, INR was >9 so blood draw was done which showed level to be 6.6, Coumadin placed on hold for 3 days and were to recheck.  She wound up coming to the ED with c/o chest pain for 2 days. Pain is to left chest that lasts a few minutes, relieved with pain medication. States it starts in her chest and wraps around to her shoulder blade.  These occur intermittently, has had none today.  She denies cp currently, denies f/c, n/v, shortness of breath or cough.  Noted patient was just in hospital recently for COVID.  INR at discharge was 5.4, Coumadin still on hold and is going tomorrow for recheck INR at the clinic.  She denies any evidence of bleeding, denies nose bleeds, new bruising, blood in stool, no bleeding gums.  Patient is on Coumadin she

## 2024-02-02 ENCOUNTER — HOSPITAL ENCOUNTER (OUTPATIENT)
Dept: PHARMACY | Age: 74
Setting detail: THERAPIES SERIES
Discharge: HOME | End: 2024-02-02
Payer: COMMERCIAL

## 2024-02-02 DIAGNOSIS — I82.403 RECURRENT ACUTE DEEP VEIN THROMBOSIS (DVT) OF BOTH LOWER EXTREMITIES (HCC): Primary | Chronic | ICD-10-CM

## 2024-02-02 LAB
INR BLD: 4.5
PROTIME: 53.5 SECONDS

## 2024-02-02 PROCEDURE — 85610 PROTHROMBIN TIME: CPT | Performed by: PHARMACIST

## 2024-02-02 PROCEDURE — 99212 OFFICE O/P EST SF 10 MIN: CPT | Performed by: PHARMACIST

## 2024-02-02 NOTE — PROGRESS NOTES
Patient seen in person in Medication Management Service.    Patient states compliant all of the time with regimen.    No bleeding or thromboembolic side effects noted.    No significant dietary changes.  States she plans to eat two servings cooked spinach per week. Stressed consistency. States she has not had much of an appetite.   No significant recent illness or disease state changes.      Patient was admitted to [facility] on 1/30/24 - 1/31/24 after appointment with Dr Hernandez on 1/30/24. Reported chest heaviness. Was admitted overnight for observation.   Patient did take ibuprofen 600 mg twice on Wednesday 1/31/24 for pain and inflammation. Rates it as 9/10. States Tylenol helps a little. Recommended Tylenol.      PT/INR done via POC meter per protocol.  INR was supratherapeutic at 4.5.  (goal 2 - 3)    Warfarin regimen will be held for 2 days and then 2.5 mg Sunday and Monday.  Will retest in 5 days.  Patient counseled on signs/symptoms of bleeding and when to seek medical attention. Patient instructed to use extra precautions with elevated INR.     Patient understands dosing directions and information discussed.  Dosing schedule and follow up appointment given to patient. Progress note routed to referring physicians office.  Patient acknowledges working in Consult Agreement with Pharmacist as referred by his/her physician/provider.      COVID 19 screening completed.  At this time patient denies symptoms, recent travel and exposure.  Patient educated to screen for temperature and COVID-19 symptoms prior to coming to clinic for next appointment.  They are instructed to call the clinic to reschedule if they have any symptoms.      Standing order for PT/INR has been placed in preparation to transition to possible remote INR monitoring given efforts to reduce the spread of COVID-19.      For Pharmacy Admin Tracking Only    Intervention Detail: Dose Adjustment: 1, reason: Therapy De-escalation  Total # of

## 2024-02-07 ENCOUNTER — HOSPITAL ENCOUNTER (OUTPATIENT)
Dept: PHARMACY | Age: 74
Setting detail: THERAPIES SERIES
Discharge: HOME | End: 2024-02-07
Payer: COMMERCIAL

## 2024-02-07 DIAGNOSIS — I82.403 RECURRENT ACUTE DEEP VEIN THROMBOSIS (DVT) OF BOTH LOWER EXTREMITIES (HCC): Primary | Chronic | ICD-10-CM

## 2024-02-07 LAB
INR BLD: 1.5
PROTIME: 18.6 SECONDS

## 2024-02-07 PROCEDURE — 85610 PROTHROMBIN TIME: CPT

## 2024-02-07 PROCEDURE — 99212 OFFICE O/P EST SF 10 MIN: CPT

## 2024-02-07 NOTE — PROGRESS NOTES
Patient seen in person in Medication Management Service.    Patient states compliant most of the time with regimen.  Patient skipped dose of warfarin last night as she had not had any spinach and concerned about INR going high.  No bleeding or thromboembolic side effects noted other than bruise on right thigh.    No significant med changes.    Patient did not have any spinach since last appt but plans to go to store after this appt and get some spinach.  Plans to eat twice a week.   Patient reports pain is \"off the charts\" but has only been taking some APAP.  Took two (2) APAP today and plans to go to son's house to use hot tub.    PT/INR done via POC meter per protocol.  INR was subtherapeutic at 1.5.  (goal 2 - 3)    Warfarin regimen will be 2.5mg on Tues/Thur and Sat and 5mg all other days with importance of compliance with diet stressed.  Patient to eat two servings of spinach per week. .Monitor closely.  Will retest in 5 days.    Patient understands dosing directions and information discussed.  Dosing schedule and follow up appointment given to patient. Progress note routed to referring physicians office.  Patient acknowledges working in Consult Agreement with Pharmacist as referred by his/her physician/provider.      COVID 19 screening completed.  At this time patient denies symptoms, recent travel and exposure.  Patient educated to screen for temperature and COVID-19 symptoms prior to coming to clinic for next appointment.  They are instructed to call the clinic to reschedule if they have any symptoms.      Standing order for PT/INR has been placed in preparation to transition to possible remote INR monitoring given efforts to reduce the spread of COVID-19.      For Pharmacy Admin Tracking Only    Intervention Detail: Adherence Monitorin and Dose Adjustment: 1, reason: Therapy Optimization  Total # of Interventions Recommended: 2  Total # of Interventions Accepted: 2  Time Spent (min): 20  Lauryl R

## 2024-02-08 ENCOUNTER — CARE COORDINATION (OUTPATIENT)
Dept: CASE MANAGEMENT | Age: 74
End: 2024-02-08

## 2024-02-08 NOTE — CARE COORDINATION
PCP  Specialist  [employer]. The patient agrees to contact the PCP office for questions related to their healthcare.     Advance Care Planning:   reviewed and current.     Patients top risk factors for readmission: medical condition-chest pain  Interventions to address risk factors: Scheduled appointment with PCP-completed on 2/07/2024, Scheduled appointment with Specialist-02/29/2024, and Reviewed and followed up on pending diagnostic tests and treatments-reviewed notes from Coumadin Clinic    Offered patient enrollment in the Remote Patient Monitoring (RPM) program for in-home monitoring: Patient is not eligible for RPM program.     Care Transitions Subsequent and Final Call    Subsequent and Final Calls  Do you have any ongoing symptoms?: No  Have your medications changed?: No  Do you have any questions related to your medications?: No  Do you currently have any active services?: No  Do you have any needs or concerns that I can assist you with?: No  Identified Barriers: None  Care Transitions Interventions  Other Interventions:             LPN Care Coordinator provided contact information for future needs. Plan for follow-up call in 7-10 days based on severity of symptoms and risk factors.  Plan for next call: symptom management-any episodes of chest pain?  follow-up appointment-review notes from Coumadin Clinic    Gala Ma LPN

## 2024-02-09 ENCOUNTER — TELEPHONE (OUTPATIENT)
Dept: NEUROLOGY | Age: 74
End: 2024-02-09

## 2024-02-12 ENCOUNTER — HOSPITAL ENCOUNTER (OUTPATIENT)
Dept: PHARMACY | Age: 74
Setting detail: THERAPIES SERIES
Discharge: HOME | End: 2024-02-12
Payer: COMMERCIAL

## 2024-02-12 DIAGNOSIS — I82.403 RECURRENT ACUTE DEEP VEIN THROMBOSIS (DVT) OF BOTH LOWER EXTREMITIES (HCC): Primary | Chronic | ICD-10-CM

## 2024-02-12 LAB
INR BLD: 3.2
PROTIME: 38.2 SECONDS

## 2024-02-12 PROCEDURE — 85610 PROTHROMBIN TIME: CPT | Performed by: PHARMACIST

## 2024-02-12 PROCEDURE — 99212 OFFICE O/P EST SF 10 MIN: CPT | Performed by: PHARMACIST

## 2024-02-12 NOTE — PROGRESS NOTES
Patient seen in person in Medication Management Service.    Patient states compliant all of the time with regimen.    No bleeding or thromboembolic side effects noted.    No significant med changes.    Pt states that she was going to eat 2 servings of cooked spinach a week, but only had 1.  She did get a large portion of spinach and cooked it and measured out a cup into individual bags. She has these in the freezer to eat now. Plans to eat spinach twice weekly. Denies cranberry, grapefruit, black licorice, or alcohol.   No significant recent illness or disease state changes. Denies any nausea, vomiting, or diarrhea.   Patient reports pain 7/10. Pt reports she takes Tylenol #3 or regular Tylenol in the morning and night and Tramdol once during the day.    PT/INR done via POC meter per protocol.  INR was supratherapeutic at 3.2.  (goal 2 - 3)    Pt plans to eat 2 servings spinach weekly. Significant increase in INR.  Warfarin regimen will be held for 1 day and then 2.5 mg Mon/Tues/Thurs/Sat and 5 mg all other days.  Will retest in 1 week.  Patient counseled on signs/symptoms of bleeding and when to seek medical attention. Patient instructed to use extra precautions with elevated INR.     Patient understands dosing directions and information discussed.  Dosing schedule and follow up appointment given to patient. Progress note routed to referring physicians office.  Patient acknowledges working in Consult Agreement with Pharmacist as referred by his/her physician/provider.      COVID 19 screening completed.  At this time patient denies symptoms, recent travel and exposure.  Patient educated to screen for temperature and COVID-19 symptoms prior to coming to clinic for next appointment.  They are instructed to call the clinic to reschedule if they have any symptoms.      Standing order for PT/INR has been placed in preparation to transition to possible remote INR monitoring given efforts to reduce the spread of

## 2024-02-16 ENCOUNTER — CARE COORDINATION (OUTPATIENT)
Dept: CASE MANAGEMENT | Age: 74
End: 2024-02-16

## 2024-02-16 NOTE — CARE COORDINATION
Care Transitions Follow Up Call    Patient Current Location:  Home: [address] [place] [id number]    Care Transition Nurse contacted the patient by telephone to follow up after admission on .  Verified name and  with patient as identifiers.    Patient: Kaylynn Gates  Patient : 1950   MRN: [id number]  Reason for Admission: Chest pain   Discharge Date: 24 RARS: Readmission Risk Score: 10.1      Needs to be reviewed by the provider   Additional needs identified to be addressed with provider: No  none             Method of communication with provider: none.    Spoke with Kaylynn today for transitional follow up call. Patient denies having any chest pains today, denies shortness of breath, swelling or other cardiac related issues. INR on  was 3.2, will have another INR check on Monday.  She denies any s/s of bleeding.  She c/o pain to her left inner leg that starts below her knee and shoots up towards her thigh. This is not new pain, patient states she deals with chronic pain quite a bit and is just trying to find a good combination with her pain medication that works for her.  She was encouraged to discuss this with Dr. Hdz.  She did say she found it beneficial when she spoke with behavioral therapist in past, had seen someone before at [facility].  I did advise that [facility] has behavioral specialists as well that can see her right at her PCP office.    Patient has all her medications, denies any new needs at this time. She has follow up with Dr. Hernandez on .       Addressed changes since last contact:  none  Discussed follow-up appointments. If no appointment was previously scheduled, appointment scheduling offered: Yes.   Is follow up appointment scheduled within 7 days of discharge? No.    Follow Up  Future Appointments   Date Time Provider Department Center   2024  2:30 PM [facility] MEDICATION MGMT [facility]   2024  3:00 PM [facility] EMG  [facility] [place]

## 2024-02-19 ENCOUNTER — HOSPITAL ENCOUNTER (OUTPATIENT)
Dept: PHARMACY | Age: 74
Setting detail: THERAPIES SERIES
Discharge: HOME | End: 2024-02-19
Payer: COMMERCIAL

## 2024-02-19 ENCOUNTER — HOSPITAL ENCOUNTER (OUTPATIENT)
Dept: NEUROLOGY | Age: 74
Discharge: HOME | End: 2024-02-19
Payer: COMMERCIAL

## 2024-02-19 ENCOUNTER — TELEPHONE (OUTPATIENT)
Dept: PHARMACY | Age: 74
End: 2024-02-19

## 2024-02-19 DIAGNOSIS — I82.403 RECURRENT ACUTE DEEP VEIN THROMBOSIS (DVT) OF BOTH LOWER EXTREMITIES (HCC): Primary | Chronic | ICD-10-CM

## 2024-02-19 DIAGNOSIS — G60.9 IDIOPATHIC PERIPHERAL NEUROPATHY: ICD-10-CM

## 2024-02-19 LAB
INR BLD: 3.3
PROTIME: 40.1 SECONDS

## 2024-02-19 PROCEDURE — 95886 MUSC TEST DONE W/N TEST COMP: CPT | Performed by: PHYSICAL MEDICINE & REHABILITATION

## 2024-02-19 PROCEDURE — 99212 OFFICE O/P EST SF 10 MIN: CPT

## 2024-02-19 PROCEDURE — 95909 NRV CNDJ TST 5-6 STUDIES: CPT | Performed by: PHYSICAL MEDICINE & REHABILITATION

## 2024-02-19 PROCEDURE — 85610 PROTHROMBIN TIME: CPT

## 2024-02-19 NOTE — PROGRESS NOTES
Patient seen in person in Medication Management Service.    Patient states compliant most of the time with regimen.    No bleeding or thromboembolic side effects noted.    No significant med or dietary changes.    No significant recent illness or disease state changes.      PT/INR done via POC meter per protocol.  INR was supratherapeutic at 3.3.  (goal 2 - 3)    Warfarin regimen will be decreased to 5 mg Wed/Sat and 2.5 mg all other days.  Will retest in 2 weeks.    Patient understands dosing directions and information discussed.  Dosing schedule and follow up appointment given to patient. Progress note routed to referring physicians office.  Patient acknowledges working in Consult Agreement with Pharmacist as referred by his/her physician/provider.      COVID 19 screening completed.  At this time patient denies symptoms, recent travel and exposure.  Patient educated to screen for temperature and COVID-19 symptoms prior to coming to clinic for next appointment.  They are instructed to call the clinic to reschedule if they have any symptoms.      Standing order for PT/INR has been placed in preparation to transition to possible remote INR monitoring given efforts to reduce the spread of COVID-19.      For Pharmacy Admin Tracking Only    Intervention Detail: Dose Adjustment: 1, reason: Therapy De-escalation  Total # of Interventions Recommended: 1  Total # of Interventions Accepted: 1  Time Spent (min): 20

## 2024-02-19 NOTE — TELEPHONE ENCOUNTER
Called patient to inform her that the [facility] will pick her up for her next medication management appointment on March 6 at 2:30. Could not leave message as mailbox was full.    Danielle Smith [facility], PharmD, BCACP  2/19/2024  2:56 PM

## 2024-02-21 ENCOUNTER — TELEPHONE (OUTPATIENT)
Dept: ORTHOPEDIC SURGERY | Age: 74
End: 2024-02-21

## 2024-02-21 NOTE — TELEPHONE ENCOUNTER
Spoke with patient and provided her with her EMG results. Patient voiced understanding and appreciation for the phone call.      ----- Message from GILDA Albert sent at 2/21/2024  3:57 PM EST -----  EMG appears unremarkable no clear explanation for patient's paresthesias/numbness and tingling

## 2024-02-22 ENCOUNTER — CARE COORDINATION (OUTPATIENT)
Dept: CASE MANAGEMENT | Age: 74
End: 2024-02-22

## 2024-02-22 NOTE — CARE COORDINATION
Care Transitions Follow Up Call    Patient Current Location:  Home: [address] [place] [address] [place] [address]    [facility] Care Coordinator contacted the patient by telephone to follow up after admission on 2024.  Verified name and  with patient as identifiers.    Patient: Kaylynn Gates  Patient : 1950   MRN: [id number]  Reason for Admission: Chest pain   Discharge Date: 24 RARS: Readmission Risk Score: 10.1      Needs to be reviewed by the provider   Additional needs identified to be addressed with provider: No  none             Method of communication with provider: none.    Writer spoke with Kaylynn for a follow up care transitions call. She states she is doing \"so-so\". She had an appointment with her PCP yesterday to discuss her chronic pain and was referred to [facility] in [place].   Pain is in multiple joints and muscles.   She states she has not had any chest pain, she has had a few episodes that she describes as chest pressure that come and go, she states these episodes only last approximately one minute and she is no concerned due to cardiac testing being okay. Advised to call EMS/got to ED if she has any of these episodes that do not resolve. She is thankful for today;s call and denies having any other needs or concerns at this time.     Addressed changes since last contact:   PCP appt yesterday-referred to PT/rehab for her chronic pain   Discussed follow-up appointments. If no appointment was previously scheduled, appointment scheduling offered: Yes.   Is follow up appointment scheduled within 7 days of discharge? Yes.    Follow Up  Future Appointments   Date Time Provider Department Center   3/6/2024  2:30 PM [facility] MEDICATION MGMT [facility]   2024  8:40 AM Giovany Masters MD [facility] NEURO Neurology -   2024 10:00 AM Germaine Hdz MD [facility]     External follow up appointment(s): N/A    LPN Care Coordinator reviewed red flags

## 2024-03-01 ENCOUNTER — CARE COORDINATION (OUTPATIENT)
Dept: CASE MANAGEMENT | Age: 74
End: 2024-03-01

## 2024-03-01 ENCOUNTER — HOSPITAL ENCOUNTER (OUTPATIENT)
Age: 74
Discharge: HOME | End: 2024-03-01
Payer: COMMERCIAL

## 2024-03-01 LAB
ANION GAP SERPL CALCULATED.3IONS-SCNC: 9 MMOL/L (ref 9–17)
BUN SERPL-MCNC: 11 MG/DL (ref 8–23)
CALCIUM SERPL-MCNC: 9.6 MG/DL (ref 8.6–10.4)
CHLORIDE SERPL-SCNC: 102 MMOL/L (ref 98–107)
CO2 SERPL-SCNC: 30 MMOL/L (ref 20–31)
CREAT SERPL-MCNC: 0.8 MG/DL (ref 0.5–0.9)
ERYTHROCYTE [DISTWIDTH] IN BLOOD BY AUTOMATED COUNT: 20.5 % (ref 11.5–14.9)
GFR SERPL CREATININE-BSD FRML MDRD: >60 ML/MIN/1.73M2
GLUCOSE SERPL-MCNC: 87 MG/DL (ref 70–99)
HCT VFR BLD AUTO: 33.4 % (ref 36–46)
HGB BLD-MCNC: 10.6 G/DL (ref 12–16)
MAGNESIUM SERPL-MCNC: 1.8 MG/DL (ref 1.6–2.6)
MCH RBC QN AUTO: 25.6 PG (ref 26–34)
MCHC RBC AUTO-ENTMCNC: 31.6 G/DL (ref 31–37)
MCV RBC AUTO: 81 FL (ref 80–100)
PLATELET # BLD AUTO: 250 K/UL (ref 150–450)
PMV BLD AUTO: 6.7 FL (ref 6–12)
POTASSIUM SERPL-SCNC: 4 MMOL/L (ref 3.7–5.3)
RBC # BLD AUTO: 4.12 M/UL (ref 4–5.2)
SODIUM SERPL-SCNC: 141 MMOL/L (ref 135–144)
WBC OTHER # BLD: 6.7 K/UL (ref 3.5–11)

## 2024-03-01 PROCEDURE — 85027 COMPLETE CBC AUTOMATED: CPT

## 2024-03-01 PROCEDURE — 36415 COLL VENOUS BLD VENIPUNCTURE: CPT

## 2024-03-01 PROCEDURE — 80048 BASIC METABOLIC PNL TOTAL CA: CPT

## 2024-03-01 PROCEDURE — 83735 ASSAY OF MAGNESIUM: CPT

## 2024-03-01 NOTE — CARE COORDINATION
Care Transitions Final  Call    Patient Current Location:  Home: [address] [place] [id number]    [facility] Care Coordinator contacted the patient by telephone to follow up after admission on .  Verified name and  with patient as identifiers.    Patient: Kaylynn Gates  Patient : 1950   MRN: [id number]  Reason for Admission: Chest pain   Discharge Date: 24 RARS: Readmission Risk Score: 10.1      Needs to be reviewed by the provider   Additional needs identified to be addressed with provider: No  none             Method of communication with provider: chart routing.    Subsequent transitional call. Spoke to Kaylynn. She states that she is fair. She has no new or worsening cardiac symptoms. She stated that she sometimes has occasional cp that comes and goes . Writer discussed that if she has new or worsening cardiac symptoms to contact provider return to ER. She verbalized understanding. Discussed ACM referral she declined she has writers contact information if she need to reach out.     Addressed changes since last contact:   lab work today pending.    Discussed follow-up appointments. If no appointment was previously scheduled, appointment scheduling offered: Yes.   Is follow up appointment scheduled within 7 days of discharge? Yes.    Follow Up  Future Appointments   Date Time Provider Department Center   3/6/2024  2:30 PM [facility] MEDICATION MGMT [facility] MG[facility]   2024  8:40 AM Giovany Masters MD [facility] NEURO Neurology -   2024 10:00 AM [facility]   2024  3:00 PM Lucia Vance MD [facility] med/reha [facility]         [facility] Care Coordinator reviewed discharge instructions, medical action plan, and red flags with patient and discussed any barriers to care and/or understanding of plan of care after discharge. Discussed appropriate site of care based on symptoms and resources available to patient including: PCP  Specialist  Urgent care clinics  When to

## 2024-03-06 ENCOUNTER — HOSPITAL ENCOUNTER (OUTPATIENT)
Dept: PHARMACY | Age: 74
Setting detail: THERAPIES SERIES
Discharge: HOME | End: 2024-03-06
Payer: COMMERCIAL

## 2024-03-06 DIAGNOSIS — I82.403 RECURRENT ACUTE DEEP VEIN THROMBOSIS (DVT) OF BOTH LOWER EXTREMITIES (HCC): Primary | Chronic | ICD-10-CM

## 2024-03-06 LAB
INR BLD: 2.8
PROTIME: 33.1 SECONDS

## 2024-03-06 PROCEDURE — 99211 OFF/OP EST MAY X REQ PHY/QHP: CPT

## 2024-03-06 PROCEDURE — 85610 PROTHROMBIN TIME: CPT

## 2024-03-06 NOTE — PROGRESS NOTES
Patient seen in person in Medication Management Service.    Patient states compliant all of the time with regimen.    No bleeding or thromboembolic side effects noted.    No significant med or dietary changes.    Patient believes she had UTI due to frequent urination and burning and took five (5) days of cephalexin she had at home ending on 3/4.  Patient indicates her symptoms have resolved.  Encouraged her to get rid of any \"extra\" antibiotics she has at home and not take anything unless instructed by provider.     Patient acknowledges they are still an active patient of referring provider which is Dr. LENIN Hdz Yes    PT/INR done via POC meter per protocol.  INR was therapeutic at 2.8.  (goal 2 - 3)    Warfarin regimen will be continued at current dose 5 mg Wed/Sat and 2.5 mg all other days. .  Will retest in 4 weeks.    Patient understands dosing directions and information discussed.  Dosing schedule and follow up appointment given to patient. Progress note routed to referring physicians office.  Patient acknowledges working in Consult Agreement with Pharmacist as referred by his/her physician/provider.      For Pharmacy Admin Tracking Only      Total # of Interventions Recommended: 0  Total # of Interventions Accepted: 0  Time Spent (min): 20    Carlton Nice RPH,Pharm.D,, BCPS, CACP  3/6/2024  2:47 PM

## 2024-04-03 ENCOUNTER — HOSPITAL ENCOUNTER (OUTPATIENT)
Dept: PHARMACY | Age: 74
Setting detail: THERAPIES SERIES
Discharge: HOME | End: 2024-04-03
Payer: COMMERCIAL

## 2024-04-03 DIAGNOSIS — I82.403 RECURRENT ACUTE DEEP VEIN THROMBOSIS (DVT) OF BOTH LOWER EXTREMITIES (HCC): Primary | Chronic | ICD-10-CM

## 2024-04-03 LAB
INR BLD: 3.3
PROTIME: 39.7 SECONDS

## 2024-04-03 PROCEDURE — 99212 OFFICE O/P EST SF 10 MIN: CPT

## 2024-04-03 PROCEDURE — 85610 PROTHROMBIN TIME: CPT

## 2024-04-03 NOTE — PROGRESS NOTES
Patient seen in person in Medication Management Service.    Patient states compliant all of the time with regimen.    No bleeding or thromboembolic side effects noted.    No significant med changes.    Patient indicates \"I am not eating much\".  Was eating spinach twice a week and has only had once in last month. Discussed need to be consistent with vitamin K intake.  She plans to resume eating spinach twice a week.     Patient does report she bought some \"Premier\" shakes but has not had any yet.  Review of nutrition label shows each 11oz has 30 mcg vitamin K.  Discussed that the spinach has more vitamin K than a shake.    Patient reports pain level is very high today.  Taking Tylenol #3 and tramadol for pain and is not taking any more or less than typical.     Patient acknowledges they are still an active patient of referring provider which is LENIN Hdz Yes     PT/INR done via POC meter per protocol.  INR was supratherapeutic at 3.3.  (goal 2 - 3) possibly due to change in diet/vitamin K intake and possibly increased pain.     Warfarin regimen will be continued at current dose  of 5 mg Wed/Sat and 2.5 mg all other days with resumption of spinach intake.  Will have patient skip warfarin dose today.  Will retest in 2 weeks.    Patient understands dosing directions and information discussed.  Dosing schedule and follow up appointment given to patient. Progress note routed to referring physicians office.  Patient acknowledges working in Consult Agreement with Pharmacist as referred by his/her physician/provider.      For Pharmacy Admin Tracking Only    Intervention Detail: Dose Adjustment: 1, reason: Therapy Optimization  Total # of Interventions Recommended: 1  Total # of Interventions Accepted: 1  Time Spent (min): 20     Carlton Nice RPH,Pharm.D,, BCPS, CACP  4/3/2024  1:47 PM

## 2024-04-09 ENCOUNTER — APPOINTMENT (OUTPATIENT)
Dept: CT IMAGING | Age: 74
DRG: 198 | End: 2024-04-09
Payer: COMMERCIAL

## 2024-04-09 ENCOUNTER — APPOINTMENT (OUTPATIENT)
Dept: GENERAL RADIOLOGY | Age: 74
DRG: 198 | End: 2024-04-09
Payer: COMMERCIAL

## 2024-04-09 ENCOUNTER — HOSPITAL ENCOUNTER (INPATIENT)
Age: 74
LOS: 2 days | Discharge: HOME | DRG: 198 | End: 2024-04-11
Attending: STUDENT IN AN ORGANIZED HEALTH CARE EDUCATION/TRAINING PROGRAM | Admitting: FAMILY MEDICINE
Payer: COMMERCIAL

## 2024-04-09 DIAGNOSIS — R10.13 EPIGASTRIC PAIN: ICD-10-CM

## 2024-04-09 DIAGNOSIS — I20.0 UNSTABLE ANGINA (HCC): Primary | ICD-10-CM

## 2024-04-09 DIAGNOSIS — R07.9 CHEST PAIN, UNSPECIFIED TYPE: ICD-10-CM

## 2024-04-09 LAB
ANION GAP SERPL CALCULATED.3IONS-SCNC: 12 MMOL/L (ref 9–17)
BASOPHILS # BLD: 0 K/UL (ref 0–0.2)
BASOPHILS NFR BLD: 0 % (ref 0–2)
BUN SERPL-MCNC: 7 MG/DL (ref 8–23)
CALCIUM SERPL-MCNC: 9.2 MG/DL (ref 8.6–10.4)
CHLORIDE SERPL-SCNC: 101 MMOL/L (ref 98–107)
CO2 SERPL-SCNC: 28 MMOL/L (ref 20–31)
CREAT SERPL-MCNC: 0.5 MG/DL (ref 0.5–0.9)
EOSINOPHIL # BLD: 0.07 K/UL (ref 0–0.4)
EOSINOPHILS RELATIVE PERCENT: 1 % (ref 0–4)
ERYTHROCYTE [DISTWIDTH] IN BLOOD BY AUTOMATED COUNT: 20 % (ref 11.5–14.9)
GFR SERPL CREATININE-BSD FRML MDRD: >90 ML/MIN/1.73M2
GLUCOSE SERPL-MCNC: 106 MG/DL (ref 70–99)
HCT VFR BLD AUTO: 35.2 % (ref 36–46)
HGB BLD-MCNC: 10.9 G/DL (ref 12–16)
INR PPP: 2.2
LIPASE SERPL-CCNC: 18 U/L (ref 13–60)
LYMPHOCYTES NFR BLD: 0.99 K/UL (ref 1–4.8)
LYMPHOCYTES RELATIVE PERCENT: 14 % (ref 24–44)
MAGNESIUM SERPL-MCNC: 1.8 MG/DL (ref 1.6–2.6)
MCH RBC QN AUTO: 25.1 PG (ref 26–34)
MCHC RBC AUTO-ENTMCNC: 31 G/DL (ref 31–37)
MCV RBC AUTO: 81 FL (ref 80–100)
MONOCYTES NFR BLD: 0.57 K/UL (ref 0.1–1.3)
MONOCYTES NFR BLD: 8 % (ref 1–7)
MORPHOLOGY: ABNORMAL
NEUTROPHILS NFR BLD: 77 % (ref 36–66)
NEUTS SEG NFR BLD: 5.47 K/UL (ref 1.3–9.1)
PLATELET # BLD AUTO: 233 K/UL (ref 150–450)
PMV BLD AUTO: 6.8 FL (ref 6–12)
POTASSIUM SERPL-SCNC: 3.2 MMOL/L (ref 3.7–5.3)
PROTHROMBIN TIME: 24.8 SEC (ref 11.8–14.6)
RBC # BLD AUTO: 4.35 M/UL (ref 4–5.2)
SODIUM SERPL-SCNC: 141 MMOL/L (ref 135–144)
TROPONIN I SERPL HS-MCNC: 7 NG/L (ref 0–14)
TROPONIN I SERPL HS-MCNC: <6 NG/L (ref 0–14)
WBC OTHER # BLD: 7.1 K/UL (ref 3.5–11)

## 2024-04-09 PROCEDURE — 6370000000 HC RX 637 (ALT 250 FOR IP): Performed by: FAMILY MEDICINE

## 2024-04-09 PROCEDURE — 6360000002 HC RX W HCPCS: Performed by: STUDENT IN AN ORGANIZED HEALTH CARE EDUCATION/TRAINING PROGRAM

## 2024-04-09 PROCEDURE — 2500000003 HC RX 250 WO HCPCS: Performed by: STUDENT IN AN ORGANIZED HEALTH CARE EDUCATION/TRAINING PROGRAM

## 2024-04-09 PROCEDURE — C9113 INJ PANTOPRAZOLE SODIUM, VIA: HCPCS | Performed by: STUDENT IN AN ORGANIZED HEALTH CARE EDUCATION/TRAINING PROGRAM

## 2024-04-09 PROCEDURE — 85025 COMPLETE CBC W/AUTO DIFF WBC: CPT

## 2024-04-09 PROCEDURE — 6360000002 HC RX W HCPCS: Performed by: FAMILY MEDICINE

## 2024-04-09 PROCEDURE — 2580000003 HC RX 258: Performed by: FAMILY MEDICINE

## 2024-04-09 PROCEDURE — A4216 STERILE WATER/SALINE, 10 ML: HCPCS | Performed by: STUDENT IN AN ORGANIZED HEALTH CARE EDUCATION/TRAINING PROGRAM

## 2024-04-09 PROCEDURE — 6370000000 HC RX 637 (ALT 250 FOR IP): Performed by: STUDENT IN AN ORGANIZED HEALTH CARE EDUCATION/TRAINING PROGRAM

## 2024-04-09 PROCEDURE — 80048 BASIC METABOLIC PNL TOTAL CA: CPT

## 2024-04-09 PROCEDURE — 71045 X-RAY EXAM CHEST 1 VIEW: CPT

## 2024-04-09 PROCEDURE — 96375 TX/PRO/DX INJ NEW DRUG ADDON: CPT

## 2024-04-09 PROCEDURE — 2580000003 HC RX 258: Performed by: STUDENT IN AN ORGANIZED HEALTH CARE EDUCATION/TRAINING PROGRAM

## 2024-04-09 PROCEDURE — 83690 ASSAY OF LIPASE: CPT

## 2024-04-09 PROCEDURE — 96365 THER/PROPH/DIAG IV INF INIT: CPT

## 2024-04-09 PROCEDURE — 83735 ASSAY OF MAGNESIUM: CPT

## 2024-04-09 PROCEDURE — 99285 EMERGENCY DEPT VISIT HI MDM: CPT

## 2024-04-09 PROCEDURE — 93005 ELECTROCARDIOGRAM TRACING: CPT | Performed by: STUDENT IN AN ORGANIZED HEALTH CARE EDUCATION/TRAINING PROGRAM

## 2024-04-09 PROCEDURE — 74174 CTA ABD&PLVS W/CONTRAST: CPT

## 2024-04-09 PROCEDURE — 36415 COLL VENOUS BLD VENIPUNCTURE: CPT

## 2024-04-09 PROCEDURE — 6360000004 HC RX CONTRAST MEDICATION: Performed by: STUDENT IN AN ORGANIZED HEALTH CARE EDUCATION/TRAINING PROGRAM

## 2024-04-09 PROCEDURE — 93005 ELECTROCARDIOGRAM TRACING: CPT | Performed by: FAMILY MEDICINE

## 2024-04-09 PROCEDURE — 84484 ASSAY OF TROPONIN QUANT: CPT

## 2024-04-09 PROCEDURE — 85610 PROTHROMBIN TIME: CPT

## 2024-04-09 PROCEDURE — 2060000000 HC ICU INTERMEDIATE R&B

## 2024-04-09 RX ORDER — MAGNESIUM SULFATE HEPTAHYDRATE 40 MG/ML
2000 INJECTION, SOLUTION INTRAVENOUS ONCE
Status: COMPLETED | OUTPATIENT
Start: 2024-04-09 | End: 2024-04-09

## 2024-04-09 RX ORDER — AMLODIPINE BESYLATE 10 MG/1
10 TABLET ORAL DAILY
Status: DISCONTINUED | OUTPATIENT
Start: 2024-04-10 | End: 2024-04-11 | Stop reason: HOSPADM

## 2024-04-09 RX ORDER — MORPHINE SULFATE 4 MG/ML
4 INJECTION, SOLUTION INTRAMUSCULAR; INTRAVENOUS ONCE
Status: DISCONTINUED | OUTPATIENT
Start: 2024-04-09 | End: 2024-04-10

## 2024-04-09 RX ORDER — FLUTICASONE PROPIONATE 50 MCG
2 SPRAY, SUSPENSION (ML) NASAL DAILY
Status: DISCONTINUED | OUTPATIENT
Start: 2024-04-10 | End: 2024-04-09

## 2024-04-09 RX ORDER — ONDANSETRON 4 MG/1
4 TABLET, ORALLY DISINTEGRATING ORAL EVERY 8 HOURS PRN
Status: DISCONTINUED | OUTPATIENT
Start: 2024-04-09 | End: 2024-04-11 | Stop reason: HOSPADM

## 2024-04-09 RX ORDER — BENZOCAINE/MENTHOL 6 MG-10 MG
LOZENGE MUCOUS MEMBRANE 2 TIMES DAILY
Status: DISCONTINUED | OUTPATIENT
Start: 2024-04-09 | End: 2024-04-11 | Stop reason: HOSPADM

## 2024-04-09 RX ORDER — ACETAMINOPHEN 650 MG/1
650 SUPPOSITORY RECTAL EVERY 6 HOURS PRN
Status: DISCONTINUED | OUTPATIENT
Start: 2024-04-09 | End: 2024-04-11 | Stop reason: HOSPADM

## 2024-04-09 RX ORDER — MAGNESIUM HYDROXIDE/ALUMINUM HYDROXICE/SIMETHICONE 120; 1200; 1200 MG/30ML; MG/30ML; MG/30ML
30 SUSPENSION ORAL ONCE
Status: COMPLETED | OUTPATIENT
Start: 2024-04-09 | End: 2024-04-09

## 2024-04-09 RX ORDER — BUDESONIDE AND FORMOTEROL FUMARATE DIHYDRATE 160; 4.5 UG/1; UG/1
2 AEROSOL RESPIRATORY (INHALATION)
Status: DISCONTINUED | OUTPATIENT
Start: 2024-04-09 | End: 2024-04-11 | Stop reason: HOSPADM

## 2024-04-09 RX ORDER — SODIUM CHLORIDE 9 MG/ML
INJECTION, SOLUTION INTRAVENOUS PRN
Status: DISCONTINUED | OUTPATIENT
Start: 2024-04-09 | End: 2024-04-11 | Stop reason: HOSPADM

## 2024-04-09 RX ORDER — SODIUM CHLORIDE 9 MG/ML
INJECTION, SOLUTION INTRAVENOUS CONTINUOUS
Status: DISCONTINUED | OUTPATIENT
Start: 2024-04-09 | End: 2024-04-11 | Stop reason: HOSPADM

## 2024-04-09 RX ORDER — AMMONIUM LACTATE 12 G/100G
LOTION TOPICAL PRN
Status: DISCONTINUED | OUTPATIENT
Start: 2024-04-09 | End: 2024-04-11 | Stop reason: HOSPADM

## 2024-04-09 RX ORDER — SODIUM CHLORIDE 0.9 % (FLUSH) 0.9 %
5-40 SYRINGE (ML) INJECTION EVERY 12 HOURS SCHEDULED
Status: DISCONTINUED | OUTPATIENT
Start: 2024-04-09 | End: 2024-04-11 | Stop reason: HOSPADM

## 2024-04-09 RX ORDER — SODIUM CHLORIDE 0.9 % (FLUSH) 0.9 %
10 SYRINGE (ML) INJECTION PRN
Status: DISCONTINUED | OUTPATIENT
Start: 2024-04-09 | End: 2024-04-11 | Stop reason: HOSPADM

## 2024-04-09 RX ORDER — ENOXAPARIN SODIUM 100 MG/ML
40 INJECTION SUBCUTANEOUS DAILY
Status: DISCONTINUED | OUTPATIENT
Start: 2024-04-09 | End: 2024-04-09

## 2024-04-09 RX ORDER — ACYCLOVIR 200 MG/1
200 CAPSULE ORAL 2 TIMES DAILY
Status: DISCONTINUED | OUTPATIENT
Start: 2024-04-09 | End: 2024-04-11 | Stop reason: HOSPADM

## 2024-04-09 RX ORDER — HYDROCODONE BITARTRATE AND ACETAMINOPHEN 5; 325 MG/1; MG/1
1 TABLET ORAL EVERY 6 HOURS PRN
Status: DISCONTINUED | OUTPATIENT
Start: 2024-04-09 | End: 2024-04-11 | Stop reason: HOSPADM

## 2024-04-09 RX ORDER — FENTANYL CITRATE 0.05 MG/ML
50 INJECTION, SOLUTION INTRAMUSCULAR; INTRAVENOUS ONCE
Status: COMPLETED | OUTPATIENT
Start: 2024-04-09 | End: 2024-04-09

## 2024-04-09 RX ORDER — AMITRIPTYLINE HYDROCHLORIDE 10 MG/1
10 TABLET, FILM COATED ORAL NIGHTLY
Status: DISCONTINUED | OUTPATIENT
Start: 2024-04-09 | End: 2024-04-09

## 2024-04-09 RX ORDER — SODIUM CHLORIDE 0.9 % (FLUSH) 0.9 %
5-40 SYRINGE (ML) INJECTION PRN
Status: DISCONTINUED | OUTPATIENT
Start: 2024-04-09 | End: 2024-04-11 | Stop reason: HOSPADM

## 2024-04-09 RX ORDER — NITROGLYCERIN 20 MG/100ML
10-200 INJECTION INTRAVENOUS CONTINUOUS
Status: DISCONTINUED | OUTPATIENT
Start: 2024-04-09 | End: 2024-04-11 | Stop reason: HOSPADM

## 2024-04-09 RX ORDER — 0.9 % SODIUM CHLORIDE 0.9 %
100 INTRAVENOUS SOLUTION INTRAVENOUS ONCE
Status: COMPLETED | OUTPATIENT
Start: 2024-04-09 | End: 2024-04-09

## 2024-04-09 RX ORDER — ASPIRIN 81 MG/1
81 TABLET, CHEWABLE ORAL DAILY
Status: DISCONTINUED | OUTPATIENT
Start: 2024-04-10 | End: 2024-04-11 | Stop reason: HOSPADM

## 2024-04-09 RX ORDER — LIDOCAINE 4 G/G
1 PATCH TOPICAL DAILY
Status: DISCONTINUED | OUTPATIENT
Start: 2024-04-10 | End: 2024-04-11 | Stop reason: HOSPADM

## 2024-04-09 RX ORDER — POTASSIUM CHLORIDE 7.45 MG/ML
10 INJECTION INTRAVENOUS PRN
Status: DISCONTINUED | OUTPATIENT
Start: 2024-04-09 | End: 2024-04-10 | Stop reason: SDUPTHER

## 2024-04-09 RX ORDER — POTASSIUM CHLORIDE 20 MEQ/1
40 TABLET, EXTENDED RELEASE ORAL PRN
Status: DISCONTINUED | OUTPATIENT
Start: 2024-04-09 | End: 2024-04-10 | Stop reason: SDUPTHER

## 2024-04-09 RX ORDER — WARFARIN SODIUM 2.5 MG/1
2.5 TABLET ORAL
Status: COMPLETED | OUTPATIENT
Start: 2024-04-09 | End: 2024-04-09

## 2024-04-09 RX ORDER — ACETAMINOPHEN 325 MG/1
650 TABLET ORAL EVERY 6 HOURS PRN
Status: DISCONTINUED | OUTPATIENT
Start: 2024-04-09 | End: 2024-04-11 | Stop reason: HOSPADM

## 2024-04-09 RX ORDER — ATORVASTATIN CALCIUM 20 MG/1
20 TABLET, FILM COATED ORAL DAILY
Status: DISCONTINUED | OUTPATIENT
Start: 2024-04-10 | End: 2024-04-11 | Stop reason: HOSPADM

## 2024-04-09 RX ORDER — GABAPENTIN 400 MG/1
800 CAPSULE ORAL 3 TIMES DAILY
Status: DISCONTINUED | OUTPATIENT
Start: 2024-04-09 | End: 2024-04-11 | Stop reason: HOSPADM

## 2024-04-09 RX ORDER — MAGNESIUM SULFATE HEPTAHYDRATE 40 MG/ML
2000 INJECTION, SOLUTION INTRAVENOUS PRN
Status: DISCONTINUED | OUTPATIENT
Start: 2024-04-09 | End: 2024-04-11 | Stop reason: HOSPADM

## 2024-04-09 RX ORDER — MONTELUKAST SODIUM 10 MG/1
10 TABLET ORAL NIGHTLY
Status: DISCONTINUED | OUTPATIENT
Start: 2024-04-09 | End: 2024-04-11 | Stop reason: HOSPADM

## 2024-04-09 RX ORDER — ONDANSETRON 2 MG/ML
4 INJECTION INTRAMUSCULAR; INTRAVENOUS EVERY 6 HOURS PRN
Status: DISCONTINUED | OUTPATIENT
Start: 2024-04-09 | End: 2024-04-11 | Stop reason: HOSPADM

## 2024-04-09 RX ORDER — ALBUTEROL SULFATE 2.5 MG/3ML
2.5 SOLUTION RESPIRATORY (INHALATION) EVERY 6 HOURS PRN
Status: DISCONTINUED | OUTPATIENT
Start: 2024-04-09 | End: 2024-04-11 | Stop reason: HOSPADM

## 2024-04-09 RX ORDER — FLUTICASONE PROPIONATE 50 MCG
2 SPRAY, SUSPENSION (ML) NASAL DAILY
Status: DISCONTINUED | OUTPATIENT
Start: 2024-04-09 | End: 2024-04-11 | Stop reason: HOSPADM

## 2024-04-09 RX ORDER — BUPROPION HYDROCHLORIDE 300 MG/1
300 TABLET ORAL EVERY MORNING
Status: DISCONTINUED | OUTPATIENT
Start: 2024-04-10 | End: 2024-04-11 | Stop reason: HOSPADM

## 2024-04-09 RX ORDER — ALBUTEROL SULFATE 90 UG/1
1 AEROSOL, METERED RESPIRATORY (INHALATION) EVERY 6 HOURS PRN
Status: DISCONTINUED | OUTPATIENT
Start: 2024-04-09 | End: 2024-04-11 | Stop reason: HOSPADM

## 2024-04-09 RX ORDER — OXYCODONE HYDROCHLORIDE 5 MG/1
5 TABLET ORAL ONCE
Status: COMPLETED | OUTPATIENT
Start: 2024-04-09 | End: 2024-04-09

## 2024-04-09 RX ORDER — BUPROPION HYDROCHLORIDE 150 MG/1
150 TABLET ORAL EVERY MORNING
Status: DISCONTINUED | OUTPATIENT
Start: 2024-04-10 | End: 2024-04-11 | Stop reason: HOSPADM

## 2024-04-09 RX ORDER — ASPIRIN 81 MG/1
324 TABLET, CHEWABLE ORAL ONCE
Status: COMPLETED | OUTPATIENT
Start: 2024-04-09 | End: 2024-04-09

## 2024-04-09 RX ORDER — TRAMADOL HYDROCHLORIDE 50 MG/1
50 TABLET ORAL 2 TIMES DAILY PRN
Status: DISCONTINUED | OUTPATIENT
Start: 2024-04-09 | End: 2024-04-11 | Stop reason: HOSPADM

## 2024-04-09 RX ORDER — POLYETHYLENE GLYCOL 3350 17 G/17G
17 POWDER, FOR SOLUTION ORAL DAILY PRN
Status: DISCONTINUED | OUTPATIENT
Start: 2024-04-09 | End: 2024-04-11 | Stop reason: HOSPADM

## 2024-04-09 RX ORDER — LEVOTHYROXINE SODIUM 137 UG/1
137 TABLET ORAL DAILY
Status: DISCONTINUED | OUTPATIENT
Start: 2024-04-10 | End: 2024-04-11 | Stop reason: HOSPADM

## 2024-04-09 RX ADMIN — MONTELUKAST 10 MG: 10 TABLET, FILM COATED ORAL at 21:50

## 2024-04-09 RX ADMIN — ENOXAPARIN SODIUM 40 MG: 100 INJECTION SUBCUTANEOUS at 21:51

## 2024-04-09 RX ADMIN — MAGNESIUM SULFATE HEPTAHYDRATE 2000 MG: 40 INJECTION, SOLUTION INTRAVENOUS at 17:13

## 2024-04-09 RX ADMIN — ACYCLOVIR 200 MG: 200 CAPSULE ORAL at 23:46

## 2024-04-09 RX ADMIN — WARFARIN SODIUM 2.5 MG: 2.5 TABLET ORAL at 23:46

## 2024-04-09 RX ADMIN — POTASSIUM BICARBONATE 40 MEQ: 782 TABLET, EFFERVESCENT ORAL at 17:12

## 2024-04-09 RX ADMIN — ASPIRIN 324 MG: 81 TABLET, CHEWABLE ORAL at 14:39

## 2024-04-09 RX ADMIN — ALUMINUM HYDROXIDE, MAGNESIUM HYDROXIDE, AND SIMETHICONE 30 ML: 1200; 120; 1200 SUSPENSION ORAL at 16:59

## 2024-04-09 RX ADMIN — SODIUM CHLORIDE: 9 INJECTION, SOLUTION INTRAVENOUS at 21:57

## 2024-04-09 RX ADMIN — IOPAMIDOL 100 ML: 755 INJECTION, SOLUTION INTRAVENOUS at 15:32

## 2024-04-09 RX ADMIN — FLUTICASONE PROPIONATE 2 SPRAY: 50 SPRAY, METERED NASAL at 23:46

## 2024-04-09 RX ADMIN — METOPROLOL TARTRATE 25 MG: 25 TABLET, FILM COATED ORAL at 21:50

## 2024-04-09 RX ADMIN — FENTANYL CITRATE 50 MCG: 0.05 INJECTION, SOLUTION INTRAMUSCULAR; INTRAVENOUS at 17:01

## 2024-04-09 RX ADMIN — HYDROCODONE BITARTRATE AND ACETAMINOPHEN 1 TABLET: 5; 325 TABLET ORAL at 21:50

## 2024-04-09 RX ADMIN — OXYCODONE HYDROCHLORIDE 5 MG: 5 TABLET ORAL at 18:20

## 2024-04-09 RX ADMIN — PANTOPRAZOLE SODIUM 40 MG: 40 INJECTION, POWDER, FOR SOLUTION INTRAVENOUS at 16:59

## 2024-04-09 RX ADMIN — SODIUM CHLORIDE, PRESERVATIVE FREE 10 ML: 5 INJECTION INTRAVENOUS at 15:32

## 2024-04-09 RX ADMIN — NITROGLYCERIN 10 MCG/MIN: 20 INJECTION INTRAVENOUS at 14:48

## 2024-04-09 RX ADMIN — SODIUM CHLORIDE 100 ML: 9 INJECTION, SOLUTION INTRAVENOUS at 15:33

## 2024-04-09 RX ADMIN — GABAPENTIN 800 MG: 400 CAPSULE ORAL at 21:50

## 2024-04-09 RX ADMIN — FAMOTIDINE 20 MG: 10 INJECTION INTRAVENOUS at 15:08

## 2024-04-09 ASSESSMENT — ENCOUNTER SYMPTOMS
ABDOMINAL PAIN: 1
COUGH: 0
RHINORRHEA: 0
NAUSEA: 0
VOMITING: 0
SHORTNESS OF BREATH: 0
DIARRHEA: 0

## 2024-04-09 ASSESSMENT — PAIN SCALES - GENERAL
PAINLEVEL_OUTOF10: 10
PAINLEVEL_OUTOF10: 3
PAINLEVEL_OUTOF10: 8

## 2024-04-09 ASSESSMENT — PAIN DESCRIPTION - ORIENTATION: ORIENTATION: LEFT

## 2024-04-09 ASSESSMENT — PAIN - FUNCTIONAL ASSESSMENT: PAIN_FUNCTIONAL_ASSESSMENT: 0-10

## 2024-04-09 NOTE — ED PROVIDER NOTES
give a dose of aspirin.  Given her abdominal pain which she states intermittently radiates to the back but I am unable to find any back or flank tenderness on exam, I am concerned about the possibility of aortic pathology although this does seem unlikely.  Discussed with patient and we will obtain a CTA chest abdomen and pelvis.        Pertinent Comorbid Conditions:  See history above.        EMERGENCY DEPARTMENT COURSE:  MDM:    2)  Data Reviewed    Decision Rules, Testing considered, external document review, independent imaging review:    High concern for ACS, EKG not showing STEMI.  Unstable angina also concern.  Denies any history of GERD but does have a history noted for acid reflux.  Unlikely ulcer perforation or pneumoperitoneum but will be able to evaluate that on the CT scan as well.  Concern for aortic pathology.  Plan for admission.      3)  Treatment and Disposition     ED Course as of 04/09/24 1803   Tue Apr 09, 2024   1529 Troponin, High Sensitivity: 7 [AP]   1638 Patient reevaluated.  Her chest pain is gone but she still having some persistent abdominal pain.  Although the abdominal pain has improved. [AP]      ED Course User Index  [AP] Casey Monique,        Patient repeat assessment, shared decision making, and disposition discussion: Chest pain gone.  Still on a nitro drip.  Abdominal pain improving.  Spoke with Dr. Fernandez who accepted patient to her service.  Awaiting callback from Dr. Diana.    Dr. Diana called back.  He agreed with workup thus far and with nitroglycerin infusion.  Stated the patient does not require heparin's obviously since she is on Coumadin.  Continue with admission under Dr. Fernandez        MIPS:  [None]    Social determinants of health impacting treatment or disposition:  none    Code Status Discussion:  Full Code      PROCEDURES:  none    CONSULTS:  IP CONSULT TO INTERNAL MEDICINE  IP CONSULT TO CARDIOLOGY    CRITICAL CARE:  There was a high probability of

## 2024-04-09 NOTE — PROGRESS NOTES
Pharmacy Medication History Note      List of current medications patient is taking is complete.     Source of information: patient, dispense report, [facility], [place], [place] Medication Management notes    Changes made to medication list:  Medications flagged for removal (include reason, ex. noncompliance):  Amitriptyline - patient reports this medication is discontinued     Medications removed (include reason, ex. therapy complete or physician discontinued):  Acetaminophen - course complete   Guaifenesin ER - course complete   Ibuprofen - course complete     Medications added/doses adjusted:  Diclofenac gel is applied to the shoulders or the knees   Lidocaine patches are applied to the shoulders    Other notes (ex. Recent course of antibiotics, Coumadin dosing):  Patient was seen by the [place] [facility] on 4/3/24 with an INR of 3.3. She had reduced her intake of spinach prior to the visit. The patient reports continued lack of appetite with very little intake at all, including reduced intake of spinach. She skipped her dose on 4/3/24 as instructed and returned to her regimen of 5 mg on Wed/Sat and 2.5 mg all other days.   Per [facility], the patient filled Tylenol #3, gabapentin, and tramadol on 3/22/24. The Tylenol #3 had quantity 120 for 30 days. Gabapentin had quantity 90 for 30 days. Tramadol had quantity of 60 for 30 days.   The patient is chronically on acyclovir 200 mg twice daily.     Denies use of other OTC or herbal medications.      Allergies clarified    Medication list provided to the patient: no  Medication education provided to the patient: none      Prior to Admission Medications   Prescriptions Last Dose Informant Patient Reported? Taking?   Blood Pressure Monitoring (BP MONITOR-STETHOSCOPE) KIT   No No   Si each by Does not apply route daily as needed (high blood pressure)             acetaminophen-codeine (TYLENOL #3) 300-30 MG per tablet 2024  No No   Sig: Take 1 tablet by

## 2024-04-09 NOTE — ED NOTES
Pt cuff changed and readjusted. Pt assisted with repositioning in bed. Pt reports 3/10 chest pain but still reports she is in pain all over.

## 2024-04-09 NOTE — ED TRIAGE NOTES
Mode of arrival (squad #, walk in, police, etc) : walk in        Chief complaint(s): chest pain        Arrival Note (brief scenario, treatment PTA, etc).: pt presents with chest pain, pt states she woke up with it between [id number]. Pt states it tyrone  10/10, she took 2 nitro tablets which she thought provided some relief until she started walking around. Pt denies cardiac hx. Pt also reports some SOB. Pt has hx of clots and is on warfarin        C= \"Have you ever felt that you should Cut down on your drinking?\"  No  A= \"Have people Annoyed you by criticizing your drinking?\"  No  G= \"Have you ever felt bad or Guilty about your drinking?\"  No  E= \"Have you ever had a drink as an Eye-opener first thing in the morning to steady your nerves or to help a hangover?\"  No      Deferred []      Reason for deferring: n/a    *If yes to two or more: probable alcohol abuse.*

## 2024-04-10 ENCOUNTER — APPOINTMENT (OUTPATIENT)
Dept: NUCLEAR MEDICINE | Age: 74
DRG: 198 | End: 2024-04-10
Payer: COMMERCIAL

## 2024-04-10 ENCOUNTER — APPOINTMENT (OUTPATIENT)
Dept: CT IMAGING | Age: 74
DRG: 198 | End: 2024-04-10
Payer: COMMERCIAL

## 2024-04-10 ENCOUNTER — HOSPITAL ENCOUNTER (INPATIENT)
Age: 74
Discharge: HOME | DRG: 198 | End: 2024-04-12
Payer: COMMERCIAL

## 2024-04-10 LAB
ANION GAP SERPL CALCULATED.3IONS-SCNC: 7 MMOL/L (ref 9–17)
BUN SERPL-MCNC: 5 MG/DL (ref 8–23)
CALCIUM SERPL-MCNC: 8.6 MG/DL (ref 8.6–10.4)
CHLORIDE SERPL-SCNC: 102 MMOL/L (ref 98–107)
CO2 SERPL-SCNC: 32 MMOL/L (ref 20–31)
CREAT SERPL-MCNC: 0.6 MG/DL (ref 0.5–0.9)
ERYTHROCYTE [DISTWIDTH] IN BLOOD BY AUTOMATED COUNT: 20.5 % (ref 11.5–14.9)
GFR SERPL CREATININE-BSD FRML MDRD: >90 ML/MIN/1.73M2
GLUCOSE SERPL-MCNC: 95 MG/DL (ref 70–99)
HCT VFR BLD AUTO: 30.9 % (ref 36–46)
HGB BLD-MCNC: 9.7 G/DL (ref 12–16)
INR PPP: 2.3
MAGNESIUM SERPL-MCNC: 2 MG/DL (ref 1.6–2.6)
MCH RBC QN AUTO: 25.6 PG (ref 26–34)
MCHC RBC AUTO-ENTMCNC: 31.4 G/DL (ref 31–37)
MCV RBC AUTO: 81.3 FL (ref 80–100)
NUC STRESS EJECTION FRACTION: 54 %
PLATELET # BLD AUTO: 206 K/UL (ref 150–450)
PMV BLD AUTO: 7 FL (ref 6–12)
POTASSIUM SERPL-SCNC: 3.4 MMOL/L (ref 3.7–5.3)
PROTHROMBIN TIME: 25.7 SEC (ref 11.8–14.6)
RBC # BLD AUTO: 3.8 M/UL (ref 4–5.2)
SODIUM SERPL-SCNC: 141 MMOL/L (ref 135–144)
STRESS BASELINE DIAS BP: 94 MMHG
STRESS BASELINE HR: 67 BPM
STRESS BASELINE SYS BP: 152 MMHG
STRESS ESTIMATED WORKLOAD: 1 METS
STRESS PEAK DIAS BP: 77 MMHG
STRESS PEAK SYS BP: 158 MMHG
STRESS PERCENT HR ACHIEVED: 54 %
STRESS POST PEAK HR: 80 BPM
STRESS RATE PRESSURE PRODUCT: NORMAL BPM*MMHG
STRESS STAGE RECOVERY 1 BP: NORMAL MMHG
STRESS STAGE RECOVERY 1 DURATION: 1 MIN:SEC
STRESS STAGE RECOVERY 1 HR: 77 BPM
STRESS STAGE RECOVERY 2 BP: NORMAL MMHG
STRESS STAGE RECOVERY 2 DURATION: 7 MIN:SEC
STRESS STAGE RECOVERY 2 HR: 69 BPM
STRESS TARGET HR: 149 BPM
TID: 1.02
WBC OTHER # BLD: 6 K/UL (ref 3.5–11)

## 2024-04-10 PROCEDURE — 85027 COMPLETE CBC AUTOMATED: CPT

## 2024-04-10 PROCEDURE — 78452 HT MUSCLE IMAGE SPECT MULT: CPT

## 2024-04-10 PROCEDURE — 6360000002 HC RX W HCPCS: Performed by: INTERNAL MEDICINE

## 2024-04-10 PROCEDURE — 85610 PROTHROMBIN TIME: CPT

## 2024-04-10 PROCEDURE — 6370000000 HC RX 637 (ALT 250 FOR IP): Performed by: FAMILY MEDICINE

## 2024-04-10 PROCEDURE — 6360000002 HC RX W HCPCS: Performed by: FAMILY MEDICINE

## 2024-04-10 PROCEDURE — 3430000000 HC RX DIAGNOSTIC RADIOPHARMACEUTICAL: Performed by: INTERNAL MEDICINE

## 2024-04-10 PROCEDURE — A9500 TC99M SESTAMIBI: HCPCS | Performed by: INTERNAL MEDICINE

## 2024-04-10 PROCEDURE — 74176 CT ABD & PELVIS W/O CONTRAST: CPT

## 2024-04-10 PROCEDURE — 80048 BASIC METABOLIC PNL TOTAL CA: CPT

## 2024-04-10 PROCEDURE — 94640 AIRWAY INHALATION TREATMENT: CPT

## 2024-04-10 PROCEDURE — 2060000000 HC ICU INTERMEDIATE R&B

## 2024-04-10 PROCEDURE — 94761 N-INVAS EAR/PLS OXIMETRY MLT: CPT

## 2024-04-10 PROCEDURE — 36415 COLL VENOUS BLD VENIPUNCTURE: CPT

## 2024-04-10 PROCEDURE — 2580000003 HC RX 258: Performed by: FAMILY MEDICINE

## 2024-04-10 PROCEDURE — 93017 CV STRESS TEST TRACING ONLY: CPT

## 2024-04-10 PROCEDURE — 2580000003 HC RX 258: Performed by: INTERNAL MEDICINE

## 2024-04-10 PROCEDURE — 83735 ASSAY OF MAGNESIUM: CPT

## 2024-04-10 PROCEDURE — 99223 1ST HOSP IP/OBS HIGH 75: CPT | Performed by: FAMILY MEDICINE

## 2024-04-10 RX ORDER — WARFARIN SODIUM 2.5 MG/1
2.5 TABLET ORAL
Status: COMPLETED | OUTPATIENT
Start: 2024-04-10 | End: 2024-04-10

## 2024-04-10 RX ORDER — AMINOPHYLLINE 25 MG/ML
50 INJECTION, SOLUTION INTRAVENOUS PRN
Status: ACTIVE | OUTPATIENT
Start: 2024-04-10 | End: 2024-04-10

## 2024-04-10 RX ORDER — METOPROLOL TARTRATE 1 MG/ML
5 INJECTION, SOLUTION INTRAVENOUS EVERY 5 MIN PRN
Status: CANCELLED | OUTPATIENT
Start: 2024-04-10 | End: 2024-04-10

## 2024-04-10 RX ORDER — SODIUM CHLORIDE 0.9 % (FLUSH) 0.9 %
10 SYRINGE (ML) INJECTION PRN
Status: DISCONTINUED | OUTPATIENT
Start: 2024-04-10 | End: 2024-04-11 | Stop reason: HOSPADM

## 2024-04-10 RX ORDER — ALBUTEROL SULFATE 90 UG/1
2 AEROSOL, METERED RESPIRATORY (INHALATION) PRN
Status: CANCELLED | OUTPATIENT
Start: 2024-04-10 | End: 2024-04-10

## 2024-04-10 RX ORDER — POTASSIUM CHLORIDE 7.45 MG/ML
10 INJECTION INTRAVENOUS PRN
Status: DISCONTINUED | OUTPATIENT
Start: 2024-04-10 | End: 2024-04-11 | Stop reason: HOSPADM

## 2024-04-10 RX ORDER — REGADENOSON 0.08 MG/ML
0.4 INJECTION, SOLUTION INTRAVENOUS
Status: CANCELLED | OUTPATIENT
Start: 2024-04-10

## 2024-04-10 RX ORDER — SODIUM CHLORIDE 0.9 % (FLUSH) 0.9 %
5-40 SYRINGE (ML) INJECTION PRN
Status: ACTIVE | OUTPATIENT
Start: 2024-04-10 | End: 2024-04-10

## 2024-04-10 RX ORDER — POTASSIUM CHLORIDE 20 MEQ/1
40 TABLET, EXTENDED RELEASE ORAL PRN
Status: DISCONTINUED | OUTPATIENT
Start: 2024-04-10 | End: 2024-04-11 | Stop reason: HOSPADM

## 2024-04-10 RX ORDER — ATROPINE SULFATE 0.1 MG/ML
0.5 INJECTION INTRAVENOUS EVERY 5 MIN PRN
Status: CANCELLED | OUTPATIENT
Start: 2024-04-10 | End: 2024-04-10

## 2024-04-10 RX ORDER — ATROPINE SULFATE 0.1 MG/ML
0.5 INJECTION INTRAVENOUS EVERY 5 MIN PRN
Status: ACTIVE | OUTPATIENT
Start: 2024-04-10 | End: 2024-04-10

## 2024-04-10 RX ORDER — NITROGLYCERIN 0.4 MG/1
0.4 TABLET SUBLINGUAL EVERY 5 MIN PRN
Status: CANCELLED | OUTPATIENT
Start: 2024-04-10 | End: 2024-04-10

## 2024-04-10 RX ORDER — METOPROLOL TARTRATE 1 MG/ML
5 INJECTION, SOLUTION INTRAVENOUS EVERY 5 MIN PRN
Status: ACTIVE | OUTPATIENT
Start: 2024-04-10 | End: 2024-04-10

## 2024-04-10 RX ORDER — TETRAKIS(2-METHOXYISOBUTYLISOCYANIDE)COPPER(I) TETRAFLUOROBORATE 1 MG/ML
15 INJECTION, POWDER, LYOPHILIZED, FOR SOLUTION INTRAVENOUS
Status: COMPLETED | OUTPATIENT
Start: 2024-04-10 | End: 2024-04-10

## 2024-04-10 RX ORDER — ALBUTEROL SULFATE 90 UG/1
2 AEROSOL, METERED RESPIRATORY (INHALATION) PRN
Status: ACTIVE | OUTPATIENT
Start: 2024-04-10 | End: 2024-04-10

## 2024-04-10 RX ORDER — SODIUM CHLORIDE 9 MG/ML
500 INJECTION, SOLUTION INTRAVENOUS CONTINUOUS PRN
Status: ACTIVE | OUTPATIENT
Start: 2024-04-10 | End: 2024-04-10

## 2024-04-10 RX ORDER — NITROGLYCERIN 0.4 MG/1
0.4 TABLET SUBLINGUAL EVERY 5 MIN PRN
Status: ACTIVE | OUTPATIENT
Start: 2024-04-10 | End: 2024-04-10

## 2024-04-10 RX ORDER — SODIUM CHLORIDE 0.9 % (FLUSH) 0.9 %
5-40 SYRINGE (ML) INJECTION PRN
Status: CANCELLED | OUTPATIENT
Start: 2024-04-10 | End: 2024-04-10

## 2024-04-10 RX ORDER — TETRAKIS(2-METHOXYISOBUTYLISOCYANIDE)COPPER(I) TETRAFLUOROBORATE 1 MG/ML
30 INJECTION, POWDER, LYOPHILIZED, FOR SOLUTION INTRAVENOUS
Status: COMPLETED | OUTPATIENT
Start: 2024-04-10 | End: 2024-04-10

## 2024-04-10 RX ORDER — AMINOPHYLLINE 25 MG/ML
50 INJECTION, SOLUTION INTRAVENOUS PRN
Status: CANCELLED | OUTPATIENT
Start: 2024-04-10 | End: 2024-04-10

## 2024-04-10 RX ORDER — SODIUM CHLORIDE 9 MG/ML
500 INJECTION, SOLUTION INTRAVENOUS CONTINUOUS PRN
Status: CANCELLED | OUTPATIENT
Start: 2024-04-10 | End: 2024-04-10

## 2024-04-10 RX ORDER — REGADENOSON 0.08 MG/ML
0.4 INJECTION, SOLUTION INTRAVENOUS
Status: COMPLETED | OUTPATIENT
Start: 2024-04-10 | End: 2024-04-10

## 2024-04-10 RX ADMIN — GABAPENTIN 800 MG: 400 CAPSULE ORAL at 21:45

## 2024-04-10 RX ADMIN — NITROGLYCERIN 15 MCG/MIN: 20 INJECTION INTRAVENOUS at 07:43

## 2024-04-10 RX ADMIN — NITROGLYCERIN 10 MCG/MIN: 20 INJECTION INTRAVENOUS at 08:06

## 2024-04-10 RX ADMIN — FLUTICASONE PROPIONATE 2 SPRAY: 50 SPRAY, METERED NASAL at 08:12

## 2024-04-10 RX ADMIN — Medication 17 MILLICURIE: at 09:15

## 2024-04-10 RX ADMIN — SODIUM CHLORIDE, PRESERVATIVE FREE 10 ML: 5 INJECTION INTRAVENOUS at 13:04

## 2024-04-10 RX ADMIN — Medication 38.8 MILLICURIE: at 13:03

## 2024-04-10 RX ADMIN — ACYCLOVIR 200 MG: 200 CAPSULE ORAL at 08:12

## 2024-04-10 RX ADMIN — BUDESONIDE AND FORMOTEROL FUMARATE DIHYDRATE 2 PUFF: 160; 4.5 AEROSOL RESPIRATORY (INHALATION) at 08:20

## 2024-04-10 RX ADMIN — GABAPENTIN 800 MG: 400 CAPSULE ORAL at 08:12

## 2024-04-10 RX ADMIN — AMLODIPINE BESYLATE 10 MG: 10 TABLET ORAL at 08:13

## 2024-04-10 RX ADMIN — BUDESONIDE AND FORMOTEROL FUMARATE DIHYDRATE 2 PUFF: 160; 4.5 AEROSOL RESPIRATORY (INHALATION) at 19:15

## 2024-04-10 RX ADMIN — METOPROLOL TARTRATE 25 MG: 25 TABLET, FILM COATED ORAL at 08:13

## 2024-04-10 RX ADMIN — MONTELUKAST 10 MG: 10 TABLET, FILM COATED ORAL at 21:45

## 2024-04-10 RX ADMIN — BUPROPION HYDROCHLORIDE 300 MG: 300 TABLET, FILM COATED, EXTENDED RELEASE ORAL at 08:15

## 2024-04-10 RX ADMIN — HYDROCODONE BITARTRATE AND ACETAMINOPHEN 1 TABLET: 5; 325 TABLET ORAL at 21:45

## 2024-04-10 RX ADMIN — ASPIRIN 81 MG: 81 TABLET, CHEWABLE ORAL at 08:13

## 2024-04-10 RX ADMIN — REGADENOSON 0.4 MG: 0.08 INJECTION, SOLUTION INTRAVENOUS at 09:13

## 2024-04-10 RX ADMIN — ATORVASTATIN CALCIUM 20 MG: 20 TABLET, FILM COATED ORAL at 08:13

## 2024-04-10 RX ADMIN — HYDROCODONE BITARTRATE AND ACETAMINOPHEN 1 TABLET: 5; 325 TABLET ORAL at 04:03

## 2024-04-10 RX ADMIN — HYDROCODONE BITARTRATE AND ACETAMINOPHEN 1 TABLET: 5; 325 TABLET ORAL at 14:52

## 2024-04-10 RX ADMIN — LEVOTHYROXINE SODIUM 137 MCG: 0.14 TABLET ORAL at 05:32

## 2024-04-10 RX ADMIN — BUPROPION HYDROCHLORIDE 150 MG: 150 TABLET, EXTENDED RELEASE ORAL at 08:18

## 2024-04-10 RX ADMIN — WARFARIN SODIUM 2.5 MG: 2.5 TABLET ORAL at 17:58

## 2024-04-10 RX ADMIN — SODIUM CHLORIDE, PRESERVATIVE FREE 10 ML: 5 INJECTION INTRAVENOUS at 21:45

## 2024-04-10 RX ADMIN — METOPROLOL TARTRATE 25 MG: 25 TABLET, FILM COATED ORAL at 21:45

## 2024-04-10 RX ADMIN — GABAPENTIN 800 MG: 400 CAPSULE ORAL at 15:27

## 2024-04-10 RX ADMIN — ACYCLOVIR 200 MG: 200 CAPSULE ORAL at 21:45

## 2024-04-10 ASSESSMENT — PAIN - FUNCTIONAL ASSESSMENT
PAIN_FUNCTIONAL_ASSESSMENT: ACTIVITIES ARE NOT PREVENTED
PAIN_FUNCTIONAL_ASSESSMENT: ACTIVITIES ARE NOT PREVENTED

## 2024-04-10 ASSESSMENT — PAIN DESCRIPTION - ORIENTATION
ORIENTATION: MID
ORIENTATION: MID

## 2024-04-10 ASSESSMENT — PAIN SCALES - GENERAL
PAINLEVEL_OUTOF10: 7
PAINLEVEL_OUTOF10: 0
PAINLEVEL_OUTOF10: 5
PAINLEVEL_OUTOF10: 9

## 2024-04-10 ASSESSMENT — PAIN DESCRIPTION - DESCRIPTORS
DESCRIPTORS: DISCOMFORT;ACHING
DESCRIPTORS: ACHING;DISCOMFORT

## 2024-04-10 ASSESSMENT — PAIN DESCRIPTION - LOCATION
LOCATION: GENERALIZED;HEAD;ABDOMEN
LOCATION: HEAD
LOCATION: HEAD

## 2024-04-10 NOTE — H&P
PM    GLUCOSEU NEGATIVE 01/16/2024 04:45 PM    AMORPHOUS 1+ 01/16/2024 04:45 PM       ABG  No results found for: \"LXQ9QLB\", \"BEART\", \"Q0HNKYZR\", \"PHART\", \"THGBART\", \"SYN8LBU\", \"PO2ART\", \"RKN4JQF\"        Active Hospital Problems    Diagnosis Date Noted    Chronic pulmonary embolism (HCC) [I27.82] 08/05/2019    Chest pain [R07.9] 07/19/2016    Acid reflux [K21.9] 11/01/2011    Essential hypertension [I10] 11/01/2011    Hyperlipidemia [E78.5] 11/01/2011    Hypothyroidism [E03.9] 11/01/2011    Fibromyalgia [M79.7] 11/01/2011         ASSESSMENT/PLAN:    Chest pain - Normal stress test.     Upper abdominal pain - dedicated abdominal CT since pain has a rebound component to it.     DVT Prophylaxis: therapeutic warfarin  Diet: ADULT DIET; Clear Liquid; No Caffeine  Code Status: Full Code      Dispo - admitted      Abbey Fernandez MD, FAAFP  4/10/2024, 6:11 AM

## 2024-04-10 NOTE — PROGRESS NOTES
Pharmacy Note  Warfarin Consult    Kaylynn Gates is a 73 y.o. female for whom pharmacy has been consulted to manage warfarin therapy.     Consulting Physician: Dr. 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)  Target INR range: 2.0-3.0     Past Medical History:   Diagnosis Date    Affective disorder (HCC)     seasonal    Arthritis     shoulders and lower back    DVT (deep venous thrombosis) (HCC)     has history in R leg X 4    Fibromyalgia     Hyperlipidemia     Hypertension     Pneumonia     Sinusitis     Thyroid disease     hypothyroidsm                Recent Labs     04/09/24  1440   INR 2.2     Recent Labs     04/09/24  1440   HGB 10.9*   HCT 35.2*          Current warfarin drug-drug interactions: Norco, Aspirin, Synthroid, Tramadol      Date             INR        Dose   4/9/2024            2.2       2.5 mg    Daily PT/INR while inpatient.     Thank you for the consult.  Will continue to follow.     Alverto Franklin, CodiD. [facility]  4/9/2024  10:12 PM

## 2024-04-10 NOTE — ACP (ADVANCE CARE PLANNING)
[] Schedule follow-up conversation to continue planning  [] Referred individual to Provider for additional questions/concerns   [] Advised patient/agent/surrogate to review completed ACP document and update if needed with changes in condition, patient preferences or care setting    [] This note routed to one or more involved healthcare providers

## 2024-04-10 NOTE — PROGRESS NOTES
04/10/24 1545   Encounter Summary   Encounter Overview/Reason  Attempted Encounter   Service Provided For: Patient not available  (PT with staff)

## 2024-04-10 NOTE — ED NOTES
Report given to BARRERA Krishnan from [facility].   Report method by phone   The following was reviewed with receiving RN:   Current vital signs:  BP (!) 166/77   Pulse 80   Temp 98.2 °F (36.8 °C) (Oral)   Resp 13   Wt 92.5 kg (204 lb)   SpO2 95%   BMI 33.95 kg/m²                MEWS Score: 1     Any medication or safety alerts were reviewed. Any pending diagnostics and notifications were also reviewed, as well as any safety concerns or issues, abnormal labs, abnormal imaging, and abnormal assessment findings. Questions were answered.

## 2024-04-10 NOTE — PROGRESS NOTES
Pharmacy Note  Warfarin Consult follow-up      Recent Labs     04/09/24  1440 04/10/24  1119   INR 2.2 2.3     Recent Labs     04/09/24  1440 04/10/24  0535   HGB 10.9* 9.7*   HCT 35.2* 30.9*    206       Significant Drug-Drug Interactions:  New warfarin drug-drug interactions: none  Discontinued drug-drug interactions: lovenox  Current warfarin drug-drug interactions: Aspirin, Tramadol, Synthroid, Norco      Date             INR        Dose given previous day  Dose scheduled for today  4/10/2024            2.3                 2.5 mg     2.5 mg        Notes:                     Daily PT/INR while inpatient.      Codi DerasD. [facility]  4/10/2024  12:08 PM

## 2024-04-10 NOTE — PROGRESS NOTES
Patient states chest pain has resolved. Still has complaints of mild epigastric pain and a severe headache. Writer messaged physician and notified of headache.     Per cardiology titrate off nitro drip. Patient will need stress test.

## 2024-04-11 VITALS
WEIGHT: 204 LBS | DIASTOLIC BLOOD PRESSURE: 51 MMHG | OXYGEN SATURATION: 97 % | TEMPERATURE: 98.7 F | HEIGHT: 65 IN | RESPIRATION RATE: 18 BRPM | BODY MASS INDEX: 33.99 KG/M2 | SYSTOLIC BLOOD PRESSURE: 122 MMHG | HEART RATE: 61 BPM

## 2024-04-11 LAB
ANION GAP SERPL CALCULATED.3IONS-SCNC: 9 MMOL/L (ref 9–17)
BACTERIA URNS QL MICRO: ABNORMAL
BILIRUB UR QL STRIP: NEGATIVE
BUN SERPL-MCNC: 5 MG/DL (ref 8–23)
CALCIUM SERPL-MCNC: 8.8 MG/DL (ref 8.6–10.4)
CASTS #/AREA URNS LPF: ABNORMAL /LPF
CHLORIDE SERPL-SCNC: 105 MMOL/L (ref 98–107)
CLARITY UR: ABNORMAL
CO2 SERPL-SCNC: 31 MMOL/L (ref 20–31)
COLOR UR: YELLOW
CREAT SERPL-MCNC: 0.6 MG/DL (ref 0.5–0.9)
EKG ATRIAL RATE: 72 BPM
EKG ATRIAL RATE: 74 BPM
EKG ATRIAL RATE: 75 BPM
EKG P AXIS: 35 DEGREES
EKG P AXIS: 46 DEGREES
EKG P AXIS: 49 DEGREES
EKG P-R INTERVAL: 146 MS
EKG P-R INTERVAL: 148 MS
EKG P-R INTERVAL: 152 MS
EKG Q-T INTERVAL: 430 MS
EKG Q-T INTERVAL: 448 MS
EKG Q-T INTERVAL: 452 MS
EKG QRS DURATION: 146 MS
EKG QRS DURATION: 146 MS
EKG QRS DURATION: 148 MS
EKG QTC CALCULATION (BAZETT): 480 MS
EKG QTC CALCULATION (BAZETT): 490 MS
EKG QTC CALCULATION (BAZETT): 501 MS
EKG R AXIS: -13 DEGREES
EKG R AXIS: -4 DEGREES
EKG R AXIS: 6 DEGREES
EKG T AXIS: 12 DEGREES
EKG T AXIS: 3 DEGREES
EKG T AXIS: 8 DEGREES
EKG VENTRICULAR RATE: 72 BPM
EKG VENTRICULAR RATE: 74 BPM
EKG VENTRICULAR RATE: 75 BPM
EPI CELLS #/AREA URNS HPF: ABNORMAL /HPF
ERYTHROCYTE [DISTWIDTH] IN BLOOD BY AUTOMATED COUNT: 20 % (ref 11.5–14.9)
GFR SERPL CREATININE-BSD FRML MDRD: >90 ML/MIN/1.73M2
GLUCOSE SERPL-MCNC: 87 MG/DL (ref 70–99)
GLUCOSE UR STRIP-MCNC: NEGATIVE MG/DL
HCT VFR BLD AUTO: 32.6 % (ref 36–46)
HGB BLD-MCNC: 10.2 G/DL (ref 12–16)
HGB UR QL STRIP.AUTO: NEGATIVE
INR PPP: 3.4
KETONES UR STRIP-MCNC: NEGATIVE MG/DL
LEUKOCYTE ESTERASE UR QL STRIP: ABNORMAL
LIPASE SERPL-CCNC: 17 U/L (ref 13–60)
MCH RBC QN AUTO: 25.5 PG (ref 26–34)
MCHC RBC AUTO-ENTMCNC: 31.3 G/DL (ref 31–37)
MCV RBC AUTO: 81.4 FL (ref 80–100)
NITRITE UR QL STRIP: NEGATIVE
PH UR STRIP: 6.5 [PH] (ref 5–8)
PLATELET # BLD AUTO: 221 K/UL (ref 150–450)
PMV BLD AUTO: 7 FL (ref 6–12)
POTASSIUM SERPL-SCNC: 3.7 MMOL/L (ref 3.7–5.3)
PROT UR STRIP-MCNC: NEGATIVE MG/DL
PROTHROMBIN TIME: 34.4 SEC (ref 11.8–14.6)
RBC # BLD AUTO: 4.01 M/UL (ref 4–5.2)
RBC #/AREA URNS HPF: ABNORMAL /HPF
SODIUM SERPL-SCNC: 145 MMOL/L (ref 135–144)
SP GR UR STRIP: 1.01 (ref 1–1.03)
UROBILINOGEN UR STRIP-ACNC: NORMAL EU/DL (ref 0–1)
WBC #/AREA URNS HPF: ABNORMAL /HPF
WBC OTHER # BLD: 6.2 K/UL (ref 3.5–11)

## 2024-04-11 PROCEDURE — 85610 PROTHROMBIN TIME: CPT

## 2024-04-11 PROCEDURE — 80048 BASIC METABOLIC PNL TOTAL CA: CPT

## 2024-04-11 PROCEDURE — 94640 AIRWAY INHALATION TREATMENT: CPT

## 2024-04-11 PROCEDURE — 94761 N-INVAS EAR/PLS OXIMETRY MLT: CPT

## 2024-04-11 PROCEDURE — 85027 COMPLETE CBC AUTOMATED: CPT

## 2024-04-11 PROCEDURE — 99239 HOSP IP/OBS DSCHRG MGMT >30: CPT | Performed by: FAMILY MEDICINE

## 2024-04-11 PROCEDURE — 36415 COLL VENOUS BLD VENIPUNCTURE: CPT

## 2024-04-11 PROCEDURE — 81001 URINALYSIS AUTO W/SCOPE: CPT

## 2024-04-11 PROCEDURE — 87086 URINE CULTURE/COLONY COUNT: CPT

## 2024-04-11 PROCEDURE — 6370000000 HC RX 637 (ALT 250 FOR IP): Performed by: FAMILY MEDICINE

## 2024-04-11 PROCEDURE — 83690 ASSAY OF LIPASE: CPT

## 2024-04-11 RX ORDER — CEPHALEXIN 500 MG/1
500 CAPSULE ORAL EVERY 6 HOURS SCHEDULED
Status: DISCONTINUED | OUTPATIENT
Start: 2024-04-11 | End: 2024-04-11 | Stop reason: HOSPADM

## 2024-04-11 RX ORDER — CEPHALEXIN 500 MG/1
500 CAPSULE ORAL 4 TIMES DAILY
Qty: 28 CAPSULE | Refills: 0 | Status: SHIPPED | OUTPATIENT
Start: 2024-04-11

## 2024-04-11 RX ORDER — ASPIRIN 81 MG/1
81 TABLET, CHEWABLE ORAL DAILY
Qty: 30 TABLET | Refills: 3 | Status: SHIPPED | OUTPATIENT
Start: 2024-04-11

## 2024-04-11 RX ADMIN — GABAPENTIN 800 MG: 400 CAPSULE ORAL at 09:18

## 2024-04-11 RX ADMIN — ACYCLOVIR 200 MG: 200 CAPSULE ORAL at 09:16

## 2024-04-11 RX ADMIN — BUDESONIDE AND FORMOTEROL FUMARATE DIHYDRATE 2 PUFF: 160; 4.5 AEROSOL RESPIRATORY (INHALATION) at 08:35

## 2024-04-11 RX ADMIN — AMLODIPINE BESYLATE 10 MG: 10 TABLET ORAL at 09:17

## 2024-04-11 RX ADMIN — BUPROPION HYDROCHLORIDE 300 MG: 300 TABLET, FILM COATED, EXTENDED RELEASE ORAL at 09:19

## 2024-04-11 RX ADMIN — ATORVASTATIN CALCIUM 20 MG: 20 TABLET, FILM COATED ORAL at 09:18

## 2024-04-11 RX ADMIN — METOPROLOL TARTRATE 25 MG: 25 TABLET, FILM COATED ORAL at 09:18

## 2024-04-11 RX ADMIN — CEPHALEXIN 500 MG: 500 CAPSULE ORAL at 12:16

## 2024-04-11 RX ADMIN — HYDROCODONE BITARTRATE AND ACETAMINOPHEN 1 TABLET: 5; 325 TABLET ORAL at 09:18

## 2024-04-11 RX ADMIN — BUPROPION HYDROCHLORIDE 150 MG: 150 TABLET, EXTENDED RELEASE ORAL at 09:17

## 2024-04-11 RX ADMIN — GABAPENTIN 800 MG: 400 CAPSULE ORAL at 13:51

## 2024-04-11 RX ADMIN — FLUTICASONE PROPIONATE 2 SPRAY: 50 SPRAY, METERED NASAL at 09:16

## 2024-04-11 RX ADMIN — LEVOTHYROXINE SODIUM 137 MCG: 0.14 TABLET ORAL at 06:43

## 2024-04-11 ASSESSMENT — PAIN SCALES - GENERAL: PAINLEVEL_OUTOF10: 8

## 2024-04-11 NOTE — CONSULTS
nursing.    Electronically signed by Jaylon Hernandez MD on 4/11/2024 at 7:02 AM.  [facility] cardiology Consultant

## 2024-04-11 NOTE — DISCHARGE INSTR - DIET

## 2024-04-11 NOTE — PROGRESS NOTES
Progress Note  Date:2024       Room:-  Patient Name:Kaylynn Gates     YOB: 1950     Age:73 y.o.        Subjective    Subjective:  Symptoms:  Resolved.  (LUQ abd pain improved.).    Diet:  Adequate intake.    Activity level: Returning to normal.    Pain:  She complains of pain that is mild.       Review of Systems  Objective         Vitals Last 24 Hours:  TEMPERATURE:  Temp  Av.8 °F (36.6 °C)  Min: 97.1 °F (36.2 °C)  Max: 98.2 °F (36.8 °C)  RESPIRATIONS RANGE: Resp  Av.2  Min: 16  Max: 20  PULSE OXIMETRY RANGE: SpO2  Av.9 %  Min: 97 %  Max: 100 %  PULSE RANGE: Pulse  Av.6  Min: 65  Max: 84  BLOOD PRESSURE RANGE: Systolic (24hrs), Av , Min:122 , Max:146   ; Diastolic (24hrs), Av, Min:68, Max:88    I/O (24Hr):    Intake/Output Summary (Last 24 hours) at 2024 0538  Last data filed at 2024 0137  Gross per 24 hour   Intake --   Output 200 ml   Net -200 ml     Objective:  General Appearance:  Comfortable.    Vital signs: (most recent): Blood pressure (!) 132/94, pulse 84, temperature 98.5 °F (36.9 °C), temperature source Oral, resp. rate 18, height 1.651 m (5' 5\"), weight 92.5 kg (204 lb), SpO2 96 %, not currently breastfeeding.  Vital signs are normal.    Lungs:  Normal effort and normal respiratory rate.  Breath sounds clear to auscultation.    Heart: Normal rate.  Regular rhythm.  S1 normal and S2 normal.    Abdomen: Abdomen is soft.  Bowel sounds are normal.       Labs/Imaging/Diagnostics    Labs:  CBC:  Recent Labs     24  1440 04/10/24  0535   WBC 7.1 6.0   RBC 4.35 3.80*   HGB 10.9* 9.7*   HCT 35.2* 30.9*   MCV 81.0 81.3   RDW 20.0* 20.5*    206     CHEMISTRIES:  Recent Labs     24  1440 04/10/24  0535    141   K 3.2* 3.4*    102   CO2 28 32*   BUN 7* 5*   CREATININE 0.5 0.6   GLUCOSE 106* 95   MG 1.8 2.0     PT/INR:  Recent Labs     24  1440 04/10/24  1119   PROTIME 24.8* 25.7*   INR 2.2 2.3     APTT:No

## 2024-04-11 NOTE — PROGRESS NOTES
Pharmacy Note  Warfarin Consult follow-up      Recent Labs     04/09/24  1440 04/10/24  1119 04/11/24  0523   INR 2.2 2.3 3.4     Recent Labs     04/09/24  1440 04/10/24  0535 04/11/24  0523   HGB 10.9* 9.7* 10.2*   HCT 35.2* 30.9* 32.6*    206 221       Significant Drug-Drug Interactions:  New warfarin drug-drug interactions: none  Discontinued drug-drug interactions: none  Current warfarin drug-drug interactions: Aspirin, Lipitor, Levothyroxine      Date             INR        Dose given previous day  Dose scheduled for today  4/11/2024            3.4       2.5 mg           hold        Notes:                   Aguilar Gaspar RP,RPH, MS   4/11/2024  9:22 AM     Daily PT/INR while inpatient.

## 2024-04-11 NOTE — PROGRESS NOTES
Pt concerned about going home since she is still having intermittent CP. She would like to know the plan will be if the CP continues.    Writer sent a message to Jess Wick NP for cardiology to have her answer the above question for the pt on rounds. Np replied and stated that she already rounded and had this discussion with the pt. That she will f/u outpt and a coronary CTA.    Writer notified Donita RUST who is the bedside RN

## 2024-04-11 NOTE — CARE COORDINATION
ONGOING DISCHARGE PLAN:    Patient is alert and oriented x4.    Spoke with patient regarding discharge plan and patient confirms that plan is still home. Denies needs.    Cardiology consult-  4/10 stress test-neg for ischemia    INR 3.4    Will continue to follow for additional discharge needs.    If patient is discharged prior to next notation, then this note serves as note for discharge by case management.    Electronically signed by Maggy Gatica RN on 4/11/2024 at 9:28 AM   
with the Discharge Plan? Yes    Maggy Gatica RN  Case Management Department  Ph: [phone] Fax: [phone]

## 2024-04-11 NOTE — PROGRESS NOTES
[facility]   Progress Note                   Date:   4/11/2024  Patient name: Kaylynn Gates  Date of admission:  4/9/2024  1:48 PM  MRN:   [id number]  YOB: 1950  PCP: Germaine Hdz MD    Reason for Admission: Unstable angina (HCC) [I20.0]  Epigastric pain [R10.13]  Chest pain [R07.9]    Subjective:       Clinical Changes / Abnormalities:Pt seen and examined in the  room.  Pt denies any CP or sob.  Labs, vitals and tele reviewed-  stress test done yesterday       Medications:   Scheduled Meds:   acyclovir  200 mg Oral BID    amLODIPine  10 mg Oral Daily    buPROPion  150 mg Oral QAM    buPROPion  300 mg Oral QAM    budesonide-formoterol  2 puff Inhalation BID RT    gabapentin  800 mg Oral TID    hydrocortisone   Topical BID    levothyroxine  137 mcg Oral Daily    lidocaine  1 patch Topical Daily    metoprolol tartrate  25 mg Oral BID    montelukast  10 mg Oral Nightly    atorvastatin  20 mg Oral Daily    sodium chloride flush  5-40 mL IntraVENous 2 times per day    aspirin  81 mg Oral Daily    warfarin placeholder: dosing by pharmacy   Other RX Placeholder    fluticasone  2 spray Each Nostril Daily     Continuous Infusions:   nitroGLYCERIN Stopped (04/10/24 0824)    sodium chloride      sodium chloride 75 mL/hr at 04/09/24 2157     CBC:   Recent Labs     04/09/24  1440 04/10/24  0535 04/11/24  0523   WBC 7.1 6.0 6.2   HGB 10.9* 9.7* 10.2*    206 221     BMP:    Recent Labs     04/09/24  1440 04/10/24  0535 04/11/24  0523    141 145*   K 3.2* 3.4* 3.7    102 105   CO2 28 32* 31   BUN 7* 5* 5*   CREATININE 0.5 0.6 0.6   GLUCOSE 106* 95 87     Hepatic: No results for input(s): \"AST\", \"ALT\", \"ALB\", \"BILITOT\", \"ALKPHOS\" in the last 72 hours.  Troponin:   Recent Labs     04/09/24  1440 04/09/24  1630   TROPHS 7 <6     BNP: No results for input(s): \"BNP\" in the last 72 hours.  Lipids: No results for input(s): \"CHOL\", \"HDL\" in the last 72 hours.    Invalid input(s):

## 2024-04-11 NOTE — PLAN OF CARE
Problem: Discharge Planning  Goal: Discharge to home or other facility with appropriate resources  Outcome: Adequate for Discharge     Problem: Pain  Goal: Verbalizes/displays adequate comfort level or baseline comfort level  Outcome: Adequate for Discharge     Problem: ABCDS Injury Assessment  Goal: Absence of physical injury  Outcome: Adequate for Discharge     Problem: Safety - Adult  Goal: Free from fall injury  Outcome: Adequate for Discharge

## 2024-04-11 NOTE — DISCHARGE INSTR - COC
Continuity of Care Form    Patient Name: Kaylynn Gates   :  1950  MRN:  [id number]    Admit date:  2024  Discharge date:  ***    Code Status Order: Full Code   Advance Directives:     Admitting Physician:  Abbey Fernandez MD  PCP: Germaine Hdz MD    Discharging Nurse: ***  Discharging Hospital Unit/Room#: 2091/2091-01  Discharging Unit Phone Number: ***    Emergency Contact:   Extended Emergency Contact Information  Primary Emergency Contact: Caren Gates  Address: [address] [place] [address]  Home Phone: [phone]  Relation: Child  Secondary Emergency Contact: Carlos Miramontes   [facility]  Home Phone: [phone]  Relation: Child    Past Surgical History:  Past Surgical History:   Procedure Laterality Date    COLONOSCOPY  2011    internal hemorrhoides    DILATION AND CURETTAGE OF UTERUS      ECTOPIC PREGNANCY SURGERY Left     HYSTERECTOMY (CERVIX STATUS UNKNOWN)         Immunization History:   Immunization History   Administered Date(s) Administered    COVID-19, MODERNA BLUE border, Primary or Immunocompromised, (age 12y+), IM, 100 mcg/0.5mL 2021, 2021, 01/10/2022    Influenza 10/31/2013    Influenza Virus Vaccine 10/29/2014    Influenza, FLUAD, (age 65 y+), Adjuvanted, 0.5mL 10/26/2020, 11/10/2021, 2022, 2023    Influenza, FLUARIX, FLULAVAL, FLUZONE (age 6 mo+) AND AFLURIA, (age 3 y+), PF, 0.5mL 10/13/2016    Influenza, High Dose (Fluzone 65 yrs and older) 2011, 10/25/2017, 10/15/2018    Influenza, Triv, inactivated, subunit, adjuvanted, IM (Fluad 65 yrs and older) 2019    Pneumococcal, PCV-13, PREVNAR 13, (age 6w+), IM, 0.5mL 10/25/2017    Pneumococcal, PPSV23, PNEUMOVAX 23, (age 2y+), SC/IM, 0.5mL 2019    Td, unspecified formulation 2014       Active Problems:  Patient Active Problem List   Diagnosis Code    Essential hypertension I10    Hyperlipidemia E78.5    Fibromyalgia M79.7

## 2024-04-12 ENCOUNTER — TELEPHONE (OUTPATIENT)
Dept: PHARMACY | Age: 74
End: 2024-04-12

## 2024-04-12 LAB
MICROORGANISM SPEC CULT: NORMAL
SPECIMEN DESCRIPTION: NORMAL

## 2024-04-12 NOTE — TELEPHONE ENCOUNTER
Patient was admitted to [facility] on 04/09/2024 for chest pain.   INR and doses were as follows  04/09 2.2 2.5 mg  04/10 2.3 2.5 mg  4/11 3.4 Held    Called patient. Patient stated that she did not take warfarin on 04/11/2024. She discussed plan with Aguilar FREEDMAN RPh while in hospital. The plan was for her to increase intake of spinach and greens after discharge and resume normal dosing as 5 mg Wed/Sat and 2.5 mg all other days. Patient reports that she has not been eating much x 4 days and thinks that was the cause of INR increase. Will eat spinach and also use protein shakes starting today. Next appt is scheduled for 4/17/2024 at 0930.     Joann Montana PharmD, BCPS 4/12/2024 8:32 AM

## 2024-04-17 ENCOUNTER — HOSPITAL ENCOUNTER (OUTPATIENT)
Dept: PHARMACY | Age: 74
Setting detail: THERAPIES SERIES
Discharge: HOME | End: 2024-04-17
Payer: COMMERCIAL

## 2024-04-17 DIAGNOSIS — I82.403 RECURRENT ACUTE DEEP VEIN THROMBOSIS (DVT) OF BOTH LOWER EXTREMITIES (HCC): Primary | Chronic | ICD-10-CM

## 2024-04-17 LAB
INR BLD: 2
PROTIME: 23.8 SECONDS

## 2024-04-17 PROCEDURE — 85610 PROTHROMBIN TIME: CPT

## 2024-04-17 PROCEDURE — 99211 OFF/OP EST MAY X REQ PHY/QHP: CPT

## 2024-04-17 NOTE — PROGRESS NOTES
Patient seen in person in Medication Management Service.    Patient states compliant all of the time with regimen.    No bleeding or thromboembolic side effects noted.      Had two protein shakes within a  week, Last protein shake was on Monday. Had a bit of spinach with eggs as well.   Patient was in hospital last week, still cephalexin 500 mg 4 times a day last Thursday.     No significant recent illness or disease state changes.  Continues not eating much.     Patient acknowledges they are still an active patient of referring provider which is Dr. Hdz  Yes     PT/INR done via POC meter per protocol.  INR was therapeutic at 2.  (goal 2 - 3)    Patient appears to be sensitive to Vitamin K consumption, She is not eating much and was on antibiotic but with protein shakes was able to remain in range for her INR. Plan is to keep drinking at least one protein shake per week to keep consistent intake of vitamin K. Patient expressed understanding.    Warfarin regimen will be continued at current dose of 5 mg Wed/Sat and 2.5 mg all other days.    Will retest in 1 week.    Patient understands dosing directions and information discussed.  Dosing schedule and follow up appointment given to patient. Progress note routed to referring physicians office.  Patient acknowledges working in Consult Agreement with Pharmacist as referred by his/her physician/provider.      For Pharmacy Admin Tracking Only    Intervention Detail: Adherence Monitorin  Total # of Interventions Recommended: 1  Total # of Interventions Accepted: 1  Time Spent (min): 20    Joann Montana PharmD, [facility] 2024 1:59 PM

## 2024-04-22 ENCOUNTER — TELEPHONE (OUTPATIENT)
Dept: PHARMACY | Age: 74
End: 2024-04-22

## 2024-04-22 NOTE — TELEPHONE ENCOUNTER
Patient request ride pick-up from [facility] for 1:30 appointment on Wednesday, 4/24. The only available time they had close to 1:30 was 12:30. Patient was advised of this and said she may have to wait to be seen upon arrival. Patient verbalized understanding and said she does not mind having to wait.     aDnielle Smith [facility], PharmD, BCACP  4/22/2024  4:37 PM

## 2024-04-24 ENCOUNTER — HOSPITAL ENCOUNTER (OUTPATIENT)
Dept: PHARMACY | Age: 74
Setting detail: THERAPIES SERIES
Discharge: HOME | End: 2024-04-24
Payer: COMMERCIAL

## 2024-04-24 DIAGNOSIS — I82.403 RECURRENT ACUTE DEEP VEIN THROMBOSIS (DVT) OF BOTH LOWER EXTREMITIES (HCC): Primary | Chronic | ICD-10-CM

## 2024-04-24 LAB
INR BLD: 2.2
PROTIME: 26.9 SECONDS

## 2024-04-24 PROCEDURE — 99211 OFF/OP EST MAY X REQ PHY/QHP: CPT | Performed by: PHARMACIST

## 2024-04-24 PROCEDURE — 85610 PROTHROMBIN TIME: CPT | Performed by: PHARMACIST

## 2024-04-24 NOTE — PROGRESS NOTES
Patient seen in person in Medication Management Service.    Patient states compliant all of the time with regimen.    No bleeding side effects noted.  Smaller bruise on left hand from injury.   No significant dietary changes.  Patient reports she has not been consistent with protein shakes. She had one the day of last visit, but none until this morning. Did have 1 serving of spinach since last visit. Pt instructed to stay consistent with protein shakes. Pt wants to drink 2 per week (1 on Wed and Sat)    Patient finished Keflex on 4/19/24. States she is not really sure if it helped. Thinks she may still have a UTI. Encouraged patient to contact her physician today.   No significant disease state changes.      Patient acknowledges they are still an active patient of referring provider which is Wilder Yes     PT/INR done via POC meter per protocol.  INR was therapeutic at 2.2.  (goal 2 - 3)    Warfarin regimen will be continued at current dose 5 mg Wed/Sat and 2.5 mg all other days.  Will retest in 2 weeks.    Patient understands dosing directions and information discussed.  Dosing schedule and follow up appointment given to patient. Progress note routed to referring physicians office.  Patient acknowledges working in Consult Agreement with Pharmacist as referred by his/her physician/provider.      For Pharmacy Admin Tracking Only    Intervention Detail:   Total # of Interventions Recommended: 0  Total # of Interventions Accepted: 0  Time Spent (min): 20     Darby Michaels, Pharm D, BCPS, CACP  [facility]  4/24/2024 1:37 PM

## 2024-05-09 ENCOUNTER — TELEPHONE (OUTPATIENT)
Dept: PHARMACY | Age: 74
End: 2024-05-09

## 2024-05-09 ENCOUNTER — HOSPITAL ENCOUNTER (OUTPATIENT)
Dept: PHARMACY | Age: 74
Setting detail: THERAPIES SERIES
Discharge: HOME | End: 2024-05-09
Payer: COMMERCIAL

## 2024-05-09 ENCOUNTER — OFFICE VISIT (OUTPATIENT)
Dept: NEUROLOGY | Age: 74
End: 2024-05-09
Payer: COMMERCIAL

## 2024-05-09 VITALS
HEIGHT: 65 IN | BODY MASS INDEX: 34.66 KG/M2 | WEIGHT: 208 LBS | SYSTOLIC BLOOD PRESSURE: 132 MMHG | OXYGEN SATURATION: 98 % | DIASTOLIC BLOOD PRESSURE: 68 MMHG | HEART RATE: 70 BPM

## 2024-05-09 DIAGNOSIS — H81.10 BENIGN PAROXYSMAL POSITIONAL VERTIGO, UNSPECIFIED LATERALITY: ICD-10-CM

## 2024-05-09 DIAGNOSIS — I82.403 RECURRENT ACUTE DEEP VEIN THROMBOSIS (DVT) OF BOTH LOWER EXTREMITIES (HCC): Primary | Chronic | ICD-10-CM

## 2024-05-09 DIAGNOSIS — M79.7 FIBROMYALGIA: ICD-10-CM

## 2024-05-09 DIAGNOSIS — M48.061 SPINAL STENOSIS OF LUMBAR REGION, UNSPECIFIED WHETHER NEUROGENIC CLAUDICATION PRESENT: ICD-10-CM

## 2024-05-09 DIAGNOSIS — G43.109 MIGRAINE WITH AURA AND WITHOUT STATUS MIGRAINOSUS, NOT INTRACTABLE: Primary | ICD-10-CM

## 2024-05-09 LAB
INR BLD: 5.5
PROTIME: 66.4 SECONDS

## 2024-05-09 PROCEDURE — 99212 OFFICE O/P EST SF 10 MIN: CPT

## 2024-05-09 PROCEDURE — 99214 OFFICE O/P EST MOD 30 MIN: CPT | Performed by: PSYCHIATRY & NEUROLOGY

## 2024-05-09 PROCEDURE — 1036F TOBACCO NON-USER: CPT | Performed by: PSYCHIATRY & NEUROLOGY

## 2024-05-09 PROCEDURE — 85610 PROTHROMBIN TIME: CPT

## 2024-05-09 PROCEDURE — 3017F COLORECTAL CA SCREEN DOC REV: CPT | Performed by: PSYCHIATRY & NEUROLOGY

## 2024-05-09 PROCEDURE — G8399 PT W/DXA RESULTS DOCUMENT: HCPCS | Performed by: PSYCHIATRY & NEUROLOGY

## 2024-05-09 PROCEDURE — 1090F PRES/ABSN URINE INCON ASSESS: CPT | Performed by: PSYCHIATRY & NEUROLOGY

## 2024-05-09 PROCEDURE — 3075F SYST BP GE 130 - 139MM HG: CPT | Performed by: PSYCHIATRY & NEUROLOGY

## 2024-05-09 PROCEDURE — 1123F ACP DISCUSS/DSCN MKR DOCD: CPT | Performed by: PSYCHIATRY & NEUROLOGY

## 2024-05-09 PROCEDURE — 3078F DIAST BP <80 MM HG: CPT | Performed by: PSYCHIATRY & NEUROLOGY

## 2024-05-09 PROCEDURE — G8417 CALC BMI ABV UP PARAM F/U: HCPCS | Performed by: PSYCHIATRY & NEUROLOGY

## 2024-05-09 PROCEDURE — 1111F DSCHRG MED/CURRENT MED MERGE: CPT | Performed by: PSYCHIATRY & NEUROLOGY

## 2024-05-09 PROCEDURE — G8427 DOCREV CUR MEDS BY ELIG CLIN: HCPCS | Performed by: PSYCHIATRY & NEUROLOGY

## 2024-05-09 RX ORDER — WARFARIN SODIUM 2.5 MG/1
2.5 TABLET ORAL DAILY
Qty: 30 TABLET | Refills: 2 | Status: SHIPPED | OUTPATIENT
Start: 2024-05-09

## 2024-05-09 ASSESSMENT — ENCOUNTER SYMPTOMS
GASTROINTESTINAL NEGATIVE: 1
EYES NEGATIVE: 1
RESPIRATORY NEGATIVE: 1
ALLERGIC/IMMUNOLOGIC NEGATIVE: 1

## 2024-05-09 NOTE — PROGRESS NOTES
Patient seen in person in Medication Management Service.    Patient states compliant all of the time with regimen.    No bleeding or thromboembolic side effects noted.    No significant med or dietary changes.    No significant recent illness or disease state changes.  No protein shakes or spinach  Patient hasn't been eating a lot     Patient will be starting keflex today for 7 days  No cranberries, black licorice or alcohol.  Patient states she is in more pain and took 800 mg of ibuprofen last night.     Patient acknowledges they are still an active patient of referring provider which is Dr Hdz Yes     PT/INR done via POC meter per protocol.  INR was supratherapeutic at 5.5.  (goal 2 - 3)    Warfarin regimen will be held for 2 days and decreased to 5 mg on Wednesdays, 2.5 mg all other days.  Will retest in 1 week.    Warfarin 2.5 mg refills sent to [facility]     Patient counseled on risks of elevated INR and to monitor for signs of bleeding and when to seek medical attention.     Patient understands dosing directions and information discussed.  Dosing schedule and follow up appointment given to patient. Progress note routed to referring physicians office.  Patient acknowledges working in Consult Agreement with Pharmacist as referred by his/her physician/provider.      For Pharmacy Admin Tracking Only    Intervention Detail: Dose Adjustment: 1, reason: Therapy Optimization  Total # of Interventions Recommended: 1  Total # of Interventions Accepted: 1  Time Spent (min): 20      Griselda Andrade, PharmD  PGY-1 Pharmacy Resident    5/9/2024   2:02 PM

## 2024-05-09 NOTE — PROGRESS NOTES
Active problem Transient head pains are felt to be migraine variant with history of migraines . 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 Leg pain is suggestive for lumbar stenos. Fibromyalgia  The condition is patient was seen at [facility] on January 17 by Dr Ortiz admitted for positional vertigo found to have COVID . She has had prior episodes of vertigo 10 to 15 years prior having had vestibular rehabilitation . With dizziness there was generalized body aches and depression. Head CT normal . CTA head and neck with no large vessel occlusion . She reports that hospitalization she will have positional vertigo 4 to 5 days per week triggered movement . There is some postural instability with tendency to lean towards the left . There is no hearing loss although will have bilateral tinnitus  . She is using antivert 25 mg po tid PRN . She reports infrequent transient head pains twice per month lasting about one minute  . She has been on elavil 10 mg po qhs with daytime hungover  . Head CT normal . CRP 3.7. She reports that leg pain has gone away . She has fibromyalgia with diffuse body pain in neck , shoulders , legs and low back on neurontin 800 mg po tid with tylenol wit codeine PRN through PMD  . She did not undergo MRI of lumbar spine . She has been having headaches since childhood . She found in her 30's that these were precipitated by food such as MSG and food coloring ,  tuna and peanut butter . There is occasional sinus pressure attenuated with flonase . She has arthritis with pain in knees hips and low back and fibromyalgia. She is on coumadin for DVT and PE in past Testing neuontin 800 mg po tid , tylenol # 3 PRN , antivert 25 mg po tid PRN, coumadin . Head CT normal . CRP 3.7      Past Medical History:   Diagnosis Date    Affective disorder ([facility])     seasonal    Arthritis     shoulders and lower back    DVT (deep venous thrombosis) ([facility])     has history in R leg X 4    Fibromyalgia

## 2024-05-09 NOTE — TELEPHONE ENCOUNTER
Warfarin 2.5 mg #30 2 refills sent to [facility]    Griselda Andrade, PharmD  PGY-1 Pharmacy Resident    5/9/2024   2:30 PM

## 2024-05-15 ENCOUNTER — HOSPITAL ENCOUNTER (OUTPATIENT)
Dept: PHARMACY | Age: 74
Setting detail: THERAPIES SERIES
Discharge: HOME | End: 2024-05-15
Payer: COMMERCIAL

## 2024-05-15 DIAGNOSIS — I82.403 RECURRENT ACUTE DEEP VEIN THROMBOSIS (DVT) OF BOTH LOWER EXTREMITIES (HCC): Primary | Chronic | ICD-10-CM

## 2024-05-15 LAB
INR BLD: 2.2
PROTIME: 26.7 SECONDS

## 2024-05-15 PROCEDURE — 99211 OFF/OP EST MAY X REQ PHY/QHP: CPT | Performed by: PHARMACY

## 2024-05-15 PROCEDURE — 85610 PROTHROMBIN TIME: CPT | Performed by: PHARMACY

## 2024-05-15 NOTE — PROGRESS NOTES
Patient seen in person in Medication Management Service.    Patient states compliant all of the time with regimen.    No bleeding or thromboembolic side effects noted.    No significant med or dietary changes. Patient hasn't started Keflex yet. Patient reports not having protein shakes or spinach since her last visit. Patient hasn't had much of an appetite. Patient reports taking 800 mg of ibuprofen earlier this week. Patient plans to start Keflex tonight. Patient was advised that if she still has a UTI, she should take the antibiotic to prevent the infection from worsening, and we would monitor her INR more closely. Patient also was advised that she should try to be consistent with diet, including when eating leafy greens or drinking protein drinks.  No significant recent illness or disease state changes. Patient reports being prescribed an antibiotic for UTI but did not start it yet.    Patient acknowledges they are still an active patient of referring provider which is Wilder Yes     PT/INR done via POC meter per protocol.  INR was therapeutic at 2.2.  (goal 2 - 3)    Warfarin regimen will be continued at current dose 5 mg Wed and 2.5 mg all other days.  Will retest in 5 days.    Patient understands dosing directions and information discussed.  Dosing schedule and follow up appointment given to patient. Progress note routed to referring physicians office.  Patient acknowledges working in Consult Agreement with Pharmacist as referred by his/her physician/provider.      For Pharmacy Admin Tracking Only    Total # of Interventions Recommended: 0  Total # of Interventions Accepted: 0  Time Spent (min): 20    Danielle Smith RPH, PharmD, BCACP  5/15/2024  1:36 PM

## 2024-05-21 ENCOUNTER — HOSPITAL ENCOUNTER (OUTPATIENT)
Dept: PHARMACY | Age: 74
Setting detail: THERAPIES SERIES
Discharge: HOME | End: 2024-05-21
Payer: COMMERCIAL

## 2024-05-21 DIAGNOSIS — I82.403 RECURRENT ACUTE DEEP VEIN THROMBOSIS (DVT) OF BOTH LOWER EXTREMITIES (HCC): Primary | Chronic | ICD-10-CM

## 2024-05-21 LAB
INR BLD: 2.3
PROTIME: 27.7 SECONDS

## 2024-05-21 PROCEDURE — 99211 OFF/OP EST MAY X REQ PHY/QHP: CPT | Performed by: PHARMACIST

## 2024-05-21 PROCEDURE — 85610 PROTHROMBIN TIME: CPT | Performed by: PHARMACIST

## 2024-05-21 NOTE — PROGRESS NOTES
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    Patient states compliant all of the time with regimen.    No bleeding or thromboembolic side effects noted.      No significant dietary changes.  States little appetite. No spinach or protein shakes. Understands she needs to be consistent, but states hasn't felt like eating.     No significant disease state changes.      Patient finished 7 day course of Keflex yesterday. States she has follow up appointment next week with Dr. Hdz for follow up.     Patient acknowledges they are still an active patient of referring provider which is Dr. Hdz Yes     PT/INR done via POC meter per protocol.  INR was therapeutic at 2.3.  (goal 2 - 3)    Warfarin regimen will be continued at current dose 5 mg Wed and 2.5 mg all other days.  Will retest in 2 weeks.    Patient understands dosing directions and information discussed.  Dosing schedule and follow up appointment given to patient. Progress note routed to referring physicians office.  Patient acknowledges working in Consult Agreement with Pharmacist as referred by his/her physician/provider.      For Pharmacy Admin Tracking Only    Intervention Detail:   Total # of Interventions Recommended: 0  Total # of Interventions Accepted: 0  Time Spent (min): 20     Darby Michaels, Pharm D, BCPS, CACP  [facility]  5/21/2024 2:14 PM

## 2024-05-28 ENCOUNTER — HOSPITAL ENCOUNTER (OUTPATIENT)
Age: 74
Setting detail: SPECIMEN
Discharge: HOME | End: 2024-05-28

## 2024-05-28 DIAGNOSIS — R30.0 DYSURIA: ICD-10-CM

## 2024-05-28 DIAGNOSIS — N89.8 VAGINAL DISCHARGE: ICD-10-CM

## 2024-05-28 LAB
BACTERIA URNS QL MICRO: NORMAL
BILIRUB UR QL STRIP: NEGATIVE
CANDIDA SPECIES: NEGATIVE
CASTS #/AREA URNS LPF: NORMAL /LPF (ref 0–8)
CLARITY UR: CLEAR
COLOR UR: YELLOW
EPI CELLS #/AREA URNS HPF: NORMAL /HPF (ref 0–5)
GARDNERELLA VAGINALIS: NEGATIVE
GLUCOSE UR STRIP-MCNC: NEGATIVE MG/DL
HGB UR QL STRIP.AUTO: NEGATIVE
KETONES UR STRIP-MCNC: ABNORMAL MG/DL
LEUKOCYTE ESTERASE UR QL STRIP: NEGATIVE
NITRITE UR QL STRIP: NEGATIVE
PH UR STRIP: 6.5 [PH] (ref 5–8)
PROT UR STRIP-MCNC: ABNORMAL MG/DL
RBC #/AREA URNS HPF: NORMAL /HPF (ref 0–4)
SOURCE: NORMAL
SP GR UR STRIP: 1.03 (ref 1–1.03)
TRICHOMONAS: NEGATIVE
UROBILINOGEN UR STRIP-ACNC: NORMAL EU/DL (ref 0–1)
WBC #/AREA URNS HPF: NORMAL /HPF (ref 0–5)

## 2024-05-29 ENCOUNTER — OFFICE VISIT (OUTPATIENT)
Dept: ORTHOPEDIC SURGERY | Age: 74
End: 2024-05-29
Payer: COMMERCIAL

## 2024-05-29 ENCOUNTER — OFFICE VISIT (OUTPATIENT)
Dept: BEHAVIORAL/MENTAL HEALTH CLINIC | Age: 74
End: 2024-05-29

## 2024-05-29 VITALS — HEIGHT: 65 IN | WEIGHT: 207 LBS | BODY MASS INDEX: 34.49 KG/M2 | RESPIRATION RATE: 14 BRPM

## 2024-05-29 DIAGNOSIS — F41.1 GENERALIZED ANXIETY DISORDER: ICD-10-CM

## 2024-05-29 DIAGNOSIS — F34.1 PERSISTENT DEPRESSIVE DISORDER: Primary | ICD-10-CM

## 2024-05-29 DIAGNOSIS — M65.342 ACQUIRED TRIGGER FINGER OF LEFT RING FINGER: Primary | ICD-10-CM

## 2024-05-29 PROCEDURE — 1036F TOBACCO NON-USER: CPT | Performed by: PHYSICIAN ASSISTANT

## 2024-05-29 PROCEDURE — 1123F ACP DISCUSS/DSCN MKR DOCD: CPT | Performed by: PHYSICIAN ASSISTANT

## 2024-05-29 PROCEDURE — G8417 CALC BMI ABV UP PARAM F/U: HCPCS | Performed by: PHYSICIAN ASSISTANT

## 2024-05-29 PROCEDURE — 1090F PRES/ABSN URINE INCON ASSESS: CPT | Performed by: PHYSICIAN ASSISTANT

## 2024-05-29 PROCEDURE — G8427 DOCREV CUR MEDS BY ELIG CLIN: HCPCS | Performed by: PHYSICIAN ASSISTANT

## 2024-05-29 PROCEDURE — 99213 OFFICE O/P EST LOW 20 MIN: CPT | Performed by: PHYSICIAN ASSISTANT

## 2024-05-29 PROCEDURE — 3017F COLORECTAL CA SCREEN DOC REV: CPT | Performed by: PHYSICIAN ASSISTANT

## 2024-05-29 PROCEDURE — G8399 PT W/DXA RESULTS DOCUMENT: HCPCS | Performed by: PHYSICIAN ASSISTANT

## 2024-05-29 ASSESSMENT — PATIENT HEALTH QUESTIONNAIRE - PHQ9
4. FEELING TIRED OR HAVING LITTLE ENERGY: NEARLY EVERY DAY
7. TROUBLE CONCENTRATING ON THINGS, SUCH AS READING THE NEWSPAPER OR WATCHING TELEVISION: NEARLY EVERY DAY
5. POOR APPETITE OR OVEREATING: MORE THAN HALF THE DAYS
SUM OF ALL RESPONSES TO PHQ QUESTIONS 1-9: 11
3. TROUBLE FALLING OR STAYING ASLEEP: SEVERAL DAYS
2. FEELING DOWN, DEPRESSED OR HOPELESS: SEVERAL DAYS
SUM OF ALL RESPONSES TO PHQ QUESTIONS 1-9: 11
9. THOUGHTS THAT YOU WOULD BE BETTER OFF DEAD, OR OF HURTING YOURSELF: NOT AT ALL
SUM OF ALL RESPONSES TO PHQ QUESTIONS 1-9: 11
1. LITTLE INTEREST OR PLEASURE IN DOING THINGS: SEVERAL DAYS
SUM OF ALL RESPONSES TO PHQ QUESTIONS 1-9: 11
10. IF YOU CHECKED OFF ANY PROBLEMS, HOW DIFFICULT HAVE THESE PROBLEMS MADE IT FOR YOU TO DO YOUR WORK, TAKE CARE OF THINGS AT HOME, OR GET ALONG WITH OTHER PEOPLE: SOMEWHAT DIFFICULT
8. MOVING OR SPEAKING SO SLOWLY THAT OTHER PEOPLE COULD HAVE NOTICED. OR THE OPPOSITE, BEING SO FIGETY OR RESTLESS THAT YOU HAVE BEEN MOVING AROUND A LOT MORE THAN USUAL: NOT AT ALL
6. FEELING BAD ABOUT YOURSELF - OR THAT YOU ARE A FAILURE OR HAVE LET YOURSELF OR YOUR FAMILY DOWN: NOT AT ALL
SUM OF ALL RESPONSES TO PHQ9 QUESTIONS 1 & 2: 2

## 2024-05-29 ASSESSMENT — ANXIETY QUESTIONNAIRES
4. TROUBLE RELAXING: 2-OVER HALF THE DAYS
6. BECOMING EASILY ANNOYED OR IRRITABLE: 1-SEVERAL DAYS
2. NOT BEING ABLE TO STOP OR CONTROL WORRYING: 3-NEARLY EVERY DAY
GAD7 TOTAL SCORE: 10
7. FEELING AFRAID AS IF SOMETHING AWFUL MIGHT HAPPEN: 0-NOT AT ALL
1. FEELING NERVOUS, ANXIOUS, OR ON EDGE: SEVERAL DAYS
3. WORRYING TOO MUCH ABOUT DIFFERENT THINGS: 3-NEARLY EVERY DAY
5. BEING SO RESTLESS THAT IT IS HARD TO SIT STILL: 0-NOT AT ALL

## 2024-05-29 NOTE — PROGRESS NOTES
Subjective:      Kaylynn Gates is a 73 y.o. female who presents for evaluation and treatment of left Ring finger  catching and locking.  This has been present for 1 week however recently has become progressively aggravating. This affects most activities involving gripping.. Originally only a catching sensation was present, however now locking is present. She presents today for evaluation.  Treatment to date has been heat, wrapping, without significant relief.    Outside reports reviewed: none.    Patient's medications, allergies, past medical, surgical, social and family histories were reviewed and updated as appropriate.      Objective:         General :    alert, appears stated age, and cooperative   Gait:  Normal. The patient can bear weight on the injured extremity.   Left Hand  Circulation:  warm, well perfused, brisk capillary refill with normal radial and ulnar pulses   Sensation:    intact to light touch   Strength:   Normal wrist and finger strength to flexion and extension   Wrist ROM:  100% of normal on flexion and extension   Finger ROM:   100% of normal on flexion   Power :   catching, locking of the Ring finger.  The patient was able to straighten the digit without the use of their other hand.   Tenderness:  localized to the distal palm   Nodules:   Palpable nodule was positive on the flexor tendon of the Ring finger.           Assessment:      left Ring finger trigger finger.     Encounter Diagnosis   Name Primary?    Acquired trigger finger of left ring finger Yes          Plan:   Kaylynn Gates is a 73 y.o. old female with what appears to be a left trigger ring finger.  I had a discussion with the patient today with regards to the nature and extent of her problem.  We discussed treatment options available to her including nonoperative and operative intervention.  I'd recommend proceeding conservatively at this time with nighttime splinting which she was amenable to.  I'll have her follow up
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
## 2024-06-04 ENCOUNTER — TELEPHONE (OUTPATIENT)
Dept: PHARMACY | Age: 74
End: 2024-06-04

## 2024-06-04 NOTE — TELEPHONE ENCOUNTER
Patient called to reschedule appt for INR check today. States she will not be able to come into clinic today. She has already cancelled the [facility] van pickup for today.  Appt rescheduled for 6/12/24 with [facility]   Darby Michaels, Pharm D, BCPS, CACP  [facility]  6/4/2024 10:58 AM

## 2024-06-07 ENCOUNTER — HOSPITAL ENCOUNTER (OUTPATIENT)
Dept: PHYSICAL THERAPY | Age: 74
Setting detail: THERAPIES SERIES
Discharge: HOME | End: 2024-06-07
Payer: COMMERCIAL

## 2024-06-07 PROCEDURE — 97162 PT EVAL MOD COMPLEX 30 MIN: CPT

## 2024-06-07 NOTE — THERAPY EVALUATION
[facility]   Outpatient Rehabilitation & Therapy  [address] [facility] [address]  P: [phone]   F: [phone]      Physical Therapy Vestibular Rehab Evaluation    Date:  2024  Patient: Kaylynn Gates  : 1950  MRN: [id number]  Physician: Giovany Masters MD     Insurance: [facility] after eval   Medical Diagnosis: H81.10 (ICD-10-CM) - Benign paroxysmal positional vertigo, unspecified laterality     Rehab Codes: R42 dizziness, R26.81 unsteadiness on feet   Onset date: 2024  Next 's appt.: 24- Dr. Masters     Precautions: none      Subjective:   CC: Dizziness     Pt notes she has obnoxious dizziness -- describes as a floating & nauseous. Feels at times her eyes cross She is worried this is making her off balanced. This has been going on since 2024. She went to ER due to feeling dizzy in 2024-- she was diagnosed with L posterior BPPV.  She did not do PT following. She notes years ago she had vestibular therapy for similar issue. Reports since  there has been some improvement in her dizziness. Has taken meclizine only 3-4 times since. She feels this occurs with turning over in bed mostly or turning while sitting. Episode will last a few seconds-- max 5 seconds. When walking she feels a \"funny\" sensation over her head and that she pulls to the L. She denies any falls. She denies any cervical surgeries. Has a hx of headaches -- only getting 1x every few months as she has controlled the triggers. Also hx of migraines but they have subsided -- was more a light and noise sensitivity.       Comorbidities:   [x] Obesity [] Dialysis  [x] Other: fibromyalgia    [] Asthma/COPD [] Dementia [x] Other: thyroid disease   [] Stroke [] Sleep apnea [] Other:   [] Vascular disease [] Rheumatic disease [] Other:     Past Medical History:   Diagnosis Date    Affective disorder (HCC)     seasonal    Arthritis     shoulders and lower back    DVT (deep venous thrombosis) ([facility])

## 2024-06-12 ENCOUNTER — INITIAL CONSULT (OUTPATIENT)
Dept: PHYSICAL MEDICINE AND REHAB | Age: 74
End: 2024-06-12

## 2024-06-12 ENCOUNTER — HOSPITAL ENCOUNTER (OUTPATIENT)
Dept: PHARMACY | Age: 74
Setting detail: THERAPIES SERIES
Discharge: HOME | End: 2024-06-12
Payer: COMMERCIAL

## 2024-06-12 ENCOUNTER — TELEPHONE (OUTPATIENT)
Dept: PHARMACY | Age: 74
End: 2024-06-12

## 2024-06-12 VITALS
WEIGHT: 207 LBS | TEMPERATURE: 97.9 F | HEIGHT: 65 IN | HEART RATE: 72 BPM | SYSTOLIC BLOOD PRESSURE: 136 MMHG | BODY MASS INDEX: 34.49 KG/M2 | DIASTOLIC BLOOD PRESSURE: 80 MMHG

## 2024-06-12 DIAGNOSIS — M79.7 FIBROMYALGIA: Primary | ICD-10-CM

## 2024-06-12 DIAGNOSIS — I82.403 RECURRENT ACUTE DEEP VEIN THROMBOSIS (DVT) OF BOTH LOWER EXTREMITIES (HCC): Primary | Chronic | ICD-10-CM

## 2024-06-12 LAB
INR BLD: 4.1
PROTIME: 49.1 SECONDS

## 2024-06-12 PROCEDURE — 85610 PROTHROMBIN TIME: CPT

## 2024-06-12 PROCEDURE — 99212 OFFICE O/P EST SF 10 MIN: CPT

## 2024-06-12 RX ORDER — NORTRIPTYLINE HYDROCHLORIDE 10 MG/1
10 CAPSULE ORAL NIGHTLY
Qty: 30 CAPSULE | Refills: 2 | Status: SHIPPED | OUTPATIENT
Start: 2024-06-12

## 2024-06-12 RX ORDER — WARFARIN SODIUM 2.5 MG/1
2.5 TABLET ORAL DAILY
Qty: 30 TABLET | Refills: 3 | OUTPATIENT
Start: 2024-06-12

## 2024-06-12 NOTE — PROGRESS NOTES
Patient seen in person in Medication Management Service.    Patient states compliant all of the time with regimen.  Has a couple of bruises from carrying TV.   No bleeding or thromboembolic side effects noted.    No significant med or dietary changes.  Does not eat much, States little appetite. Had some boost but not in last two weeks. 4 tylenol per day and tramadol for pain. Recently more pain.   No significant recent illness or disease state changes.      Patient acknowledges they are still an active patient of referring provider which is Wilder  Yes     PT/INR done via POC meter per protocol.  INR was supratherapeutic at 4.1.  (goal 2 - 3)    Sent refill for warfarin 2.5 mg tabs.     Warfarin regimen will be held for 1 day today (dose of 5 mg was due today) and continued at 2.5 mg daily. Patient also stated that she was ready to eat some spinach today as well.  Will retest in 5 days.    Patient understands dosing directions and information discussed.  Dosing schedule and follow up appointment given to patient. Progress note routed to referring physicians office.  Patient acknowledges working in Consult Agreement with Pharmacist as referred by his/her physician/provider.      For Pharmacy Admin Tracking Only    Intervention Detail: Adherence Monitorin and Dose Adjustment: 1, reason: Therapy Optimization  Total # of Interventions Recommended: 2  Total # of Interventions Accepted: 2  Time Spent (min): 20    Joann Montana PharmD, [facility] 2024 11:38 AM

## 2024-06-13 ENCOUNTER — APPOINTMENT (OUTPATIENT)
Dept: PHYSICAL THERAPY | Age: 74
End: 2024-06-13
Payer: COMMERCIAL

## 2024-06-17 ENCOUNTER — HOSPITAL ENCOUNTER (OUTPATIENT)
Dept: PHARMACY | Age: 74
Setting detail: THERAPIES SERIES
Discharge: HOME | End: 2024-06-17
Payer: COMMERCIAL

## 2024-06-17 DIAGNOSIS — I82.403 RECURRENT ACUTE DEEP VEIN THROMBOSIS (DVT) OF BOTH LOWER EXTREMITIES (HCC): Primary | Chronic | ICD-10-CM

## 2024-06-17 LAB
INR BLD: 2
PROTIME: 24.3 SECONDS

## 2024-06-17 PROCEDURE — 99212 OFFICE O/P EST SF 10 MIN: CPT

## 2024-06-17 PROCEDURE — 85610 PROTHROMBIN TIME: CPT

## 2024-06-17 NOTE — PROGRESS NOTES
Patient seen in person in Medication Management Service.    Patient states compliant most of the time with regimen.    No bleeding or thromboembolic side effects noted.    No significant med or dietary changes.    No significant recent illness or disease state changes.      Patient acknowledges they are still an active patient of referring provider which is MONSE Hdz Yes     PT/INR done via POC meter per protocol.  INR was therapeutic at 2.  (goal 2 - 3)  The last INR was 4.1  The patient complains of increased pain at 9/10  She is taking Ibuprofen 800 mg daily  The patient has no appetite    Warfarin regimen will be continued at current dose 2.5 mg daily.  Will retest in 2 weeks.    Patient understands dosing directions and information discussed.  Dosing schedule and follow up appointment given to patient. Progress note routed to referring physicians office.  Patient acknowledges working in Consult Agreement with Pharmacist as referred by his/her physician/provider.      For Pharmacy Admin Tracking Only    Intervention Detail: Adherence Monitorin  Total # of Interventions Recommended: 0  Total # of Interventions Accepted: 0  Time Spent (min): 20

## 2024-06-20 ENCOUNTER — HOSPITAL ENCOUNTER (OUTPATIENT)
Dept: PHYSICAL THERAPY | Age: 74
Setting detail: THERAPIES SERIES
Discharge: HOME | End: 2024-06-20
Payer: COMMERCIAL

## 2024-06-20 PROCEDURE — 97112 NEUROMUSCULAR REEDUCATION: CPT

## 2024-06-20 PROCEDURE — 97530 THERAPEUTIC ACTIVITIES: CPT

## 2024-06-20 NOTE — FLOWSHEET NOTE
control so she can feel more stable with ADLs. Goal added for FGA.     [] No change.     [] Other:    [x] Patient would continue to benefit from skilled physical therapy services in order to: correct BPPV, improve balance self confidence, and strengthen the vestibular system to help her have less symptoms and be more iND in her daily life.      GOALS:   Short Term Goals: (to be met in 8   Treatments)   Decreased Vertigo- Pt will report 50% improvement in dizziness showing a lessening of symptoms impacting daily life     Negative Prieto Hallpike-- Pt will have a negative prieto flores pike testing for 3 sessions showing resolve of BPPV thus reducing the amount of dizziness she is having.   Pt will be able to maintain balance on foam with EC for > 20s showing improved balance control by the vestibular system to reduce fall risk.   Decreased Dizziness Handicap Inventory (DHI) -- Pt will decrease score on DHI to </=  25 points showing improvement of dizziness that has minimal effect on their everyday life.        Long Term Goals: (to be met in 14    Treatments)  Pt will be independent with home program addressing strength, flexibility and balance to maximize gains made in therapy to improve overall functional capacity for mobility.    Pt will be able to ambulate with head turns with no LOB to be able to go to the grocery store.    Pt will report no falls for x6 weeks demonstrating improved safety with mobility and implementation of fall prevention strategies.  Pt will improve self confidence of balance per the ABC scale to >65% to demonstrate greater confidence that correlates with decreased fall risk.    Added 6/20: Pt will score above 26/30 on functional gait assessment to decrease overall fall risk with community level mobility.    Decreased Dizziness Handicap Inventory (DHI) -- Pt will decrease score on DHI to </=  15 points showing improvement of dizziness that has minimal effect on their everyday life.          Plan: [x]

## 2024-06-25 ENCOUNTER — HOSPITAL ENCOUNTER (OUTPATIENT)
Dept: PHYSICAL THERAPY | Age: 74
Setting detail: THERAPIES SERIES
Discharge: HOME | End: 2024-06-25
Payer: COMMERCIAL

## 2024-06-25 PROCEDURE — 97112 NEUROMUSCULAR REEDUCATION: CPT

## 2024-06-25 NOTE — FLOWSHEET NOTE
[facility] Outpatient Physical Therapy   [address]   Phone: [phone]   Fax: [phone]    Physical Therapy Daily Treatment Note      Date:  2024  Patient Name:  Kaylynn Gates    :  1950  MRN: [id number]  Physician: Giovany Masters MD      Insurance: [facility] after eval - 6/10-8/10 TE(42), TA (42), GT(28), NM (28), CRM (28), Man (28)   Medical Diagnosis: H81.10 (ICD-10-CM) - Benign paroxysmal positional vertigo, unspecified laterality                Rehab Codes: R42 dizziness, R26.81 unsteadiness on feet   Onset date: 2024                        Next Dr's appt.: 24- Dr. Masters   Visit# / total visits:   Cancels/No Shows: 0/0    Precautions: none      Subjective:  Pt arrives ambulatory. She states she is not dizzy and has not been since initial evaluation. States she does notice she leans to L side when she is static standing or walking. States she is eager to address but also is happy about current results. States it is stressful to have to come to PT 2x a week and is thinking about doing 1x a week instead.     Pain:  [x] Yes  [] No   Location: generalized due to fibromyalgia   Pain Rating: (0-10 scale) 8/10  Pain altered Tx:  [] No  [] Yes  Action:  Comments:    Objective:  INTERVENTIONS  INTERVENTIONS  Reps/ Time Weight/ Level Completed  Today Comments   Balance       (B) tandem stance  2x30\" hold  Foam  x 2 sets eyes open  2 sets eyes closed   No UE support    Rhomberg stance eyes closed  3x30\" hold  Foams  x No UE support    (B) heel/toe raises  10x eo  10x ec  Foam  x 1 finger tip touch support as needed   (B) march  10x eo  10x ec Foam  x 1 finger tip touch support as needed   (B) 3 way hip  10x eo  10x ec  Foam  x 1 finger tip touch support as needed   (B) small range squat  10x eo   10x ec  Foam  x 1 finger tip touch support as needed   Rocker board taps: F/B & S/S 2x30 ea  x Intermittent touch down    Rocker board holds:

## 2024-06-28 ENCOUNTER — HOSPITAL ENCOUNTER (OUTPATIENT)
Dept: PHYSICAL THERAPY | Age: 74
Setting detail: THERAPIES SERIES
Discharge: HOME | End: 2024-06-28
Payer: COMMERCIAL

## 2024-06-28 NOTE — FLOWSHEET NOTE
[facility]   Outpatient Rehabilitation & Therapy  [address] [facility] [address]  P: [phone]   F: [phone]     Physical Therapy Cancel/No Show note    Date: 2024  Patient: Kaylynn Gates  : 1950  MRN: [id number]    Visit Count:   Cancels/No Shows to date:     For today's appointment patient:    [x]  Cancelled    [] Rescheduled appointment    [] No-show     Reason given by patient:    []  Patient ill    []  Conflicting appointment    [x] No transportation      [] Conflict with work    [] No reason given    [] Weather related    [] COVID-19    [] Other:      Comments:        [x] Next appointment was confirmed    Electronically signed by: Kait Wynn PTA

## 2024-07-01 ENCOUNTER — APPOINTMENT (OUTPATIENT)
Dept: PHARMACY | Age: 74
End: 2024-07-01
Payer: COMMERCIAL

## 2024-07-01 ENCOUNTER — HOSPITAL ENCOUNTER (OUTPATIENT)
Dept: PHYSICAL THERAPY | Age: 74
Setting detail: THERAPIES SERIES
Discharge: HOME | End: 2024-07-01
Payer: COMMERCIAL

## 2024-07-01 ENCOUNTER — HOSPITAL ENCOUNTER (OUTPATIENT)
Dept: PHARMACY | Age: 74
Setting detail: THERAPIES SERIES
Discharge: HOME | End: 2024-07-01
Payer: COMMERCIAL

## 2024-07-01 DIAGNOSIS — I82.403 RECURRENT ACUTE DEEP VEIN THROMBOSIS (DVT) OF BOTH LOWER EXTREMITIES (HCC): Primary | Chronic | ICD-10-CM

## 2024-07-01 LAB
INR BLD: 4.7
PROTIME: 56.5 SECONDS

## 2024-07-01 PROCEDURE — 99212 OFFICE O/P EST SF 10 MIN: CPT | Performed by: PHARMACY

## 2024-07-01 PROCEDURE — 97112 NEUROMUSCULAR REEDUCATION: CPT

## 2024-07-01 PROCEDURE — 85610 PROTHROMBIN TIME: CPT | Performed by: PHARMACY

## 2024-07-01 NOTE — PROGRESS NOTES
Patient seen in person in Medication Management Service.    Patient states unknown compliance with regimen.  Patient verbally states that she has two days a week of 5 mg but states she follows the card. Patient states she was taking a green tablet every day, which is the 2.5 mg dose. There was one day that the patient forgot the 2.5 mg dose and took 5 mg the next day. Patient was advised to take missed medication as soon as it's remembered and to not double up.  No bleeding or thromboembolic side effects noted.    No significant med or dietary changes.  Patient hasn't really had much of an appetite. Patient has used two protein drinks in the past two weeks. Receives home delivery meals.  No significant recent illness or disease state changes.      Patient acknowledges they are still an active patient of referring provider which is LENIN Hdz Yes     PT/INR done via POC meter per protocol.  INR was supratherapeutic at 4.7.  (goal 2 - 3)    Warfarin regimen will be held for two days (today and tomorrow), then resume 2.5 mg daily.  Will retest in 4 days.    Patient understands dosing directions and information discussed.  Dosing schedule and follow up appointment given to patient. Progress note routed to referring physicians office.  Patient acknowledges working in Consult Agreement with Pharmacist as referred by his/her physician/provider.      For Pharmacy Admin Tracking Only    Intervention Detail: Dose Adjustment: 1, reason: Therapy Optimization  Total # of Interventions Recommended: 1  Total # of Interventions Accepted: 1  Time Spent (min): 20      Danielle Smith RPH, PharmD, BCACP  7/1/2024  11:42 AM

## 2024-07-01 NOTE — FLOWSHEET NOTE
[facility] Outpatient Physical Therapy   [address] [facility] [facility] [address]   Phone: [phone]   Fax: [phone]    Physical Therapy Daily Treatment Note      Date:  2024  Patient Name:  Kaylynn Gates    :  1950  MRN: [id number]  Physician: Giovany Masters MD      Insurance: [facility] after eval - 6/10-8/10 TE(42), TA (42), GT(28), NM (28), CRM (28), Man (28)   Medical Diagnosis: H81.10 (ICD-10-CM) - Benign paroxysmal positional vertigo, unspecified laterality                Rehab Codes: R42 dizziness, R26.81 unsteadiness on feet   Onset date: 2024                        Next 's appt.: 24- Dr. Masters   Visit# / total visits:   Cancels/No Shows: 0/0    Precautions: none      Subjective:  Patient denying dizziness since last session.  Patient reporting she is more frustrated with her balance.  Patient reports she wants to discharge soon but feels she is coming too frequently.    Pain:  [x] Yes  [] No   Location: generalized due to fibromyalgia   Pain Rating: (0-10 scale) 9.5/10  Pain altered Tx:  [] No  [] Yes  Action:  Comments:    Objective:  INTERVENTIONS  INTERVENTIONS  Reps/ Time Weight/ Level Completed  Today Comments   Balance       (B) tandem stance  2x30\" hold  Foam  x 2 sets eyes open  2 sets eyes closed   No UE support    Rhomberg stance eyes closed  3x30\" hold  Foams  x No UE support    Rhomberg head turns EO V/H 10x ea foam x 7/1 pt reporting B foot pain possibly due to shoes.   (B) heel/toe raises  20x eo  20x ec  Foam  x 1 finger tip touch support as needed   (B) march  10x eo  10x ec Foam  x 1 finger tip touch support as needed   (B) 3 way hip  10x eo  10x ec  Foam  x 1 finger tip touch support as needed   (B) small range squat  10x eo   10x ec  Foam  x 1 finger tip touch support as needed   Rocker board taps: F/B & S/S 2x30 ea  x Intermittent touch down    Rocker board holds: F/B & S/S 2x30s ea   x Intermittent touch down

## 2024-07-03 ENCOUNTER — HOSPITAL ENCOUNTER (OUTPATIENT)
Dept: PHYSICAL THERAPY | Age: 74
Setting detail: THERAPIES SERIES
End: 2024-07-03
Payer: COMMERCIAL

## 2024-07-05 ENCOUNTER — HOSPITAL ENCOUNTER (OUTPATIENT)
Dept: PHARMACY | Age: 74
Setting detail: THERAPIES SERIES
Discharge: HOME | End: 2024-07-05
Payer: COMMERCIAL

## 2024-07-05 DIAGNOSIS — I82.403 RECURRENT ACUTE DEEP VEIN THROMBOSIS (DVT) OF BOTH LOWER EXTREMITIES (HCC): Primary | Chronic | ICD-10-CM

## 2024-07-05 LAB
INR BLD: 1.9
PROTIME: 22.5 SECONDS

## 2024-07-05 PROCEDURE — 99212 OFFICE O/P EST SF 10 MIN: CPT

## 2024-07-05 PROCEDURE — 85610 PROTHROMBIN TIME: CPT

## 2024-07-05 NOTE — PROGRESS NOTES
Patient seen in person in Medication Management Service.    Patient states compliant most of the time with regimen.    No bleeding or thromboembolic side effects noted.    No significant med or dietary changes.    No significant recent illness or disease state changes.      Patient acknowledges they are still an active patient of referring provider which is MONSE Hdz Yes     PT/INR done via POC meter per protocol.  INR was subtherapeutic at 1.9.  (goal 2 - 3)  INR decreased from 4.7 four days ago    Warfarin regimen will be continued at current dose 2.5 mg daily.  Will retest in 2 weeks.    Patient understands dosing directions and information discussed.  Dosing schedule and follow up appointment given to patient. Progress note routed to referring physicians office.  Patient acknowledges working in Consult Agreement with Pharmacist as referred by his/her physician/provider.      For Pharmacy Admin Tracking Only    Intervention Detail: Adherence Monitorin  Total # of Interventions Recommended: 0  Total # of Interventions Accepted: 0  Time Spent (min): 20

## 2024-07-06 ASSESSMENT — ENCOUNTER SYMPTOMS: BACK PAIN: 1

## 2024-07-08 ENCOUNTER — APPOINTMENT (OUTPATIENT)
Dept: PHYSICAL THERAPY | Age: 74
End: 2024-07-08
Payer: COMMERCIAL

## 2024-07-10 ENCOUNTER — HOSPITAL ENCOUNTER (OUTPATIENT)
Dept: PHYSICAL THERAPY | Age: 74
Setting detail: THERAPIES SERIES
Discharge: HOME | End: 2024-07-10
Payer: COMMERCIAL

## 2024-07-10 PROCEDURE — 97112 NEUROMUSCULAR REEDUCATION: CPT

## 2024-07-10 NOTE — FLOWSHEET NOTE
[facility] Outpatient Physical Therapy   [address] [facility] [address]   Phone: [phone]   Fax: [phone]    Physical Therapy Daily Treatment Note      Date:  7/10/2024  Patient Name:  Kaylynn Gates    :  1950  MRN: [id number]  Physician: Giovany Masters MD      Insurance: [facility] after eval - 6/10-8/10 TE(42), TA (42), GT(28), NM (28), CRM (28), Man (28)   Medical Diagnosis: H81.10 (ICD-10-CM) - Benign paroxysmal positional vertigo, unspecified laterality                Rehab Codes: R42 dizziness, R26.81 unsteadiness on feet   Onset date: 2024                        Next 's appt.: 24- Dr. Masters   Visit# / total visits:   Cancels/No Shows: 0/0    Precautions: none      Subjective:  Patient reported of dizziness over weekend.  Decrease in overall fibromyalgia pain noted.  Pain:  [x] Yes  [] No   Location: generalized due to fibromyalgia   Pain Rating: (0-10 scale) 710  Pain altered Tx:  [] No  [] Yes  Action:  Comments:    Objective:  INTERVENTIONS  INTERVENTIONS  Reps/ Time Weight/ Level Completed  Today Comments   Balance       (B) tandem stance  2x30\" hold  Foam  x 2 sets eyes open  2 sets eyes closed   No UE support    Rhomberg stance eyes closed  3x30\" hold  Foams  x No UE support    Rhomberg head turns EO V/H 10x ea foam x 7/1 pt reporting B foot pain possibly due to shoes.   (B) heel/toe raises  20x eo  20x ec  Foam  x 1 finger tip touch support as needed   (B) march  10x eo  10x ec Foam  x 1 finger tip touch support as needed   (B) 3 way hip  10x eo  10x ec  Foam  x 1 finger tip touch support as needed   (B) small range squat  10x eo   10x ec  Foam  x 1 finger tip touch support as needed   Rocker board taps: F/B & S/S 2x30 ea  x Intermittent touch down    Rocker board holds: F/B & S/S 2x30s ea   x Intermittent touch down               Specific Instructions for next treatment   - dynamic walking balance        Assessment: [x] Progressing

## 2024-07-15 ENCOUNTER — HOSPITAL ENCOUNTER (OUTPATIENT)
Dept: PHYSICAL THERAPY | Age: 74
Setting detail: THERAPIES SERIES
Discharge: HOME | End: 2024-07-15
Payer: COMMERCIAL

## 2024-07-15 PROCEDURE — 95992 CANALITH REPOSITIONING PROC: CPT

## 2024-07-15 PROCEDURE — 97530 THERAPEUTIC ACTIVITIES: CPT

## 2024-07-15 NOTE — PROGRESS NOTES
[facility] Outpatient Physical Therapy   [address]   Phone: [phone]   Fax: [phone]    Physical Therapy Daily Treatment Note/Progress Note       Date:  7/15/2024  Patient Name:  Kaylynn Gates    :  1950  MRN: [id number]  Physician: Giovany Masters MD      Insurance: [facility] auth after eval - 6/10-8/10 TE(42), TA (42), GT(28), NM (28), CRM (28), Man (28)   Medical Diagnosis: H81.10 (ICD-10-CM) - Benign paroxysmal positional vertigo, unspecified laterality                Rehab Codes: R42 dizziness, R26.81 unsteadiness on feet   Onset date: 2024                        Next 's appt.: 24- Dr. Masters   Visit# / total visits:   Cancels/No Shows: 0/0  Date of initial visit: 24        Date of PN: 7/15/24 (visit 6)  Formal progress note reporting period:  24 - 7/15/24       Precautions: none      Subjective:  Patient arrives ambulatory. She felt she was doing well for awhile until the past 2 Sundays she started to have dizziness in bed. Felt with turning over and trying to get out of bed. She did take 1/2 anitvert but felt drowsiness. Describes dizziness as \"the kind that makes your stomach sick\". Felt is lasted < 1 minute. Denies any nausea or vomiting. Feels better today. Her balance is improving as she is not feeling the pull laterally with walking. Also noticing she is picking her feet up with walking.       Pain:  [x] Yes  [] No   Location: generalized due to fibromyalgia   Pain Rating: (0-10 scale) 7/10  Pain altered Tx:  [] No  [] Yes  Action:  Comments:    Objective:  INTERVENTIONS  INTERVENTIONS  Reps/ Time Weight/ Level Completed  Today Comments   Balance       (B) tandem stance  2x30\" hold  Foam   2 sets eyes open  2 sets eyes closed   No UE support    Rhomberg stance eyes closed  3x30\" hold  Foams   No UE support    Rhomberg head turns EO V/H 10x ea foam  / pt reporting B foot pain possibly due to shoes.   (B) heel/toe

## 2024-07-22 ENCOUNTER — HOSPITAL ENCOUNTER (OUTPATIENT)
Dept: PHARMACY | Age: 74
Setting detail: THERAPIES SERIES
Discharge: HOME | End: 2024-07-22
Payer: COMMERCIAL

## 2024-07-22 DIAGNOSIS — I82.403 RECURRENT ACUTE DEEP VEIN THROMBOSIS (DVT) OF BOTH LOWER EXTREMITIES (HCC): Primary | Chronic | ICD-10-CM

## 2024-07-22 LAB
INR BLD: 3.2
PROTIME: 38.5 SECONDS

## 2024-07-22 PROCEDURE — 85610 PROTHROMBIN TIME: CPT | Performed by: PHARMACIST

## 2024-07-22 PROCEDURE — 99212 OFFICE O/P EST SF 10 MIN: CPT | Performed by: PHARMACIST

## 2024-07-22 NOTE — PROGRESS NOTES
Patient seen in person in Medication Management Service.    Patient states compliant all of the time with regimen.    No bleeding or thromboembolic side effects noted.    No significant med changes.    Patient has Premier protein shakes at home that she had planned to restart. States she was going these off and on, but not consistently. Discussed if she is going to use these she needs to be consistent.  Plans to drink 2 per week.  No significant recent illness or disease state changes.      Patient acknowledges they are still an active patient of referring provider which is Dr. Hdz Yes     PT/INR done via POC meter per protocol.  INR was supratherapeutic at 3.2.  (goal 2 - 3)    Pt starting Premier protein shakes 2 per day. Confirms she has warfarin 2.5 mg tablets at home  Warfarin regimen will be decreased to 1.25 mg Mondays and 2.5 mg all other days.  Will retest in 1 week.    Patient understands dosing directions and information discussed.  Dosing schedule and follow up appointment given to patient. Progress note routed to referring physicians office.  Patient acknowledges working in Consult Agreement with Pharmacist as referred by his/her physician/provider.      For Pharmacy Admin Tracking Only    Intervention Detail: Dose Adjustment: 1, reason: Therapy Optimization  Total # of Interventions Recommended: 1  Total # of Interventions Accepted: 1  Time Spent (min): 20     Darby Michaels, Pharm D, BCPS, CACP  [facility]  7/22/2024 11:11 AM

## 2024-07-30 ENCOUNTER — HOSPITAL ENCOUNTER (OUTPATIENT)
Dept: PHARMACY | Age: 74
Setting detail: THERAPIES SERIES
Discharge: HOME | End: 2024-07-30
Payer: COMMERCIAL

## 2024-07-30 DIAGNOSIS — I82.403 RECURRENT ACUTE DEEP VEIN THROMBOSIS (DVT) OF BOTH LOWER EXTREMITIES (HCC): Primary | Chronic | ICD-10-CM

## 2024-07-30 LAB
INR BLD: 1.3
PROTIME: 15.5 SECONDS

## 2024-07-30 PROCEDURE — 99212 OFFICE O/P EST SF 10 MIN: CPT

## 2024-07-30 PROCEDURE — 85610 PROTHROMBIN TIME: CPT

## 2024-07-30 NOTE — PROGRESS NOTES
Patient seen in person in Medication Management Service.    Patient states compliant most of the time with regimen.  Patient took 2.5 mg on this Monday instead of 1.25 mg.   No bleeding or thromboembolic side effects noted.    No significant med or dietary changes.  Not started protein shakes yet, but plans to start 2 per week.   No significant recent illness or disease state changes.      Patient acknowledges they are still an active patient of referring provider which is Dr. Hdz - YES      PT/INR done via POC meter per protocol.  INR was subtherapeutic at 1.3.  (goal 2 - 3)    Warfarin regimen will be increased to 5 mg today and tomorrow, then 2.5 mg all other days.  Will retest in 1 week.    Patient understands dosing directions and information discussed.  Dosing schedule and follow up appointment given to patient. Progress note routed to referring physicians office.  Patient acknowledges working in Consult Agreement with Pharmacist as referred by his/her physician/provider.      For Pharmacy Admin Tracking Only    Intervention Detail: Dose Adjustment: 1, reason: Therapy Optimization  Total # of Interventions Recommended: 1  Total # of Interventions Accepted: 1  Time Spent (min): 20      Codi CollazoD  PGY1 Pharmacy Resident    7/30/2024  11:27 AM

## 2024-08-05 ENCOUNTER — ANTI-COAG VISIT (OUTPATIENT)
Age: 74
End: 2024-08-05
Payer: COMMERCIAL

## 2024-08-05 DIAGNOSIS — I82.403 RECURRENT ACUTE DEEP VEIN THROMBOSIS (DVT) OF BOTH LOWER EXTREMITIES (HCC): Primary | Chronic | ICD-10-CM

## 2024-08-05 LAB — INR BLD: 1.8

## 2024-08-05 PROCEDURE — 85610 PROTHROMBIN TIME: CPT | Performed by: PHARMACY

## 2024-08-05 PROCEDURE — 99212 OFFICE O/P EST SF 10 MIN: CPT | Performed by: PHARMACY

## 2024-08-05 NOTE — PROGRESS NOTES
Patient seen in person in Medication Management Service.    Patient states compliant all of the time with regimen.    No bleeding or thromboembolic side effects noted.    No significant med or dietary changes.  Patient drinking less than 1 protein shake in the week and had a serving of spinach yesterday. Patient plans to continue to aim to drink 2 protein drinks and eat about a half a cup of spinach each week.   No significant recent illness or disease state changes.      Patient acknowledges they are still an active patient of referring provider which is Wilder Yes     PT/INR done via POC meter per protocol.  INR was subtherapeutic at 1.8.  (goal 2 - 3)    Warfarin regimen will be increased to 5 mg on Mon/Wed/Fri and 2.5 mg on all other days.  Will retest in 9 days.    Patient understands dosing directions and information discussed.  Dosing schedule and follow up appointment given to patient. Progress note routed to referring physicians office.  Patient acknowledges working in Consult Agreement with Pharmacist as referred by his/her physician/provider.      For Pharmacy Admin Tracking Only    Intervention Detail: Dose Adjustment: 1, reason: Therapy Optimization  Total # of Interventions Recommended: 1  Total # of Interventions Accepted: 1  Time Spent (min): 20    Danielle Smith RPH, PharmD, BCACP  8/5/2024  11:34 AM

## 2024-08-13 ENCOUNTER — HOSPITAL ENCOUNTER (OUTPATIENT)
Age: 74
Setting detail: SPECIMEN
Discharge: HOME | End: 2024-08-13

## 2024-08-13 DIAGNOSIS — R35.0 URINE FREQUENCY: ICD-10-CM

## 2024-08-14 ENCOUNTER — TELEPHONE (OUTPATIENT)
Age: 74
End: 2024-08-14

## 2024-08-14 LAB
BACTERIA URNS QL MICRO: NORMAL
BILIRUB UR QL STRIP: NEGATIVE
CASTS #/AREA URNS LPF: NORMAL /LPF (ref 0–8)
CLARITY UR: CLEAR
COLOR UR: YELLOW
EPI CELLS #/AREA URNS HPF: NORMAL /HPF (ref 0–5)
GLUCOSE UR STRIP-MCNC: NEGATIVE MG/DL
HGB UR QL STRIP.AUTO: NEGATIVE
KETONES UR STRIP-MCNC: NEGATIVE MG/DL
LEUKOCYTE ESTERASE UR QL STRIP: ABNORMAL
NITRITE UR QL STRIP: NEGATIVE
PH UR STRIP: 8.5 [PH] (ref 5–8)
PROT UR STRIP-MCNC: NEGATIVE MG/DL
RBC #/AREA URNS HPF: NORMAL /HPF (ref 0–4)
SP GR UR STRIP: 1.01 (ref 1–1.03)
UROBILINOGEN UR STRIP-ACNC: NORMAL EU/DL (ref 0–1)
WBC #/AREA URNS HPF: NORMAL /HPF (ref 0–5)

## 2024-08-14 NOTE — TELEPHONE ENCOUNTER
Pt left voicemail canceling appointment for INR check this afternoon. States she was out shopping and just remembered she had appt. Will not be back in time to make appt. Rescheduled for 8/21/24  Darby Michaels, Pharm D, BCPS, CACP  [facility]  8/14/2024 3:54 PM

## 2024-08-21 ENCOUNTER — ANTI-COAG VISIT (OUTPATIENT)
Age: 74
End: 2024-08-21
Payer: COMMERCIAL

## 2024-08-21 DIAGNOSIS — I82.403 RECURRENT ACUTE DEEP VEIN THROMBOSIS (DVT) OF BOTH LOWER EXTREMITIES (HCC): Primary | Chronic | ICD-10-CM

## 2024-08-21 LAB
INR BLD: 3.7
PROTIME: 44.4 SECONDS

## 2024-08-21 PROCEDURE — 99212 OFFICE O/P EST SF 10 MIN: CPT

## 2024-08-21 PROCEDURE — 85610 PROTHROMBIN TIME: CPT

## 2024-08-21 NOTE — PROGRESS NOTES
Patient seen in person in Medication Management Service.    Patient states compliant all of the time with regimen.    No bleeding or thromboembolic side effects noted.    No significant med  changes.  Started eating little bit  more recently. Wants to eat spinach every day. Does not want to change dosage for now.  Patient plans to continue to aim to drink 2 protein drinks each week.   No significant recent illness or disease state changes.      Patient acknowledges they are still an active patient of referring provider which is Wilder Yes     PT/INR done via POC meter per protocol.  INR was supra therapeutic at 3.7.  (goal 2 - 3)    Warfarin regimen will be to Hold dose today and continue with  5 mg on Mon/Wed/Fri and 2.5 mg on all other days.Wants to eat spinach every day. Does not want to change dosage for now.   Will retest in 1 week.    Patient understands dosing directions and information discussed.  Dosing schedule and follow up appointment given to patient. Progress note routed to referring physicians office.  Patient acknowledges working in Consult Agreement with Pharmacist as referred by his/her physician/provider.      For Pharmacy Admin Tracking Only    Intervention Detail: Adherence Monitorin  Total # of Interventions Recommended: 1  Total # of Interventions Accepted: 1  Time Spent (min): 20    Joann Montana PharmD, BCPS 2024 1:23 PM

## 2024-08-28 ENCOUNTER — ANTI-COAG VISIT (OUTPATIENT)
Age: 74
End: 2024-08-28
Payer: COMMERCIAL

## 2024-08-28 DIAGNOSIS — I82.403 RECURRENT ACUTE DEEP VEIN THROMBOSIS (DVT) OF BOTH LOWER EXTREMITIES (HCC): Primary | Chronic | ICD-10-CM

## 2024-08-28 LAB
INTERNATIONAL NORMALIZATION RATIO, POC: 2.7
PROTHROMBIN TIME, POC: 32.7

## 2024-08-28 PROCEDURE — 85610 PROTHROMBIN TIME: CPT

## 2024-08-28 PROCEDURE — 99211 OFF/OP EST MAY X REQ PHY/QHP: CPT

## 2024-08-28 NOTE — PROGRESS NOTES
Patient seen in person in Medication Management Service.    Patient states compliant all of the time with regimen.    No bleeding or thromboembolic side effects noted.    No significant med changes.  Patient has not had spinach in couple weeks but has been consistent with 2 protein drinks per week. Overall has not been eating much at all.   Just has not had any appetite.  Admits to losing weight and clothes being loose. Thinks the last few days has been a bit better.  Down about 7 lbs in last 3 months.   No significant recent illness or disease state changes.      Patient acknowledges they are still an active patient of referring provider which is Dr. Hdz Yes     PT/INR done via POC meter per protocol.  INR was therapeutic at 2.7.  (goal 2 - 3) with consistent spinach intake / diet.  Patient plans to try to eat spinach twice a week.  Will continue to drink 2 protein drinks (Premier) per week.     Warfarin regimen will be continued at current dose of 5mg on MWF and 2.5mg all other days.  Will retest in 2 weeks.    Patient understands dosing directions and information discussed.  Dosing schedule and follow up appointment given to patient. Progress note routed to referring physicians office.  Patient acknowledges working in Consult Agreement with Pharmacist as referred by his/her physician/provider.      For Pharmacy Admin Tracking Only    Intervention Detail: Adherence Monitorin  Total # of Interventions Recommended: 1  Total # of Interventions Accepted: 1  Time Spent (min): 20    Carlton Nice RPH,Pharm.D,, BCPS, CACP  2024  11:10 AM

## 2024-09-11 ENCOUNTER — ANTI-COAG VISIT (OUTPATIENT)
Age: 74
End: 2024-09-11
Payer: COMMERCIAL

## 2024-09-11 DIAGNOSIS — I82.403 RECURRENT ACUTE DEEP VEIN THROMBOSIS (DVT) OF BOTH LOWER EXTREMITIES (HCC): Primary | Chronic | ICD-10-CM

## 2024-09-11 LAB
INTERNATIONAL NORMALIZATION RATIO, POC: 2.8 (ref 2–3)
PROTHROMBIN TIME, POC: 33.7

## 2024-09-11 PROCEDURE — 99211 OFF/OP EST MAY X REQ PHY/QHP: CPT

## 2024-09-11 PROCEDURE — 85610 PROTHROMBIN TIME: CPT

## 2024-09-12 RX ORDER — NORTRIPTYLINE HCL 10 MG
10 CAPSULE ORAL NIGHTLY
Qty: 30 CAPSULE | Refills: 0 | Status: SHIPPED | OUTPATIENT
Start: 2024-09-12

## 2024-09-17 ENCOUNTER — HOSPITAL ENCOUNTER (OUTPATIENT)
Age: 74
Discharge: HOME | End: 2024-09-17
Payer: COMMERCIAL

## 2024-09-17 DIAGNOSIS — I10 ESSENTIAL HYPERTENSION: ICD-10-CM

## 2024-09-17 DIAGNOSIS — E78.2 MIXED HYPERLIPIDEMIA: ICD-10-CM

## 2024-09-17 DIAGNOSIS — E03.9 ACQUIRED HYPOTHYROIDISM: ICD-10-CM

## 2024-09-17 LAB
ALBUMIN SERPL-MCNC: 3.7 G/DL (ref 3.5–5.2)
ALP SERPL-CCNC: 81 U/L (ref 35–104)
ALT SERPL-CCNC: 7 U/L (ref 5–33)
ANION GAP SERPL CALCULATED.3IONS-SCNC: 7 MMOL/L (ref 9–17)
AST SERPL-CCNC: 15 U/L
BILIRUB SERPL-MCNC: 0.2 MG/DL (ref 0.3–1.2)
BUN SERPL-MCNC: 13 MG/DL (ref 8–23)
CALCIUM SERPL-MCNC: 9.4 MG/DL (ref 8.6–10.4)
CHLORIDE SERPL-SCNC: 102 MMOL/L (ref 98–107)
CHOLEST SERPL-MCNC: 255 MG/DL
CHOLESTEROL/HDL RATIO: 5.3
CO2 SERPL-SCNC: 30 MMOL/L (ref 20–31)
CREAT SERPL-MCNC: 0.7 MG/DL (ref 0.5–0.9)
ERYTHROCYTE [DISTWIDTH] IN BLOOD BY AUTOMATED COUNT: 19 % (ref 11.5–14.9)
GFR, ESTIMATED: >90 ML/MIN/1.73M2
GLUCOSE SERPL-MCNC: 86 MG/DL (ref 70–99)
HCT VFR BLD AUTO: 32.7 % (ref 36–46)
HDLC SERPL-MCNC: 48 MG/DL
HGB BLD-MCNC: 10.4 G/DL (ref 12–16)
LDLC SERPL CALC-MCNC: 151 MG/DL (ref 0–130)
MCH RBC QN AUTO: 24.2 PG (ref 26–34)
MCHC RBC AUTO-ENTMCNC: 31.8 G/DL (ref 31–37)
MCV RBC AUTO: 76 FL (ref 80–100)
PLATELET # BLD AUTO: 235 K/UL (ref 150–450)
PMV BLD AUTO: 7.1 FL (ref 6–12)
POTASSIUM SERPL-SCNC: 3.9 MMOL/L (ref 3.7–5.3)
PROT SERPL-MCNC: 6.9 G/DL (ref 6.4–8.3)
RBC # BLD AUTO: 4.3 M/UL (ref 4–5.2)
SODIUM SERPL-SCNC: 139 MMOL/L (ref 135–144)
TRIGL SERPL-MCNC: 278 MG/DL
TSH SERPL DL<=0.05 MIU/L-ACNC: 24.66 UIU/ML (ref 0.3–5)
WBC OTHER # BLD: 5.3 K/UL (ref 3.5–11)

## 2024-09-17 PROCEDURE — 85027 COMPLETE CBC AUTOMATED: CPT

## 2024-09-17 PROCEDURE — 80061 LIPID PANEL: CPT

## 2024-09-17 PROCEDURE — 36415 COLL VENOUS BLD VENIPUNCTURE: CPT

## 2024-09-17 PROCEDURE — 80053 COMPREHEN METABOLIC PANEL: CPT

## 2024-09-17 PROCEDURE — 84443 ASSAY THYROID STIM HORMONE: CPT

## 2024-09-23 ENCOUNTER — OFFICE VISIT (OUTPATIENT)
Dept: BEHAVIORAL/MENTAL HEALTH CLINIC | Age: 74
End: 2024-09-23
Payer: COMMERCIAL

## 2024-09-23 DIAGNOSIS — F34.1 PERSISTENT DEPRESSIVE DISORDER: Primary | ICD-10-CM

## 2024-09-23 DIAGNOSIS — F41.1 GENERALIZED ANXIETY DISORDER: ICD-10-CM

## 2024-09-23 PROCEDURE — 90837 PSYTX W PT 60 MINUTES: CPT | Performed by: PSYCHOLOGIST

## 2024-09-23 PROCEDURE — 1036F TOBACCO NON-USER: CPT | Performed by: PSYCHOLOGIST

## 2024-09-23 PROCEDURE — 1123F ACP DISCUSS/DSCN MKR DOCD: CPT | Performed by: PSYCHOLOGIST

## 2024-09-23 ASSESSMENT — PATIENT HEALTH QUESTIONNAIRE - PHQ9
6. FEELING BAD ABOUT YOURSELF - OR THAT YOU ARE A FAILURE OR HAVE LET YOURSELF OR YOUR FAMILY DOWN: SEVERAL DAYS
5. POOR APPETITE OR OVEREATING: MORE THAN HALF THE DAYS
8. MOVING OR SPEAKING SO SLOWLY THAT OTHER PEOPLE COULD HAVE NOTICED. OR THE OPPOSITE, BEING SO FIGETY OR RESTLESS THAT YOU HAVE BEEN MOVING AROUND A LOT MORE THAN USUAL: NOT AT ALL
3. TROUBLE FALLING OR STAYING ASLEEP: NOT AT ALL
SUM OF ALL RESPONSES TO PHQ QUESTIONS 1-9: 10
SUM OF ALL RESPONSES TO PHQ QUESTIONS 1-9: 10
4. FEELING TIRED OR HAVING LITTLE ENERGY: MORE THAN HALF THE DAYS
SUM OF ALL RESPONSES TO PHQ QUESTIONS 1-9: 10
SUM OF ALL RESPONSES TO PHQ QUESTIONS 1-9: 9
1. LITTLE INTEREST OR PLEASURE IN DOING THINGS: SEVERAL DAYS
10. IF YOU CHECKED OFF ANY PROBLEMS, HOW DIFFICULT HAVE THESE PROBLEMS MADE IT FOR YOU TO DO YOUR WORK, TAKE CARE OF THINGS AT HOME, OR GET ALONG WITH OTHER PEOPLE: SOMEWHAT DIFFICULT
7. TROUBLE CONCENTRATING ON THINGS, SUCH AS READING THE NEWSPAPER OR WATCHING TELEVISION: MORE THAN HALF THE DAYS
SUM OF ALL RESPONSES TO PHQ9 QUESTIONS 1 & 2: 2
9. THOUGHTS THAT YOU WOULD BE BETTER OFF DEAD, OR OF HURTING YOURSELF: SEVERAL DAYS
2. FEELING DOWN, DEPRESSED OR HOPELESS: SEVERAL DAYS

## 2024-09-23 ASSESSMENT — COLUMBIA-SUICIDE SEVERITY RATING SCALE - C-SSRS
2. HAVE YOU ACTUALLY HAD ANY THOUGHTS OF KILLING YOURSELF?: NO
6. HAVE YOU EVER DONE ANYTHING, STARTED TO DO ANYTHING, OR PREPARED TO DO ANYTHING TO END YOUR LIFE?: NO
1. WITHIN THE PAST MONTH, HAVE YOU WISHED YOU WERE DEAD OR WISHED YOU COULD GO TO SLEEP AND NOT WAKE UP?: YES

## 2024-09-26 ENCOUNTER — OFFICE VISIT (OUTPATIENT)
Dept: PHYSICAL MEDICINE AND REHAB | Age: 74
End: 2024-09-26
Payer: COMMERCIAL

## 2024-09-26 VITALS
HEIGHT: 65 IN | WEIGHT: 196 LBS | BODY MASS INDEX: 32.65 KG/M2 | SYSTOLIC BLOOD PRESSURE: 128 MMHG | DIASTOLIC BLOOD PRESSURE: 60 MMHG | HEART RATE: 72 BPM

## 2024-09-26 DIAGNOSIS — G89.4 CHRONIC PAIN SYNDROME: ICD-10-CM

## 2024-09-26 DIAGNOSIS — M79.7 FIBROMYALGIA: Primary | ICD-10-CM

## 2024-09-26 PROCEDURE — G8417 CALC BMI ABV UP PARAM F/U: HCPCS | Performed by: STUDENT IN AN ORGANIZED HEALTH CARE EDUCATION/TRAINING PROGRAM

## 2024-09-26 PROCEDURE — 99214 OFFICE O/P EST MOD 30 MIN: CPT | Performed by: STUDENT IN AN ORGANIZED HEALTH CARE EDUCATION/TRAINING PROGRAM

## 2024-09-26 PROCEDURE — 1123F ACP DISCUSS/DSCN MKR DOCD: CPT | Performed by: STUDENT IN AN ORGANIZED HEALTH CARE EDUCATION/TRAINING PROGRAM

## 2024-09-26 PROCEDURE — 3074F SYST BP LT 130 MM HG: CPT | Performed by: STUDENT IN AN ORGANIZED HEALTH CARE EDUCATION/TRAINING PROGRAM

## 2024-09-26 PROCEDURE — 3017F COLORECTAL CA SCREEN DOC REV: CPT | Performed by: STUDENT IN AN ORGANIZED HEALTH CARE EDUCATION/TRAINING PROGRAM

## 2024-09-26 PROCEDURE — 1036F TOBACCO NON-USER: CPT | Performed by: STUDENT IN AN ORGANIZED HEALTH CARE EDUCATION/TRAINING PROGRAM

## 2024-09-26 PROCEDURE — G8399 PT W/DXA RESULTS DOCUMENT: HCPCS | Performed by: STUDENT IN AN ORGANIZED HEALTH CARE EDUCATION/TRAINING PROGRAM

## 2024-09-26 PROCEDURE — G8427 DOCREV CUR MEDS BY ELIG CLIN: HCPCS | Performed by: STUDENT IN AN ORGANIZED HEALTH CARE EDUCATION/TRAINING PROGRAM

## 2024-09-26 PROCEDURE — 1090F PRES/ABSN URINE INCON ASSESS: CPT | Performed by: STUDENT IN AN ORGANIZED HEALTH CARE EDUCATION/TRAINING PROGRAM

## 2024-09-26 PROCEDURE — 3078F DIAST BP <80 MM HG: CPT | Performed by: STUDENT IN AN ORGANIZED HEALTH CARE EDUCATION/TRAINING PROGRAM

## 2024-09-26 RX ORDER — BUDESONIDE AND FORMOTEROL FUMARATE DIHYDRATE 160; 4.5 UG/1; UG/1
AEROSOL RESPIRATORY (INHALATION)
COMMUNITY
Start: 2024-09-10

## 2024-10-07 ENCOUNTER — OFFICE VISIT (OUTPATIENT)
Dept: BEHAVIORAL/MENTAL HEALTH CLINIC | Age: 74
End: 2024-10-07
Payer: COMMERCIAL

## 2024-10-07 DIAGNOSIS — F34.1 PERSISTENT DEPRESSIVE DISORDER: Primary | ICD-10-CM

## 2024-10-07 DIAGNOSIS — F41.1 GENERALIZED ANXIETY DISORDER: ICD-10-CM

## 2024-10-07 PROCEDURE — 90834 PSYTX W PT 45 MINUTES: CPT | Performed by: PSYCHOLOGIST

## 2024-10-07 PROCEDURE — 1036F TOBACCO NON-USER: CPT | Performed by: PSYCHOLOGIST

## 2024-10-07 PROCEDURE — 1123F ACP DISCUSS/DSCN MKR DOCD: CPT | Performed by: PSYCHOLOGIST

## 2024-10-07 ASSESSMENT — PATIENT HEALTH QUESTIONNAIRE - PHQ9
8. MOVING OR SPEAKING SO SLOWLY THAT OTHER PEOPLE COULD HAVE NOTICED. OR THE OPPOSITE, BEING SO FIGETY OR RESTLESS THAT YOU HAVE BEEN MOVING AROUND A LOT MORE THAN USUAL: NOT AT ALL
2. FEELING DOWN, DEPRESSED OR HOPELESS: SEVERAL DAYS
SUM OF ALL RESPONSES TO PHQ QUESTIONS 1-9: 12
7. TROUBLE CONCENTRATING ON THINGS, SUCH AS READING THE NEWSPAPER OR WATCHING TELEVISION: MORE THAN HALF THE DAYS
SUM OF ALL RESPONSES TO PHQ9 QUESTIONS 1 & 2: 2
SUM OF ALL RESPONSES TO PHQ QUESTIONS 1-9: 12
4. FEELING TIRED OR HAVING LITTLE ENERGY: MORE THAN HALF THE DAYS
SUM OF ALL RESPONSES TO PHQ QUESTIONS 1-9: 12
1. LITTLE INTEREST OR PLEASURE IN DOING THINGS: SEVERAL DAYS
10. IF YOU CHECKED OFF ANY PROBLEMS, HOW DIFFICULT HAVE THESE PROBLEMS MADE IT FOR YOU TO DO YOUR WORK, TAKE CARE OF THINGS AT HOME, OR GET ALONG WITH OTHER PEOPLE: SOMEWHAT DIFFICULT
5. POOR APPETITE OR OVEREATING: SEVERAL DAYS
3. TROUBLE FALLING OR STAYING ASLEEP: MORE THAN HALF THE DAYS
6. FEELING BAD ABOUT YOURSELF - OR THAT YOU ARE A FAILURE OR HAVE LET YOURSELF OR YOUR FAMILY DOWN: NEARLY EVERY DAY
SUM OF ALL RESPONSES TO PHQ QUESTIONS 1-9: 12
9. THOUGHTS THAT YOU WOULD BE BETTER OFF DEAD, OR OF HURTING YOURSELF: NOT AT ALL

## 2024-10-07 NOTE — PROGRESS NOTES
independent and capable person, but now she feels like a \"failure,\" especially because she hates having to ask for help from others.          Previous Recommendations:   1) Pt will return for a follow-up appointment with the [facility] in 2 weeks on 10/7/24 at 11AM.         MENTAL STATUS EXAM  Mood was anxious and dysthymic with congruent affect.   Suicidal ideation was denied.   Homicidal ideation was denied.   Hygiene was good .  Dress was appropriate.   Behavior was Within Normal Limits with self-report of some difficulties ambulating.   Attitude was Cooperative.  Eye-contact was good.  Speech: rate - WNL, rhythm - WNL, volume - WNL.  Verbalizations were goal directed and coherent.  Thought processes were intact and goal-oriented without evidence of delusions, hallucinations, obsessions, or danish; with no cognitive distortions.   Associations were characterized by intact cognitive processes.  Pt was orientated oriented to person, place, time, and general circumstances;  recent:  good.  Insight and judgment were estimated to be good to fair, AEB, a good  understanding of cyclical maladaptive patterns, and the ability to use insight to inform behavior change.   Attention span and concentration appear within functional limits  Language use and knowledge base appear within functional limits    ASSESSMENT  Kaylynn NYA Gates presented to the appointment today for evaluation and treatment of symptoms of depression and anxiety, mostly related to chronic pain.  She is currently deemed low risk to herself and none to others and meets criteria for PDD and FLORENCE.  She denied any passive suicidal ideation in the past couple of weeks.  She responded well to interventions, including challenging and reframing maladaptive thoughts/beliefs and modeling more adaptive, balanced coping talk.  She will continue to benefit from therapy with the [facility] to address ongoing anxiety and depression.  Kaylynn was in agreement with recommendations.

## 2024-10-09 ENCOUNTER — ANTI-COAG VISIT (OUTPATIENT)
Age: 74
End: 2024-10-09
Payer: COMMERCIAL

## 2024-10-09 DIAGNOSIS — I82.403 RECURRENT ACUTE DEEP VEIN THROMBOSIS (DVT) OF BOTH LOWER EXTREMITIES (HCC): Primary | Chronic | ICD-10-CM

## 2024-10-09 LAB
INTERNATIONAL NORMALIZATION RATIO, POC: 3.1
PROTHROMBIN TIME, POC: 37.3

## 2024-10-09 PROCEDURE — 85610 PROTHROMBIN TIME: CPT

## 2024-10-09 PROCEDURE — 99212 OFFICE O/P EST SF 10 MIN: CPT

## 2024-10-09 NOTE — PROGRESS NOTES
Patient seen in person in Medication Management Service.    Patient states compliant all of the time with regimen.    No bleeding or thromboembolic side effects noted.    No significant med changes.   Patient had been good, eating spinach three days a week, but last week she has not been eating much at all and has not had any spinach.   No significant recent illness or disease state changes.      Patient acknowledges they are still an active patient of referring provider which is LENIN Hdz Yes     PT/INR done via POC meter per protocol.  INR was supratherapeutic at 3.1.  (goal 2 - 3) which is expected due to patient not eating spinach.    Warfarin regimen will be continued at current dose of 5mg on MWF and 2.5mg all other days with resumption of normal vitamin K /spinach intake.  Will retest in 2 weeks.    Patient understands dosing directions and information discussed.  Dosing schedule and follow up appointment given to patient. Progress note routed to referring physicians office.  Patient acknowledges working in Consult Agreement with Pharmacist as referred by his/her physician/provider.      For Pharmacy Admin Tracking Only  Total # of Interventions Recommended: 0  Total # of Interventions Accepted: 0  Time Spent (min): 20    Carlton Nice RPH,Pharm.D,, BCPS, CACP  10/9/2024  11:47 AM

## 2024-10-09 NOTE — TELEPHONE ENCOUNTER
Pharmacy is requesting a refill of nortriptyline last ordered 9/12/24 9/26 Last visit  12/5 Next visit

## 2024-10-11 RX ORDER — NORTRIPTYLINE HCL 10 MG
10 CAPSULE ORAL NIGHTLY
Qty: 30 CAPSULE | Refills: 1 | Status: SHIPPED | OUTPATIENT
Start: 2024-10-11

## 2024-10-11 NOTE — TELEPHONE ENCOUNTER
Geovanny,    Pharmacy requesting refill of arthritis pain gel 1% 100 g 0 refills. Medication pended for your approval.

## 2024-10-14 ENCOUNTER — CLINICAL DOCUMENTATION (OUTPATIENT)
Dept: PHYSICAL MEDICINE AND REHAB | Age: 74
End: 2024-10-14

## 2024-10-14 RX ORDER — DICLOFENAC SODIUM TOPICAL GEL, 1% 10 MG/G
GEL TOPICAL
Qty: 100 G | Refills: 0 | Status: SHIPPED | OUTPATIENT
Start: 2024-10-14

## 2024-10-23 ENCOUNTER — OFFICE VISIT (OUTPATIENT)
Dept: BEHAVIORAL/MENTAL HEALTH CLINIC | Age: 74
End: 2024-10-23
Payer: COMMERCIAL

## 2024-10-23 ENCOUNTER — ANTI-COAG VISIT (OUTPATIENT)
Age: 74
End: 2024-10-23
Payer: COMMERCIAL

## 2024-10-23 DIAGNOSIS — F41.1 GENERALIZED ANXIETY DISORDER: ICD-10-CM

## 2024-10-23 DIAGNOSIS — I82.403 RECURRENT ACUTE DEEP VEIN THROMBOSIS (DVT) OF BOTH LOWER EXTREMITIES (HCC): Primary | Chronic | ICD-10-CM

## 2024-10-23 DIAGNOSIS — F34.1 PERSISTENT DEPRESSIVE DISORDER: Primary | ICD-10-CM

## 2024-10-23 LAB
INTERNATIONAL NORMALIZATION RATIO, POC: 3.2
PROTHROMBIN TIME, POC: 38.8

## 2024-10-23 PROCEDURE — 1123F ACP DISCUSS/DSCN MKR DOCD: CPT | Performed by: PSYCHOLOGIST

## 2024-10-23 PROCEDURE — 99212 OFFICE O/P EST SF 10 MIN: CPT

## 2024-10-23 PROCEDURE — 1036F TOBACCO NON-USER: CPT | Performed by: PSYCHOLOGIST

## 2024-10-23 PROCEDURE — 85610 PROTHROMBIN TIME: CPT

## 2024-10-23 PROCEDURE — 90837 PSYTX W PT 60 MINUTES: CPT | Performed by: PSYCHOLOGIST

## 2024-10-23 ASSESSMENT — PATIENT HEALTH QUESTIONNAIRE - PHQ9
4. FEELING TIRED OR HAVING LITTLE ENERGY: MORE THAN HALF THE DAYS
6. FEELING BAD ABOUT YOURSELF - OR THAT YOU ARE A FAILURE OR HAVE LET YOURSELF OR YOUR FAMILY DOWN: NOT AT ALL
10. IF YOU CHECKED OFF ANY PROBLEMS, HOW DIFFICULT HAVE THESE PROBLEMS MADE IT FOR YOU TO DO YOUR WORK, TAKE CARE OF THINGS AT HOME, OR GET ALONG WITH OTHER PEOPLE: SOMEWHAT DIFFICULT
SUM OF ALL RESPONSES TO PHQ QUESTIONS 1-9: 9
SUM OF ALL RESPONSES TO PHQ QUESTIONS 1-9: 9
9. THOUGHTS THAT YOU WOULD BE BETTER OFF DEAD, OR OF HURTING YOURSELF: NOT AT ALL
5. POOR APPETITE OR OVEREATING: SEVERAL DAYS
7. TROUBLE CONCENTRATING ON THINGS, SUCH AS READING THE NEWSPAPER OR WATCHING TELEVISION: MORE THAN HALF THE DAYS
SUM OF ALL RESPONSES TO PHQ QUESTIONS 1-9: 9
3. TROUBLE FALLING OR STAYING ASLEEP: SEVERAL DAYS
SUM OF ALL RESPONSES TO PHQ QUESTIONS 1-9: 9
8. MOVING OR SPEAKING SO SLOWLY THAT OTHER PEOPLE COULD HAVE NOTICED. OR THE OPPOSITE, BEING SO FIGETY OR RESTLESS THAT YOU HAVE BEEN MOVING AROUND A LOT MORE THAN USUAL: NOT AT ALL
1. LITTLE INTEREST OR PLEASURE IN DOING THINGS: MORE THAN HALF THE DAYS
SUM OF ALL RESPONSES TO PHQ9 QUESTIONS 1 & 2: 3
2. FEELING DOWN, DEPRESSED OR HOPELESS: SEVERAL DAYS

## 2024-10-23 NOTE — PROGRESS NOTES
Patient seen in person in Medication Management Service.    Patient states compliant all of the time with regimen.    No bleeding or thromboembolic side effects noted.    No significant med or dietary changes.  Has been eating spinach - but only had it twice. Would like to continue with trying to eat spinach, but not sure if will be consistent.   No significant recent illness or disease state changes.  Still has a lot of  pain.     Patient acknowledges they are still an active patient of referring provider which is Dr. Hdz Yes     PT/INR done via POC meter per protocol.  INR was  supratherapeutic at 3.2.  (goal 2 - 3)    Warfarin regimen will be decreased to 5 mg MF and 2.5 mg all other days.  Will retest in 2 weeks.    Patient understands dosing directions and information discussed.  Dosing schedule and follow up appointment given to patient. Progress note routed to referring physicians office.  Patient acknowledges working in Consult Agreement with Pharmacist as referred by his/her physician/provider.      For Pharmacy Admin Tracking Only    Intervention Detail: Adherence Monitorin  Total # of Interventions Recommended: 2  Total # of Interventions Accepted: 2  Time Spent (min): 20    Joann KincaidD, BCPS 10/23/2024 10:42 AM

## 2024-10-23 NOTE — PROGRESS NOTES
ADULT BEHAVIORAL HEALTH ASSESSMENT  Génesis Taveras,  Ph.D.   Licensed Clinical Psychologist ([place] [id number])    Visit Date: 10/23/2024   Time of appointment:  11:00a - 12:09p   Time spent with Patient: 69 minutes.   This is patient's fourth appointment.    Reason for Consult:  Depression and Anxiety     Referring Provider/PCP:    No ref. provider found  Germaine Hdz MD      Pt provided informed consent for the behavioral health program. Discussed with patient model of service to include the limits of confidentiality (i.e. abuse reporting, suicide intervention, etc.) and short-term intervention focused approach.  Pt indicated understanding.    LUISANA Chaidez is a 74 y.o. female who presents for follow up of depression and anxiety.  She reported that she was in a great deal of pain on Monday and yesterday and she admitted that the pain was so much yesterday, she experienced that \"scary fear\" that she could lose control and \"do anything\" to stop the pain.  She stated that she did not really have any specific thoughts of harming herself, it was just more that she had the fear she could be capable of doing something because of the severe pain.  She reported that she continues to feel very frustrated and angry about dealing with the pain and struggling to get her healthcare providers to help her more effectively with this.  She stated, \"I don't expect to pain free, but I should not be suffering like this, and I don't use that word lightly.\"  Kaylynn also described major stress related to her living situation.  She stated that she has mostly liked where she has lived, but since she has lived there she has actually felt that there has been discrimination towards people of color since most of the senior community she lives in are white.  She stated that in the past few years, [employer] has been involved and most recently even [facility] has become involved in the case.  She described some of the discrimination and

## 2024-10-29 ENCOUNTER — OFFICE VISIT (OUTPATIENT)
Dept: ORTHOPEDIC SURGERY | Age: 74
End: 2024-10-29
Payer: COMMERCIAL

## 2024-10-29 VITALS — WEIGHT: 196 LBS | HEIGHT: 65 IN | BODY MASS INDEX: 32.65 KG/M2 | RESPIRATION RATE: 14 BRPM

## 2024-10-29 DIAGNOSIS — M70.61 TROCHANTERIC BURSITIS, RIGHT HIP: ICD-10-CM

## 2024-10-29 DIAGNOSIS — M65.331 TRIGGER MIDDLE FINGER OF RIGHT HAND: ICD-10-CM

## 2024-10-29 DIAGNOSIS — M65.342 ACQUIRED TRIGGER FINGER OF LEFT RING FINGER: Primary | ICD-10-CM

## 2024-10-29 DIAGNOSIS — M70.62 GREATER TROCHANTERIC BURSITIS OF LEFT HIP: ICD-10-CM

## 2024-10-29 PROCEDURE — 1036F TOBACCO NON-USER: CPT | Performed by: PHYSICIAN ASSISTANT

## 2024-10-29 PROCEDURE — 1090F PRES/ABSN URINE INCON ASSESS: CPT | Performed by: PHYSICIAN ASSISTANT

## 2024-10-29 PROCEDURE — 99214 OFFICE O/P EST MOD 30 MIN: CPT | Performed by: PHYSICIAN ASSISTANT

## 2024-10-29 PROCEDURE — G8399 PT W/DXA RESULTS DOCUMENT: HCPCS | Performed by: PHYSICIAN ASSISTANT

## 2024-10-29 PROCEDURE — G8428 CUR MEDS NOT DOCUMENT: HCPCS | Performed by: PHYSICIAN ASSISTANT

## 2024-10-29 PROCEDURE — 1123F ACP DISCUSS/DSCN MKR DOCD: CPT | Performed by: PHYSICIAN ASSISTANT

## 2024-10-29 PROCEDURE — G8484 FLU IMMUNIZE NO ADMIN: HCPCS | Performed by: PHYSICIAN ASSISTANT

## 2024-10-29 PROCEDURE — 3017F COLORECTAL CA SCREEN DOC REV: CPT | Performed by: PHYSICIAN ASSISTANT

## 2024-10-29 PROCEDURE — G8417 CALC BMI ABV UP PARAM F/U: HCPCS | Performed by: PHYSICIAN ASSISTANT

## 2024-10-29 NOTE — PROGRESS NOTES
pain is most consistent with bilateral trochanteric bursitis. We discussed treatment options available to her, including nonoperative and operative intervention.  At this time I believe she will benefit from conservative management.    Consequently, an extensive home exercise program was provided.    We also discussed use the possibility of a cortisone injection which patient has done well with in the past but she elected to hold off at this time to see how she responds to HEP and topical medication    Right middle and left ring trigger finger:  I had a discussion with the patient today with regards to the nature and extent of her problem.    We discussed treatment options available to her including nonoperative and operative intervention.  I'd recommend proceeding conservatively at this time with nighttime splinting which she was amenable to.    I'll have her follow up in my clinic for reevaluation if she continues to be painful/locking but she was encouraged to return or call with questions and/or concerns.             Electronically signed by GILDA Albert on 10/29/24 at 10:39 AM EDT        Please note that this chart was generated using voice recognition Dragon dictation software.  Although every effort was made to ensure the accuracy of this automated transcription, some errors in transcription may have occurred.

## 2024-11-06 ENCOUNTER — ANTI-COAG VISIT (OUTPATIENT)
Age: 74
End: 2024-11-06
Payer: COMMERCIAL

## 2024-11-06 DIAGNOSIS — I82.403 RECURRENT ACUTE DEEP VEIN THROMBOSIS (DVT) OF BOTH LOWER EXTREMITIES (HCC): Primary | Chronic | ICD-10-CM

## 2024-11-06 LAB
INTERNATIONAL NORMALIZATION RATIO, POC: 2.8 (ref 2–3)
PROTHROMBIN TIME, POC: 33.4

## 2024-11-06 PROCEDURE — 85610 PROTHROMBIN TIME: CPT

## 2024-11-06 PROCEDURE — 99212 OFFICE O/P EST SF 10 MIN: CPT

## 2024-11-06 NOTE — PROGRESS NOTES
Patient seen in person in Medication Management Service.    Patient states compliant most of the time with regimen.    No bleeding or thromboembolic side effects noted.    No significant med or dietary changes.    No significant recent illness or disease state changes.      Patient acknowledges they are still an active patient of referring provider which is MONSE Hdz Yes     PT/INR done via POC meter per protocol.  INR was therapeutic at 2.8.  (goal 2 - 3)  The patient did miss one dose of warfarin this past week but also took 800 mg ibuprofen the past two days in a row    Warfarin regimen will be continued at current dose 5 mg MWF and 2.5 mg all other days.  Will retest in 2 weeks.    Patient understands dosing directions and information discussed.  Dosing schedule and follow up appointment given to patient. Progress note routed to referring physicians office.  Patient acknowledges working in Consult Agreement with Pharmacist as referred by his/her physician/provider.      For Pharmacy Admin Tracking Only    Intervention Detail: Adherence Monitorin  Total # of Interventions Recommended: 0  Total # of Interventions Accepted: 0  Time Spent (min): 20

## 2024-11-11 ENCOUNTER — OFFICE VISIT (OUTPATIENT)
Dept: BEHAVIORAL/MENTAL HEALTH CLINIC | Age: 74
End: 2024-11-11
Payer: MEDICARE

## 2024-11-11 DIAGNOSIS — F41.1 GENERALIZED ANXIETY DISORDER: ICD-10-CM

## 2024-11-11 DIAGNOSIS — F34.1 PERSISTENT DEPRESSIVE DISORDER: Primary | ICD-10-CM

## 2024-11-11 PROCEDURE — 1123F ACP DISCUSS/DSCN MKR DOCD: CPT | Performed by: PSYCHOLOGIST

## 2024-11-11 PROCEDURE — 1036F TOBACCO NON-USER: CPT | Performed by: PSYCHOLOGIST

## 2024-11-11 PROCEDURE — 90837 PSYTX W PT 60 MINUTES: CPT | Performed by: PSYCHOLOGIST

## 2024-11-11 ASSESSMENT — PATIENT HEALTH QUESTIONNAIRE - PHQ9
10. IF YOU CHECKED OFF ANY PROBLEMS, HOW DIFFICULT HAVE THESE PROBLEMS MADE IT FOR YOU TO DO YOUR WORK, TAKE CARE OF THINGS AT HOME, OR GET ALONG WITH OTHER PEOPLE: SOMEWHAT DIFFICULT
7. TROUBLE CONCENTRATING ON THINGS, SUCH AS READING THE NEWSPAPER OR WATCHING TELEVISION: MORE THAN HALF THE DAYS
6. FEELING BAD ABOUT YOURSELF - OR THAT YOU ARE A FAILURE OR HAVE LET YOURSELF OR YOUR FAMILY DOWN: NOT AT ALL
SUM OF ALL RESPONSES TO PHQ9 QUESTIONS 1 & 2: 6
1. LITTLE INTEREST OR PLEASURE IN DOING THINGS: NEARLY EVERY DAY
2. FEELING DOWN, DEPRESSED OR HOPELESS: NEARLY EVERY DAY
8. MOVING OR SPEAKING SO SLOWLY THAT OTHER PEOPLE COULD HAVE NOTICED. OR THE OPPOSITE, BEING SO FIGETY OR RESTLESS THAT YOU HAVE BEEN MOVING AROUND A LOT MORE THAN USUAL: NOT AT ALL
4. FEELING TIRED OR HAVING LITTLE ENERGY: NEARLY EVERY DAY
SUM OF ALL RESPONSES TO PHQ QUESTIONS 1-9: 12
5. POOR APPETITE OR OVEREATING: SEVERAL DAYS
3. TROUBLE FALLING OR STAYING ASLEEP: NOT AT ALL
9. THOUGHTS THAT YOU WOULD BE BETTER OFF DEAD, OR OF HURTING YOURSELF: SEVERAL DAYS
SUM OF ALL RESPONSES TO PHQ QUESTIONS 1-9: 13

## 2024-11-11 ASSESSMENT — COLUMBIA-SUICIDE SEVERITY RATING SCALE - C-SSRS
2. HAVE YOU ACTUALLY HAD ANY THOUGHTS OF KILLING YOURSELF?: NO
1. WITHIN THE PAST MONTH, HAVE YOU WISHED YOU WERE DEAD OR WISHED YOU COULD GO TO SLEEP AND NOT WAKE UP?: YES
6. HAVE YOU EVER DONE ANYTHING, STARTED TO DO ANYTHING, OR PREPARED TO DO ANYTHING TO END YOUR LIFE?: NO

## 2024-11-11 NOTE — PROGRESS NOTES
ADULT BEHAVIORAL HEALTH ASSESSMENT  Génesis Taveras,  Ph.D.   Licensed Clinical Psychologist ([place] [id number])    Visit Date: 11/11/2024   Time of appointment:  10:11a - 11:11a   Time spent with Patient: 60 minutes.   This is patient's fifth appointment.    Reason for Consult:  Depression and Anxiety     Referring Provider/PCP:    No ref. provider found  Germaine Hdz MD      Pt provided informed consent for the behavioral health program. Discussed with patient model of service to include the limits of confidentiality (i.e. abuse reporting, suicide intervention, etc.) and short-term intervention focused approach.  Pt indicated understanding.    LUISANA Chaidez is a 74 y.o. female who presents for follow up of depression and anxiety.  She reported that she has continued to have thoughts about death regularly, but stated, \"I definitely do NOT consider myself to be suicidal.\"  She reported that she continues to have a fear that one day she will find herself \"in that place\" because the chronic pain will become too much for her to tolerate and she will not be able to control herself.  She reported that as she experiences \"more and more days\" of severe pain, she fears that that \"one day\" will be \"sooner rather than later.\"  She reported that she still feels very angry because she does not feel like she has to \"suffer this much\" if her pain were better managed by her healthcare providers.  She described her ongoing frustrations with her health care, including feeling like her providers are not listening to her.  She reported that she is in the process of changing insurances and is hoping that she will be able to start seeing a provider at [facility].  She reported that other people in her senior community go there and have liked the care and representatives of [facility] recently gave a presentation about their clinic.  She stated that switching to a different provider there has been giving her \"a little hope\"

## 2024-11-14 ENCOUNTER — OFFICE VISIT (OUTPATIENT)
Dept: NEUROLOGY | Age: 74
End: 2024-11-14
Payer: COMMERCIAL

## 2024-11-14 VITALS
OXYGEN SATURATION: 97 % | HEART RATE: 83 BPM | BODY MASS INDEX: 31.65 KG/M2 | DIASTOLIC BLOOD PRESSURE: 82 MMHG | SYSTOLIC BLOOD PRESSURE: 124 MMHG | HEIGHT: 65 IN | TEMPERATURE: 98.1 F | WEIGHT: 190 LBS

## 2024-11-14 DIAGNOSIS — M79.7 FIBROMYALGIA: ICD-10-CM

## 2024-11-14 DIAGNOSIS — G43.109 MIGRAINE WITH AURA AND WITHOUT STATUS MIGRAINOSUS, NOT INTRACTABLE: Primary | ICD-10-CM

## 2024-11-14 DIAGNOSIS — H81.10 BENIGN PAROXYSMAL POSITIONAL VERTIGO, UNSPECIFIED LATERALITY: ICD-10-CM

## 2024-11-14 DIAGNOSIS — M48.061 SPINAL STENOSIS OF LUMBAR REGION, UNSPECIFIED WHETHER NEUROGENIC CLAUDICATION PRESENT: ICD-10-CM

## 2024-11-14 PROCEDURE — G8417 CALC BMI ABV UP PARAM F/U: HCPCS | Performed by: PSYCHIATRY & NEUROLOGY

## 2024-11-14 PROCEDURE — 1123F ACP DISCUSS/DSCN MKR DOCD: CPT | Performed by: PSYCHIATRY & NEUROLOGY

## 2024-11-14 PROCEDURE — 3017F COLORECTAL CA SCREEN DOC REV: CPT | Performed by: PSYCHIATRY & NEUROLOGY

## 2024-11-14 PROCEDURE — 99214 OFFICE O/P EST MOD 30 MIN: CPT | Performed by: PSYCHIATRY & NEUROLOGY

## 2024-11-14 PROCEDURE — 3074F SYST BP LT 130 MM HG: CPT | Performed by: PSYCHIATRY & NEUROLOGY

## 2024-11-14 PROCEDURE — G8427 DOCREV CUR MEDS BY ELIG CLIN: HCPCS | Performed by: PSYCHIATRY & NEUROLOGY

## 2024-11-14 PROCEDURE — G8484 FLU IMMUNIZE NO ADMIN: HCPCS | Performed by: PSYCHIATRY & NEUROLOGY

## 2024-11-14 PROCEDURE — 1036F TOBACCO NON-USER: CPT | Performed by: PSYCHIATRY & NEUROLOGY

## 2024-11-14 PROCEDURE — 1090F PRES/ABSN URINE INCON ASSESS: CPT | Performed by: PSYCHIATRY & NEUROLOGY

## 2024-11-14 PROCEDURE — G8399 PT W/DXA RESULTS DOCUMENT: HCPCS | Performed by: PSYCHIATRY & NEUROLOGY

## 2024-11-14 PROCEDURE — 3079F DIAST BP 80-89 MM HG: CPT | Performed by: PSYCHIATRY & NEUROLOGY

## 2024-11-14 RX ORDER — NORTRIPTYLINE HYDROCHLORIDE 10 MG/1
10 CAPSULE ORAL NIGHTLY
Qty: 90 CAPSULE | Refills: 1 | Status: SHIPPED | OUTPATIENT
Start: 2024-11-14

## 2024-11-14 RX ORDER — ASPIRIN 81 MG
TABLET,CHEWABLE ORAL
COMMUNITY
Start: 2024-11-08

## 2024-11-14 ASSESSMENT — ENCOUNTER SYMPTOMS
RESPIRATORY NEGATIVE: 1
EYES NEGATIVE: 1
GASTROINTESTINAL NEGATIVE: 1
ALLERGIC/IMMUNOLOGIC NEGATIVE: 1

## 2024-11-14 NOTE — PROGRESS NOTES
Head/Ears /Nose/Throat: external ear .Normal exam  Neck and thyroid .Normal size. No bruits  Respiratory .Breathsounds clear bilaterally  Cardiovascular: Auscultation of heart with regular rate and rhythm  Musculoskeletal. Muscle bulk and tone normal                                                           Muscle strength 5/5 strength throughout                                                                                No dysmetria or dysdiadokinesis  No tremor   Normal fine motor  Gait normal   Orientation Alert and oriented x 3   Attention and concentration normal  Short term memory normal  Language process and speech normal . No aphasia   Cranial nerve 2 normal acuety and visual fields  Cranial nerve 3, 4 and 6 .Extraocular muscles are intact . Pupils are equal and reactive   Cranial nerve 5 .Intact corneal reflex. Normal facial sensation  Cranial nerve 7 normal exam   Cranial nerve 8. Grossly intact hearing   Cranial nerve 9 and 10. Symmetric palate elevation   Cranial nerve 11 , 5 out of 5 strength   Cranial Nerve 12 midline tongue . No atrophy  Sensation .Normal pinprick and light touch   Deep Tendon Reflexes normal  Plantar response flexor bilaterally      ASSESSMENT/PLAN      Diagnosis Orders   1. Migraine with aura and without status migrainosus, not intractable        2. Fibromyalgia        3. Spinal stenosis of lumbar region, unspecified whether neurogenic claudication present  MRI LUMBAR SPINE WO CONTRAST    PT eval and treat      4. Benign paroxysmal positional vertigo, unspecified laterality          She is to undergo MRI of lumbar spine with suggestion of lumbar stenosis along with course of PT to low back     Orders Placed This Encounter   Procedures    MRI LUMBAR SPINE WO CONTRAST     Standing Status:   Future     Standing Expiration Date:   11/14/2025    PT eval and treat     Standing Status:   Future     Standing Expiration Date:   11/14/2025     Scheduling Instructions:      Dx low back

## 2024-11-20 ENCOUNTER — TELEPHONE (OUTPATIENT)
Age: 74
End: 2024-11-20

## 2024-11-20 ENCOUNTER — ANTI-COAG VISIT (OUTPATIENT)
Age: 74
End: 2024-11-20
Payer: COMMERCIAL

## 2024-11-20 DIAGNOSIS — I82.403 RECURRENT ACUTE DEEP VEIN THROMBOSIS (DVT) OF BOTH LOWER EXTREMITIES (HCC): Primary | Chronic | ICD-10-CM

## 2024-11-20 LAB
INTERNATIONAL NORMALIZATION RATIO, POC: 2
PROTHROMBIN TIME, POC: 23.4

## 2024-11-20 PROCEDURE — 85610 PROTHROMBIN TIME: CPT

## 2024-11-20 PROCEDURE — 99211 OFF/OP EST MAY X REQ PHY/QHP: CPT

## 2024-11-20 NOTE — TELEPHONE ENCOUNTER
Van  confirmed for 12/11/24 at 1100. Unable to update pt, mailbox is full.     Joann KincaidD, BCPS 11/20/2024 4:32 PM

## 2024-11-20 NOTE — PROGRESS NOTES
Patient seen in person in Medication Management Service.    Patient states compliant most of the time with regimen.  Pt thinks she missed a dose  - she found a pill on the night stand, thinks   No bleeding or thromboembolic side effects noted.    No significant med or dietary changes.  Pt stopped using spinach for 2 weeks.   No significant recent illness or disease state changes.  Still in a lot of pain (which is chronic). Has been using Tylenol 3.     Patient acknowledges they are still an active patient of referring provider which is Dr. Hdz Yes     PT/INR done via POC meter per protocol.  INR was therapeutic at 2.  (goal 2 - 3)    Warfarin regimen will be continued at current dose 5 mg every Mon, Wed, Fri; 2.5 mg all other days .  Will retest in 3 week.    Patient understands dosing directions and information discussed.  Dosing schedule and follow up appointment given to patient. Progress note routed to referring physicians office.  Patient acknowledges working in Consult Agreement with Pharmacist as referred by his/her physician/provider.      For Pharmacy Admin Tracking Only    Intervention Detail: Adherence Monitorin  Total # of Interventions Recommended: 1  Total # of Interventions Accepted: 1  Time Spent (min): 20    Joann Montana PharmD, BCPS 2024 11:39 AM

## 2024-11-25 ENCOUNTER — OFFICE VISIT (OUTPATIENT)
Dept: BEHAVIORAL/MENTAL HEALTH CLINIC | Age: 74
End: 2024-11-25
Payer: MEDICARE

## 2024-11-25 DIAGNOSIS — F41.1 GENERALIZED ANXIETY DISORDER: ICD-10-CM

## 2024-11-25 DIAGNOSIS — F34.1 PERSISTENT DEPRESSIVE DISORDER: Primary | ICD-10-CM

## 2024-11-25 PROCEDURE — 90837 PSYTX W PT 60 MINUTES: CPT | Performed by: PSYCHOLOGIST

## 2024-11-25 PROCEDURE — 1123F ACP DISCUSS/DSCN MKR DOCD: CPT | Performed by: PSYCHOLOGIST

## 2024-11-25 PROCEDURE — 1036F TOBACCO NON-USER: CPT | Performed by: PSYCHOLOGIST

## 2024-11-25 ASSESSMENT — PATIENT HEALTH QUESTIONNAIRE - PHQ9
4. FEELING TIRED OR HAVING LITTLE ENERGY: MORE THAN HALF THE DAYS
SUM OF ALL RESPONSES TO PHQ9 QUESTIONS 1 & 2: 4
SUM OF ALL RESPONSES TO PHQ QUESTIONS 1-9: 9
7. TROUBLE CONCENTRATING ON THINGS, SUCH AS READING THE NEWSPAPER OR WATCHING TELEVISION: SEVERAL DAYS
6. FEELING BAD ABOUT YOURSELF - OR THAT YOU ARE A FAILURE OR HAVE LET YOURSELF OR YOUR FAMILY DOWN: NOT AT ALL
9. THOUGHTS THAT YOU WOULD BE BETTER OFF DEAD, OR OF HURTING YOURSELF: SEVERAL DAYS
SUM OF ALL RESPONSES TO PHQ QUESTIONS 1-9: 10
8. MOVING OR SPEAKING SO SLOWLY THAT OTHER PEOPLE COULD HAVE NOTICED. OR THE OPPOSITE, BEING SO FIGETY OR RESTLESS THAT YOU HAVE BEEN MOVING AROUND A LOT MORE THAN USUAL: NOT AT ALL
2. FEELING DOWN, DEPRESSED OR HOPELESS: MORE THAN HALF THE DAYS
10. IF YOU CHECKED OFF ANY PROBLEMS, HOW DIFFICULT HAVE THESE PROBLEMS MADE IT FOR YOU TO DO YOUR WORK, TAKE CARE OF THINGS AT HOME, OR GET ALONG WITH OTHER PEOPLE: SOMEWHAT DIFFICULT
SUM OF ALL RESPONSES TO PHQ QUESTIONS 1-9: 10
5. POOR APPETITE OR OVEREATING: SEVERAL DAYS
SUM OF ALL RESPONSES TO PHQ QUESTIONS 1-9: 10
3. TROUBLE FALLING OR STAYING ASLEEP: SEVERAL DAYS
1. LITTLE INTEREST OR PLEASURE IN DOING THINGS: MORE THAN HALF THE DAYS

## 2024-11-25 ASSESSMENT — COLUMBIA-SUICIDE SEVERITY RATING SCALE - C-SSRS
6. HAVE YOU EVER DONE ANYTHING, STARTED TO DO ANYTHING, OR PREPARED TO DO ANYTHING TO END YOUR LIFE?: NO
2. HAVE YOU ACTUALLY HAD ANY THOUGHTS OF KILLING YOURSELF?: NO
1. WITHIN THE PAST MONTH, HAVE YOU WISHED YOU WERE DEAD OR WISHED YOU COULD GO TO SLEEP AND NOT WAKE UP?: YES

## 2024-11-25 NOTE — PROGRESS NOTES
ADULT BEHAVIORAL HEALTH ASSESSMENT  Génesis Taveras,  Ph.D.   Licensed Clinical Psychologist ([place] [id number])    Visit Date: 11/25/2024   Time of appointment:  10:17a - 11:15a   Time spent with Patient: 58 minutes.   This is patient's sixth appointment.    Reason for Consult:  Depression and Anxiety     Referring Provider/PCP:    No ref. provider found  Germaine Hdz MD      Pt provided informed consent for the behavioral health program. Discussed with patient model of service to include the limits of confidentiality (i.e. abuse reporting, suicide intervention, etc.) and short-term intervention focused approach.  Pt indicated understanding.    LUISANA Chaidez is a 74 y.o. female who presents for follow up of depression and anxiety.  She again reported that the chronic pain leads to frequent thoughts of death because she does not want live with the pain anymore, but she denied any specific plan or immediate intent to harm herself and was adamant that she does not consider herself to be suicidal.  She reported that in the past couple of weeks, she has actually felt less fearful about losing control in the future and harming herself because of the pain.  She stated that she has been feeling distressed more recently because of the death of 2 cousins last week.  She reported that her brother, who has ongoing mental health issues, has been particularly struggling with these losses and she is worried about her brother given his mental health struggles.  Kaylynn reported that she is feeling particularly angry and \"pissed\" today because of issues with her pain medications, particularly the gabapentin.  She stated that when she takes her pain medication, she often feels very sleepy and lethargic, which makes it hard for her to do anything even if her pain is more managed.  She reported that everyday feels like a struggle between managing the pain and how sleepy/lethargic she feels because of the medication, which she \"hates.\"

## 2024-12-02 ENCOUNTER — TELEPHONE (OUTPATIENT)
Dept: PHYSICAL MEDICINE AND REHAB | Age: 74
End: 2024-12-02

## 2024-12-02 NOTE — TELEPHONE ENCOUNTER
NOREEN: Patient called in today saying that she had scheduled the van to take her to [place] on 12/5 and didn't know her appointment was in [place] until looking on [facility]. She said [facility] is shipping out her TENS unit today and is asking for her to follow up with you in 30 days for documentation. I let her know that you will be out on maternity leave in January. Should we cancel December appointment since location doesn't work for this patient and put her on a follow up appointment for you when you return, or maybe a follow up with Dr Vargas in January as he has openings in [place]?

## 2024-12-11 ENCOUNTER — TELEPHONE (OUTPATIENT)
Age: 74
End: 2024-12-11

## 2024-12-11 NOTE — TELEPHONE ENCOUNTER
Pt called to cancel ppt with coumadin clinic today, will call back to reschedule. Please, note pt mentioned that she moved and needs to provide her new address.     Joann KincaidD, BCPS 12/11/2024 5:19 PM

## 2024-12-24 ENCOUNTER — ANTI-COAG VISIT (OUTPATIENT)
Age: 74
End: 2024-12-24
Payer: MEDICARE

## 2024-12-24 DIAGNOSIS — I82.403 RECURRENT ACUTE DEEP VEIN THROMBOSIS (DVT) OF BOTH LOWER EXTREMITIES (HCC): Primary | Chronic | ICD-10-CM

## 2024-12-24 LAB
INTERNATIONAL NORMALIZATION RATIO, POC: 4.5 (ref 2–3)
PROTHROMBIN TIME, POC: 53.8

## 2024-12-24 PROCEDURE — 99212 OFFICE O/P EST SF 10 MIN: CPT

## 2024-12-24 PROCEDURE — 85610 PROTHROMBIN TIME: CPT

## 2024-12-24 NOTE — PROGRESS NOTES
Patient seen in person in Medication Management Service.    Patient states compliant most of the time with regimen.    No bleeding or thromboembolic side effects noted.    No significant med or dietary changes.    No significant recent illness or disease state changes.      Patient acknowledges they are still an active patient of referring provider which is Dr MONSE Hdz Yes     PT/INR done via POC meter per protocol.  INR was supratherapeutic at 4.5.  (goal 2 - 3)  The patient complains of increased general body pain at 10/10  The INR was been stable in the past at the current warfarin dose  Warfarin will be held x 2 days  Warfarin regimen will be continued at current dose 5 mg MWF and 2.5 mg all other days.  Will retest in 1 week.    Patient understands dosing directions and information discussed.  Dosing schedule and follow up appointment given to patient. Progress note routed to referring physicians office.  Patient acknowledges working in Consult Agreement with Pharmacist as referred by his/her physician/provider.      For Pharmacy Admin Tracking Only    Intervention Detail: Dose Adjustment: 1, reason: Therapy De-escalation  Total # of Interventions Recommended: 1  Total # of Interventions Accepted: 1  Time Spent (min): 20

## 2024-12-30 ENCOUNTER — OFFICE VISIT (OUTPATIENT)
Dept: PHYSICAL MEDICINE AND REHAB | Age: 74
End: 2024-12-30
Payer: MEDICARE

## 2024-12-30 VITALS
HEART RATE: 71 BPM | WEIGHT: 192.8 LBS | SYSTOLIC BLOOD PRESSURE: 129 MMHG | TEMPERATURE: 97.1 F | DIASTOLIC BLOOD PRESSURE: 73 MMHG | BODY MASS INDEX: 32.08 KG/M2

## 2024-12-30 DIAGNOSIS — M79.7 FIBROMYALGIA: Primary | ICD-10-CM

## 2024-12-30 DIAGNOSIS — G89.4 CHRONIC PAIN SYNDROME: ICD-10-CM

## 2024-12-30 PROCEDURE — 3074F SYST BP LT 130 MM HG: CPT | Performed by: STUDENT IN AN ORGANIZED HEALTH CARE EDUCATION/TRAINING PROGRAM

## 2024-12-30 PROCEDURE — 1123F ACP DISCUSS/DSCN MKR DOCD: CPT | Performed by: STUDENT IN AN ORGANIZED HEALTH CARE EDUCATION/TRAINING PROGRAM

## 2024-12-30 PROCEDURE — 1159F MED LIST DOCD IN RCRD: CPT | Performed by: STUDENT IN AN ORGANIZED HEALTH CARE EDUCATION/TRAINING PROGRAM

## 2024-12-30 PROCEDURE — 1036F TOBACCO NON-USER: CPT | Performed by: STUDENT IN AN ORGANIZED HEALTH CARE EDUCATION/TRAINING PROGRAM

## 2024-12-30 PROCEDURE — 99213 OFFICE O/P EST LOW 20 MIN: CPT | Performed by: STUDENT IN AN ORGANIZED HEALTH CARE EDUCATION/TRAINING PROGRAM

## 2024-12-30 PROCEDURE — 3078F DIAST BP <80 MM HG: CPT | Performed by: STUDENT IN AN ORGANIZED HEALTH CARE EDUCATION/TRAINING PROGRAM

## 2024-12-30 PROCEDURE — G8417 CALC BMI ABV UP PARAM F/U: HCPCS | Performed by: STUDENT IN AN ORGANIZED HEALTH CARE EDUCATION/TRAINING PROGRAM

## 2024-12-30 PROCEDURE — G8427 DOCREV CUR MEDS BY ELIG CLIN: HCPCS | Performed by: STUDENT IN AN ORGANIZED HEALTH CARE EDUCATION/TRAINING PROGRAM

## 2024-12-30 PROCEDURE — 1090F PRES/ABSN URINE INCON ASSESS: CPT | Performed by: STUDENT IN AN ORGANIZED HEALTH CARE EDUCATION/TRAINING PROGRAM

## 2024-12-30 PROCEDURE — G8484 FLU IMMUNIZE NO ADMIN: HCPCS | Performed by: STUDENT IN AN ORGANIZED HEALTH CARE EDUCATION/TRAINING PROGRAM

## 2024-12-30 PROCEDURE — 3017F COLORECTAL CA SCREEN DOC REV: CPT | Performed by: STUDENT IN AN ORGANIZED HEALTH CARE EDUCATION/TRAINING PROGRAM

## 2024-12-30 PROCEDURE — G8399 PT W/DXA RESULTS DOCUMENT: HCPCS | Performed by: STUDENT IN AN ORGANIZED HEALTH CARE EDUCATION/TRAINING PROGRAM

## 2024-12-31 ENCOUNTER — TELEPHONE (OUTPATIENT)
Age: 74
End: 2024-12-31

## 2024-12-31 ENCOUNTER — ANTI-COAG VISIT (OUTPATIENT)
Age: 74
End: 2024-12-31
Payer: MEDICARE

## 2024-12-31 DIAGNOSIS — I82.403 RECURRENT ACUTE DEEP VEIN THROMBOSIS (DVT) OF BOTH LOWER EXTREMITIES (HCC): Primary | Chronic | ICD-10-CM

## 2024-12-31 LAB
INTERNATIONAL NORMALIZATION RATIO, POC: 1.6
PROTHROMBIN TIME, POC: 19.3

## 2024-12-31 PROCEDURE — 99212 OFFICE O/P EST SF 10 MIN: CPT

## 2024-12-31 PROCEDURE — 85610 PROTHROMBIN TIME: CPT

## 2024-12-31 RX ORDER — WARFARIN SODIUM 2.5 MG/1
2.5 TABLET ORAL SEE ADMIN INSTRUCTIONS
Qty: 30 TABLET | Refills: 5 | Status: SHIPPED | OUTPATIENT
Start: 2024-12-31

## 2024-12-31 RX ORDER — WARFARIN SODIUM 5 MG/1
5 TABLET ORAL SEE ADMIN INSTRUCTIONS
Qty: 30 TABLET | Refills: 5 | Status: SHIPPED | OUTPATIENT
Start: 2024-12-31

## 2024-12-31 NOTE — TELEPHONE ENCOUNTER
New Rx for Warfarin 2.5 mg#30 R5; Warfarin 5 mg #30 R5 to [facility].  Joann KincaidD, BCPS 12/31/2024 4:25 PM

## 2024-12-31 NOTE — PROGRESS NOTES
Patient seen in person in Medication Management Service.    Patient states compliant all of the time with regimen.  Skipped 2 days of coumadin last week  as instructed at last appt due to high INR  No bleeding or thromboembolic side effects noted.    No significant med or dietary changes.  Has been eating spinach, 2-3  times a week  No significant recent illness or disease state changes.      Patient acknowledges they are still an active patient of referring provider which is Dr. Hdz Yes     PT/INR done via POC meter per protocol.  INR was subtherapeutic at 1.6.  (goal 2 - 3)    Warfarin regimen will be boosted to 5 mg today and continued at current dose 5 mg MWF and 2.5 mg all other days. Pt will use spinach twice a week.   Will retest in 2 weeks.    Patient understands dosing directions and information discussed.  Dosing schedule and follow up appointment given to patient. Progress note routed to referring physicians office.  Patient acknowledges working in Consult Agreement with Pharmacist as referred by his/her physician/provider.      For Pharmacy Admin Tracking Only    Intervention Detail: Adherence Monitorin and Dose Adjustment: 1, reason: Therapy Optimization  Total # of Interventions Recommended: 2  Total # of Interventions Accepted: 2  Time Spent (min): 20    Joann Montana PharmD, BCPS 2024 11:59 AM

## 2025-01-08 NOTE — PROGRESS NOTES
reviewed.      Assessment:       Diagnosis Orders   1. Fibromyalgia        2. Chronic pain syndrome             Plan:     - Continue home exercise program  - Continue use of TENS unit as needed for pain, as patient has found this device beneficial (discussed that she could try it on other body areas besides the left hip and right shoulder)  - She is taking nortriptyline 10mg nightly (prescribed by neurology), gabapentin 400mg 4 times daily (prescribed by PCP), and as-needed tramadol (prescribed by PCP) for pain  - Follow up in this clinic as needed      Electronically signed by Lucia Vance MD on 1/8/2025 at 8:58 AM.

## 2025-01-09 ENCOUNTER — CLINICAL DOCUMENTATION (OUTPATIENT)
Dept: PHYSICAL MEDICINE AND REHAB | Age: 75
End: 2025-01-09

## 2025-01-09 DIAGNOSIS — M17.12 PRIMARY OSTEOARTHRITIS OF LEFT KNEE: Primary | ICD-10-CM

## 2025-01-09 NOTE — PROGRESS NOTES
FAXED THE MOST RECENT OFFICE NOTE FROM DR EDWARDS TO [facility]. FOR DOCUMENTATION OF THE TENS UNIT USE AS REQUESTED   · Last A1c was was 6 2 in July 2019  Recheck A1c  · Start diabetic diet and ISS    · Hold home [place]

## 2025-01-13 ENCOUNTER — OFFICE VISIT (OUTPATIENT)
Dept: BEHAVIORAL/MENTAL HEALTH CLINIC | Age: 75
End: 2025-01-13
Payer: MEDICARE

## 2025-01-13 DIAGNOSIS — F34.1 PERSISTENT DEPRESSIVE DISORDER: Primary | ICD-10-CM

## 2025-01-13 DIAGNOSIS — F41.1 GENERALIZED ANXIETY DISORDER: ICD-10-CM

## 2025-01-13 PROCEDURE — 1123F ACP DISCUSS/DSCN MKR DOCD: CPT | Performed by: PSYCHOLOGIST

## 2025-01-13 PROCEDURE — 1036F TOBACCO NON-USER: CPT | Performed by: PSYCHOLOGIST

## 2025-01-13 PROCEDURE — 90837 PSYTX W PT 60 MINUTES: CPT | Performed by: PSYCHOLOGIST

## 2025-01-13 ASSESSMENT — PATIENT HEALTH QUESTIONNAIRE - PHQ9
5. POOR APPETITE OR OVEREATING: SEVERAL DAYS
SUM OF ALL RESPONSES TO PHQ9 QUESTIONS 1 & 2: 4
SUM OF ALL RESPONSES TO PHQ QUESTIONS 1-9: 10
2. FEELING DOWN, DEPRESSED OR HOPELESS: NEARLY EVERY DAY
8. MOVING OR SPEAKING SO SLOWLY THAT OTHER PEOPLE COULD HAVE NOTICED. OR THE OPPOSITE, BEING SO FIGETY OR RESTLESS THAT YOU HAVE BEEN MOVING AROUND A LOT MORE THAN USUAL: NOT AT ALL
7. TROUBLE CONCENTRATING ON THINGS, SUCH AS READING THE NEWSPAPER OR WATCHING TELEVISION: MORE THAN HALF THE DAYS
1. LITTLE INTEREST OR PLEASURE IN DOING THINGS: SEVERAL DAYS
SUM OF ALL RESPONSES TO PHQ QUESTIONS 1-9: 10
10. IF YOU CHECKED OFF ANY PROBLEMS, HOW DIFFICULT HAVE THESE PROBLEMS MADE IT FOR YOU TO DO YOUR WORK, TAKE CARE OF THINGS AT HOME, OR GET ALONG WITH OTHER PEOPLE: SOMEWHAT DIFFICULT
SUM OF ALL RESPONSES TO PHQ QUESTIONS 1-9: 10
4. FEELING TIRED OR HAVING LITTLE ENERGY: MORE THAN HALF THE DAYS
9. THOUGHTS THAT YOU WOULD BE BETTER OFF DEAD, OR OF HURTING YOURSELF: NOT AT ALL
6. FEELING BAD ABOUT YOURSELF - OR THAT YOU ARE A FAILURE OR HAVE LET YOURSELF OR YOUR FAMILY DOWN: SEVERAL DAYS
3. TROUBLE FALLING OR STAYING ASLEEP: NOT AT ALL
SUM OF ALL RESPONSES TO PHQ QUESTIONS 1-9: 10

## 2025-01-13 ASSESSMENT — ANXIETY QUESTIONNAIRES
2. NOT BEING ABLE TO STOP OR CONTROL WORRYING: 3-NEARLY EVERY DAY
3. WORRYING TOO MUCH ABOUT DIFFERENT THINGS: 3-NEARLY EVERY DAY
4. TROUBLE RELAXING: 2-OVER HALF THE DAYS
6. BECOMING EASILY ANNOYED OR IRRITABLE: 1-SEVERAL DAYS
1. FEELING NERVOUS, ANXIOUS, OR ON EDGE: MORE THAN HALF THE DAYS
7. FEELING AFRAID AS IF SOMETHING AWFUL MIGHT HAPPEN: 0-NOT AT ALL
5. BEING SO RESTLESS THAT IT IS HARD TO SIT STILL: 1-SEVERAL DAYS
GAD7 TOTAL SCORE: 12

## 2025-01-13 NOTE — PROGRESS NOTES
and better self-care.    2)  Pt will return for a follow-up appointment with the [facility] in 3 weeks on 12/16/24 at 10AM.           MENTAL STATUS EXAM  Mood was anxious and dysthymic with somewhat brighter affect.   Suicidal ideation was denied.   Homicidal ideation was denied.   Hygiene was good .  Dress was appropriate.   Behavior was Within Normal Limits with self-report of some difficulties ambulating.   Attitude was Cooperative.  Eye-contact was good.  Speech: rate - WNL, rhythm - WNL, volume - WNL.  Verbalizations were goal directed and coherent.  Thought processes were intact and goal-oriented without evidence of delusions, hallucinations, obsessions, or danish; with no cognitive distortions.   Associations were characterized by intact cognitive processes.  Pt was orientated oriented to person, place, time, and general circumstances;  recent:  good.  Insight and judgment were estimated to be good to fair, AEB, a good  understanding of cyclical maladaptive patterns, and the ability to use insight to inform behavior change.   Attention span and concentration appear within functional limits  Language use and knowledge base appear within functional limits    ASSESSMENT  Kaylynn NYA Gates presented to the appointment today for evaluation and treatment of symptoms of depression and anxiety, mostly related to chronic pain.  She is currently deemed low risk to herself and none to others and meets criteria for PDD and FLORENCE.  She responded well to interventions, including providing emotional support and validation, engaging her in cognitive reframing, and discussing pain management exercises such as progressive muscle relaxation, meditation, and guided imagery.  She will continue to benefit from therapy with the [facility] to address ongoing anxiety and depression.  Kaylynn was in agreement with recommendations.        1/13/2025    10:18 AM 11/25/2024    10:31 AM 11/11/2024    10:15 AM 10/23/2024    11:02 AM 10/7/2024    11:13 AM

## 2025-01-15 ENCOUNTER — ANTI-COAG VISIT (OUTPATIENT)
Age: 75
End: 2025-01-15
Payer: MEDICARE

## 2025-01-15 DIAGNOSIS — I82.403 RECURRENT ACUTE DEEP VEIN THROMBOSIS (DVT) OF BOTH LOWER EXTREMITIES (HCC): Primary | Chronic | ICD-10-CM

## 2025-01-15 LAB
INTERNATIONAL NORMALIZATION RATIO, POC: 4
PROTHROMBIN TIME, POC: 47.9

## 2025-01-15 PROCEDURE — 99212 OFFICE O/P EST SF 10 MIN: CPT

## 2025-01-15 PROCEDURE — 85610 PROTHROMBIN TIME: CPT

## 2025-01-15 NOTE — PROGRESS NOTES
Patient seen in person in Medication Management Service.    Patient states compliant all of the time with regimen.    No bleeding or thromboembolic side effects noted.    No significant med or dietary changes.    No significant recent illness or disease state changes.  No appetite and has not used spinach x 3 days. Re-educated pt on being consistent with diet and alena spinach intake. Pt stated that she understands now and promised to eat spinach on regular basis, has some in freezer also.     Patient acknowledges they are still an active patient of referring provider which is Dr. Hdz Yes     PT/INR done via POC meter per protocol.  INR was supratherapeutic at 4.  (goal 2 - 3)    Warfarin regimen will be held for 1 day and then continued 5 mg every Mon, Wed, Fri; 2.5 mg all other days.  Will retest in 2 weeks.    Patient understands dosing directions and information discussed.  Dosing schedule and follow up appointment given to patient. Progress note routed to referring physicians office.  Patient acknowledges working in Consult Agreement with Pharmacist as referred by his/her physician/provider.      For Pharmacy Admin Tracking Only    Intervention Detail: Adherence Monitorin and Dose Adjustment: 1, reason: Therapy Optimization  Total # of Interventions Recommended: 2  Total # of Interventions Accepted: 2  Time Spent (min): 20    Joann Montana PharmD, BCPS 1/15/2025 11:57 AM

## 2025-01-29 ENCOUNTER — HOSPITAL ENCOUNTER (OUTPATIENT)
Dept: MRI IMAGING | Age: 75
Discharge: HOME | End: 2025-01-31
Attending: PSYCHIATRY & NEUROLOGY
Payer: MEDICARE

## 2025-01-29 ENCOUNTER — ANTI-COAG VISIT (OUTPATIENT)
Age: 75
End: 2025-01-29
Payer: MEDICARE

## 2025-01-29 DIAGNOSIS — I82.403 RECURRENT ACUTE DEEP VEIN THROMBOSIS (DVT) OF BOTH LOWER EXTREMITIES (HCC): Primary | Chronic | ICD-10-CM

## 2025-01-29 DIAGNOSIS — M48.061 SPINAL STENOSIS OF LUMBAR REGION, UNSPECIFIED WHETHER NEUROGENIC CLAUDICATION PRESENT: ICD-10-CM

## 2025-01-29 LAB
INTERNATIONAL NORMALIZATION RATIO, POC: 3.2
PROTHROMBIN TIME, POC: 38.9

## 2025-01-29 PROCEDURE — 99212 OFFICE O/P EST SF 10 MIN: CPT | Performed by: PHARMACIST

## 2025-01-29 PROCEDURE — 72148 MRI LUMBAR SPINE W/O DYE: CPT

## 2025-01-29 PROCEDURE — 85610 PROTHROMBIN TIME: CPT | Performed by: PHARMACIST

## 2025-01-29 NOTE — PROGRESS NOTES
Patient seen in person in Medication Management Service.    Patient states compliant all of the time with regimen.    No bleeding or thromboembolic side effects noted.    No significant med or dietary changes.  Patient has resume spinach 3 times weekly. Stressed the importance of keeping spinach at 3 times weekly.  No significant recent illness or disease state changes.      Patient acknowledges they are still an active patient of referring provider which is Dr. Hdz Yes     PT/INR done via POC meter per protocol.  INR was supratherapeutic at 3.2.  (goal 2 - 3)    Warfarin regimen will be decreased to 5 mg Mon/Fri and 2.5 mg all other days.  Will retest in 2 weeks.    Patient understands dosing directions and information discussed.  Dosing schedule and follow up appointment given to patient. Progress note routed to referring physicians office.  Patient acknowledges working in Consult Agreement with Pharmacist as referred by his/her physician/provider.      For Pharmacy Admin Tracking Only    Intervention Detail: Dose Adjustment: 1, reason: Therapy De-escalation  Total # of Interventions Recommended: 1  Total # of Interventions Accepted: 1  Time Spent (min): 20     Darby Michaels, Pharm D, BCPS, CACP  [facility]  1/29/2025 9:48 AM       Warm

## 2025-02-03 ENCOUNTER — OFFICE VISIT (OUTPATIENT)
Dept: BEHAVIORAL/MENTAL HEALTH CLINIC | Age: 75
End: 2025-02-03
Payer: MEDICARE

## 2025-02-03 ENCOUNTER — HOSPITAL ENCOUNTER (OUTPATIENT)
Dept: WOMENS IMAGING | Age: 75
Discharge: HOME | End: 2025-02-05
Attending: FAMILY MEDICINE
Payer: MEDICARE

## 2025-02-03 VITALS — HEIGHT: 65 IN | WEIGHT: 196 LBS | BODY MASS INDEX: 32.65 KG/M2

## 2025-02-03 DIAGNOSIS — Z12.31 BREAST CANCER SCREENING BY MAMMOGRAM: ICD-10-CM

## 2025-02-03 DIAGNOSIS — F34.1 PERSISTENT DEPRESSIVE DISORDER: Primary | ICD-10-CM

## 2025-02-03 DIAGNOSIS — F41.1 GENERALIZED ANXIETY DISORDER: ICD-10-CM

## 2025-02-03 PROCEDURE — 1123F ACP DISCUSS/DSCN MKR DOCD: CPT | Performed by: PSYCHOLOGIST

## 2025-02-03 PROCEDURE — 90837 PSYTX W PT 60 MINUTES: CPT | Performed by: PSYCHOLOGIST

## 2025-02-03 PROCEDURE — 77063 BREAST TOMOSYNTHESIS BI: CPT

## 2025-02-03 ASSESSMENT — ANXIETY QUESTIONNAIRES
2. NOT BEING ABLE TO STOP OR CONTROL WORRYING: 3-NEARLY EVERY DAY
4. TROUBLE RELAXING: 3-NEARLY EVERY DAY
3. WORRYING TOO MUCH ABOUT DIFFERENT THINGS: 3-NEARLY EVERY DAY
7. FEELING AFRAID AS IF SOMETHING AWFUL MIGHT HAPPEN: 0-NOT AT ALL
GAD7 TOTAL SCORE: 13
5. BEING SO RESTLESS THAT IT IS HARD TO SIT STILL: 1-SEVERAL DAYS
1. FEELING NERVOUS, ANXIOUS, OR ON EDGE: MORE THAN HALF THE DAYS
6. BECOMING EASILY ANNOYED OR IRRITABLE: 1-SEVERAL DAYS

## 2025-02-03 ASSESSMENT — PATIENT HEALTH QUESTIONNAIRE - PHQ9
SUM OF ALL RESPONSES TO PHQ QUESTIONS 1-9: 11
8. MOVING OR SPEAKING SO SLOWLY THAT OTHER PEOPLE COULD HAVE NOTICED. OR THE OPPOSITE, BEING SO FIGETY OR RESTLESS THAT YOU HAVE BEEN MOVING AROUND A LOT MORE THAN USUAL: NOT AT ALL
SUM OF ALL RESPONSES TO PHQ QUESTIONS 1-9: 11
4. FEELING TIRED OR HAVING LITTLE ENERGY: NEARLY EVERY DAY
10. IF YOU CHECKED OFF ANY PROBLEMS, HOW DIFFICULT HAVE THESE PROBLEMS MADE IT FOR YOU TO DO YOUR WORK, TAKE CARE OF THINGS AT HOME, OR GET ALONG WITH OTHER PEOPLE: VERY DIFFICULT
1. LITTLE INTEREST OR PLEASURE IN DOING THINGS: MORE THAN HALF THE DAYS
SUM OF ALL RESPONSES TO PHQ9 QUESTIONS 1 & 2: 4
5. POOR APPETITE OR OVEREATING: SEVERAL DAYS
9. THOUGHTS THAT YOU WOULD BE BETTER OFF DEAD, OR OF HURTING YOURSELF: NOT AT ALL
6. FEELING BAD ABOUT YOURSELF - OR THAT YOU ARE A FAILURE OR HAVE LET YOURSELF OR YOUR FAMILY DOWN: SEVERAL DAYS
3. TROUBLE FALLING OR STAYING ASLEEP: SEVERAL DAYS
7. TROUBLE CONCENTRATING ON THINGS, SUCH AS READING THE NEWSPAPER OR WATCHING TELEVISION: SEVERAL DAYS
SUM OF ALL RESPONSES TO PHQ QUESTIONS 1-9: 11
2. FEELING DOWN, DEPRESSED OR HOPELESS: MORE THAN HALF THE DAYS
SUM OF ALL RESPONSES TO PHQ QUESTIONS 1-9: 11

## 2025-02-03 NOTE — PROGRESS NOTES
ADULT BEHAVIORAL HEALTH ASSESSMENT  Génesis Taveras,  Ph.D.   Licensed Clinical Psychologist ([place] [id number])    Visit Date: 2/3/2025   Time of appointment:  9:50a - 10:44a   Time spent with Patient: 54 minutes.   This is patient's eighth appointment.    Reason for Consult:  Depression and Anxiety     Referring Provider/PCP:    No ref. provider found  Germaine Hdz MD      Pt provided informed consent for the behavioral health program. Discussed with patient model of service to include the limits of confidentiality (i.e. abuse reporting, suicide intervention, etc.) and short-term intervention focused approach.  Pt indicated understanding.    LUISANA Chaidez is a 74 y.o. female who presents for follow up of depression and anxiety.  She reported that today is \"one of those days\" when she is feeling particularly frustrated and angry about her pain management care.  She stated that she is not having thoughts of ending her life, but today is \"one of those days\" when she \"fears\" the day when she may \"lose control\" and no longer care if she lives or dies because she will be \"too desperate, angry, and so hurt both physically and emotionally\" that she will \"act without thought.\"  She reported that she is thankfully still \"too logical\" and too much of an \"over-thinker\" to carry out any action to end her life.  She stated that she has been very appreciative of being able to come to therapy to \"process\" these thoughts and feelings openly and without judgement, because in the past she would have been too \"embarrassed\" to talk.  She reported that therapy has helped her to \"organize\" her thoughts and give her \"perspective.\"  Kaylynn noted that she did finally make an appointment with [facility] and her appointment is scheduled for 2/21.  She stated that she is already \"impressed\" with them so far as the woman who scheduled her appointment explained in thorough detail everything that will happen for the first appointment, which

## 2025-02-13 ENCOUNTER — ANTI-COAG VISIT (OUTPATIENT)
Age: 75
End: 2025-02-13
Payer: MEDICAID

## 2025-02-13 DIAGNOSIS — I82.403 RECURRENT ACUTE DEEP VEIN THROMBOSIS (DVT) OF BOTH LOWER EXTREMITIES (HCC): Primary | Chronic | ICD-10-CM

## 2025-02-13 LAB
INTERNATIONAL NORMALIZATION RATIO, POC: 4.5
PROTHROMBIN TIME, POC: 53.4

## 2025-02-13 PROCEDURE — 85610 PROTHROMBIN TIME: CPT | Performed by: PHARMACY

## 2025-02-13 PROCEDURE — 99212 OFFICE O/P EST SF 10 MIN: CPT | Performed by: PHARMACY

## 2025-02-13 NOTE — PROGRESS NOTES
Patient seen in person in Medication Management Service.    Patient states unknown compliance with regimen. Updated pill box according to dosing card but unable to verify.    No bleeding or thromboembolic side effects noted.    No significant med or dietary changes. Patient may have eaten less spinach due to decreased appetite (occasionally experiences lack of appetite when pain level is higher). Reiterated importance of consistency. Patient states experiencing high pain levels right now.   No significant recent illness. Experiencing more fibromyalgia pain right now.      Patient acknowledges they are still an active patient of referring provider which is Wilder Yes Patient is in the process of transitioning to [facility] and will have a new provider but is unable to provide name at this time. She states she will call with this information after her appointment next week.     PT/INR done via POC meter per protocol.  INR was supratherapeutic at 4.5.  (goal 2 - 3)    Warfarin regimen will be held for today, then take 2.5 mg tomorrow, then resume 5 mg on Mon/Fri and 2.5 mg on all other days.  Will retest in 1 week.    Patient understands dosing directions and information discussed.  Dosing schedule and follow up appointment given to patient. Progress note routed to referring physicians office.  Patient acknowledges working in Consult Agreement with Pharmacist as referred by his/her physician/provider.      For Pharmacy Admin Tracking Only    Intervention Detail: Adherence Monitorin and Dose Adjustment: 1, reason: Therapy Optimization  Total # of Interventions Recommended: 2  Total # of Interventions Accepted: 2  Time Spent (min): 20    Danielle Smith RPH, PharmD, BCACP  2025  11:22 AM

## 2025-02-21 ENCOUNTER — ANTI-COAG VISIT (OUTPATIENT)
Age: 75
End: 2025-02-21
Payer: MEDICARE

## 2025-02-21 DIAGNOSIS — I82.403 RECURRENT ACUTE DEEP VEIN THROMBOSIS (DVT) OF BOTH LOWER EXTREMITIES (HCC): Primary | Chronic | ICD-10-CM

## 2025-02-21 LAB
INTERNATIONAL NORMALIZATION RATIO, POC: 1.9
PROTHROMBIN TIME, POC: 23.3

## 2025-02-21 PROCEDURE — 99212 OFFICE O/P EST SF 10 MIN: CPT

## 2025-02-21 PROCEDURE — 85610 PROTHROMBIN TIME: CPT

## 2025-02-21 NOTE — PROGRESS NOTES
Patient seen in person in Medication Management Service.    Patient states compliant all of the time with regimen.    No bleeding or thromboembolic side effects noted.    No significant med or dietary changes. Patient states that she has been consistent with greens intake and also pain levels have decreased since last appointment.   No significant recent illness or disease state changes.      Patient acknowledges they are still an active patient of referring provider which is Dr. Hdz Yes - however, patient is in the process of transitioning to [facility] and will have a new PCP at this time. She states that she will call with this information after her appointment.     PT/INR done via POC meter per protocol.  INR was slightly below goal at 1.9.  (goal 2 - 3)    Warfarin regimen will be increased to 5 mg Monday/Wednesday/Friday, 2.5 mg all other days.  Will retest in 2 weeks.    Patient understands dosing directions and information discussed.  Dosing schedule and follow up appointment given to patient. Progress note routed to referring physicians office.  Patient acknowledges working in Consult Agreement with Pharmacist as referred by his/her physician/provider.      For Pharmacy Admin Tracking Only    Intervention Detail: Adherence Monitorin and Dose Adjustment: 1, reason: Therapy Optimization  Total # of Interventions Recommended: 2  Total # of Interventions Accepted: 2  Time Spent (min): 20    Lois Willett, Monty, [facility]

## 2025-02-27 ENCOUNTER — APPOINTMENT (OUTPATIENT)
Dept: VASCULAR LAB | Age: 75
End: 2025-02-27
Attending: EMERGENCY MEDICINE
Payer: MEDICARE

## 2025-02-27 ENCOUNTER — HOSPITAL ENCOUNTER (EMERGENCY)
Age: 75
Discharge: HOME | End: 2025-02-27
Attending: EMERGENCY MEDICINE
Payer: MEDICARE

## 2025-02-27 VITALS
HEART RATE: 89 BPM | BODY MASS INDEX: 34.49 KG/M2 | OXYGEN SATURATION: 96 % | TEMPERATURE: 98.2 F | SYSTOLIC BLOOD PRESSURE: 163 MMHG | RESPIRATION RATE: 18 BRPM | WEIGHT: 207 LBS | DIASTOLIC BLOOD PRESSURE: 82 MMHG | HEIGHT: 65 IN

## 2025-02-27 DIAGNOSIS — M79.605 LEFT LEG PAIN: Primary | ICD-10-CM

## 2025-02-27 LAB
ALBUMIN SERPL-MCNC: 3.6 G/DL (ref 3.5–5.2)
ALP SERPL-CCNC: 75 U/L (ref 35–104)
ALT SERPL-CCNC: 9 U/L (ref 10–35)
ANION GAP SERPL CALCULATED.3IONS-SCNC: 7 MMOL/L (ref 9–16)
AST SERPL-CCNC: 17 U/L (ref 10–35)
BASOPHILS # BLD: 0 K/UL (ref 0–0.2)
BASOPHILS NFR BLD: 0 % (ref 0–2)
BILIRUB SERPL-MCNC: 0.2 MG/DL (ref 0–1.2)
BUN SERPL-MCNC: 13 MG/DL (ref 8–23)
CALCIUM SERPL-MCNC: 9.4 MG/DL (ref 8.6–10.4)
CHLORIDE SERPL-SCNC: 103 MMOL/L (ref 98–107)
CO2 SERPL-SCNC: 30 MMOL/L (ref 20–31)
CREAT SERPL-MCNC: 0.7 MG/DL (ref 0.7–1.2)
ECHO BSA: 2.07 M2
EOSINOPHIL # BLD: 0.17 K/UL (ref 0–0.4)
EOSINOPHILS RELATIVE PERCENT: 3 % (ref 0–4)
ERYTHROCYTE [DISTWIDTH] IN BLOOD BY AUTOMATED COUNT: 18.9 % (ref 11.5–14.9)
GFR, ESTIMATED: >90 ML/MIN/1.73M2
GLUCOSE SERPL-MCNC: 97 MG/DL (ref 74–99)
HCT VFR BLD AUTO: 32.9 % (ref 36–46)
HGB BLD-MCNC: 10.5 G/DL (ref 12–16)
INR PPP: 2.5
LYMPHOCYTES NFR BLD: 1.28 K/UL (ref 1–4.8)
LYMPHOCYTES RELATIVE PERCENT: 22 % (ref 24–44)
MCH RBC QN AUTO: 25.1 PG (ref 26–34)
MCHC RBC AUTO-ENTMCNC: 31.9 G/DL (ref 31–37)
MCV RBC AUTO: 78.8 FL (ref 80–100)
MONOCYTES NFR BLD: 0.58 K/UL (ref 0.1–1.3)
MONOCYTES NFR BLD: 10 % (ref 1–7)
MORPHOLOGY: ABNORMAL
MORPHOLOGY: ABNORMAL
NEUTROPHILS NFR BLD: 65 % (ref 36–66)
NEUTS SEG NFR BLD: 3.77 K/UL (ref 1.3–9.1)
PARTIAL THROMBOPLASTIN TIME: 37.3 SEC (ref 24–36)
PLATELET # BLD AUTO: 259 K/UL (ref 150–450)
PMV BLD AUTO: 6.7 FL (ref 6–12)
POTASSIUM SERPL-SCNC: 4.1 MMOL/L (ref 3.7–5.3)
PROT SERPL-MCNC: 7.2 G/DL (ref 6.6–8.7)
PROTHROMBIN TIME: 29.2 SEC (ref 11.8–14.6)
RBC # BLD AUTO: 4.17 M/UL (ref 4–5.2)
SODIUM SERPL-SCNC: 140 MMOL/L (ref 136–145)
WBC OTHER # BLD: 5.8 K/UL (ref 3.5–11)

## 2025-02-27 PROCEDURE — 85610 PROTHROMBIN TIME: CPT

## 2025-02-27 PROCEDURE — 93971 EXTREMITY STUDY: CPT

## 2025-02-27 PROCEDURE — 99284 EMERGENCY DEPT VISIT MOD MDM: CPT

## 2025-02-27 PROCEDURE — 36415 COLL VENOUS BLD VENIPUNCTURE: CPT

## 2025-02-27 PROCEDURE — 80053 COMPREHEN METABOLIC PANEL: CPT

## 2025-02-27 PROCEDURE — 85730 THROMBOPLASTIN TIME PARTIAL: CPT

## 2025-02-27 PROCEDURE — 85025 COMPLETE CBC W/AUTO DIFF WBC: CPT

## 2025-02-27 ASSESSMENT — ENCOUNTER SYMPTOMS
SHORTNESS OF BREATH: 0
NAUSEA: 0
VOMITING: 0

## 2025-02-27 NOTE — ED TRIAGE NOTES
Mode of arrival (squad #, walk in, police, etc) : walk in        Chief complaint(s): Leg pain        Arrival Note (brief scenario, treatment PTA, etc).: Left leg pain x 3 days, states NP @ f/u appointment was concerned for DVT> hx of DVT in past.         C= \"Have you ever felt that you should Cut down on your drinking?\"  No  A= \"Have people Annoyed you by criticizing your drinking?\"  No  G= \"Have you ever felt bad or Guilty about your drinking?\"  No  E= \"Have you ever had a drink as an Eye-opener first thing in the morning to steady your nerves or to help a hangover?\"  No      Deferred []      Reason for deferring: N/A    *If yes to two or more: probable alcohol abuse.*

## 2025-02-27 NOTE — ED PROVIDER NOTES
EMERGENCY DEPARTMENT ENCOUNTER    Pt Name: Kaylynn Gates  MRN: [id number]  Birthdate 1950  Date of evaluation: 2/27/25  CHIEF COMPLAINT       Chief Complaint   Patient presents with    Leg Pain     Left calf pain x 3 days. NP @ f/u yesterday was concerned for DVT     HISTORY OF PRESENT ILLNESS   74-year-old female with past medical history of DVTs on warfarin, fibromyalgia is presenting for left calf pain for the last 3 days.  She said that her left leg is more swollen than her right.  She denies any chest pain or shortness of breath.    The history is provided by the patient.           REVIEW OF SYSTEMS     Review of Systems   Respiratory:  Negative for shortness of breath.    Cardiovascular:  Negative for chest pain.   Gastrointestinal:  Negative for nausea and vomiting.   Musculoskeletal:  Positive for myalgias (left calf).   Neurological:  Negative for headaches.     PASTMEDICAL HISTORY     Past Medical History:   Diagnosis Date    Affective disorder     seasonal    Arthritis     shoulders and lower back    DVT (deep venous thrombosis) (HCC)     has history in R leg X 4    Fibromyalgia     Hyperlipidemia     Hypertension     Pneumonia     Sinusitis     Thyroid disease     hypothyroidsm     Past Problem List  Patient Active Problem List   Diagnosis Code    Essential hypertension I10    Hyperlipidemia E78.5    Fibromyalgia M79.7    Sinusitis  J32.9    Hypothyroidism E03.9    Acid reflux K21.9    RNEEA (obstructive sleep apnea) G47.33    Depression F32.A    Anxiety F41.9    PTSD (post-traumatic stress disorder) F43.10    Recurrent acute deep vein thrombosis (DVT) of both lower extremities (HCC) I82.403    Chronic pain G89.29    IGT (impaired glucose tolerance) R73.02    Fatigue R53.83    Chronic bilateral low back pain without sciatica M54.50, G89.29    Chest pain R07.9    Obesity (BMI 30-39.9) E66.9    Fall as cause of accidental injury at home as place of occurrence W19.XXXA, Y92.009    Chronic pain of both

## 2025-02-28 ENCOUNTER — TELEPHONE (OUTPATIENT)
Age: 75
End: 2025-02-28

## 2025-02-28 NOTE — TELEPHONE ENCOUNTER
Called pt to follow up on recent [facility] Ed visit on 2/27/25. Unable to leave message.   INR 2.5 02/27/25, no evidence of DVT , no new prescriptions. Next appt with coumadin clinic on 3/7/25.     Joann KincaidD, BCPS 2/28/2025 1:12 PM

## 2025-03-03 ENCOUNTER — OFFICE VISIT (OUTPATIENT)
Dept: BEHAVIORAL/MENTAL HEALTH CLINIC | Age: 75
End: 2025-03-03
Payer: MEDICARE

## 2025-03-03 DIAGNOSIS — F41.1 GENERALIZED ANXIETY DISORDER: ICD-10-CM

## 2025-03-03 DIAGNOSIS — F34.1 PERSISTENT DEPRESSIVE DISORDER: Primary | ICD-10-CM

## 2025-03-03 PROCEDURE — 1123F ACP DISCUSS/DSCN MKR DOCD: CPT | Performed by: PSYCHOLOGIST

## 2025-03-03 PROCEDURE — 90837 PSYTX W PT 60 MINUTES: CPT | Performed by: PSYCHOLOGIST

## 2025-03-03 PROCEDURE — 1036F TOBACCO NON-USER: CPT | Performed by: PSYCHOLOGIST

## 2025-03-03 ASSESSMENT — ANXIETY QUESTIONNAIRES
5. BEING SO RESTLESS THAT IT IS HARD TO SIT STILL: 1-SEVERAL DAYS
2. NOT BEING ABLE TO STOP OR CONTROL WORRYING: 3-NEARLY EVERY DAY
3. WORRYING TOO MUCH ABOUT DIFFERENT THINGS: 3-NEARLY EVERY DAY
4. TROUBLE RELAXING: 3-NEARLY EVERY DAY
7. FEELING AFRAID AS IF SOMETHING AWFUL MIGHT HAPPEN: 0-NOT AT ALL
1. FEELING NERVOUS, ANXIOUS, OR ON EDGE: NEARLY EVERY DAY
GAD7 TOTAL SCORE: 14
6. BECOMING EASILY ANNOYED OR IRRITABLE: 1-SEVERAL DAYS

## 2025-03-03 ASSESSMENT — PATIENT HEALTH QUESTIONNAIRE - PHQ9
SUM OF ALL RESPONSES TO PHQ QUESTIONS 1-9: 13
3. TROUBLE FALLING OR STAYING ASLEEP: SEVERAL DAYS
1. LITTLE INTEREST OR PLEASURE IN DOING THINGS: NEARLY EVERY DAY
8. MOVING OR SPEAKING SO SLOWLY THAT OTHER PEOPLE COULD HAVE NOTICED. OR THE OPPOSITE, BEING SO FIGETY OR RESTLESS THAT YOU HAVE BEEN MOVING AROUND A LOT MORE THAN USUAL: NOT AT ALL
SUM OF ALL RESPONSES TO PHQ QUESTIONS 1-9: 13
4. FEELING TIRED OR HAVING LITTLE ENERGY: NEARLY EVERY DAY
SUM OF ALL RESPONSES TO PHQ QUESTIONS 1-9: 13
10. IF YOU CHECKED OFF ANY PROBLEMS, HOW DIFFICULT HAVE THESE PROBLEMS MADE IT FOR YOU TO DO YOUR WORK, TAKE CARE OF THINGS AT HOME, OR GET ALONG WITH OTHER PEOPLE: SOMEWHAT DIFFICULT
2. FEELING DOWN, DEPRESSED OR HOPELESS: NEARLY EVERY DAY
7. TROUBLE CONCENTRATING ON THINGS, SUCH AS READING THE NEWSPAPER OR WATCHING TELEVISION: MORE THAN HALF THE DAYS
6. FEELING BAD ABOUT YOURSELF - OR THAT YOU ARE A FAILURE OR HAVE LET YOURSELF OR YOUR FAMILY DOWN: NOT AT ALL
5. POOR APPETITE OR OVEREATING: SEVERAL DAYS
9. THOUGHTS THAT YOU WOULD BE BETTER OFF DEAD, OR OF HURTING YOURSELF: NOT AT ALL
SUM OF ALL RESPONSES TO PHQ QUESTIONS 1-9: 13

## 2025-03-03 NOTE — PROGRESS NOTES
ADULT BEHAVIORAL HEALTH ASSESSMENT  Génesis Taveras,  Ph.D.   Licensed Clinical Psychologist ([place] [id number])    Visit Date: 3/3/2025   Time of appointment:  12:00p - 12:55p   Time spent with Patient: 55 minutes.   This is patient's ninth appointment.    Reason for Consult:  Depression and Anxiety     Referring Provider/PCP:    No ref. provider found  Germaine Hdz MD      Pt provided informed consent for the behavioral health program. Discussed with patient model of service to include the limits of confidentiality (i.e. abuse reporting, suicide intervention, etc.) and short-term intervention focused approach.  Pt indicated understanding.    LUISANA Chaidez is a 74 y.o. female who presents for follow up of depression and anxiety.  She reported that she has been feeling worse lately, partly due to even worsening chronic pain.  She noted that she did go to [facility], but the experience was quite what she was hoping.  She stated that she was there for over an hour and she felt like they had listened.  However, she stated that at the very end, one of the physician assistants told her that they do NOT prescribe pain medications at [facility], but they could refer her to pain management.  She reported that this was very upsetting to hear and caused her to leave there in \"80% panic mode.\"  She stated that by the time she was back home, she had worked herself up into \"100% panic mode.\"  She reported that the main reason she became \"panicked\" was she feared that if she switched to receiving her healthcare at [facility], it would take too long to get her set up with pain management and she would not be able to get her pain medications covered.  She reported that she decided not to continue with [facility] and to just stay with her current primary care provider at [facility].  She stated that it was very disappointing and frustrating that [facility] had not worked out as she had been hoping.  She reported that she

## 2025-03-07 ENCOUNTER — ANTI-COAG VISIT (OUTPATIENT)
Age: 75
End: 2025-03-07
Payer: MEDICARE

## 2025-03-07 DIAGNOSIS — I82.403 RECURRENT ACUTE DEEP VEIN THROMBOSIS (DVT) OF BOTH LOWER EXTREMITIES (HCC): Primary | Chronic | ICD-10-CM

## 2025-03-07 LAB
INTERNATIONAL NORMALIZATION RATIO, POC: 2.4
PROTHROMBIN TIME, POC: 29.3

## 2025-03-07 PROCEDURE — 99211 OFF/OP EST MAY X REQ PHY/QHP: CPT

## 2025-03-07 PROCEDURE — 85610 PROTHROMBIN TIME: CPT

## 2025-03-07 NOTE — PROGRESS NOTES
Patient seen in person in Medication Management Service.    Patient states compliant all of the time with regimen.    No bleeding or thromboembolic side effects noted.    Patient was in the ED on 03/07 for rule out DVT after visiting cardiologist and stating she had leg pains. Patient was sent to the ED and no DVT was found, was deemed to be muscle pains. No new prescriptions given at time of encounter.   No significant recent illness or disease state changes.      Patient acknowledges they are still an active patient of referring provider which is Dr. Hdz  Yes - however, patient is in the process of transitioning to [facility] and will have a new PCP at this time. She states that she will call with this information after her appointment.     PT/INR done via POC meter per protocol.  INR was therapeutic at 2.4.  (goal 2 - 3)    Warfarin regimen will be continued at current dose 5 mg Monday/Wednesday/Friday, 2.5 mg all other days.  Will retest in 4 weeks.    Patient understands dosing directions and information discussed.  Dosing schedule and follow up appointment given to patient. Progress note routed to referring physicians office.  Patient acknowledges working in Consult Agreement with Pharmacist as referred by his/her physician/provider.      For Pharmacy Admin Tracking Only    Intervention Detail:   Total # of Interventions Recommended: 0  Total # of Interventions Accepted: 0  Time Spent (min): 20    Lois Willett, Monty, [facility]

## 2025-03-17 ENCOUNTER — OFFICE VISIT (OUTPATIENT)
Dept: BEHAVIORAL/MENTAL HEALTH CLINIC | Age: 75
End: 2025-03-17
Payer: MEDICARE

## 2025-03-17 DIAGNOSIS — F34.1 PERSISTENT DEPRESSIVE DISORDER: Primary | ICD-10-CM

## 2025-03-17 DIAGNOSIS — F41.1 GENERALIZED ANXIETY DISORDER: ICD-10-CM

## 2025-03-17 PROCEDURE — 1123F ACP DISCUSS/DSCN MKR DOCD: CPT | Performed by: PSYCHOLOGIST

## 2025-03-17 PROCEDURE — 90837 PSYTX W PT 60 MINUTES: CPT | Performed by: PSYCHOLOGIST

## 2025-03-17 ASSESSMENT — PATIENT HEALTH QUESTIONNAIRE - PHQ9
8. MOVING OR SPEAKING SO SLOWLY THAT OTHER PEOPLE COULD HAVE NOTICED. OR THE OPPOSITE, BEING SO FIGETY OR RESTLESS THAT YOU HAVE BEEN MOVING AROUND A LOT MORE THAN USUAL: NOT AT ALL
6. FEELING BAD ABOUT YOURSELF - OR THAT YOU ARE A FAILURE OR HAVE LET YOURSELF OR YOUR FAMILY DOWN: NOT AT ALL
SUM OF ALL RESPONSES TO PHQ QUESTIONS 1-9: 13
SUM OF ALL RESPONSES TO PHQ QUESTIONS 1-9: 14
4. FEELING TIRED OR HAVING LITTLE ENERGY: MORE THAN HALF THE DAYS
9. THOUGHTS THAT YOU WOULD BE BETTER OFF DEAD, OR OF HURTING YOURSELF: SEVERAL DAYS
SUM OF ALL RESPONSES TO PHQ QUESTIONS 1-9: 14
1. LITTLE INTEREST OR PLEASURE IN DOING THINGS: NEARLY EVERY DAY
7. TROUBLE CONCENTRATING ON THINGS, SUCH AS READING THE NEWSPAPER OR WATCHING TELEVISION: NEARLY EVERY DAY
SUM OF ALL RESPONSES TO PHQ QUESTIONS 1-9: 14
2. FEELING DOWN, DEPRESSED OR HOPELESS: NEARLY EVERY DAY
3. TROUBLE FALLING OR STAYING ASLEEP: SEVERAL DAYS
10. IF YOU CHECKED OFF ANY PROBLEMS, HOW DIFFICULT HAVE THESE PROBLEMS MADE IT FOR YOU TO DO YOUR WORK, TAKE CARE OF THINGS AT HOME, OR GET ALONG WITH OTHER PEOPLE: EXTREMELY DIFFICULT
5. POOR APPETITE OR OVEREATING: SEVERAL DAYS

## 2025-03-17 ASSESSMENT — ANXIETY QUESTIONNAIRES
GAD7 TOTAL SCORE: 15
4. TROUBLE RELAXING: 3-NEARLY EVERY DAY
7. FEELING AFRAID AS IF SOMETHING AWFUL MIGHT HAPPEN: 0-NOT AT ALL
1. FEELING NERVOUS, ANXIOUS, OR ON EDGE: NEARLY EVERY DAY
3. WORRYING TOO MUCH ABOUT DIFFERENT THINGS: 3-NEARLY EVERY DAY
2. NOT BEING ABLE TO STOP OR CONTROL WORRYING: 3-NEARLY EVERY DAY
5. BEING SO RESTLESS THAT IT IS HARD TO SIT STILL: 2-OVER HALF THE DAYS
6. BECOMING EASILY ANNOYED OR IRRITABLE: 1-SEVERAL DAYS

## 2025-03-17 ASSESSMENT — COLUMBIA-SUICIDE SEVERITY RATING SCALE - C-SSRS
1. WITHIN THE PAST MONTH, HAVE YOU WISHED YOU WERE DEAD OR WISHED YOU COULD GO TO SLEEP AND NOT WAKE UP?: YES
6. HAVE YOU EVER DONE ANYTHING, STARTED TO DO ANYTHING, OR PREPARED TO DO ANYTHING TO END YOUR LIFE?: NO
2. HAVE YOU ACTUALLY HAD ANY THOUGHTS OF KILLING YOURSELF?: NO

## 2025-03-17 NOTE — PROGRESS NOTES
ADULT BEHAVIORAL HEALTH ASSESSMENT  Génesis Taveras,  Ph.D.   Licensed Clinical Psychologist ([place] [id number])    Visit Date: 3/17/2025   Time of appointment:  10:05a - 11:02a   Time spent with Patient: 57 minutes.   This is patient's tenth appointment.    Reason for Consult:  Depression and Anxiety     Referring Provider/PCP:    No ref. provider found  Germaine Hdz MD      Pt provided informed consent for the behavioral health program. Discussed with patient model of service to include the limits of confidentiality (i.e. abuse reporting, suicide intervention, etc.) and short-term intervention focused approach.  Pt indicated understanding.    LUISANA Chaidez is a 74 y.o. female who presents for follow up of depression and anxiety.  She denied having any specific plan or immediate intent to end her life, but she has been struggling with an increased fear again that she could one day reach the point that she won't care about ending her life due to the pain being so bad and she could lose her ability to \"rationalize.\"  She stated that the pain was \"so bad\" this past Saturday that she did have the vague thought of jumping off a bridge to end the pain.  She reported that she feels \"embarrassed\" and \"ashamed\" to admit how easily this thought came to her this past Saturday.  She stated, \"I hate that the pain was so bad that day that it put me in a position to have that thought.\"  She reported that she has continued to give more thought about going to pain management, but she still feels reluctant to do it.  She stated that she is particularly worried about pain management not really working or how hard it would be to \"transition\" if they decided to make major changes to her pain medications.  She reported that for now, she is continuing to utilize distraction as much as she can to help keep her from overly focusing on the pain.  She stated that she has been keeping active around [place], even though she does not

## 2025-03-27 ENCOUNTER — OFFICE VISIT (OUTPATIENT)
Dept: NEUROLOGY | Age: 75
End: 2025-03-27
Payer: MEDICARE

## 2025-03-27 VITALS
DIASTOLIC BLOOD PRESSURE: 93 MMHG | BODY MASS INDEX: 33.92 KG/M2 | HEIGHT: 65 IN | HEART RATE: 87 BPM | WEIGHT: 203.6 LBS | SYSTOLIC BLOOD PRESSURE: 178 MMHG

## 2025-03-27 DIAGNOSIS — M79.7 FIBROMYALGIA: ICD-10-CM

## 2025-03-27 DIAGNOSIS — H81.10 BENIGN PAROXYSMAL POSITIONAL VERTIGO, UNSPECIFIED LATERALITY: Primary | ICD-10-CM

## 2025-03-27 PROCEDURE — 1159F MED LIST DOCD IN RCRD: CPT | Performed by: PSYCHIATRY & NEUROLOGY

## 2025-03-27 PROCEDURE — 1123F ACP DISCUSS/DSCN MKR DOCD: CPT | Performed by: PSYCHIATRY & NEUROLOGY

## 2025-03-27 PROCEDURE — 3080F DIAST BP >= 90 MM HG: CPT | Performed by: PSYCHIATRY & NEUROLOGY

## 2025-03-27 PROCEDURE — 99214 OFFICE O/P EST MOD 30 MIN: CPT | Performed by: PSYCHIATRY & NEUROLOGY

## 2025-03-27 PROCEDURE — G8427 DOCREV CUR MEDS BY ELIG CLIN: HCPCS | Performed by: PSYCHIATRY & NEUROLOGY

## 2025-03-27 PROCEDURE — 1090F PRES/ABSN URINE INCON ASSESS: CPT | Performed by: PSYCHIATRY & NEUROLOGY

## 2025-03-27 PROCEDURE — 3077F SYST BP >= 140 MM HG: CPT | Performed by: PSYCHIATRY & NEUROLOGY

## 2025-03-27 PROCEDURE — 3017F COLORECTAL CA SCREEN DOC REV: CPT | Performed by: PSYCHIATRY & NEUROLOGY

## 2025-03-27 PROCEDURE — G8399 PT W/DXA RESULTS DOCUMENT: HCPCS | Performed by: PSYCHIATRY & NEUROLOGY

## 2025-03-27 PROCEDURE — 1036F TOBACCO NON-USER: CPT | Performed by: PSYCHIATRY & NEUROLOGY

## 2025-03-27 PROCEDURE — G8417 CALC BMI ABV UP PARAM F/U: HCPCS | Performed by: PSYCHIATRY & NEUROLOGY

## 2025-03-27 ASSESSMENT — ENCOUNTER SYMPTOMS
EYES NEGATIVE: 1
RESPIRATORY NEGATIVE: 1
GASTROINTESTINAL NEGATIVE: 1
ALLERGIC/IMMUNOLOGIC NEGATIVE: 1

## 2025-03-27 NOTE — PROGRESS NOTES
Active problem  Benign positional vertigo to undergo vestibular rehabilitation . Transient head pains felt to be migraine variant  . 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 Leg pain is suggestive for lumbar stenosis to undergo MRI of lumbar spine and course of PT  . Fibromyalgia . The condition is she reports that low back pain has improved being mild . MRI lumbar spine mild spinal canal stenosis and mild bilateral neural foraminal narrowing at L3-4.Severe bilateral neural foraminal narrowing at L4-5 and L5-S1.She has fibromyalgia with diffuse body pain grade 8 over 10 more  in neck , shoulders , legs and low back on neurontin 800 mg po bid , pamelor 10 mg po qhs with tylenol with codeine two at bed time and in the morning wit tramadol during the day as needed PRN through PMD. She reports that if she sticks on neurontin pain level is less . Dosage was reduced previously from 800 mg po tid . She reports that positional vertigo is very rare being seldom at this time much improved having undergone vestibular rehabilitation . There are no head tarnsient head pains . There will be twice per month mild sinus pressure attenuated with flonase .She is on coumadin for DVT and PE in past . Testing neurontin 800 mg po tid , tylenol # 3 PRN , pamelor 10 mg po qhs , coumadin . Head CT normal . CRP 3.7 . MRI lumbar spine mild spinal canal stenosis and mild bilateral neural foraminal narrowing at L3-4.Severe bilateral neural foraminal narrowing at L4-5 and L5-S1.     Past Medical History:   Diagnosis Date    Affective disorder     seasonal    Arthritis     shoulders and lower back    DVT (deep venous thrombosis) ([facility])     has history in R leg X 4    Fibromyalgia     Hyperlipidemia     Hypertension     Pneumonia     Sinusitis     Thyroid disease     hypothyroidsm       Past Surgical History:   Procedure Laterality Date    COLONOSCOPY  08/31/2011    internal hemorrhoides    DILATION AND CURETTAGE OF UTERUS      ECTOPIC

## 2025-04-04 ENCOUNTER — ANTI-COAG VISIT (OUTPATIENT)
Age: 75
End: 2025-04-04
Payer: MEDICARE

## 2025-04-04 DIAGNOSIS — I82.403 RECURRENT ACUTE DEEP VEIN THROMBOSIS (DVT) OF BOTH LOWER EXTREMITIES: Primary | Chronic | ICD-10-CM

## 2025-04-04 LAB
INTERNATIONAL NORMALIZATION RATIO, POC: 3.9 (ref 2–3)
PROTHROMBIN TIME, POC: 47

## 2025-04-04 PROCEDURE — 99212 OFFICE O/P EST SF 10 MIN: CPT

## 2025-04-04 PROCEDURE — 85610 PROTHROMBIN TIME: CPT

## 2025-04-04 NOTE — PROGRESS NOTES
Patient seen in person in Medication Management Service.    Patient states compliant most of the time with regimen.    No bleeding or thromboembolic side effects noted.    No significant med or dietary changes.    No significant recent illness or disease state changes.      Patient acknowledges they are still an active patient of referring provider which is MONSE Hdz Yes     PT/INR done via POC meter per protocol.  INR was supratherapeutic at 3.9.  (goal 2 - 3)  The patient only ate spinach x 1 this past month  She plans to resume her usual spinach intake    Warfarin regimen will be held for one day, then Resume 5 mg MWF and 2.5 mg all other days.  Will retest in 1 week.    Patient understands dosing directions and information discussed.  Dosing schedule and follow up appointment given to patient. Progress note routed to referring physicians office.  Patient acknowledges working in Consult Agreement with Pharmacist as referred by his/her physician/provider.      For Pharmacy Admin Tracking Only    Intervention Detail: Dose Adjustment: 1, reason: Therapy De-escalation  Total # of Interventions Recommended: 1  Total # of Interventions Accepted: 1  Time Spent (min): 20

## 2025-04-11 ENCOUNTER — ANTI-COAG VISIT (OUTPATIENT)
Age: 75
End: 2025-04-11
Payer: MEDICARE

## 2025-04-11 DIAGNOSIS — I82.403 RECURRENT ACUTE DEEP VEIN THROMBOSIS (DVT) OF BOTH LOWER EXTREMITIES: Primary | Chronic | ICD-10-CM

## 2025-04-11 LAB
INTERNATIONAL NORMALIZATION RATIO, POC: 3.5 (ref 2–3)
PROTHROMBIN TIME, POC: 41.4

## 2025-04-11 PROCEDURE — 99212 OFFICE O/P EST SF 10 MIN: CPT

## 2025-04-11 PROCEDURE — 85610 PROTHROMBIN TIME: CPT

## 2025-04-11 NOTE — PROGRESS NOTES
Patient seen in person in Medication Management Service.    Patient states compliant most of the time with regimen.    No bleeding or thromboembolic side effects noted.    No significant med or dietary changes.    No significant recent illness or disease state changes.      Patient acknowledges they are still an active patient of referring provider which is MONSE Hdz Yes     PT/INR done via POC meter per protocol.  INR was supratherapeutic at 3.5.  (goal 2 - 3)    Warfarin regimen will be decreased to 5 mg Mon/Thurs and 2.5 mg all other days.  Will retest in 1 week.    Patient understands dosing directions and information discussed.  Dosing schedule and follow up appointment given to patient. Progress note routed to referring physicians office.  Patient acknowledges working in Consult Agreement with Pharmacist as referred by his/her physician/provider.      For Pharmacy Admin Tracking Only    Intervention Detail: Dose Adjustment: 1, reason: Therapy De-escalation  Total # of Interventions Recommended: 1  Total # of Interventions Accepted: 1  Time Spent (min): 20

## 2025-04-16 ENCOUNTER — OFFICE VISIT (OUTPATIENT)
Dept: BEHAVIORAL/MENTAL HEALTH CLINIC | Age: 75
End: 2025-04-16
Payer: MEDICARE

## 2025-04-16 DIAGNOSIS — F41.1 GENERALIZED ANXIETY DISORDER: ICD-10-CM

## 2025-04-16 DIAGNOSIS — F34.1 PERSISTENT DEPRESSIVE DISORDER: Primary | ICD-10-CM

## 2025-04-16 PROCEDURE — 1036F TOBACCO NON-USER: CPT | Performed by: PSYCHOLOGIST

## 2025-04-16 PROCEDURE — 90837 PSYTX W PT 60 MINUTES: CPT | Performed by: PSYCHOLOGIST

## 2025-04-16 PROCEDURE — 1123F ACP DISCUSS/DSCN MKR DOCD: CPT | Performed by: PSYCHOLOGIST

## 2025-04-16 ASSESSMENT — ANXIETY QUESTIONNAIRES
3. WORRYING TOO MUCH ABOUT DIFFERENT THINGS: 2-OVER HALF THE DAYS
4. TROUBLE RELAXING: 1-SEVERAL DAYS
1. FEELING NERVOUS, ANXIOUS, OR ON EDGE: SEVERAL DAYS
GAD7 TOTAL SCORE: 8
2. NOT BEING ABLE TO STOP OR CONTROL WORRYING: 3-NEARLY EVERY DAY
7. FEELING AFRAID AS IF SOMETHING AWFUL MIGHT HAPPEN: 0-NOT AT ALL
5. BEING SO RESTLESS THAT IT IS HARD TO SIT STILL: 0-NOT AT ALL
6. BECOMING EASILY ANNOYED OR IRRITABLE: 1-SEVERAL DAYS

## 2025-04-16 ASSESSMENT — PATIENT HEALTH QUESTIONNAIRE - PHQ9
1. LITTLE INTEREST OR PLEASURE IN DOING THINGS: NEARLY EVERY DAY
10. IF YOU CHECKED OFF ANY PROBLEMS, HOW DIFFICULT HAVE THESE PROBLEMS MADE IT FOR YOU TO DO YOUR WORK, TAKE CARE OF THINGS AT HOME, OR GET ALONG WITH OTHER PEOPLE: SOMEWHAT DIFFICULT
SUM OF ALL RESPONSES TO PHQ QUESTIONS 1-9: 14
9. THOUGHTS THAT YOU WOULD BE BETTER OFF DEAD, OR OF HURTING YOURSELF: NOT AT ALL
6. FEELING BAD ABOUT YOURSELF - OR THAT YOU ARE A FAILURE OR HAVE LET YOURSELF OR YOUR FAMILY DOWN: NOT AT ALL
8. MOVING OR SPEAKING SO SLOWLY THAT OTHER PEOPLE COULD HAVE NOTICED. OR THE OPPOSITE, BEING SO FIGETY OR RESTLESS THAT YOU HAVE BEEN MOVING AROUND A LOT MORE THAN USUAL: NOT AT ALL
4. FEELING TIRED OR HAVING LITTLE ENERGY: NEARLY EVERY DAY
7. TROUBLE CONCENTRATING ON THINGS, SUCH AS READING THE NEWSPAPER OR WATCHING TELEVISION: NEARLY EVERY DAY
SUM OF ALL RESPONSES TO PHQ QUESTIONS 1-9: 14
5. POOR APPETITE OR OVEREATING: SEVERAL DAYS
3. TROUBLE FALLING OR STAYING ASLEEP: SEVERAL DAYS
2. FEELING DOWN, DEPRESSED OR HOPELESS: NEARLY EVERY DAY

## 2025-04-16 NOTE — PROGRESS NOTES
ADULT BEHAVIORAL HEALTH ASSESSMENT  Génesis Taveras,  Ph.D.   Licensed Clinical Psychologist ([place] [id number])    Visit Date: 4/16/2025   Time of appointment:  1:15p - 2:10p   Time spent with Patient: 55 minutes.   This is patient's 11th appointment.    Reason for Consult:  Depression and Anxiety     Referring Provider/PCP:    No ref. provider found  Germaine Hdz MD      Pt provided informed consent for the behavioral health program. Discussed with patient model of service to include the limits of confidentiality (i.e. abuse reporting, suicide intervention, etc.) and short-term intervention focused approach.  Pt indicated understanding.    SUBJECTIVE  Kaylynn is a 74 y.o. female who presents for follow up of depression and anxiety.  She reported that her mood has been \"terrible,\" mostly due to worsening pain.  She stated that she also continues to feel very frustrated with her healthcare and her perception of a lack of response from her providers in discussing options to better manage her pain.  She reported that last week, the pain was so bad that she called her primary care provider's office and \"begged\" to get an appointment with any of the providers in the practice.  She stated that she saw one of the nurse practitioners this past Monday and while she liked the provider and appreciated that she listened, she did not feel like they actually discussed options for managing her pain and she felt like nothing came from the appointment.  She reported that she also saw her neurologist a couple of weeks ago on 3/27 and he recommended increasing the gabapentin from 2 pills to 3 pills a day.  However, Kaylynn stated that she has not increased it because she is concerned about negative side effects that she experienced in the past when she was taking a higher dose of gabapentin.  She reported that when she was taking 3-4 pills of gabapentin in the past, it caused extreme lethargy that it was hard to do anything and it also caused

## 2025-04-18 ENCOUNTER — ANTI-COAG VISIT (OUTPATIENT)
Age: 75
End: 2025-04-18
Payer: MEDICARE

## 2025-04-18 DIAGNOSIS — I82.403 RECURRENT ACUTE DEEP VEIN THROMBOSIS (DVT) OF BOTH LOWER EXTREMITIES: Primary | Chronic | ICD-10-CM

## 2025-04-18 LAB
INTERNATIONAL NORMALIZATION RATIO, POC: 2.7
PROTHROMBIN TIME, POC: 33

## 2025-04-18 PROCEDURE — 99211 OFF/OP EST MAY X REQ PHY/QHP: CPT

## 2025-04-18 PROCEDURE — 85610 PROTHROMBIN TIME: CPT

## 2025-04-18 NOTE — PROGRESS NOTES
Patient seen in person in Medication Management Service.    Patient states compliant all of the time with regimen.    No bleeding or thromboembolic side effects noted.    No significant med or dietary changes.  Pt has not been eating and did not have any spinach prior to last appt. Pt is eating now and had spinach last wed and is planning to have more spinach on regular basis.   No significant recent illness or disease state changes.      Patient acknowledges they are still an active patient of referring provider which is Dr. Hdz Yes     PT/INR done via POC meter per protocol.  INR was therapeutic at 2.7.  (goal 2 - 3)    Warfarin regimen will be continued at current dose 5 mg Mon and Thur and 2.5 mg all other days  Will retest in 2 weeks.    Patient understands dosing directions and information discussed.  Dosing schedule and follow up appointment given to patient. Progress note routed to referring physicians office.  Patient acknowledges working in Consult Agreement with Pharmacist as referred by his/her physician/provider.      For Pharmacy Admin Tracking Only    Intervention Detail: Adherence Monitorin  Total # of Interventions Recommended: 1  Total # of Interventions Accepted: 1  Time Spent (min): 20    Joann Montana PharmD, BCPS 2025 11:48 AM

## 2025-05-01 ENCOUNTER — TELEPHONE (OUTPATIENT)
Dept: NEUROLOGY | Age: 75
End: 2025-05-01

## 2025-05-01 DIAGNOSIS — R42 VERTIGO: Primary | ICD-10-CM

## 2025-05-01 NOTE — TELEPHONE ENCOUNTER
Patient called into office stating she is having a flare up of vertigo again which began on Monday of this week. She is asking for a new referral for vestibular rehab (PT). This is Dr. Masters's patient. Please advise.

## 2025-05-01 NOTE — TELEPHONE ENCOUNTER
Fine for referral. Looks like she was sent to [facility] in the past; I've placed the order. Please make sure this was correct

## 2025-05-02 ENCOUNTER — ANTI-COAG VISIT (OUTPATIENT)
Age: 75
End: 2025-05-02
Payer: MEDICARE

## 2025-05-02 DIAGNOSIS — I82.403 RECURRENT ACUTE DEEP VEIN THROMBOSIS (DVT) OF BOTH LOWER EXTREMITIES (HCC): Primary | Chronic | ICD-10-CM

## 2025-05-02 LAB
INTERNATIONAL NORMALIZATION RATIO, POC: 4
PROTHROMBIN TIME, POC: 48.4

## 2025-05-02 PROCEDURE — 85610 PROTHROMBIN TIME: CPT

## 2025-05-02 PROCEDURE — 99212 OFFICE O/P EST SF 10 MIN: CPT

## 2025-05-02 NOTE — PROGRESS NOTES
Patient seen in person in Medication Management Service.    Patient states compliant all of the time with regimen.    No bleeding or thromboembolic side effects noted.    No significant med or dietary changes.  Pt has not been eating spinach as usual and in general has not been eating much, just snacking. Has frozen spinach at home but not sure when she will be able to it again.   No significant recent illness or disease state changes.      Patient acknowledges they are still an active patient of referring provider which is Germaine Hdz MD  Yes     PT/INR done via POC meter per protocol.  INR was supratherapeutic at 4.  (goal 2 - 3)    Warfarin regimen will be held for 1 day and the decreased to 5 mg on Monday and 2.5 mg all other days.  Will retest in 1 week.    Patient understands dosing directions and information discussed.  Dosing schedule and follow up appointment given to patient. Progress note routed to referring physicians office.  Patient acknowledges working in Consult Agreement with Pharmacist as referred by his/her physician/provider.      For Pharmacy Admin Tracking Only    Intervention Detail: Adherence Monitorin and Dose Adjustment: 1, reason: Therapy Optimization  Total # of Interventions Recommended: 2  Total # of Interventions Accepted: 2  Time Spent (min): 20

## 2025-05-02 NOTE — TELEPHONE ENCOUNTER
Patient notified; voiced understanding. Confirmed with patient that she was previously going to the [facility] on [address]. Patient states she already has phone number to call to schedule.

## 2025-05-08 ENCOUNTER — HOSPITAL ENCOUNTER (OUTPATIENT)
Age: 75
Setting detail: SPECIMEN
Discharge: HOME | End: 2025-05-08
Payer: MEDICARE

## 2025-05-08 DIAGNOSIS — E03.9 ACQUIRED HYPOTHYROIDISM: ICD-10-CM

## 2025-05-08 LAB
T4 FREE SERPL-MCNC: 0.8 NG/DL (ref 0.92–1.68)
TSH SERPL DL<=0.05 MIU/L-ACNC: 6.36 UIU/ML (ref 0.27–4.2)

## 2025-05-08 PROCEDURE — 36415 COLL VENOUS BLD VENIPUNCTURE: CPT

## 2025-05-08 PROCEDURE — 84439 ASSAY OF FREE THYROXINE: CPT

## 2025-05-08 PROCEDURE — 84443 ASSAY THYROID STIM HORMONE: CPT

## 2025-05-09 ENCOUNTER — ANTI-COAG VISIT (OUTPATIENT)
Age: 75
End: 2025-05-09
Payer: MEDICARE

## 2025-05-09 DIAGNOSIS — I82.403 RECURRENT ACUTE DEEP VEIN THROMBOSIS (DVT) OF BOTH LOWER EXTREMITIES (HCC): Primary | Chronic | ICD-10-CM

## 2025-05-09 LAB
INTERNATIONAL NORMALIZATION RATIO, POC: 2.4 (ref 2–3)
PROTHROMBIN TIME, POC: 29.3

## 2025-05-09 PROCEDURE — 99211 OFF/OP EST MAY X REQ PHY/QHP: CPT

## 2025-05-09 PROCEDURE — 85610 PROTHROMBIN TIME: CPT

## 2025-05-09 NOTE — PROGRESS NOTES
Patient seen in person in Medication Management Service.    Patient states compliant most of the time with regimen.    No bleeding or thromboembolic side effects noted.    No significant med or dietary changes.    No significant recent illness or disease state changes.      Patient acknowledges they are still an active patient of referring provider which is MONSE Hdz Yes     PT/INR done via POC meter per protocol.  INR was therapeutic at 2.4.  (goal 2 - 3)  The patient did take warfarin 5 mg yesterday vs 2.5 mg but also ate spinach x 3 this past week    Warfarin regimen will be continued at current dose 5 mg Monday and 2.5 mg all other days.  Will retest in 10 days.    Patient understands dosing directions and information discussed.  Dosing schedule and follow up appointment given to patient. Progress note routed to referring physicians office.  Patient acknowledges working in Consult Agreement with Pharmacist as referred by his/her physician/provider.      For Pharmacy Admin Tracking Only    Intervention Detail: Adherence Monitorin  Total # of Interventions Recommended: 0  Total # of Interventions Accepted: 0  Time Spent (min): 20

## 2025-05-14 ENCOUNTER — HOSPITAL ENCOUNTER (OUTPATIENT)
Dept: PHYSICAL THERAPY | Age: 75
Setting detail: THERAPIES SERIES
Discharge: HOME | End: 2025-05-14
Attending: FAMILY MEDICINE
Payer: MEDICARE

## 2025-05-14 PROCEDURE — 97161 PT EVAL LOW COMPLEX 20 MIN: CPT

## 2025-05-14 NOTE — CONSULTS
[facility]  [address] [place] [address]    P: [phone]  F: [phone]     PHYSICAL THERAPY VESTIBULAR EVALUATION      Date:  2025  Patient: Kaylynn Gates  : 1950  MRN: [id number]  Referring Provider: Germaine Hdz MD      Insurance: [facility] -auth after eval  through [facility] Diagnosis:R42 (ICD-10-CM) - Vertigo   Rehab Codes: R26.81   Onset date: 25  Next 's appt.: 25    Subjective:   HPI: Reports vertigo that started 2 weeks ago.  She has history of vertigo and was seen last year around this time for the same issue. Says its not as bad as it got last year. She made sure to make an appointment to get ahead of it. Reports the dizziness is a spinning sensation and lasts seconds. She is taking Meclizine that she feels helps some but not completely. Hasn't taken the medication in the last few days. Notices it when getting out of bed or bending/stooping down. Reports Tinnitus intermittently in ears that is not new. Reports she has a sensation that she is being pulled to the left and her balance is off at times. Notices she is using hand support when navigating step up into vehicle now. Denies neck pain.     CC:   vertigo       Past Medical History:   Diagnosis Date    Affective disorder     seasonal    Anticoagulant long-term use     Anxiety     Arthritis     shoulders and lower back    Asthma     Depression     DVT (deep venous thrombosis) ([facility])     has history in R leg X 4    Fibromyalgia     Hyperlipidemia     Hypertension     Hypothyroidism     Obesity     Pneumonia     Sinusitis     Sleep apnea     Thyroid disease     hypothyroidsm     Past Surgical History:   Procedure Laterality Date    COLONOSCOPY  2011    internal hemorrhoides    DILATION AND CURETTAGE OF UTERUS      ECTOPIC PREGNANCY SURGERY Left     HYSTERECTOMY (CERVIX STATUS UNKNOWN)      HYSTERECTOMY, TOTAL ABDOMINAL (CERVIX

## 2025-05-19 ENCOUNTER — ANTI-COAG VISIT (OUTPATIENT)
Age: 75
End: 2025-05-19
Payer: MEDICARE

## 2025-05-19 DIAGNOSIS — I82.403 RECURRENT ACUTE DEEP VEIN THROMBOSIS (DVT) OF BOTH LOWER EXTREMITIES (HCC): Primary | Chronic | ICD-10-CM

## 2025-05-19 LAB
INTERNATIONAL NORMALIZATION RATIO, POC: 3.6 (ref 2–3)
PROTHROMBIN TIME, POC: 43.6

## 2025-05-19 PROCEDURE — 99212 OFFICE O/P EST SF 10 MIN: CPT

## 2025-05-19 PROCEDURE — 85610 PROTHROMBIN TIME: CPT

## 2025-05-19 NOTE — PROGRESS NOTES
Patient seen in person in Medication Management Service.    Patient states compliant most of the time with regimen.    No bleeding or thromboembolic side effects noted.    No significant med or dietary changes.    No significant recent illness or disease state changes.      Patient acknowledges they are still an active patient of referring provider which is MONSE Hdz Yes     PT/INR done via POC meter per protocol.  INR was supratherapeutic at 3.6.  (goal 2 - 3)  The last INR was 2.4  The patient only ate spinach x 1 this past week    Warfarin regimen will be held for 1 day, then resume 5 mg Mon and 2.5 mg all other days.  Will retest in 10 days.  The patient has begun taking meclizine for vertigo    Patient understands dosing directions and information discussed.  Dosing schedule and follow up appointment given to patient. Progress note routed to referring physicians office.  Patient acknowledges working in Consult Agreement with Pharmacist as referred by his/her physician/provider.      For Pharmacy Admin Tracking Only    Intervention Detail: Dose Adjustment: 1, reason: Therapy De-escalation  Total # of Interventions Recommended: 1  Total # of Interventions Accepted: 1  Time Spent (min): 20

## 2025-05-30 ENCOUNTER — ANTI-COAG VISIT (OUTPATIENT)
Age: 75
End: 2025-05-30
Payer: MEDICARE

## 2025-05-30 DIAGNOSIS — I82.403 RECURRENT ACUTE DEEP VEIN THROMBOSIS (DVT) OF BOTH LOWER EXTREMITIES (HCC): Primary | Chronic | ICD-10-CM

## 2025-05-30 LAB
INTERNATIONAL NORMALIZATION RATIO, POC: 1.9
PROTHROMBIN TIME, POC: 23

## 2025-05-30 PROCEDURE — 99212 OFFICE O/P EST SF 10 MIN: CPT

## 2025-05-30 PROCEDURE — 85610 PROTHROMBIN TIME: CPT

## 2025-05-30 NOTE — PROGRESS NOTES
Patient seen in person in Medication Management Service.    Patient states compliant most of the time with regimen.    No bleeding or thromboembolic side effects noted.    No significant med or dietary changes.  Pt did not eat spinach x 10 days. Planning to start eating some spinach soon.   No significant recent illness or disease state changes.      Patient acknowledges they are still an active patient of referring provider which is Dr. Hdz Yes     PT/INR done via POC meter per protocol.  INR was subtherapeutic at 1.9.  (goal 2 - 3)    Warfarin regimen will be increased to 5 mg MF and 2.5 mg all other days.  Will retest in 9 days.    Patient understands dosing directions and information discussed.  Dosing schedule and follow up appointment given to patient. Progress note routed to referring physicians office.  Patient acknowledges working in Consult Agreement with Pharmacist as referred by his/her physician/provider.      For Pharmacy Admin Tracking Only    Intervention Detail: Adherence Monitorin and Dose Adjustment: 1, reason: Therapy Optimization  Total # of Interventions Recommended: 2  Total # of Interventions Accepted: 2  Time Spent (min): 20   Joann Montana PharmD, BCPS 2025 5:28 PM

## 2025-06-04 ENCOUNTER — HOSPITAL ENCOUNTER (OUTPATIENT)
Dept: PHYSICAL THERAPY | Age: 75
Setting detail: THERAPIES SERIES
Discharge: HOME | End: 2025-06-04
Attending: FAMILY MEDICINE
Payer: MEDICARE

## 2025-06-04 PROCEDURE — 97530 THERAPEUTIC ACTIVITIES: CPT

## 2025-06-04 NOTE — FLOWSHEET NOTE
[facility]   Outpatient Rehabilitation & Therapy  [address] [facility] [address]  P: [phone]   F: [phone]    Physical Therapy Daily Treatment Note      Date:  2025  Patient Name:  Kaylynn Gates    :  1950  MRN: [id number]  Referring Provider: Germaine Hdz MD                            Insurance: [facility] after eval  through [facility] Diagnosis:R42 (ICD-10-CM) - Vertigo   Rehab Codes: R26.81   Onset date: 25               Next 's appt.: 25  Visit# / total visits: 2  Cancels/No Shows: 0/0    Subjective: Reports her lightheadedness comes and goes since evaluation. Says its just annoying not really bad. Says she took Meclizine a couple days ago because she was feeling off.     Pain:  [x] Yes  [] No Location:  all over Pain Rating: (0-10 scale) 8/10  Pain altered Tx:  [] No  [] Yes  Action:  Comments:    Objective:    Precautions: none   Exercises:INSTRUCTION IN HOME EXERCISE PROGRAM  Exercise Reps/ Time Completed today  Comments    supine to left side lying   x5   x Lightheadedness initially resolves by 4th rep.    Supine to right side lying   x5   x Mild lightheadedness resolves by 3rd rep     sit to right side lying   x 1  x  No symptoms.     sit to left side lying  X 5  X   resolves by 4-5th rep    Gait with horizontal HM  50' x 4 x    Gait with VORcx ball  horizontal  50'x 2 x    Gait with 180 degree turns 50' x 4 x    Cone pick ups  3x 6 cones each way    No symptoms right, left with some lightheadedness that decreased with increase reps.    Other:     Access Code: [email or web address]  URL: [email or web address]  Date: 2025  Prepared by: Lico Lanier    Exercises  - Supine to Left Sidelying Vestibular Habituation  - 3 x daily - 7 x weekly - 1 sets - 5 reps - 10\" hold         Specific Instructions for next treatment:  habituation , gait with HM, cone pick ups,     Assessment: [x] Progressing toward goals. Patient with mild symptoms throughout

## 2025-06-09 ENCOUNTER — ANTI-COAG VISIT (OUTPATIENT)
Age: 75
End: 2025-06-09
Payer: MEDICARE

## 2025-06-09 DIAGNOSIS — I82.403 RECURRENT ACUTE DEEP VEIN THROMBOSIS (DVT) OF BOTH LOWER EXTREMITIES (HCC): Primary | Chronic | ICD-10-CM

## 2025-06-09 LAB
INTERNATIONAL NORMALIZATION RATIO, POC: 2
PROTHROMBIN TIME, POC: 23.8

## 2025-06-09 PROCEDURE — 99212 OFFICE O/P EST SF 10 MIN: CPT

## 2025-06-09 PROCEDURE — 85610 PROTHROMBIN TIME: CPT

## 2025-06-09 NOTE — PROGRESS NOTES
Patient seen in person in Medication Management Service.    Patient states compliant most of the time with regimen.  Pt was taking 2.5 mg on  instead of 5 mg.   No bleeding or thromboembolic side effects noted.    No significant med or dietary changes.  Pt ate spinach x 2 times since last visit.   No significant recent illness or disease state changes.      Patient acknowledges they are still an active patient of referring provider which is Germaine Hdz MD  Yes     PT/INR done via POC meter per protocol.  INR was therapeutic at 2.  (goal 2 - 3)    Warfarin regimen will be increased to 5 mg MF and 2.5 mg all other days.  Will retest in 2 weeks.    Patient understands dosing directions and information discussed.  Dosing schedule and follow up appointment given to patient. Progress note routed to referring physicians office.  Patient acknowledges working in Consult Agreement with Pharmacist as referred by his/her physician/provider.      For Pharmacy Admin Tracking Only    Intervention Detail: Adherence Monitorin and Dose Adjustment: 1, reason: Therapy Optimization  Total # of Interventions Recommended: 2  Total # of Interventions Accepted: 2  Time Spent (min): 20   Joann KincaidD, BCPS 2025 12:11 PM

## 2025-06-10 ENCOUNTER — HOSPITAL ENCOUNTER (OUTPATIENT)
Dept: PHYSICAL THERAPY | Age: 75
Setting detail: THERAPIES SERIES
Discharge: HOME | End: 2025-06-10
Attending: FAMILY MEDICINE
Payer: MEDICARE

## 2025-06-10 PROCEDURE — 97112 NEUROMUSCULAR REEDUCATION: CPT

## 2025-06-10 NOTE — FLOWSHEET NOTE
[facility]   Outpatient Rehabilitation & Therapy  [address]  P: [phone]   F: [phone]    Physical Therapy Daily Treatment Note      Date:  6/10/2025  Patient Name:  Kaylynn Gates    :  1950  MRN: [id number]  Referring Provider: Germaine Hdz MD                            Insurance: [facility] after eval  through [facility]  5/15- 4 visits [id number], [id number], [id number], [id number]  Medical Diagnosis:R42 (ICD-10-CM) - Vertigo   Rehab Codes: R26.81   Onset date: 25               Next 's appt.: 25  Visit# / total visits: 3  Cancels/No Shows: 0/0    Subjective: Patient reporting her symptoms are less severe and less annoying.  Patient reporting less sensation of feeling like her eyes were crossing.  Patient reporting she's limited due to fear of turning to the L and is more guarded when moving to L side.    Pain:  [] Yes  [x] No Location:  all over Pain Rating: (0-10 scale) denies/10  Pain altered Tx:  [] No  [] Yes  Action:  Comments:    Objective:    Precautions: none   Exercises:INSTRUCTION IN HOME EXERCISE PROGRAM  Exercise Reps/ Time Completed today  Comments    supine to left side lying   x5   Lightheadedness initially resolves by 4th rep.    Supine to right side lying   x5   Mild lightheadedness resolves by 3rd rep     sit to right side lying   x 1  No symptoms.     sit to left side lying  X 5    resolves by 4-5th rep    Gait with horizontal & vertical HM  50' x 4 x 6/10 added vertical HM   Gait with VORcx ball  horizontal  50'x 2 x 6/10 difficulty due to L shoulder discomfort   Gait with 180 degree turns 50' x 4 x    Retro amb 50ft x2 x 6/10 added   Cone pick ups  2x 7 cones each way  x  No symptoms noted   Cone pick ups to different heights 2x7 x 6/10 added pt to  cones from floor and turn around and place on table.  Performed to R/L sides.   Gait- walking around clinic  5 mins x 6/10 begin walking in CCW direction to challenge L turns and

## 2025-06-12 ENCOUNTER — OFFICE VISIT (OUTPATIENT)
Dept: NEUROLOGY | Age: 75
End: 2025-06-12
Payer: MEDICARE

## 2025-06-12 VITALS
HEIGHT: 63 IN | HEART RATE: 74 BPM | DIASTOLIC BLOOD PRESSURE: 88 MMHG | SYSTOLIC BLOOD PRESSURE: 158 MMHG | BODY MASS INDEX: 36.96 KG/M2 | WEIGHT: 208.6 LBS

## 2025-06-12 DIAGNOSIS — H81.10 BENIGN PAROXYSMAL POSITIONAL VERTIGO, UNSPECIFIED LATERALITY: ICD-10-CM

## 2025-06-12 DIAGNOSIS — G43.809 MIGRAINE VARIANT: ICD-10-CM

## 2025-06-12 DIAGNOSIS — M79.7 FIBROMYALGIA: Primary | ICD-10-CM

## 2025-06-12 PROCEDURE — G8427 DOCREV CUR MEDS BY ELIG CLIN: HCPCS | Performed by: PSYCHIATRY & NEUROLOGY

## 2025-06-12 PROCEDURE — 1090F PRES/ABSN URINE INCON ASSESS: CPT | Performed by: PSYCHIATRY & NEUROLOGY

## 2025-06-12 PROCEDURE — 1159F MED LIST DOCD IN RCRD: CPT | Performed by: PSYCHIATRY & NEUROLOGY

## 2025-06-12 PROCEDURE — G8399 PT W/DXA RESULTS DOCUMENT: HCPCS | Performed by: PSYCHIATRY & NEUROLOGY

## 2025-06-12 PROCEDURE — 3079F DIAST BP 80-89 MM HG: CPT | Performed by: PSYCHIATRY & NEUROLOGY

## 2025-06-12 PROCEDURE — 1123F ACP DISCUSS/DSCN MKR DOCD: CPT | Performed by: PSYCHIATRY & NEUROLOGY

## 2025-06-12 PROCEDURE — 3017F COLORECTAL CA SCREEN DOC REV: CPT | Performed by: PSYCHIATRY & NEUROLOGY

## 2025-06-12 PROCEDURE — 99214 OFFICE O/P EST MOD 30 MIN: CPT | Performed by: PSYCHIATRY & NEUROLOGY

## 2025-06-12 PROCEDURE — G8417 CALC BMI ABV UP PARAM F/U: HCPCS | Performed by: PSYCHIATRY & NEUROLOGY

## 2025-06-12 PROCEDURE — 3077F SYST BP >= 140 MM HG: CPT | Performed by: PSYCHIATRY & NEUROLOGY

## 2025-06-12 PROCEDURE — 1036F TOBACCO NON-USER: CPT | Performed by: PSYCHIATRY & NEUROLOGY

## 2025-06-12 RX ORDER — NORTRIPTYLINE HYDROCHLORIDE 10 MG/1
10 CAPSULE ORAL NIGHTLY
Qty: 90 CAPSULE | Refills: 3 | Status: SHIPPED | OUTPATIENT
Start: 2025-06-12

## 2025-06-12 ASSESSMENT — ENCOUNTER SYMPTOMS
EYES NEGATIVE: 1
RESPIRATORY NEGATIVE: 1
ALLERGIC/IMMUNOLOGIC NEGATIVE: 1
GASTROINTESTINAL NEGATIVE: 1

## 2025-06-12 NOTE — PROGRESS NOTES
Neurological Examination  Constitutional .General exam well groomed   Head/Ears /Nose/Throat: external ear .Normal exam  Neck and thyroid .Normal size. No bruits  Respiratory .Breathsounds clear bilaterally  Cardiovascular: Auscultation of heart with regular rate and rhythm  Musculoskeletal. Muscle bulk and tone normal                                                           Muscle strength 5/5 strength throughout                                                                                No dysmetria or dysdiadokinesis  No tremor   Normal fine motor  Gait normal   Orientation Alert and oriented x 3   Attention and concentration normal  Short term memory normal  Language process and speech normal . No aphasia   Cranial nerve 2 normal acuety and visual fields  Cranial nerve 3, 4 and 6 .Extraocular muscles are intact . Pupils are equal and reactive   Cranial nerve 5 .Intact corneal reflex. Normal facial sensation  Cranial nerve 7 normal exam   Cranial nerve 8. Grossly intact hearing   Cranial nerve 9 and 10. Symmetric palate elevation   Cranial nerve 11 , 5 out of 5 strength   Cranial Nerve 12 midline tongue . No atrophy  Sensation .Normal pinprick and light touch   Deep Tendon Reflexes normal  Plantar response flexor bilaterally      ASSESSMENT/PLAN      Diagnosis Orders   1. Fibromyalgia        2. Benign paroxysmal positional vertigo, unspecified laterality        3. Migraine variant            Will lower neurontin to 800 mg po bid with lethargy staying on pamelor 10 mg po qhs not wanting any new medication trial at this time     As above

## 2025-06-13 ENCOUNTER — HOSPITAL ENCOUNTER (OUTPATIENT)
Dept: PHYSICAL THERAPY | Age: 75
Setting detail: THERAPIES SERIES
Discharge: HOME | End: 2025-06-13
Attending: FAMILY MEDICINE
Payer: MEDICARE

## 2025-06-13 PROCEDURE — 97530 THERAPEUTIC ACTIVITIES: CPT

## 2025-06-13 NOTE — PROGRESS NOTES
Therapy         [x]  Ther Activities  10  1 [id number]   []  Aquatics         []  Neuromuscular      [] Vasocompression         [] Gait Training         [] Dry needling        [] 1 or 2 muscles        [] 3 or more muscles         []  Other         Total Billable time 10 1        Time In:0903          Time Out: 0913     Electronically signed by: Lico Lanier PT    If you have any questions or concerns, please don't hesitate to call.  Thank you for your referral.    Physician Signature:________________________________Date:__________________  By signing above or cosigning this note, I have reviewed this plan of care and certify a need for medically necessary rehabilitation services.     *PLEASE SIGN ABOVE AND FAX BACK ALL PAGES*

## 2025-06-17 ENCOUNTER — APPOINTMENT (OUTPATIENT)
Dept: PHYSICAL THERAPY | Age: 75
End: 2025-06-17
Attending: FAMILY MEDICINE
Payer: MEDICARE

## 2025-06-24 ENCOUNTER — APPOINTMENT (OUTPATIENT)
Dept: PHYSICAL THERAPY | Age: 75
End: 2025-06-24
Attending: FAMILY MEDICINE
Payer: MEDICARE

## 2025-06-24 ENCOUNTER — ANTI-COAG VISIT (OUTPATIENT)
Age: 75
End: 2025-06-24
Payer: MEDICARE

## 2025-06-24 DIAGNOSIS — I82.403 RECURRENT ACUTE DEEP VEIN THROMBOSIS (DVT) OF BOTH LOWER EXTREMITIES (HCC): Primary | Chronic | ICD-10-CM

## 2025-06-24 LAB
INTERNATIONAL NORMALIZATION RATIO, POC: 2.4
PROTHROMBIN TIME, POC: 28.5

## 2025-06-24 PROCEDURE — 85610 PROTHROMBIN TIME: CPT

## 2025-06-24 PROCEDURE — 99211 OFF/OP EST MAY X REQ PHY/QHP: CPT

## 2025-06-24 NOTE — PROGRESS NOTES
Patient seen in person in Medication Management Service.    Patient states compliant all of the time with regimen.    No bleeding or thromboembolic side effects noted.    Patient has had cooked spinach about once a week - twice in last two weeks.  Plans to possibly increase to twice a week. Discussed consistency.   Patient's levothyroxine dose increased from 137 mcg once a day to 150 mcg/day about 3 weeks ago.  Patient also stopped tramadol and switched to APAP #3 for pain.  Patient was already taking APAP #3 but only twice a day and now can take during the day as needed.  Has taken two doses of ibuprofen 800mg twice in last two weeks.  No significant recent illness or disease state changes.      Patient acknowledges they are still an active patient of referring provider which is Dr. Germaine Hdz MD Yes     PT/INR done via POC meter per protocol.  INR was therapeutic at 2.4.  (goal 2 - 3)    Warfarin regimen will be continued at current dose of 5mg on Mon/Fri and 2.5mg all other days.  Will retest in 2 weeks to monitor for any changes due to recent change in levothyroxine dose or due to patients intended increase in spinach intake.     Patient understands dosing directions and information discussed.  Dosing schedule and follow up appointment given to patient. Progress note routed to referring physicians office.  Patient acknowledges working in Consult Agreement with Pharmacist as referred by his/her physician/provider.      For Pharmacy Admin Tracking Only    Total # of Interventions Recommended: 0  Total # of Interventions Accepted: 0  Time Spent (min): 20    Carlton Nice RPH,Pharm.D,, BCPS, CACP  6/24/2025  1:49 PM

## 2025-06-25 ENCOUNTER — APPOINTMENT (OUTPATIENT)
Dept: PHYSICAL THERAPY | Age: 75
End: 2025-06-25
Attending: FAMILY MEDICINE
Payer: MEDICARE

## 2025-06-30 ENCOUNTER — APPOINTMENT (OUTPATIENT)
Dept: PHYSICAL THERAPY | Age: 75
End: 2025-06-30
Attending: FAMILY MEDICINE
Payer: MEDICARE

## 2025-07-09 ENCOUNTER — ANTI-COAG VISIT (OUTPATIENT)
Age: 75
End: 2025-07-09
Payer: MEDICARE

## 2025-07-09 DIAGNOSIS — I82.403 RECURRENT ACUTE DEEP VEIN THROMBOSIS (DVT) OF BOTH LOWER EXTREMITIES (HCC): Primary | Chronic | ICD-10-CM

## 2025-07-09 LAB
INTERNATIONAL NORMALIZATION RATIO, POC: 2.9
PROTHROMBIN TIME, POC: 34.7

## 2025-07-09 PROCEDURE — 99211 OFF/OP EST MAY X REQ PHY/QHP: CPT | Performed by: PHARMACIST

## 2025-07-09 PROCEDURE — 85610 PROTHROMBIN TIME: CPT | Performed by: PHARMACIST

## 2025-07-09 NOTE — PROGRESS NOTES
Patient seen in person in Medication Management Service.    Patient states compliant all of the time with regimen.    No bleeding or thromboembolic side effects noted.    Patient took ibuprofen 800 mg yesterday and this morning. The previous days she was taking Tylenol #3. Plans to take Tylenol #3 for the pain unless it is unbearable she will take ibuprofen.   Patient has had cooked spinach two times each week for the last two weeks.  Discussed consistency. States she also had one bite of shelbi greens. Discussed significant amount of vitamin k and advised to avoid it.  No significant recent illness or disease state changes.      Patient acknowledges they are still an active patient of referring provider which is Dr. Hdz Yes     PT/INR done via POC meter per protocol.  INR was therapeutic at 2.9.  (goal 2 - 3)    Warfarin regimen will be continued at current dose 5 mg Mon/Fri and 2.5 mg all other days.  Will retest in 4 weeks.    Patient understands dosing directions and information discussed.  Dosing schedule and follow up appointment given to patient. Progress note routed to referring physicians office.  Patient acknowledges working in Consult Agreement with Pharmacist as referred by his/her physician/provider.      For Pharmacy Admin Tracking Only    Intervention Detail:   Total # of Interventions Recommended: 0  Total # of Interventions Accepted: 0  Time Spent (min): 20     Darby Michaels, Pharm D, BCPS, CACP  [facility]  7/9/2025 2:21 PM

## 2025-07-14 ENCOUNTER — OFFICE VISIT (OUTPATIENT)
Dept: PHYSICAL MEDICINE AND REHAB | Age: 75
End: 2025-07-14
Payer: MEDICARE

## 2025-07-14 VITALS
DIASTOLIC BLOOD PRESSURE: 84 MMHG | WEIGHT: 204.8 LBS | HEART RATE: 81 BPM | SYSTOLIC BLOOD PRESSURE: 146 MMHG | TEMPERATURE: 97.5 F | BODY MASS INDEX: 36.28 KG/M2

## 2025-07-14 DIAGNOSIS — M79.7 FIBROMYALGIA: Primary | ICD-10-CM

## 2025-07-14 PROCEDURE — 1159F MED LIST DOCD IN RCRD: CPT | Performed by: STUDENT IN AN ORGANIZED HEALTH CARE EDUCATION/TRAINING PROGRAM

## 2025-07-14 PROCEDURE — 3079F DIAST BP 80-89 MM HG: CPT | Performed by: STUDENT IN AN ORGANIZED HEALTH CARE EDUCATION/TRAINING PROGRAM

## 2025-07-14 PROCEDURE — G8427 DOCREV CUR MEDS BY ELIG CLIN: HCPCS | Performed by: STUDENT IN AN ORGANIZED HEALTH CARE EDUCATION/TRAINING PROGRAM

## 2025-07-14 PROCEDURE — 99214 OFFICE O/P EST MOD 30 MIN: CPT | Performed by: STUDENT IN AN ORGANIZED HEALTH CARE EDUCATION/TRAINING PROGRAM

## 2025-07-14 PROCEDURE — 1036F TOBACCO NON-USER: CPT | Performed by: STUDENT IN AN ORGANIZED HEALTH CARE EDUCATION/TRAINING PROGRAM

## 2025-07-14 PROCEDURE — 3077F SYST BP >= 140 MM HG: CPT | Performed by: STUDENT IN AN ORGANIZED HEALTH CARE EDUCATION/TRAINING PROGRAM

## 2025-07-14 PROCEDURE — 1090F PRES/ABSN URINE INCON ASSESS: CPT | Performed by: STUDENT IN AN ORGANIZED HEALTH CARE EDUCATION/TRAINING PROGRAM

## 2025-07-14 PROCEDURE — G8399 PT W/DXA RESULTS DOCUMENT: HCPCS | Performed by: STUDENT IN AN ORGANIZED HEALTH CARE EDUCATION/TRAINING PROGRAM

## 2025-07-14 PROCEDURE — G8417 CALC BMI ABV UP PARAM F/U: HCPCS | Performed by: STUDENT IN AN ORGANIZED HEALTH CARE EDUCATION/TRAINING PROGRAM

## 2025-07-14 PROCEDURE — 3017F COLORECTAL CA SCREEN DOC REV: CPT | Performed by: STUDENT IN AN ORGANIZED HEALTH CARE EDUCATION/TRAINING PROGRAM

## 2025-07-14 PROCEDURE — 1123F ACP DISCUSS/DSCN MKR DOCD: CPT | Performed by: STUDENT IN AN ORGANIZED HEALTH CARE EDUCATION/TRAINING PROGRAM

## 2025-07-25 ENCOUNTER — ANTI-COAG VISIT (OUTPATIENT)
Age: 75
End: 2025-07-25
Payer: MEDICARE

## 2025-07-25 DIAGNOSIS — I82.403 RECURRENT ACUTE DEEP VEIN THROMBOSIS (DVT) OF BOTH LOWER EXTREMITIES (HCC): Primary | Chronic | ICD-10-CM

## 2025-07-25 LAB
INTERNATIONAL NORMALIZATION RATIO, POC: 2
PROTHROMBIN TIME, POC: 24.3

## 2025-07-25 PROCEDURE — 99211 OFF/OP EST MAY X REQ PHY/QHP: CPT

## 2025-07-25 PROCEDURE — 85610 PROTHROMBIN TIME: CPT

## 2025-07-25 NOTE — PROGRESS NOTES
Patient seen in person in Medication Management Service.    Patient states compliant all of the time with regimen.    No bleeding or thromboembolic side effects noted.    No significant med or dietary changes. Had spinach x 2 weeks, trying to be consistant. Considering to take neuriva and cerefolin, asked pt to let clinic know if/when she starts taking supplements. Had some CBD recently a couple days ago.   No significant recent illness or disease state changes.      Patient acknowledges they are still an active patient of referring provider which is Germaine Hdz MD  Yes     PT/INR done via POC meter per protocol.  INR was therapeutic at 2.  (goal 2 - 3)    Warfarin regimen will be continued at current dose 5 mg Mon and Fri and 2.5 mg all other days.  Will retest in 3 weeks.    Patient understands dosing directions and information discussed.  Dosing schedule and follow up appointment given to patient. Progress note routed to referring physicians office.  Patient acknowledges working in Consult Agreement with Pharmacist as referred by his/her physician/provider.      For Pharmacy Admin Tracking Only    Intervention Detail: Adherence Monitorin  Total # of Interventions Recommended: 1  Total # of Interventions Accepted: 1  Time Spent (min): 20    Joann Montana PharmD, BCPS 2025 2:02 PM

## 2025-08-15 ENCOUNTER — ANTI-COAG VISIT (OUTPATIENT)
Age: 75
End: 2025-08-15
Payer: MEDICARE

## 2025-08-15 DIAGNOSIS — I82.403 RECURRENT ACUTE DEEP VEIN THROMBOSIS (DVT) OF BOTH LOWER EXTREMITIES (HCC): Primary | Chronic | ICD-10-CM

## 2025-08-15 LAB
INTERNATIONAL NORMALIZATION RATIO, POC: 2.1
PROTHROMBIN TIME, POC: NORMAL

## 2025-08-15 PROCEDURE — 85610 PROTHROMBIN TIME: CPT

## 2025-08-15 PROCEDURE — 99211 OFF/OP EST MAY X REQ PHY/QHP: CPT

## 2025-09-05 ENCOUNTER — ANTI-COAG VISIT (OUTPATIENT)
Age: 75
End: 2025-09-05
Payer: MEDICARE

## 2025-09-05 DIAGNOSIS — I82.403 RECURRENT ACUTE DEEP VEIN THROMBOSIS (DVT) OF BOTH LOWER EXTREMITIES (HCC): Primary | Chronic | ICD-10-CM

## 2025-09-05 LAB
INTERNATIONAL NORMALIZATION RATIO, POC: 5.8
PROTHROMBIN TIME, POC: 69

## 2025-09-05 PROCEDURE — 85610 PROTHROMBIN TIME: CPT | Performed by: PHARMACY

## 2025-09-05 PROCEDURE — 99212 OFFICE O/P EST SF 10 MIN: CPT | Performed by: PHARMACY
